# Patient Record
Sex: MALE | Race: BLACK OR AFRICAN AMERICAN | NOT HISPANIC OR LATINO | Employment: OTHER | URBAN - METROPOLITAN AREA
[De-identification: names, ages, dates, MRNs, and addresses within clinical notes are randomized per-mention and may not be internally consistent; named-entity substitution may affect disease eponyms.]

---

## 2023-01-01 ENCOUNTER — APPOINTMENT (OUTPATIENT)
Dept: GENERAL RADIOLOGY | Facility: CLINIC | Age: 88
DRG: 682 | End: 2023-01-01
Payer: COMMERCIAL

## 2023-01-01 ENCOUNTER — APPOINTMENT (OUTPATIENT)
Dept: SPEECH THERAPY | Facility: CLINIC | Age: 88
DRG: 871 | End: 2023-01-01
Payer: COMMERCIAL

## 2023-01-01 ENCOUNTER — VIRTUAL VISIT (OUTPATIENT)
Dept: INTERPRETER SERVICES | Facility: CLINIC | Age: 88
End: 2023-01-01

## 2023-01-01 ENCOUNTER — APPOINTMENT (OUTPATIENT)
Dept: GENERAL RADIOLOGY | Facility: CLINIC | Age: 88
DRG: 071 | End: 2023-01-01
Payer: COMMERCIAL

## 2023-01-01 ENCOUNTER — APPOINTMENT (OUTPATIENT)
Dept: GENERAL RADIOLOGY | Facility: CLINIC | Age: 88
DRG: 071 | End: 2023-01-01
Attending: HOSPITALIST
Payer: COMMERCIAL

## 2023-01-01 ENCOUNTER — APPOINTMENT (OUTPATIENT)
Dept: CT IMAGING | Facility: CLINIC | Age: 88
DRG: 871 | End: 2023-01-01
Attending: INTERNAL MEDICINE
Payer: COMMERCIAL

## 2023-01-01 ENCOUNTER — APPOINTMENT (OUTPATIENT)
Dept: INTERPRETER SERVICES | Facility: CLINIC | Age: 88
End: 2023-01-01
Payer: COMMERCIAL

## 2023-01-01 ENCOUNTER — APPOINTMENT (OUTPATIENT)
Dept: GENERAL RADIOLOGY | Facility: CLINIC | Age: 88
DRG: 871 | End: 2023-01-01
Attending: INTERNAL MEDICINE
Payer: COMMERCIAL

## 2023-01-01 ENCOUNTER — APPOINTMENT (OUTPATIENT)
Dept: CARDIOLOGY | Facility: CLINIC | Age: 88
DRG: 682 | End: 2023-01-01
Payer: COMMERCIAL

## 2023-01-01 ENCOUNTER — PATIENT OUTREACH (OUTPATIENT)
Dept: CARE COORDINATION | Facility: CLINIC | Age: 88
End: 2023-01-01
Payer: COMMERCIAL

## 2023-01-01 ENCOUNTER — APPOINTMENT (OUTPATIENT)
Dept: GENERAL RADIOLOGY | Facility: CLINIC | Age: 88
DRG: 871 | End: 2023-01-01
Attending: EMERGENCY MEDICINE
Payer: COMMERCIAL

## 2023-01-01 ENCOUNTER — HOSPITAL ENCOUNTER (INPATIENT)
Facility: CLINIC | Age: 88
LOS: 2 days | DRG: 682 | End: 2023-08-22
Attending: EMERGENCY MEDICINE | Admitting: INTERNAL MEDICINE
Payer: COMMERCIAL

## 2023-01-01 ENCOUNTER — APPOINTMENT (OUTPATIENT)
Dept: SPEECH THERAPY | Facility: CLINIC | Age: 88
DRG: 871 | End: 2023-01-01
Attending: HOSPITALIST
Payer: COMMERCIAL

## 2023-01-01 ENCOUNTER — APPOINTMENT (OUTPATIENT)
Dept: OCCUPATIONAL THERAPY | Facility: CLINIC | Age: 88
DRG: 871 | End: 2023-01-01
Attending: INTERNAL MEDICINE
Payer: COMMERCIAL

## 2023-01-01 ENCOUNTER — APPOINTMENT (OUTPATIENT)
Dept: CT IMAGING | Facility: CLINIC | Age: 88
DRG: 871 | End: 2023-01-01
Attending: EMERGENCY MEDICINE
Payer: COMMERCIAL

## 2023-01-01 ENCOUNTER — DOCUMENTATION ONLY (OUTPATIENT)
Dept: OTHER | Facility: CLINIC | Age: 88
End: 2023-01-01
Payer: COMMERCIAL

## 2023-01-01 ENCOUNTER — APPOINTMENT (OUTPATIENT)
Dept: GENERAL RADIOLOGY | Facility: CLINIC | Age: 88
DRG: 871 | End: 2023-01-01
Attending: HOSPITALIST
Payer: COMMERCIAL

## 2023-01-01 ENCOUNTER — APPOINTMENT (OUTPATIENT)
Dept: CT IMAGING | Facility: CLINIC | Age: 88
DRG: 871 | End: 2023-01-01
Attending: PHYSICIAN ASSISTANT
Payer: COMMERCIAL

## 2023-01-01 ENCOUNTER — HOSPITAL ENCOUNTER (INPATIENT)
Facility: CLINIC | Age: 88
LOS: 19 days | Discharge: NURSING, REHAB OR LONG-TERM CARE | DRG: 871 | End: 2023-07-17
Attending: EMERGENCY MEDICINE | Admitting: INTERNAL MEDICINE
Payer: COMMERCIAL

## 2023-01-01 ENCOUNTER — APPOINTMENT (OUTPATIENT)
Dept: GENERAL RADIOLOGY | Facility: CLINIC | Age: 88
DRG: 871 | End: 2023-01-01
Attending: PODIATRIST
Payer: COMMERCIAL

## 2023-01-01 ENCOUNTER — APPOINTMENT (OUTPATIENT)
Dept: CT IMAGING | Facility: CLINIC | Age: 88
DRG: 071 | End: 2023-01-01
Payer: COMMERCIAL

## 2023-01-01 ENCOUNTER — HOSPITAL ENCOUNTER (INPATIENT)
Facility: CLINIC | Age: 88
LOS: 2 days | Discharge: NURSING, REHAB OR LONG-TERM CARE | DRG: 641 | End: 2023-08-16
Attending: EMERGENCY MEDICINE | Admitting: INTERNAL MEDICINE
Payer: COMMERCIAL

## 2023-01-01 ENCOUNTER — MEDICAL CORRESPONDENCE (OUTPATIENT)
Dept: HEALTH INFORMATION MANAGEMENT | Facility: CLINIC | Age: 88
End: 2023-01-01
Payer: COMMERCIAL

## 2023-01-01 ENCOUNTER — APPOINTMENT (OUTPATIENT)
Dept: CARDIOLOGY | Facility: CLINIC | Age: 88
DRG: 871 | End: 2023-01-01
Attending: INTERNAL MEDICINE
Payer: COMMERCIAL

## 2023-01-01 ENCOUNTER — HOSPITAL ENCOUNTER (INPATIENT)
Facility: CLINIC | Age: 88
LOS: 4 days | Discharge: NURSING, REHAB OR LONG-TERM CARE | DRG: 071 | End: 2023-07-31
Admitting: HOSPITALIST
Payer: COMMERCIAL

## 2023-01-01 ENCOUNTER — APPOINTMENT (OUTPATIENT)
Dept: OCCUPATIONAL THERAPY | Facility: CLINIC | Age: 88
DRG: 871 | End: 2023-01-01
Payer: COMMERCIAL

## 2023-01-01 ENCOUNTER — APPOINTMENT (OUTPATIENT)
Dept: ULTRASOUND IMAGING | Facility: CLINIC | Age: 88
DRG: 682 | End: 2023-01-01
Attending: EMERGENCY MEDICINE
Payer: COMMERCIAL

## 2023-01-01 ENCOUNTER — APPOINTMENT (OUTPATIENT)
Dept: ULTRASOUND IMAGING | Facility: CLINIC | Age: 88
DRG: 071 | End: 2023-01-01
Attending: HOSPITALIST
Payer: COMMERCIAL

## 2023-01-01 ENCOUNTER — MEDICAL CORRESPONDENCE (OUTPATIENT)
Dept: HEALTH INFORMATION MANAGEMENT | Facility: CLINIC | Age: 88
End: 2023-01-01

## 2023-01-01 ENCOUNTER — APPOINTMENT (OUTPATIENT)
Dept: MRI IMAGING | Facility: CLINIC | Age: 88
DRG: 871 | End: 2023-01-01
Attending: NURSE PRACTITIONER
Payer: COMMERCIAL

## 2023-01-01 VITALS
HEART RATE: 104 BPM | SYSTOLIC BLOOD PRESSURE: 114 MMHG | DIASTOLIC BLOOD PRESSURE: 64 MMHG | TEMPERATURE: 98.7 F | RESPIRATION RATE: 16 BRPM | OXYGEN SATURATION: 98 %

## 2023-01-01 VITALS
WEIGHT: 220.68 LBS | BODY MASS INDEX: 34.64 KG/M2 | DIASTOLIC BLOOD PRESSURE: 62 MMHG | SYSTOLIC BLOOD PRESSURE: 116 MMHG | HEIGHT: 67 IN | OXYGEN SATURATION: 98 % | HEART RATE: 71 BPM | RESPIRATION RATE: 18 BRPM | TEMPERATURE: 97.8 F

## 2023-01-01 VITALS
WEIGHT: 188.71 LBS | RESPIRATION RATE: 18 BRPM | HEART RATE: 99 BPM | SYSTOLIC BLOOD PRESSURE: 72 MMHG | BODY MASS INDEX: 29.62 KG/M2 | DIASTOLIC BLOOD PRESSURE: 49 MMHG | OXYGEN SATURATION: 91 % | TEMPERATURE: 97.2 F

## 2023-01-01 VITALS
TEMPERATURE: 97.7 F | SYSTOLIC BLOOD PRESSURE: 106 MMHG | OXYGEN SATURATION: 100 % | RESPIRATION RATE: 18 BRPM | HEART RATE: 64 BPM | DIASTOLIC BLOOD PRESSURE: 55 MMHG | BODY MASS INDEX: 30.07 KG/M2 | WEIGHT: 191.58 LBS

## 2023-01-01 DIAGNOSIS — L97.919 ULCERS OF BOTH LOWER EXTREMITIES, UNSPECIFIED ULCER STAGE (H): ICD-10-CM

## 2023-01-01 DIAGNOSIS — R40.4 TRANSIENT ALTERATION OF AWARENESS: ICD-10-CM

## 2023-01-01 DIAGNOSIS — A41.9 SEPSIS WITH ACUTE RENAL FAILURE WITHOUT SEPTIC SHOCK, DUE TO UNSPECIFIED ORGANISM, UNSPECIFIED ACUTE RENAL FAILURE TYPE (H): ICD-10-CM

## 2023-01-01 DIAGNOSIS — A41.9 ACUTE SEPSIS (H): ICD-10-CM

## 2023-01-01 DIAGNOSIS — R65.20 SEPSIS WITH ACUTE RENAL FAILURE WITHOUT SEPTIC SHOCK, DUE TO UNSPECIFIED ORGANISM, UNSPECIFIED ACUTE RENAL FAILURE TYPE (H): ICD-10-CM

## 2023-01-01 DIAGNOSIS — R78.81 GRAM-POSITIVE BACTEREMIA: ICD-10-CM

## 2023-01-01 DIAGNOSIS — S81.812A LACERATION OF LEFT LOWER EXTREMITY, INITIAL ENCOUNTER: ICD-10-CM

## 2023-01-01 DIAGNOSIS — G93.41 METABOLIC ENCEPHALOPATHY: ICD-10-CM

## 2023-01-01 DIAGNOSIS — I50.22 CHRONIC SYSTOLIC CONGESTIVE HEART FAILURE (H): Primary | ICD-10-CM
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
## 2023-01-01 DIAGNOSIS — N17.9 SEPSIS WITH ACUTE RENAL FAILURE WITHOUT SEPTIC SHOCK, DUE TO UNSPECIFIED ORGANISM, UNSPECIFIED ACUTE RENAL FAILURE TYPE (H): ICD-10-CM

## 2023-01-01 DIAGNOSIS — R54 OLD AGE: Chronic | ICD-10-CM

## 2023-01-01 DIAGNOSIS — B37.49 CANDIDA UTI: ICD-10-CM

## 2023-01-01 DIAGNOSIS — I63.10 CEREBROVASCULAR ACCIDENT (CVA) DUE TO EMBOLISM OF PRECEREBRAL ARTERY (H): ICD-10-CM

## 2023-01-01 DIAGNOSIS — E16.2 HYPOGLYCEMIA: ICD-10-CM

## 2023-01-01 DIAGNOSIS — N17.9 ACUTE KIDNEY INJURY (H): ICD-10-CM

## 2023-01-01 DIAGNOSIS — R53.1 GENERALIZED WEAKNESS: ICD-10-CM

## 2023-01-01 DIAGNOSIS — I63.10 CEREBROVASCULAR ACCIDENT (CVA) DUE TO EMBOLISM OF PRECEREBRAL ARTERY (H): Primary | ICD-10-CM

## 2023-01-01 DIAGNOSIS — N30.01 ACUTE CYSTITIS WITH HEMATURIA: ICD-10-CM

## 2023-01-01 DIAGNOSIS — L97.929 ULCERS OF BOTH LOWER EXTREMITIES, UNSPECIFIED ULCER STAGE (H): ICD-10-CM

## 2023-01-01 DIAGNOSIS — N39.0 ACUTE UTI: ICD-10-CM

## 2023-01-01 DIAGNOSIS — I10 BENIGN ESSENTIAL HYPERTENSION: ICD-10-CM

## 2023-01-01 LAB
ABO/RH(D): NORMAL
ABO/RH(D): NORMAL
ALBUMIN SERPL BCG-MCNC: 1.6 G/DL (ref 3.5–5.2)
ALBUMIN SERPL BCG-MCNC: 1.7 G/DL (ref 3.5–5.2)
ALBUMIN SERPL BCG-MCNC: 2 G/DL (ref 3.5–5.2)
ALBUMIN SERPL BCG-MCNC: 2.2 G/DL (ref 3.5–5.2)
ALBUMIN SERPL BCG-MCNC: 2.4 G/DL (ref 3.5–5.2)
ALBUMIN UR-MCNC: 10 MG/DL
ALBUMIN UR-MCNC: 20 MG/DL
ALBUMIN UR-MCNC: 30 MG/DL
ALBUMIN UR-MCNC: NEGATIVE MG/DL
ALBUMIN UR-MCNC: NEGATIVE MG/DL
ALP SERPL-CCNC: 115 U/L (ref 40–129)
ALP SERPL-CCNC: 120 U/L (ref 40–129)
ALP SERPL-CCNC: 149 U/L (ref 40–129)
ALP SERPL-CCNC: 153 U/L (ref 40–129)
ALP SERPL-CCNC: 154 U/L (ref 40–129)
ALT SERPL W P-5'-P-CCNC: 11 U/L (ref 0–70)
ALT SERPL W P-5'-P-CCNC: 22 U/L (ref 0–70)
ALT SERPL W P-5'-P-CCNC: 36 U/L (ref 0–70)
ALT SERPL W P-5'-P-CCNC: 44 U/L (ref 0–70)
ALT SERPL W P-5'-P-CCNC: 7 U/L (ref 0–70)
ANION GAP SERPL CALCULATED.3IONS-SCNC: 10 MMOL/L (ref 7–15)
ANION GAP SERPL CALCULATED.3IONS-SCNC: 11 MMOL/L (ref 7–15)
ANION GAP SERPL CALCULATED.3IONS-SCNC: 13 MMOL/L (ref 7–15)
ANION GAP SERPL CALCULATED.3IONS-SCNC: 14 MMOL/L (ref 7–15)
ANION GAP SERPL CALCULATED.3IONS-SCNC: 17 MMOL/L (ref 7–15)
ANION GAP SERPL CALCULATED.3IONS-SCNC: 5 MMOL/L (ref 7–15)
ANION GAP SERPL CALCULATED.3IONS-SCNC: 6 MMOL/L (ref 7–15)
ANION GAP SERPL CALCULATED.3IONS-SCNC: 7 MMOL/L (ref 7–15)
ANION GAP SERPL CALCULATED.3IONS-SCNC: 8 MMOL/L (ref 7–15)
ANION GAP SERPL CALCULATED.3IONS-SCNC: 9 MMOL/L (ref 7–15)
ANTIBODY SCREEN: NEGATIVE
ANTIBODY SCREEN: NEGATIVE
APPEARANCE UR: ABNORMAL
APPEARANCE UR: CLEAR
APPEARANCE UR: CLEAR
APTT PPP: 110 SECONDS (ref 22–38)
AST SERPL W P-5'-P-CCNC: 113 U/L (ref 0–45)
AST SERPL W P-5'-P-CCNC: 21 U/L (ref 0–45)
AST SERPL W P-5'-P-CCNC: 36 U/L (ref 0–45)
AST SERPL W P-5'-P-CCNC: 61 U/L (ref 0–45)
AST SERPL W P-5'-P-CCNC: 86 U/L (ref 0–45)
ATRIAL RATE - MUSE: NORMAL BPM
ATRIAL RATE - MUSE: NORMAL BPM
BACTERIA #/AREA URNS HPF: ABNORMAL /HPF
BACTERIA BLD CULT: ABNORMAL
BACTERIA BLD CULT: NO GROWTH
BACTERIA UR CULT: ABNORMAL
BACTERIA UR CULT: NO GROWTH
BASOPHILS # BLD AUTO: 0 10E3/UL (ref 0–0.2)
BASOPHILS # BLD AUTO: 0.1 10E3/UL (ref 0–0.2)
BASOPHILS NFR BLD AUTO: 0 %
BASOPHILS NFR BLD AUTO: 1 %
BILIRUB DIRECT SERPL-MCNC: <0.2 MG/DL (ref 0–0.3)
BILIRUB SERPL-MCNC: 0.2 MG/DL
BILIRUB SERPL-MCNC: 0.3 MG/DL
BILIRUB SERPL-MCNC: 0.4 MG/DL
BILIRUB SERPL-MCNC: 0.4 MG/DL
BILIRUB SERPL-MCNC: 0.5 MG/DL
BILIRUB UR QL STRIP: NEGATIVE
BLD PROD TYP BPU: NORMAL
BLOOD COMPONENT TYPE: NORMAL
BUN SERPL-MCNC: 10.8 MG/DL (ref 8–23)
BUN SERPL-MCNC: 11 MG/DL (ref 8–23)
BUN SERPL-MCNC: 12.2 MG/DL (ref 8–23)
BUN SERPL-MCNC: 12.3 MG/DL (ref 8–23)
BUN SERPL-MCNC: 12.4 MG/DL (ref 8–23)
BUN SERPL-MCNC: 12.5 MG/DL (ref 8–23)
BUN SERPL-MCNC: 12.8 MG/DL (ref 8–23)
BUN SERPL-MCNC: 12.9 MG/DL (ref 8–23)
BUN SERPL-MCNC: 13.6 MG/DL (ref 8–23)
BUN SERPL-MCNC: 15 MG/DL (ref 8–23)
BUN SERPL-MCNC: 15.3 MG/DL (ref 8–23)
BUN SERPL-MCNC: 15.9 MG/DL (ref 8–23)
BUN SERPL-MCNC: 18 MG/DL (ref 8–23)
BUN SERPL-MCNC: 19 MG/DL (ref 8–23)
BUN SERPL-MCNC: 19.4 MG/DL (ref 8–23)
BUN SERPL-MCNC: 20 MG/DL (ref 8–23)
BUN SERPL-MCNC: 22.4 MG/DL (ref 8–23)
BUN SERPL-MCNC: 26.4 MG/DL (ref 8–23)
BUN SERPL-MCNC: 28.1 MG/DL (ref 8–23)
BUN SERPL-MCNC: 30.5 MG/DL (ref 8–23)
BUN SERPL-MCNC: 4.6 MG/DL (ref 8–23)
BUN SERPL-MCNC: 49.2 MG/DL (ref 8–23)
BUN SERPL-MCNC: 5.2 MG/DL (ref 8–23)
BUN SERPL-MCNC: 5.4 MG/DL (ref 8–23)
BUN SERPL-MCNC: 5.9 MG/DL (ref 8–23)
BUN SERPL-MCNC: 6.5 MG/DL (ref 8–23)
BUN SERPL-MCNC: 60.9 MG/DL (ref 8–23)
BUN SERPL-MCNC: 68.3 MG/DL (ref 8–23)
BUN SERPL-MCNC: 7.1 MG/DL (ref 8–23)
BUN SERPL-MCNC: 7.2 MG/DL (ref 8–23)
CA-I BLD-MCNC: 4.6 MG/DL (ref 4.4–5.2)
CALCIUM SERPL-MCNC: 10 MG/DL (ref 8.2–9.6)
CALCIUM SERPL-MCNC: 8.4 MG/DL (ref 8.2–9.6)
CALCIUM SERPL-MCNC: 8.5 MG/DL (ref 8.2–9.6)
CALCIUM SERPL-MCNC: 8.6 MG/DL (ref 8.2–9.6)
CALCIUM SERPL-MCNC: 8.6 MG/DL (ref 8.2–9.6)
CALCIUM SERPL-MCNC: 8.7 MG/DL (ref 8.2–9.6)
CALCIUM SERPL-MCNC: 8.8 MG/DL (ref 8.2–9.6)
CALCIUM SERPL-MCNC: 8.9 MG/DL (ref 8.2–9.6)
CALCIUM SERPL-MCNC: 9 MG/DL (ref 8.2–9.6)
CALCIUM SERPL-MCNC: 9.2 MG/DL (ref 8.2–9.6)
CALCIUM SERPL-MCNC: 9.3 MG/DL (ref 8.2–9.6)
CALCIUM SERPL-MCNC: 9.3 MG/DL (ref 8.2–9.6)
CALCIUM SERPL-MCNC: 9.4 MG/DL (ref 8.2–9.6)
CALCIUM SERPL-MCNC: 9.4 MG/DL (ref 8.2–9.6)
CALCIUM SERPL-MCNC: 9.5 MG/DL (ref 8.2–9.6)
CHLORIDE SERPL-SCNC: 100 MMOL/L (ref 98–107)
CHLORIDE SERPL-SCNC: 104 MMOL/L (ref 98–107)
CHLORIDE SERPL-SCNC: 106 MMOL/L (ref 98–107)
CHLORIDE SERPL-SCNC: 106 MMOL/L (ref 98–107)
CHLORIDE SERPL-SCNC: 108 MMOL/L (ref 98–107)
CHLORIDE SERPL-SCNC: 109 MMOL/L (ref 98–107)
CHLORIDE SERPL-SCNC: 110 MMOL/L (ref 98–107)
CHLORIDE SERPL-SCNC: 110 MMOL/L (ref 98–107)
CHLORIDE SERPL-SCNC: 111 MMOL/L (ref 98–107)
CHLORIDE SERPL-SCNC: 111 MMOL/L (ref 98–107)
CHLORIDE SERPL-SCNC: 112 MMOL/L (ref 98–107)
CHLORIDE SERPL-SCNC: 113 MMOL/L (ref 98–107)
CHLORIDE SERPL-SCNC: 114 MMOL/L (ref 98–107)
CHLORIDE SERPL-SCNC: 114 MMOL/L (ref 98–107)
CHLORIDE SERPL-SCNC: 115 MMOL/L (ref 98–107)
CHLORIDE SERPL-SCNC: 116 MMOL/L (ref 98–107)
CHLORIDE SERPL-SCNC: 120 MMOL/L (ref 98–107)
CHLORIDE SERPL-SCNC: 122 MMOL/L (ref 98–107)
CHLORIDE SERPL-SCNC: 123 MMOL/L (ref 98–107)
CHLORIDE SERPL-SCNC: 123 MMOL/L (ref 98–107)
CHLORIDE SERPL-SCNC: 128 MMOL/L (ref 98–107)
CHLORIDE SERPL-SCNC: 131 MMOL/L (ref 98–107)
CHOLEST SERPL-MCNC: 105 MG/DL
CODING SYSTEM: NORMAL
COLOR UR AUTO: ABNORMAL
COLOR UR AUTO: YELLOW
CREAT SERPL-MCNC: 0.59 MG/DL (ref 0.67–1.17)
CREAT SERPL-MCNC: 0.61 MG/DL (ref 0.67–1.17)
CREAT SERPL-MCNC: 0.61 MG/DL (ref 0.67–1.17)
CREAT SERPL-MCNC: 0.62 MG/DL (ref 0.67–1.17)
CREAT SERPL-MCNC: 0.63 MG/DL (ref 0.67–1.17)
CREAT SERPL-MCNC: 0.64 MG/DL (ref 0.67–1.17)
CREAT SERPL-MCNC: 0.64 MG/DL (ref 0.67–1.17)
CREAT SERPL-MCNC: 0.65 MG/DL (ref 0.67–1.17)
CREAT SERPL-MCNC: 0.68 MG/DL (ref 0.67–1.17)
CREAT SERPL-MCNC: 0.68 MG/DL (ref 0.67–1.17)
CREAT SERPL-MCNC: 0.71 MG/DL (ref 0.67–1.17)
CREAT SERPL-MCNC: 0.72 MG/DL (ref 0.67–1.17)
CREAT SERPL-MCNC: 0.73 MG/DL (ref 0.67–1.17)
CREAT SERPL-MCNC: 0.82 MG/DL (ref 0.67–1.17)
CREAT SERPL-MCNC: 0.84 MG/DL (ref 0.67–1.17)
CREAT SERPL-MCNC: 0.88 MG/DL (ref 0.67–1.17)
CREAT SERPL-MCNC: 0.88 MG/DL (ref 0.67–1.17)
CREAT SERPL-MCNC: 0.97 MG/DL (ref 0.67–1.17)
CREAT SERPL-MCNC: 1.03 MG/DL (ref 0.67–1.17)
CREAT SERPL-MCNC: 1.03 MG/DL (ref 0.67–1.17)
CREAT SERPL-MCNC: 1.05 MG/DL (ref 0.67–1.17)
CREAT SERPL-MCNC: 1.17 MG/DL (ref 0.67–1.17)
CREAT SERPL-MCNC: 1.41 MG/DL (ref 0.67–1.17)
CREAT SERPL-MCNC: 1.49 MG/DL (ref 0.67–1.17)
CREAT SERPL-MCNC: 1.63 MG/DL (ref 0.67–1.17)
CREAT SERPL-MCNC: 1.78 MG/DL (ref 0.67–1.17)
CREAT SERPL-MCNC: 2.29 MG/DL (ref 0.67–1.17)
CREAT SERPL-MCNC: 2.6 MG/DL (ref 0.67–1.17)
CREAT UR-MCNC: 46.1 MG/DL
CROSSMATCH: NORMAL
CROSSMATCH: NORMAL
DEPRECATED HCO3 PLAS-SCNC: 15 MMOL/L (ref 22–29)
DEPRECATED HCO3 PLAS-SCNC: 16 MMOL/L (ref 22–29)
DEPRECATED HCO3 PLAS-SCNC: 17 MMOL/L (ref 22–29)
DEPRECATED HCO3 PLAS-SCNC: 18 MMOL/L (ref 22–29)
DEPRECATED HCO3 PLAS-SCNC: 19 MMOL/L (ref 22–29)
DEPRECATED HCO3 PLAS-SCNC: 21 MMOL/L (ref 22–29)
DEPRECATED HCO3 PLAS-SCNC: 22 MMOL/L (ref 22–29)
DEPRECATED HCO3 PLAS-SCNC: 22 MMOL/L (ref 22–29)
DEPRECATED HCO3 PLAS-SCNC: 23 MMOL/L (ref 22–29)
DEPRECATED HCO3 PLAS-SCNC: 23 MMOL/L (ref 22–29)
DEPRECATED HCO3 PLAS-SCNC: 24 MMOL/L (ref 22–29)
DEPRECATED HCO3 PLAS-SCNC: 25 MMOL/L (ref 22–29)
DEPRECATED HCO3 PLAS-SCNC: 26 MMOL/L (ref 22–29)
DEPRECATED HCO3 PLAS-SCNC: 27 MMOL/L (ref 22–29)
DEPRECATED HCO3 PLAS-SCNC: 27 MMOL/L (ref 22–29)
DEPRECATED HCO3 PLAS-SCNC: 28 MMOL/L (ref 22–29)
DIASTOLIC BLOOD PRESSURE - MUSE: NORMAL MMHG
DIASTOLIC BLOOD PRESSURE - MUSE: NORMAL MMHG
ENTEROCOCCUS FAECALIS: NOT DETECTED
ENTEROCOCCUS FAECIUM: NOT DETECTED
EOSINOPHIL # BLD AUTO: 0 10E3/UL (ref 0–0.7)
EOSINOPHIL # BLD AUTO: 0 10E3/UL (ref 0–0.7)
EOSINOPHIL # BLD AUTO: 0.1 10E3/UL (ref 0–0.7)
EOSINOPHIL NFR BLD AUTO: 0 %
EOSINOPHIL NFR BLD AUTO: 0 %
EOSINOPHIL NFR BLD AUTO: 1 %
EOSINOPHIL NFR BLD AUTO: 1 %
EOSINOPHIL NFR BLD AUTO: 2 %
ERYTHROCYTE [DISTWIDTH] IN BLOOD BY AUTOMATED COUNT: 16.1 % (ref 10–15)
ERYTHROCYTE [DISTWIDTH] IN BLOOD BY AUTOMATED COUNT: 16.4 % (ref 10–15)
ERYTHROCYTE [DISTWIDTH] IN BLOOD BY AUTOMATED COUNT: 16.8 % (ref 10–15)
ERYTHROCYTE [DISTWIDTH] IN BLOOD BY AUTOMATED COUNT: 17 % (ref 10–15)
ERYTHROCYTE [DISTWIDTH] IN BLOOD BY AUTOMATED COUNT: 17.2 % (ref 10–15)
ERYTHROCYTE [DISTWIDTH] IN BLOOD BY AUTOMATED COUNT: 17.3 % (ref 10–15)
ERYTHROCYTE [DISTWIDTH] IN BLOOD BY AUTOMATED COUNT: 17.5 % (ref 10–15)
ERYTHROCYTE [DISTWIDTH] IN BLOOD BY AUTOMATED COUNT: 17.6 % (ref 10–15)
ERYTHROCYTE [DISTWIDTH] IN BLOOD BY AUTOMATED COUNT: 17.9 % (ref 10–15)
ERYTHROCYTE [DISTWIDTH] IN BLOOD BY AUTOMATED COUNT: 18.6 % (ref 10–15)
ERYTHROCYTE [DISTWIDTH] IN BLOOD BY AUTOMATED COUNT: 18.7 % (ref 10–15)
ERYTHROCYTE [DISTWIDTH] IN BLOOD BY AUTOMATED COUNT: 18.8 % (ref 10–15)
ERYTHROCYTE [DISTWIDTH] IN BLOOD BY AUTOMATED COUNT: 18.9 % (ref 10–15)
ERYTHROCYTE [DISTWIDTH] IN BLOOD BY AUTOMATED COUNT: 19 % (ref 10–15)
FRACT EXCRET NA UR+SERPL-RTO: 2.4 %
GFR SERPL CREATININE-BSD FRML MDRD: 21 ML/MIN/1.73M2
GFR SERPL CREATININE-BSD FRML MDRD: 25 ML/MIN/1.73M2
GFR SERPL CREATININE-BSD FRML MDRD: 34 ML/MIN/1.73M2
GFR SERPL CREATININE-BSD FRML MDRD: 38 ML/MIN/1.73M2
GFR SERPL CREATININE-BSD FRML MDRD: 42 ML/MIN/1.73M2
GFR SERPL CREATININE-BSD FRML MDRD: 45 ML/MIN/1.73M2
GFR SERPL CREATININE-BSD FRML MDRD: 56 ML/MIN/1.73M2
GFR SERPL CREATININE-BSD FRML MDRD: 64 ML/MIN/1.73M2
GFR SERPL CREATININE-BSD FRML MDRD: 65 ML/MIN/1.73M2
GFR SERPL CREATININE-BSD FRML MDRD: 65 ML/MIN/1.73M2
GFR SERPL CREATININE-BSD FRML MDRD: 70 ML/MIN/1.73M2
GFR SERPL CREATININE-BSD FRML MDRD: 77 ML/MIN/1.73M2
GFR SERPL CREATININE-BSD FRML MDRD: 77 ML/MIN/1.73M2
GFR SERPL CREATININE-BSD FRML MDRD: 78 ML/MIN/1.73M2
GFR SERPL CREATININE-BSD FRML MDRD: 79 ML/MIN/1.73M2
GFR SERPL CREATININE-BSD FRML MDRD: 82 ML/MIN/1.73M2
GFR SERPL CREATININE-BSD FRML MDRD: 83 ML/MIN/1.73M2
GFR SERPL CREATININE-BSD FRML MDRD: 83 ML/MIN/1.73M2
GFR SERPL CREATININE-BSD FRML MDRD: 85 ML/MIN/1.73M2
GFR SERPL CREATININE-BSD FRML MDRD: 86 ML/MIN/1.73M2
GFR SERPL CREATININE-BSD FRML MDRD: 87 ML/MIN/1.73M2
GLUCOSE BLDC GLUCOMTR-MCNC: 100 MG/DL (ref 70–99)
GLUCOSE BLDC GLUCOMTR-MCNC: 100 MG/DL (ref 70–99)
GLUCOSE BLDC GLUCOMTR-MCNC: 101 MG/DL (ref 70–99)
GLUCOSE BLDC GLUCOMTR-MCNC: 102 MG/DL (ref 70–99)
GLUCOSE BLDC GLUCOMTR-MCNC: 103 MG/DL (ref 70–99)
GLUCOSE BLDC GLUCOMTR-MCNC: 103 MG/DL (ref 70–99)
GLUCOSE BLDC GLUCOMTR-MCNC: 104 MG/DL (ref 70–99)
GLUCOSE BLDC GLUCOMTR-MCNC: 106 MG/DL (ref 70–99)
GLUCOSE BLDC GLUCOMTR-MCNC: 106 MG/DL (ref 70–99)
GLUCOSE BLDC GLUCOMTR-MCNC: 107 MG/DL (ref 70–99)
GLUCOSE BLDC GLUCOMTR-MCNC: 109 MG/DL (ref 70–99)
GLUCOSE BLDC GLUCOMTR-MCNC: 109 MG/DL (ref 70–99)
GLUCOSE BLDC GLUCOMTR-MCNC: 110 MG/DL (ref 70–99)
GLUCOSE BLDC GLUCOMTR-MCNC: 111 MG/DL (ref 70–99)
GLUCOSE BLDC GLUCOMTR-MCNC: 113 MG/DL (ref 70–99)
GLUCOSE BLDC GLUCOMTR-MCNC: 113 MG/DL (ref 70–99)
GLUCOSE BLDC GLUCOMTR-MCNC: 114 MG/DL (ref 70–99)
GLUCOSE BLDC GLUCOMTR-MCNC: 116 MG/DL (ref 70–99)
GLUCOSE BLDC GLUCOMTR-MCNC: 116 MG/DL (ref 70–99)
GLUCOSE BLDC GLUCOMTR-MCNC: 117 MG/DL (ref 70–99)
GLUCOSE BLDC GLUCOMTR-MCNC: 118 MG/DL (ref 70–99)
GLUCOSE BLDC GLUCOMTR-MCNC: 119 MG/DL (ref 70–99)
GLUCOSE BLDC GLUCOMTR-MCNC: 119 MG/DL (ref 70–99)
GLUCOSE BLDC GLUCOMTR-MCNC: 120 MG/DL (ref 70–99)
GLUCOSE BLDC GLUCOMTR-MCNC: 120 MG/DL (ref 70–99)
GLUCOSE BLDC GLUCOMTR-MCNC: 122 MG/DL (ref 70–99)
GLUCOSE BLDC GLUCOMTR-MCNC: 125 MG/DL (ref 70–99)
GLUCOSE BLDC GLUCOMTR-MCNC: 127 MG/DL (ref 70–99)
GLUCOSE BLDC GLUCOMTR-MCNC: 129 MG/DL (ref 70–99)
GLUCOSE BLDC GLUCOMTR-MCNC: 130 MG/DL (ref 70–99)
GLUCOSE BLDC GLUCOMTR-MCNC: 130 MG/DL (ref 70–99)
GLUCOSE BLDC GLUCOMTR-MCNC: 132 MG/DL (ref 70–99)
GLUCOSE BLDC GLUCOMTR-MCNC: 136 MG/DL (ref 70–99)
GLUCOSE BLDC GLUCOMTR-MCNC: 140 MG/DL (ref 70–99)
GLUCOSE BLDC GLUCOMTR-MCNC: 142 MG/DL (ref 70–99)
GLUCOSE BLDC GLUCOMTR-MCNC: 145 MG/DL (ref 70–99)
GLUCOSE BLDC GLUCOMTR-MCNC: 173 MG/DL (ref 70–99)
GLUCOSE BLDC GLUCOMTR-MCNC: 53 MG/DL (ref 70–99)
GLUCOSE BLDC GLUCOMTR-MCNC: 54 MG/DL (ref 70–99)
GLUCOSE BLDC GLUCOMTR-MCNC: 56 MG/DL (ref 70–99)
GLUCOSE BLDC GLUCOMTR-MCNC: 61 MG/DL (ref 70–99)
GLUCOSE BLDC GLUCOMTR-MCNC: 61 MG/DL (ref 70–99)
GLUCOSE BLDC GLUCOMTR-MCNC: 62 MG/DL (ref 70–99)
GLUCOSE BLDC GLUCOMTR-MCNC: 63 MG/DL (ref 70–99)
GLUCOSE BLDC GLUCOMTR-MCNC: 64 MG/DL (ref 70–99)
GLUCOSE BLDC GLUCOMTR-MCNC: 64 MG/DL (ref 70–99)
GLUCOSE BLDC GLUCOMTR-MCNC: 65 MG/DL (ref 70–99)
GLUCOSE BLDC GLUCOMTR-MCNC: 66 MG/DL (ref 70–99)
GLUCOSE BLDC GLUCOMTR-MCNC: 67 MG/DL (ref 70–99)
GLUCOSE BLDC GLUCOMTR-MCNC: 68 MG/DL (ref 70–99)
GLUCOSE BLDC GLUCOMTR-MCNC: 68 MG/DL (ref 70–99)
GLUCOSE BLDC GLUCOMTR-MCNC: 69 MG/DL (ref 70–99)
GLUCOSE BLDC GLUCOMTR-MCNC: 71 MG/DL (ref 70–99)
GLUCOSE BLDC GLUCOMTR-MCNC: 72 MG/DL (ref 70–99)
GLUCOSE BLDC GLUCOMTR-MCNC: 73 MG/DL (ref 70–99)
GLUCOSE BLDC GLUCOMTR-MCNC: 74 MG/DL (ref 70–99)
GLUCOSE BLDC GLUCOMTR-MCNC: 75 MG/DL (ref 70–99)
GLUCOSE BLDC GLUCOMTR-MCNC: 76 MG/DL (ref 70–99)
GLUCOSE BLDC GLUCOMTR-MCNC: 77 MG/DL (ref 70–99)
GLUCOSE BLDC GLUCOMTR-MCNC: 78 MG/DL (ref 70–99)
GLUCOSE BLDC GLUCOMTR-MCNC: 79 MG/DL (ref 70–99)
GLUCOSE BLDC GLUCOMTR-MCNC: 80 MG/DL (ref 70–99)
GLUCOSE BLDC GLUCOMTR-MCNC: 81 MG/DL (ref 70–99)
GLUCOSE BLDC GLUCOMTR-MCNC: 83 MG/DL (ref 70–99)
GLUCOSE BLDC GLUCOMTR-MCNC: 84 MG/DL (ref 70–99)
GLUCOSE BLDC GLUCOMTR-MCNC: 85 MG/DL (ref 70–99)
GLUCOSE BLDC GLUCOMTR-MCNC: 86 MG/DL (ref 70–99)
GLUCOSE BLDC GLUCOMTR-MCNC: 87 MG/DL (ref 70–99)
GLUCOSE BLDC GLUCOMTR-MCNC: 88 MG/DL (ref 70–99)
GLUCOSE BLDC GLUCOMTR-MCNC: 88 MG/DL (ref 70–99)
GLUCOSE BLDC GLUCOMTR-MCNC: 89 MG/DL (ref 70–99)
GLUCOSE BLDC GLUCOMTR-MCNC: 89 MG/DL (ref 70–99)
GLUCOSE BLDC GLUCOMTR-MCNC: 90 MG/DL (ref 70–99)
GLUCOSE BLDC GLUCOMTR-MCNC: 91 MG/DL (ref 70–99)
GLUCOSE BLDC GLUCOMTR-MCNC: 91 MG/DL (ref 70–99)
GLUCOSE BLDC GLUCOMTR-MCNC: 92 MG/DL (ref 70–99)
GLUCOSE BLDC GLUCOMTR-MCNC: 92 MG/DL (ref 70–99)
GLUCOSE BLDC GLUCOMTR-MCNC: 93 MG/DL (ref 70–99)
GLUCOSE BLDC GLUCOMTR-MCNC: 94 MG/DL (ref 70–99)
GLUCOSE BLDC GLUCOMTR-MCNC: 95 MG/DL (ref 70–99)
GLUCOSE BLDC GLUCOMTR-MCNC: 96 MG/DL (ref 70–99)
GLUCOSE BLDC GLUCOMTR-MCNC: 97 MG/DL (ref 70–99)
GLUCOSE BLDC GLUCOMTR-MCNC: 97 MG/DL (ref 70–99)
GLUCOSE BLDC GLUCOMTR-MCNC: 98 MG/DL (ref 70–99)
GLUCOSE BLDC GLUCOMTR-MCNC: 99 MG/DL (ref 70–99)
GLUCOSE SERPL-MCNC: 100 MG/DL (ref 70–99)
GLUCOSE SERPL-MCNC: 103 MG/DL (ref 70–99)
GLUCOSE SERPL-MCNC: 103 MG/DL (ref 70–99)
GLUCOSE SERPL-MCNC: 109 MG/DL (ref 70–99)
GLUCOSE SERPL-MCNC: 111 MG/DL (ref 70–99)
GLUCOSE SERPL-MCNC: 120 MG/DL (ref 70–99)
GLUCOSE SERPL-MCNC: 121 MG/DL (ref 70–99)
GLUCOSE SERPL-MCNC: 127 MG/DL (ref 70–99)
GLUCOSE SERPL-MCNC: 130 MG/DL (ref 70–99)
GLUCOSE SERPL-MCNC: 137 MG/DL (ref 70–99)
GLUCOSE SERPL-MCNC: 139 MG/DL (ref 70–99)
GLUCOSE SERPL-MCNC: 144 MG/DL (ref 70–99)
GLUCOSE SERPL-MCNC: 208 MG/DL (ref 70–99)
GLUCOSE SERPL-MCNC: 45 MG/DL (ref 70–99)
GLUCOSE SERPL-MCNC: 58 MG/DL (ref 70–99)
GLUCOSE SERPL-MCNC: 63 MG/DL (ref 70–99)
GLUCOSE SERPL-MCNC: 71 MG/DL (ref 70–99)
GLUCOSE SERPL-MCNC: 73 MG/DL (ref 70–99)
GLUCOSE SERPL-MCNC: 78 MG/DL (ref 70–99)
GLUCOSE SERPL-MCNC: 83 MG/DL (ref 70–99)
GLUCOSE SERPL-MCNC: 84 MG/DL (ref 70–99)
GLUCOSE SERPL-MCNC: 84 MG/DL (ref 70–99)
GLUCOSE SERPL-MCNC: 85 MG/DL (ref 70–99)
GLUCOSE SERPL-MCNC: 86 MG/DL (ref 70–99)
GLUCOSE SERPL-MCNC: 92 MG/DL (ref 70–99)
GLUCOSE SERPL-MCNC: 93 MG/DL (ref 70–99)
GLUCOSE SERPL-MCNC: 94 MG/DL (ref 70–99)
GLUCOSE SERPL-MCNC: 98 MG/DL (ref 70–99)
GLUCOSE UR STRIP-MCNC: NEGATIVE MG/DL
HBA1C MFR BLD: 5.8 %
HCO3 BLDV-SCNC: 19 MMOL/L (ref 21–28)
HCO3 BLDV-SCNC: 29 MMOL/L (ref 21–28)
HCT VFR BLD AUTO: 27.5 % (ref 40–53)
HCT VFR BLD AUTO: 28.6 % (ref 40–53)
HCT VFR BLD AUTO: 29.7 % (ref 40–53)
HCT VFR BLD AUTO: 29.8 % (ref 40–53)
HCT VFR BLD AUTO: 30.8 % (ref 40–53)
HCT VFR BLD AUTO: 32.3 % (ref 40–53)
HCT VFR BLD AUTO: 35.3 % (ref 40–53)
HCT VFR BLD AUTO: 35.8 % (ref 40–53)
HCT VFR BLD AUTO: 36.2 % (ref 40–53)
HCT VFR BLD AUTO: 36.8 % (ref 40–53)
HCT VFR BLD AUTO: 37.1 % (ref 40–53)
HCT VFR BLD AUTO: 37.4 % (ref 40–53)
HCT VFR BLD AUTO: 37.7 % (ref 40–53)
HCT VFR BLD AUTO: 38.8 % (ref 40–53)
HCT VFR BLD AUTO: 39.1 % (ref 40–53)
HCT VFR BLD AUTO: 43.2 % (ref 40–53)
HDLC SERPL-MCNC: 36 MG/DL
HGB BLD-MCNC: 10.1 G/DL (ref 13.3–17.7)
HGB BLD-MCNC: 10.6 G/DL (ref 13.3–17.7)
HGB BLD-MCNC: 10.8 G/DL (ref 13.3–17.7)
HGB BLD-MCNC: 11 G/DL (ref 13.3–17.7)
HGB BLD-MCNC: 11.2 G/DL (ref 13.3–17.7)
HGB BLD-MCNC: 11.2 G/DL (ref 13.3–17.7)
HGB BLD-MCNC: 11.5 G/DL (ref 13.3–17.7)
HGB BLD-MCNC: 11.6 G/DL (ref 13.3–17.7)
HGB BLD-MCNC: 11.7 G/DL (ref 13.3–17.7)
HGB BLD-MCNC: 11.7 G/DL (ref 13.3–17.7)
HGB BLD-MCNC: 12.1 G/DL (ref 13.3–17.7)
HGB BLD-MCNC: 13.4 G/DL (ref 13.3–17.7)
HGB BLD-MCNC: 8.6 G/DL (ref 13.3–17.7)
HGB BLD-MCNC: 8.7 G/DL (ref 13.3–17.7)
HGB BLD-MCNC: 9.1 G/DL (ref 13.3–17.7)
HGB BLD-MCNC: 9.3 G/DL (ref 13.3–17.7)
HGB BLD-MCNC: 9.5 G/DL (ref 13.3–17.7)
HGB BLD-MCNC: 9.6 G/DL (ref 13.3–17.7)
HGB BLD-MCNC: 9.8 G/DL (ref 13.3–17.7)
HGB BLD-MCNC: 9.8 G/DL (ref 13.3–17.7)
HGB BLD-MCNC: 9.9 G/DL (ref 13.3–17.7)
HGB UR QL STRIP: ABNORMAL
HOLD SPECIMEN: NORMAL
HOLD SPECIMEN: NORMAL
HYALINE CASTS: 2 /LPF
HYALINE CASTS: 5 /LPF
IMM GRANULOCYTES # BLD: 0.1 10E3/UL
IMM GRANULOCYTES # BLD: 0.2 10E3/UL
IMM GRANULOCYTES # BLD: 0.4 10E3/UL
IMM GRANULOCYTES NFR BLD: 1 %
IMM GRANULOCYTES NFR BLD: 2 %
IMM GRANULOCYTES NFR BLD: 4 %
INR PPP: 1 (ref 0.85–1.15)
INR PPP: 1.08 (ref 0.85–1.15)
INTERPRETATION ECG - MUSE: NORMAL
INTERPRETATION ECG - MUSE: NORMAL
ISSUE DATE AND TIME: NORMAL
KETONES UR STRIP-MCNC: NEGATIVE MG/DL
LACTATE BLD-SCNC: 1.3 MMOL/L
LACTATE BLD-SCNC: 1.9 MMOL/L
LACTATE SERPL-SCNC: 1.7 MMOL/L (ref 0.7–2)
LACTATE SERPL-SCNC: 1.8 MMOL/L (ref 0.7–2)
LACTATE SERPL-SCNC: 2 MMOL/L (ref 0.7–2)
LACTATE SERPL-SCNC: 2 MMOL/L (ref 0.7–2)
LDLC SERPL CALC-MCNC: 50 MG/DL
LEUKOCYTE ESTERASE UR QL STRIP: ABNORMAL
LISTERIA SPECIES (DETECTED/NOT DETECTED): NOT DETECTED
LVEF ECHO: NORMAL
LVEF ECHO: NORMAL
LYMPHOCYTES # BLD AUTO: 1.1 10E3/UL (ref 0.8–5.3)
LYMPHOCYTES # BLD AUTO: 1.3 10E3/UL (ref 0.8–5.3)
LYMPHOCYTES # BLD AUTO: 2.1 10E3/UL (ref 0.8–5.3)
LYMPHOCYTES # BLD AUTO: 2.5 10E3/UL (ref 0.8–5.3)
LYMPHOCYTES # BLD AUTO: 3.4 10E3/UL (ref 0.8–5.3)
LYMPHOCYTES NFR BLD AUTO: 17 %
LYMPHOCYTES NFR BLD AUTO: 17 %
LYMPHOCYTES NFR BLD AUTO: 21 %
LYMPHOCYTES NFR BLD AUTO: 27 %
LYMPHOCYTES NFR BLD AUTO: 34 %
MAGNESIUM SERPL-MCNC: 1.5 MG/DL (ref 1.7–2.3)
MAGNESIUM SERPL-MCNC: 1.5 MG/DL (ref 1.7–2.3)
MAGNESIUM SERPL-MCNC: 1.6 MG/DL (ref 1.7–2.3)
MAGNESIUM SERPL-MCNC: 1.6 MG/DL (ref 1.7–2.3)
MAGNESIUM SERPL-MCNC: 1.8 MG/DL (ref 1.7–2.3)
MAGNESIUM SERPL-MCNC: 1.9 MG/DL (ref 1.7–2.3)
MAGNESIUM SERPL-MCNC: 2 MG/DL (ref 1.7–2.3)
MAGNESIUM SERPL-MCNC: 2 MG/DL (ref 1.7–2.3)
MAGNESIUM SERPL-MCNC: 2.2 MG/DL (ref 1.7–2.3)
MAGNESIUM SERPL-MCNC: 2.2 MG/DL (ref 1.7–2.3)
MCH RBC QN AUTO: 30.6 PG (ref 26.5–33)
MCH RBC QN AUTO: 30.9 PG (ref 26.5–33)
MCH RBC QN AUTO: 31 PG (ref 26.5–33)
MCH RBC QN AUTO: 31.2 PG (ref 26.5–33)
MCH RBC QN AUTO: 31.3 PG (ref 26.5–33)
MCH RBC QN AUTO: 31.5 PG (ref 26.5–33)
MCH RBC QN AUTO: 31.5 PG (ref 26.5–33)
MCH RBC QN AUTO: 31.6 PG (ref 26.5–33)
MCH RBC QN AUTO: 31.6 PG (ref 26.5–33)
MCH RBC QN AUTO: 31.8 PG (ref 26.5–33)
MCH RBC QN AUTO: 32 PG (ref 26.5–33)
MCH RBC QN AUTO: 32.1 PG (ref 26.5–33)
MCHC RBC AUTO-ENTMCNC: 29.6 G/DL (ref 31.5–36.5)
MCHC RBC AUTO-ENTMCNC: 30.2 G/DL (ref 31.5–36.5)
MCHC RBC AUTO-ENTMCNC: 30.4 G/DL (ref 31.5–36.5)
MCHC RBC AUTO-ENTMCNC: 30.6 G/DL (ref 31.5–36.5)
MCHC RBC AUTO-ENTMCNC: 30.7 G/DL (ref 31.5–36.5)
MCHC RBC AUTO-ENTMCNC: 30.8 G/DL (ref 31.5–36.5)
MCHC RBC AUTO-ENTMCNC: 30.9 G/DL (ref 31.5–36.5)
MCHC RBC AUTO-ENTMCNC: 30.9 G/DL (ref 31.5–36.5)
MCHC RBC AUTO-ENTMCNC: 31 G/DL (ref 31.5–36.5)
MCHC RBC AUTO-ENTMCNC: 31 G/DL (ref 31.5–36.5)
MCHC RBC AUTO-ENTMCNC: 31.2 G/DL (ref 31.5–36.5)
MCHC RBC AUTO-ENTMCNC: 31.3 G/DL (ref 31.5–36.5)
MCHC RBC AUTO-ENTMCNC: 31.8 G/DL (ref 31.5–36.5)
MCV RBC AUTO: 100 FL (ref 78–100)
MCV RBC AUTO: 101 FL (ref 78–100)
MCV RBC AUTO: 102 FL (ref 78–100)
MCV RBC AUTO: 103 FL (ref 78–100)
MCV RBC AUTO: 104 FL (ref 78–100)
MCV RBC AUTO: 99 FL (ref 78–100)
MCV RBC AUTO: 99 FL (ref 78–100)
MONOCYTES # BLD AUTO: 0.3 10E3/UL (ref 0–1.3)
MONOCYTES # BLD AUTO: 0.6 10E3/UL (ref 0–1.3)
MONOCYTES # BLD AUTO: 0.6 10E3/UL (ref 0–1.3)
MONOCYTES # BLD AUTO: 0.7 10E3/UL (ref 0–1.3)
MONOCYTES # BLD AUTO: 0.8 10E3/UL (ref 0–1.3)
MONOCYTES NFR BLD AUTO: 5 %
MONOCYTES NFR BLD AUTO: 5 %
MONOCYTES NFR BLD AUTO: 8 %
MONOCYTES NFR BLD AUTO: 8 %
MONOCYTES NFR BLD AUTO: 9 %
MRSA DNA SPEC QL NAA+PROBE: NEGATIVE
MUCOUS THREADS #/AREA URNS LPF: PRESENT /LPF
MUCOUS THREADS #/AREA URNS LPF: PRESENT /LPF
NEUTROPHILS # BLD AUTO: 4.7 10E3/UL (ref 1.6–8.3)
NEUTROPHILS # BLD AUTO: 5 10E3/UL (ref 1.6–8.3)
NEUTROPHILS # BLD AUTO: 5.4 10E3/UL (ref 1.6–8.3)
NEUTROPHILS # BLD AUTO: 5.4 10E3/UL (ref 1.6–8.3)
NEUTROPHILS # BLD AUTO: 8 10E3/UL (ref 1.6–8.3)
NEUTROPHILS NFR BLD AUTO: 55 %
NEUTROPHILS NFR BLD AUTO: 62 %
NEUTROPHILS NFR BLD AUTO: 69 %
NEUTROPHILS NFR BLD AUTO: 74 %
NEUTROPHILS NFR BLD AUTO: 75 %
NITRATE UR QL: NEGATIVE
NONHDLC SERPL-MCNC: 69 MG/DL
NRBC # BLD AUTO: 0 10E3/UL
NRBC # BLD AUTO: 0.1 10E3/UL
NRBC BLD AUTO-RTO: 0 /100
NRBC BLD AUTO-RTO: 1 /100
NT-PROBNP SERPL-MCNC: 4864 PG/ML (ref 0–1800)
P AXIS - MUSE: NORMAL DEGREES
P AXIS - MUSE: NORMAL DEGREES
PCO2 BLDV: 42 MM HG (ref 40–50)
PCO2 BLDV: 55 MM HG (ref 40–50)
PH BLDV: 7.26 [PH] (ref 7.32–7.43)
PH BLDV: 7.33 [PH] (ref 7.32–7.43)
PH UR STRIP: 5 [PH] (ref 5–7)
PH UR STRIP: 5.5 [PH] (ref 5–7)
PHOSPHATE SERPL-MCNC: 1.6 MG/DL (ref 2.5–4.5)
PHOSPHATE SERPL-MCNC: 1.7 MG/DL (ref 2.5–4.5)
PHOSPHATE SERPL-MCNC: 1.7 MG/DL (ref 2.5–4.5)
PHOSPHATE SERPL-MCNC: 1.8 MG/DL (ref 2.5–4.5)
PHOSPHATE SERPL-MCNC: 2 MG/DL (ref 2.5–4.5)
PHOSPHATE SERPL-MCNC: 2 MG/DL (ref 2.5–4.5)
PHOSPHATE SERPL-MCNC: 2.1 MG/DL (ref 2.5–4.5)
PHOSPHATE SERPL-MCNC: 2.2 MG/DL (ref 2.5–4.5)
PHOSPHATE SERPL-MCNC: 2.3 MG/DL (ref 2.5–4.5)
PHOSPHATE SERPL-MCNC: 2.3 MG/DL (ref 2.5–4.5)
PHOSPHATE SERPL-MCNC: 2.5 MG/DL (ref 2.5–4.5)
PHOSPHATE SERPL-MCNC: 2.5 MG/DL (ref 2.5–4.5)
PHOSPHATE SERPL-MCNC: 2.9 MG/DL (ref 2.5–4.5)
PHOSPHATE SERPL-MCNC: 3.1 MG/DL (ref 2.5–4.5)
PHOSPHATE SERPL-MCNC: 3.1 MG/DL (ref 2.5–4.5)
PHOSPHATE SERPL-MCNC: 3.7 MG/DL (ref 2.5–4.5)
PHOSPHATE SERPL-MCNC: 3.8 MG/DL (ref 2.5–4.5)
PLATELET # BLD AUTO: 108 10E3/UL (ref 150–450)
PLATELET # BLD AUTO: 111 10E3/UL (ref 150–450)
PLATELET # BLD AUTO: 114 10E3/UL (ref 150–450)
PLATELET # BLD AUTO: 119 10E3/UL (ref 150–450)
PLATELET # BLD AUTO: 119 10E3/UL (ref 150–450)
PLATELET # BLD AUTO: 121 10E3/UL (ref 150–450)
PLATELET # BLD AUTO: 122 10E3/UL (ref 150–450)
PLATELET # BLD AUTO: 124 10E3/UL (ref 150–450)
PLATELET # BLD AUTO: 124 10E3/UL (ref 150–450)
PLATELET # BLD AUTO: 125 10E3/UL (ref 150–450)
PLATELET # BLD AUTO: 126 10E3/UL (ref 150–450)
PLATELET # BLD AUTO: 127 10E3/UL (ref 150–450)
PLATELET # BLD AUTO: 135 10E3/UL (ref 150–450)
PLATELET # BLD AUTO: 143 10E3/UL (ref 150–450)
PLATELET # BLD AUTO: 144 10E3/UL (ref 150–450)
PLATELET # BLD AUTO: 151 10E3/UL (ref 150–450)
PLATELET # BLD AUTO: 180 10E3/UL (ref 150–450)
PLATELET # BLD AUTO: 182 10E3/UL (ref 150–450)
PLATELET # BLD AUTO: 193 10E3/UL (ref 150–450)
PLATELET # BLD AUTO: 201 10E3/UL (ref 150–450)
PLATELET # BLD AUTO: 87 10E3/UL (ref 150–450)
PLATELET # BLD AUTO: 98 10E3/UL (ref 150–450)
PO2 BLDV: 16 MM HG (ref 25–47)
PO2 BLDV: 18 MM HG (ref 25–47)
POTASSIUM SERPL-SCNC: 3.1 MMOL/L (ref 3.4–5.3)
POTASSIUM SERPL-SCNC: 3.2 MMOL/L (ref 3.4–5.3)
POTASSIUM SERPL-SCNC: 3.2 MMOL/L (ref 3.4–5.3)
POTASSIUM SERPL-SCNC: 3.3 MMOL/L (ref 3.4–5.3)
POTASSIUM SERPL-SCNC: 3.3 MMOL/L (ref 3.4–5.3)
POTASSIUM SERPL-SCNC: 3.4 MMOL/L (ref 3.4–5.3)
POTASSIUM SERPL-SCNC: 3.6 MMOL/L (ref 3.4–5.3)
POTASSIUM SERPL-SCNC: 3.7 MMOL/L (ref 3.4–5.3)
POTASSIUM SERPL-SCNC: 3.7 MMOL/L (ref 3.4–5.3)
POTASSIUM SERPL-SCNC: 3.8 MMOL/L (ref 3.4–5.3)
POTASSIUM SERPL-SCNC: 3.8 MMOL/L (ref 3.4–5.3)
POTASSIUM SERPL-SCNC: 3.9 MMOL/L (ref 3.4–5.3)
POTASSIUM SERPL-SCNC: 4 MMOL/L (ref 3.4–5.3)
POTASSIUM SERPL-SCNC: 4.1 MMOL/L (ref 3.4–5.3)
POTASSIUM SERPL-SCNC: 4.2 MMOL/L (ref 3.4–5.3)
POTASSIUM SERPL-SCNC: 4.4 MMOL/L (ref 3.4–5.3)
POTASSIUM SERPL-SCNC: 4.5 MMOL/L (ref 3.4–5.3)
POTASSIUM SERPL-SCNC: 4.7 MMOL/L (ref 3.4–5.3)
POTASSIUM SERPL-SCNC: 4.7 MMOL/L (ref 3.4–5.3)
POTASSIUM SERPL-SCNC: 4.8 MMOL/L (ref 3.4–5.3)
POTASSIUM SERPL-SCNC: 4.8 MMOL/L (ref 3.4–5.3)
POTASSIUM SERPL-SCNC: 4.9 MMOL/L (ref 3.4–5.3)
POTASSIUM SERPL-SCNC: 5.1 MMOL/L (ref 3.4–5.3)
POTASSIUM SERPL-SCNC: 5.6 MMOL/L (ref 3.4–5.3)
POTASSIUM SERPL-SCNC: 6.7 MMOL/L (ref 3.4–5.3)
PR INTERVAL - MUSE: NORMAL MS
PR INTERVAL - MUSE: NORMAL MS
PROCALCITONIN SERPL IA-MCNC: 0.09 NG/ML
PROCALCITONIN SERPL IA-MCNC: 0.15 NG/ML
PROCALCITONIN SERPL IA-MCNC: 0.26 NG/ML
PROT SERPL-MCNC: 4.7 G/DL (ref 6.4–8.3)
PROT SERPL-MCNC: 4.8 G/DL (ref 6.4–8.3)
PROT SERPL-MCNC: 5.7 G/DL (ref 6.4–8.3)
PROT SERPL-MCNC: 5.7 G/DL (ref 6.4–8.3)
PROT SERPL-MCNC: 6.8 G/DL (ref 6.4–8.3)
QRS DURATION - MUSE: 106 MS
QRS DURATION - MUSE: 98 MS
QT - MUSE: 336 MS
QT - MUSE: 384 MS
QTC - MUSE: 420 MS
QTC - MUSE: 446 MS
R AXIS - MUSE: -62 DEGREES
R AXIS - MUSE: -70 DEGREES
RBC # BLD AUTO: 2.75 10E6/UL (ref 4.4–5.9)
RBC # BLD AUTO: 2.89 10E6/UL (ref 4.4–5.9)
RBC # BLD AUTO: 2.95 10E6/UL (ref 4.4–5.9)
RBC # BLD AUTO: 3 10E6/UL (ref 4.4–5.9)
RBC # BLD AUTO: 3.01 10E6/UL (ref 4.4–5.9)
RBC # BLD AUTO: 3.18 10E6/UL (ref 4.4–5.9)
RBC # BLD AUTO: 3.46 10E6/UL (ref 4.4–5.9)
RBC # BLD AUTO: 3.46 10E6/UL (ref 4.4–5.9)
RBC # BLD AUTO: 3.58 10E6/UL (ref 4.4–5.9)
RBC # BLD AUTO: 3.59 10E6/UL (ref 4.4–5.9)
RBC # BLD AUTO: 3.65 10E6/UL (ref 4.4–5.9)
RBC # BLD AUTO: 3.67 10E6/UL (ref 4.4–5.9)
RBC # BLD AUTO: 3.68 10E6/UL (ref 4.4–5.9)
RBC # BLD AUTO: 3.79 10E6/UL (ref 4.4–5.9)
RBC # BLD AUTO: 3.88 10E6/UL (ref 4.4–5.9)
RBC # BLD AUTO: 4.19 10E6/UL (ref 4.4–5.9)
RBC URINE: 21 /HPF
RBC URINE: 30 /HPF
RBC URINE: 5 /HPF
RBC URINE: 5 /HPF
RBC URINE: 50 /HPF
SA TARGET DNA: POSITIVE
SAO2 % BLDV: 20 % (ref 94–100)
SAO2 % BLDV: 21 % (ref 94–100)
SODIUM SERPL-SCNC: 136 MMOL/L (ref 136–145)
SODIUM SERPL-SCNC: 136 MMOL/L (ref 136–145)
SODIUM SERPL-SCNC: 138 MMOL/L (ref 136–145)
SODIUM SERPL-SCNC: 139 MMOL/L (ref 136–145)
SODIUM SERPL-SCNC: 140 MMOL/L (ref 136–145)
SODIUM SERPL-SCNC: 140 MMOL/L (ref 136–145)
SODIUM SERPL-SCNC: 141 MMOL/L (ref 136–145)
SODIUM SERPL-SCNC: 142 MMOL/L (ref 136–145)
SODIUM SERPL-SCNC: 143 MMOL/L (ref 136–145)
SODIUM SERPL-SCNC: 145 MMOL/L (ref 136–145)
SODIUM SERPL-SCNC: 146 MMOL/L (ref 136–145)
SODIUM SERPL-SCNC: 147 MMOL/L (ref 136–145)
SODIUM SERPL-SCNC: 148 MMOL/L (ref 136–145)
SODIUM SERPL-SCNC: 150 MMOL/L (ref 136–145)
SODIUM SERPL-SCNC: 150 MMOL/L (ref 136–145)
SODIUM SERPL-SCNC: 151 MMOL/L (ref 136–145)
SODIUM SERPL-SCNC: 153 MMOL/L (ref 136–145)
SODIUM SERPL-SCNC: 153 MMOL/L (ref 136–145)
SODIUM UR-SCNC: 69 MMOL/L
SP GR UR STRIP: 1.01 (ref 1–1.03)
SP GR UR STRIP: 1.02 (ref 1–1.03)
SP GR UR STRIP: 1.03 (ref 1–1.03)
SPECIMEN EXPIRATION DATE: NORMAL
SPECIMEN EXPIRATION DATE: NORMAL
STAPHYLOCOCCUS AUREUS: NOT DETECTED
STAPHYLOCOCCUS EPIDERMIDIS: DETECTED
STAPHYLOCOCCUS EPIDERMIDIS: NOT DETECTED
STAPHYLOCOCCUS EPIDERMIDIS: NOT DETECTED
STAPHYLOCOCCUS LUGDUNENSIS: NOT DETECTED
STAPHYLOCOCCUS SPECIES: NOT DETECTED
STAPHYLOCOCCUS SPECIES: NOT DETECTED
STREPTOCOCCUS AGALACTIAE: NOT DETECTED
STREPTOCOCCUS ANGINOSUS GROUP: NOT DETECTED
STREPTOCOCCUS PNEUMONIAE: NOT DETECTED
STREPTOCOCCUS PYOGENES: NOT DETECTED
STREPTOCOCCUS SPECIES: NOT DETECTED
SYSTOLIC BLOOD PRESSURE - MUSE: NORMAL MMHG
SYSTOLIC BLOOD PRESSURE - MUSE: NORMAL MMHG
T AXIS - MUSE: 241 DEGREES
T AXIS - MUSE: 96 DEGREES
TRIGL SERPL-MCNC: 94 MG/DL
TROPONIN T SERPL HS-MCNC: 92 NG/L
TROPONIN T SERPL HS-MCNC: 93 NG/L
UFH PPP CHRO-ACNC: 0.17 IU/ML
UFH PPP CHRO-ACNC: 0.23 IU/ML
UFH PPP CHRO-ACNC: 0.26 IU/ML
UFH PPP CHRO-ACNC: 0.29 IU/ML
UFH PPP CHRO-ACNC: 0.3 IU/ML
UFH PPP CHRO-ACNC: 0.31 IU/ML
UFH PPP CHRO-ACNC: 0.33 IU/ML
UFH PPP CHRO-ACNC: 0.34 IU/ML
UFH PPP CHRO-ACNC: 0.41 IU/ML
UFH PPP CHRO-ACNC: 0.45 IU/ML
UFH PPP CHRO-ACNC: 0.52 IU/ML
UFH PPP CHRO-ACNC: 0.67 IU/ML
UFH PPP CHRO-ACNC: <0.1 IU/ML
UNIT ABO/RH: NORMAL
UNIT NUMBER: NORMAL
UNIT STATUS: NORMAL
UNIT TYPE ISBT: 6200
UNIT TYPE ISBT: 6200
UNIT TYPE ISBT: 9500
UNIT TYPE ISBT: 9500
UROBILINOGEN UR STRIP-MCNC: NORMAL MG/DL
VENTRICULAR RATE- MUSE: 106 BPM
VENTRICULAR RATE- MUSE: 72 BPM
WBC # BLD AUTO: 10.6 10E3/UL (ref 4–11)
WBC # BLD AUTO: 11.5 10E3/UL (ref 4–11)
WBC # BLD AUTO: 5.3 10E3/UL (ref 4–11)
WBC # BLD AUTO: 6.2 10E3/UL (ref 4–11)
WBC # BLD AUTO: 6.3 10E3/UL (ref 4–11)
WBC # BLD AUTO: 6.6 10E3/UL (ref 4–11)
WBC # BLD AUTO: 7.1 10E3/UL (ref 4–11)
WBC # BLD AUTO: 7.3 10E3/UL (ref 4–11)
WBC # BLD AUTO: 7.5 10E3/UL (ref 4–11)
WBC # BLD AUTO: 7.6 10E3/UL (ref 4–11)
WBC # BLD AUTO: 8 10E3/UL (ref 4–11)
WBC # BLD AUTO: 8 10E3/UL (ref 4–11)
WBC # BLD AUTO: 8.2 10E3/UL (ref 4–11)
WBC # BLD AUTO: 8.5 10E3/UL (ref 4–11)
WBC # BLD AUTO: 9.1 10E3/UL (ref 4–11)
WBC # BLD AUTO: 9.9 10E3/UL (ref 4–11)
WBC CLUMPS #/AREA URNS HPF: PRESENT /HPF
WBC URINE: 12 /HPF
WBC URINE: 46 /HPF
WBC URINE: 95 /HPF
WBC URINE: >182 /HPF
WBC URINE: >182 /HPF
YEAST #/AREA URNS HPF: ABNORMAL /HPF
YEAST #/AREA URNS HPF: ABNORMAL /HPF

## 2023-01-01 PROCEDURE — 84132 ASSAY OF SERUM POTASSIUM: CPT | Performed by: INTERNAL MEDICINE

## 2023-01-01 PROCEDURE — 87077 CULTURE AEROBIC IDENTIFY: CPT | Performed by: EMERGENCY MEDICINE

## 2023-01-01 PROCEDURE — 250N000013 HC RX MED GY IP 250 OP 250 PS 637: Performed by: HOSPITALIST

## 2023-01-01 PROCEDURE — 250N000013 HC RX MED GY IP 250 OP 250 PS 637: Performed by: INTERNAL MEDICINE

## 2023-01-01 PROCEDURE — 36415 COLL VENOUS BLD VENIPUNCTURE: CPT | Performed by: INTERNAL MEDICINE

## 2023-01-01 PROCEDURE — 93308 TTE F-UP OR LMTD: CPT | Mod: 26 | Performed by: INTERNAL MEDICINE

## 2023-01-01 PROCEDURE — 85018 HEMOGLOBIN: CPT | Performed by: NURSE PRACTITIONER

## 2023-01-01 PROCEDURE — 250N000011 HC RX IP 250 OP 636: Mod: JZ | Performed by: HOSPITALIST

## 2023-01-01 PROCEDURE — 250N000011 HC RX IP 250 OP 636: Mod: JZ | Performed by: INTERNAL MEDICINE

## 2023-01-01 PROCEDURE — 99207 PR NO BILLABLE SERVICE THIS VISIT: CPT | Performed by: INTERNAL MEDICINE

## 2023-01-01 PROCEDURE — 97530 THERAPEUTIC ACTIVITIES: CPT | Mod: GO

## 2023-01-01 PROCEDURE — 85520 HEPARIN ASSAY: CPT | Performed by: INTERNAL MEDICINE

## 2023-01-01 PROCEDURE — 258N000001 HC RX 258: Performed by: INTERNAL MEDICINE

## 2023-01-01 PROCEDURE — 120N000001 HC R&B MED SURG/OB

## 2023-01-01 PROCEDURE — 82310 ASSAY OF CALCIUM: CPT | Performed by: INTERNAL MEDICINE

## 2023-01-01 PROCEDURE — 0HQLXZZ REPAIR LEFT LOWER LEG SKIN, EXTERNAL APPROACH: ICD-10-PCS | Performed by: EMERGENCY MEDICINE

## 2023-01-01 PROCEDURE — 73650 X-RAY EXAM OF HEEL: CPT | Mod: RT

## 2023-01-01 PROCEDURE — 84100 ASSAY OF PHOSPHORUS: CPT | Performed by: INTERNAL MEDICINE

## 2023-01-01 PROCEDURE — 85049 AUTOMATED PLATELET COUNT: CPT | Performed by: NURSE PRACTITIONER

## 2023-01-01 PROCEDURE — G0463 HOSPITAL OUTPT CLINIC VISIT: HCPCS

## 2023-01-01 PROCEDURE — 74176 CT ABD & PELVIS W/O CONTRAST: CPT

## 2023-01-01 PROCEDURE — 250N000011 HC RX IP 250 OP 636: Performed by: INTERNAL MEDICINE

## 2023-01-01 PROCEDURE — 93971 EXTREMITY STUDY: CPT | Mod: RT

## 2023-01-01 PROCEDURE — 84145 PROCALCITONIN (PCT): CPT | Performed by: EMERGENCY MEDICINE

## 2023-01-01 PROCEDURE — 73030 X-RAY EXAM OF SHOULDER: CPT | Mod: LT

## 2023-01-01 PROCEDURE — 99291 CRITICAL CARE FIRST HOUR: CPT | Performed by: INTERNAL MEDICINE

## 2023-01-01 PROCEDURE — 99418 PROLNG IP/OBS E/M EA 15 MIN: CPT | Mod: FS | Performed by: PSYCHIATRY & NEUROLOGY

## 2023-01-01 PROCEDURE — 250N000011 HC RX IP 250 OP 636: Mod: JZ | Performed by: NURSE PRACTITIONER

## 2023-01-01 PROCEDURE — 70496 CT ANGIOGRAPHY HEAD: CPT

## 2023-01-01 PROCEDURE — 82962 GLUCOSE BLOOD TEST: CPT

## 2023-01-01 PROCEDURE — 85027 COMPLETE CBC AUTOMATED: CPT | Performed by: INTERNAL MEDICINE

## 2023-01-01 PROCEDURE — 258N000003 HC RX IP 258 OP 636: Performed by: INTERNAL MEDICINE

## 2023-01-01 PROCEDURE — 85520 HEPARIN ASSAY: CPT | Performed by: HOSPITALIST

## 2023-01-01 PROCEDURE — 99232 SBSQ HOSP IP/OBS MODERATE 35: CPT | Performed by: INTERNAL MEDICINE

## 2023-01-01 PROCEDURE — 200N000001 HC R&B ICU

## 2023-01-01 PROCEDURE — 250N000009 HC RX 250: Performed by: INTERNAL MEDICINE

## 2023-01-01 PROCEDURE — 73060 X-RAY EXAM OF HUMERUS: CPT | Mod: RT

## 2023-01-01 PROCEDURE — 36415 COLL VENOUS BLD VENIPUNCTURE: CPT | Performed by: NURSE PRACTITIONER

## 2023-01-01 PROCEDURE — 36415 COLL VENOUS BLD VENIPUNCTURE: CPT | Performed by: EMERGENCY MEDICINE

## 2023-01-01 PROCEDURE — 86901 BLOOD TYPING SEROLOGIC RH(D): CPT | Performed by: NURSE PRACTITIONER

## 2023-01-01 PROCEDURE — 96376 TX/PRO/DX INJ SAME DRUG ADON: CPT

## 2023-01-01 PROCEDURE — 92526 ORAL FUNCTION THERAPY: CPT | Mod: GN | Performed by: SPEECH-LANGUAGE PATHOLOGIST

## 2023-01-01 PROCEDURE — 250N000013 HC RX MED GY IP 250 OP 250 PS 637

## 2023-01-01 PROCEDURE — 120N000013 HC R&B IMCU

## 2023-01-01 PROCEDURE — 99285 EMERGENCY DEPT VISIT HI MDM: CPT | Mod: 25

## 2023-01-01 PROCEDURE — 84100 ASSAY OF PHOSPHORUS: CPT | Performed by: HOSPITALIST

## 2023-01-01 PROCEDURE — 71045 X-RAY EXAM CHEST 1 VIEW: CPT

## 2023-01-01 PROCEDURE — 84484 ASSAY OF TROPONIN QUANT: CPT

## 2023-01-01 PROCEDURE — 97530 THERAPEUTIC ACTIVITIES: CPT | Mod: GO | Performed by: OCCUPATIONAL THERAPIST

## 2023-01-01 PROCEDURE — 250N000012 HC RX MED GY IP 250 OP 636 PS 637: Performed by: EMERGENCY MEDICINE

## 2023-01-01 PROCEDURE — 96361 HYDRATE IV INFUSION ADD-ON: CPT

## 2023-01-01 PROCEDURE — 85027 COMPLETE CBC AUTOMATED: CPT | Performed by: HOSPITALIST

## 2023-01-01 PROCEDURE — 83735 ASSAY OF MAGNESIUM: CPT | Performed by: INTERNAL MEDICINE

## 2023-01-01 PROCEDURE — 83605 ASSAY OF LACTIC ACID: CPT

## 2023-01-01 PROCEDURE — 83605 ASSAY OF LACTIC ACID: CPT | Performed by: NURSE PRACTITIONER

## 2023-01-01 PROCEDURE — 93306 TTE W/DOPPLER COMPLETE: CPT | Mod: 26 | Performed by: INTERNAL MEDICINE

## 2023-01-01 PROCEDURE — G0378 HOSPITAL OBSERVATION PER HR: HCPCS

## 2023-01-01 PROCEDURE — 96365 THER/PROPH/DIAG IV INF INIT: CPT

## 2023-01-01 PROCEDURE — 70450 CT HEAD/BRAIN W/O DYE: CPT

## 2023-01-01 PROCEDURE — 87077 CULTURE AEROBIC IDENTIFY: CPT | Performed by: INTERNAL MEDICINE

## 2023-01-01 PROCEDURE — 80048 BASIC METABOLIC PNL TOTAL CA: CPT | Performed by: INTERNAL MEDICINE

## 2023-01-01 PROCEDURE — C9113 INJ PANTOPRAZOLE SODIUM, VIA: HCPCS | Mod: JZ | Performed by: NURSE PRACTITIONER

## 2023-01-01 PROCEDURE — 99233 SBSQ HOSP IP/OBS HIGH 50: CPT | Performed by: INTERNAL MEDICINE

## 2023-01-01 PROCEDURE — 93005 ELECTROCARDIOGRAM TRACING: CPT

## 2023-01-01 PROCEDURE — 83735 ASSAY OF MAGNESIUM: CPT | Performed by: HOSPITALIST

## 2023-01-01 PROCEDURE — 99418 PROLNG IP/OBS E/M EA 15 MIN: CPT | Performed by: INTERNAL MEDICINE

## 2023-01-01 PROCEDURE — 92610 EVALUATE SWALLOWING FUNCTION: CPT | Mod: GN | Performed by: SPEECH-LANGUAGE PATHOLOGIST

## 2023-01-01 PROCEDURE — 92526 ORAL FUNCTION THERAPY: CPT | Mod: GN

## 2023-01-01 PROCEDURE — 82803 BLOOD GASES ANY COMBINATION: CPT

## 2023-01-01 PROCEDURE — 99291 CRITICAL CARE FIRST HOUR: CPT | Mod: FS | Performed by: PSYCHIATRY & NEUROLOGY

## 2023-01-01 PROCEDURE — 87086 URINE CULTURE/COLONY COUNT: CPT

## 2023-01-01 PROCEDURE — 93306 TTE W/DOPPLER COMPLETE: CPT

## 2023-01-01 PROCEDURE — 258N000001 HC RX 258: Performed by: EMERGENCY MEDICINE

## 2023-01-01 PROCEDURE — 84300 ASSAY OF URINE SODIUM: CPT | Performed by: INTERNAL MEDICINE

## 2023-01-01 PROCEDURE — 999N000198 HC STATISTIC WOC PT EDUCATION, 16-30 MIN

## 2023-01-01 PROCEDURE — 93325 DOPPLER ECHO COLOR FLOW MAPG: CPT | Mod: 26 | Performed by: INTERNAL MEDICINE

## 2023-01-01 PROCEDURE — 99292 CRITICAL CARE ADDL 30 MIN: CPT | Mod: FS | Performed by: PSYCHIATRY & NEUROLOGY

## 2023-01-01 PROCEDURE — 99207 PR NO BILLABLE SERVICE THIS VISIT: CPT | Performed by: PHYSICIAN ASSISTANT

## 2023-01-01 PROCEDURE — 99222 1ST HOSP IP/OBS MODERATE 55: CPT | Performed by: PODIATRIST

## 2023-01-01 PROCEDURE — 82330 ASSAY OF CALCIUM: CPT | Performed by: INTERNAL MEDICINE

## 2023-01-01 PROCEDURE — 97165 OT EVAL LOW COMPLEX 30 MIN: CPT | Mod: GO | Performed by: OCCUPATIONAL THERAPIST

## 2023-01-01 PROCEDURE — 99223 1ST HOSP IP/OBS HIGH 75: CPT | Performed by: INTERNAL MEDICINE

## 2023-01-01 PROCEDURE — 73610 X-RAY EXAM OF ANKLE: CPT | Mod: RT

## 2023-01-01 PROCEDURE — 96360 HYDRATION IV INFUSION INIT: CPT | Mod: 59

## 2023-01-01 PROCEDURE — 86923 COMPATIBILITY TEST ELECTRIC: CPT | Performed by: NURSE PRACTITIONER

## 2023-01-01 PROCEDURE — 86850 RBC ANTIBODY SCREEN: CPT | Performed by: NURSE PRACTITIONER

## 2023-01-01 PROCEDURE — 250N000011 HC RX IP 250 OP 636: Mod: JZ

## 2023-01-01 PROCEDURE — 71275 CT ANGIOGRAPHY CHEST: CPT

## 2023-01-01 PROCEDURE — 99222 1ST HOSP IP/OBS MODERATE 55: CPT

## 2023-01-01 PROCEDURE — 85610 PROTHROMBIN TIME: CPT | Performed by: EMERGENCY MEDICINE

## 2023-01-01 PROCEDURE — 272N000433 HC KIT CATH IV 18 OR 20G CM, POWERGLIDE W MAX BARRIER

## 2023-01-01 PROCEDURE — 99222 1ST HOSP IP/OBS MODERATE 55: CPT | Mod: AI | Performed by: INTERNAL MEDICINE

## 2023-01-01 PROCEDURE — 87106 FUNGI IDENTIFICATION YEAST: CPT | Performed by: EMERGENCY MEDICINE

## 2023-01-01 PROCEDURE — 36415 COLL VENOUS BLD VENIPUNCTURE: CPT | Performed by: HOSPITALIST

## 2023-01-01 PROCEDURE — 93321 DOPPLER ECHO F-UP/LMTD STD: CPT | Mod: 26 | Performed by: INTERNAL MEDICINE

## 2023-01-01 PROCEDURE — 99291 CRITICAL CARE FIRST HOUR: CPT | Mod: 25

## 2023-01-01 PROCEDURE — 87149 DNA/RNA DIRECT PROBE: CPT | Performed by: EMERGENCY MEDICINE

## 2023-01-01 PROCEDURE — 36415 COLL VENOUS BLD VENIPUNCTURE: CPT

## 2023-01-01 PROCEDURE — 87040 BLOOD CULTURE FOR BACTERIA: CPT | Performed by: EMERGENCY MEDICINE

## 2023-01-01 PROCEDURE — 83605 ASSAY OF LACTIC ACID: CPT | Performed by: EMERGENCY MEDICINE

## 2023-01-01 PROCEDURE — 85014 HEMATOCRIT: CPT | Performed by: EMERGENCY MEDICINE

## 2023-01-01 PROCEDURE — 99292 CRITICAL CARE ADDL 30 MIN: CPT

## 2023-01-01 PROCEDURE — 255N000002 HC RX 255 OP 636: Performed by: INTERNAL MEDICINE

## 2023-01-01 PROCEDURE — 99231 SBSQ HOSP IP/OBS SF/LOW 25: CPT | Performed by: INTERNAL MEDICINE

## 2023-01-01 PROCEDURE — 85520 HEPARIN ASSAY: CPT | Performed by: NURSE PRACTITIONER

## 2023-01-01 PROCEDURE — 87641 MR-STAPH DNA AMP PROBE: CPT | Performed by: INTERNAL MEDICINE

## 2023-01-01 PROCEDURE — 80053 COMPREHEN METABOLIC PANEL: CPT | Performed by: EMERGENCY MEDICINE

## 2023-01-01 PROCEDURE — 80053 COMPREHEN METABOLIC PANEL: CPT

## 2023-01-01 PROCEDURE — 87086 URINE CULTURE/COLONY COUNT: CPT | Performed by: EMERGENCY MEDICINE

## 2023-01-01 PROCEDURE — 99233 SBSQ HOSP IP/OBS HIGH 50: CPT | Performed by: HOSPITALIST

## 2023-01-01 PROCEDURE — 84145 PROCALCITONIN (PCT): CPT | Performed by: HOSPITALIST

## 2023-01-01 PROCEDURE — 85520 HEPARIN ASSAY: CPT

## 2023-01-01 PROCEDURE — 81001 URINALYSIS AUTO W/SCOPE: CPT | Performed by: EMERGENCY MEDICINE

## 2023-01-01 PROCEDURE — 99498 ADVNCD CARE PLAN ADDL 30 MIN: CPT | Mod: 25 | Performed by: FAMILY MEDICINE

## 2023-01-01 PROCEDURE — 70553 MRI BRAIN STEM W/O & W/DYE: CPT

## 2023-01-01 PROCEDURE — 86850 RBC ANTIBODY SCREEN: CPT | Performed by: EMERGENCY MEDICINE

## 2023-01-01 PROCEDURE — 93325 DOPPLER ECHO COLOR FLOW MAPG: CPT

## 2023-01-01 PROCEDURE — 99291 CRITICAL CARE FIRST HOUR: CPT | Performed by: NURSE PRACTITIONER

## 2023-01-01 PROCEDURE — 250N000013 HC RX MED GY IP 250 OP 250 PS 637: Performed by: EMERGENCY MEDICINE

## 2023-01-01 PROCEDURE — 99232 SBSQ HOSP IP/OBS MODERATE 35: CPT | Performed by: HOSPITALIST

## 2023-01-01 PROCEDURE — 99291 CRITICAL CARE FIRST HOUR: CPT

## 2023-01-01 PROCEDURE — 96375 TX/PRO/DX INJ NEW DRUG ADDON: CPT

## 2023-01-01 PROCEDURE — 73501 X-RAY EXAM HIP UNI 1 VIEW: CPT | Mod: RT

## 2023-01-01 PROCEDURE — 999N000065 XR ABDOMEN PORT 1 VIEW

## 2023-01-01 PROCEDURE — 90715 TDAP VACCINE 7 YRS/> IM: CPT | Performed by: EMERGENCY MEDICINE

## 2023-01-01 PROCEDURE — 99239 HOSP IP/OBS DSCHRG MGMT >30: CPT | Performed by: INTERNAL MEDICINE

## 2023-01-01 PROCEDURE — 258N000003 HC RX IP 258 OP 636: Performed by: EMERGENCY MEDICINE

## 2023-01-01 PROCEDURE — P9016 RBC LEUKOCYTES REDUCED: HCPCS

## 2023-01-01 PROCEDURE — 81001 URINALYSIS AUTO W/SCOPE: CPT

## 2023-01-01 PROCEDURE — 85025 COMPLETE CBC W/AUTO DIFF WBC: CPT

## 2023-01-01 PROCEDURE — P9045 ALBUMIN (HUMAN), 5%, 250 ML: HCPCS | Mod: JZ

## 2023-01-01 PROCEDURE — 99292 CRITICAL CARE ADDL 30 MIN: CPT | Performed by: INTERNAL MEDICINE

## 2023-01-01 PROCEDURE — A9585 GADOBUTROL INJECTION: HCPCS | Performed by: INTERNAL MEDICINE

## 2023-01-01 PROCEDURE — 99221 1ST HOSP IP/OBS SF/LOW 40: CPT | Mod: 25 | Performed by: FAMILY MEDICINE

## 2023-01-01 PROCEDURE — 258N000003 HC RX IP 258 OP 636

## 2023-01-01 PROCEDURE — 86901 BLOOD TYPING SEROLOGIC RH(D): CPT | Performed by: EMERGENCY MEDICINE

## 2023-01-01 PROCEDURE — 99239 HOSP IP/OBS DSCHRG MGMT >30: CPT | Performed by: HOSPITALIST

## 2023-01-01 PROCEDURE — 80048 BASIC METABOLIC PNL TOTAL CA: CPT

## 2023-01-01 PROCEDURE — 250N000009 HC RX 250: Performed by: HOSPITALIST

## 2023-01-01 PROCEDURE — 80048 BASIC METABOLIC PNL TOTAL CA: CPT | Performed by: HOSPITALIST

## 2023-01-01 PROCEDURE — 81001 URINALYSIS AUTO W/SCOPE: CPT | Performed by: INTERNAL MEDICINE

## 2023-01-01 PROCEDURE — 999N000199 HC STATISTIC WOC PT EDUCATION, 31-45 MIN

## 2023-01-01 PROCEDURE — 85027 COMPLETE CBC AUTOMATED: CPT

## 2023-01-01 PROCEDURE — 99223 1ST HOSP IP/OBS HIGH 75: CPT | Performed by: HOSPITALIST

## 2023-01-01 PROCEDURE — 85025 COMPLETE CBC W/AUTO DIFF WBC: CPT | Performed by: EMERGENCY MEDICINE

## 2023-01-01 PROCEDURE — 12002 RPR S/N/AX/GEN/TRNK2.6-7.5CM: CPT

## 2023-01-01 PROCEDURE — 250N000011 HC RX IP 250 OP 636: Mod: JZ | Performed by: EMERGENCY MEDICINE

## 2023-01-01 PROCEDURE — 96365 THER/PROPH/DIAG IV INF INIT: CPT | Mod: 59

## 2023-01-01 PROCEDURE — 250N000009 HC RX 250

## 2023-01-01 PROCEDURE — 90471 IMMUNIZATION ADMIN: CPT | Performed by: EMERGENCY MEDICINE

## 2023-01-01 PROCEDURE — G0463 HOSPITAL OUTPT CLINIC VISIT: HCPCS | Mod: 25

## 2023-01-01 PROCEDURE — 36569 INSJ PICC 5 YR+ W/O IMAGING: CPT

## 2023-01-01 PROCEDURE — 99233 SBSQ HOSP IP/OBS HIGH 50: CPT | Mod: FS | Performed by: PSYCHIATRY & NEUROLOGY

## 2023-01-01 PROCEDURE — 73522 X-RAY EXAM HIPS BI 3-4 VIEWS: CPT

## 2023-01-01 PROCEDURE — 80061 LIPID PANEL: CPT | Performed by: NURSE PRACTITIONER

## 2023-01-01 PROCEDURE — 83036 HEMOGLOBIN GLYCOSYLATED A1C: CPT | Performed by: INTERNAL MEDICINE

## 2023-01-01 PROCEDURE — 85018 HEMOGLOBIN: CPT

## 2023-01-01 PROCEDURE — 87040 BLOOD CULTURE FOR BACTERIA: CPT

## 2023-01-01 PROCEDURE — 87040 BLOOD CULTURE FOR BACTERIA: CPT | Performed by: INTERNAL MEDICINE

## 2023-01-01 PROCEDURE — 250N000011 HC RX IP 250 OP 636: Performed by: EMERGENCY MEDICINE

## 2023-01-01 PROCEDURE — 258N000003 HC RX IP 258 OP 636: Performed by: HOSPITALIST

## 2023-01-01 PROCEDURE — P9016 RBC LEUKOCYTES REDUCED: HCPCS | Performed by: NURSE PRACTITIONER

## 2023-01-01 PROCEDURE — C8924 2D TTE W OR W/O FOL W/CON,FU: HCPCS

## 2023-01-01 PROCEDURE — 250N000009 HC RX 250: Performed by: EMERGENCY MEDICINE

## 2023-01-01 PROCEDURE — 85014 HEMATOCRIT: CPT | Performed by: INTERNAL MEDICINE

## 2023-01-01 PROCEDURE — 85730 THROMBOPLASTIN TIME PARTIAL: CPT | Performed by: NURSE PRACTITIONER

## 2023-01-01 PROCEDURE — 82248 BILIRUBIN DIRECT: CPT | Performed by: NURSE PRACTITIONER

## 2023-01-01 PROCEDURE — 72080 X-RAY EXAM THORACOLMB 2/> VW: CPT

## 2023-01-01 PROCEDURE — 70498 CT ANGIOGRAPHY NECK: CPT

## 2023-01-01 PROCEDURE — 71101 X-RAY EXAM UNILAT RIBS/CHEST: CPT | Mod: LT

## 2023-01-01 PROCEDURE — 999N000040 HC STATISTIC CONSULT NO CHARGE VASC ACCESS

## 2023-01-01 PROCEDURE — 93010 ELECTROCARDIOGRAM REPORT: CPT | Performed by: INTERNAL MEDICINE

## 2023-01-01 PROCEDURE — 82374 ASSAY BLOOD CARBON DIOXIDE: CPT | Performed by: INTERNAL MEDICINE

## 2023-01-01 PROCEDURE — 99418 PROLNG IP/OBS E/M EA 15 MIN: CPT | Performed by: HOSPITALIST

## 2023-01-01 PROCEDURE — 99497 ADVNCD CARE PLAN 30 MIN: CPT | Mod: 25 | Performed by: FAMILY MEDICINE

## 2023-01-01 PROCEDURE — 99232 SBSQ HOSP IP/OBS MODERATE 35: CPT | Performed by: PODIATRIST

## 2023-01-01 PROCEDURE — 83880 ASSAY OF NATRIURETIC PEPTIDE: CPT

## 2023-01-01 PROCEDURE — 93970 EXTREMITY STUDY: CPT

## 2023-01-01 PROCEDURE — 84132 ASSAY OF SERUM POTASSIUM: CPT | Performed by: EMERGENCY MEDICINE

## 2023-01-01 PROCEDURE — 258N000001 HC RX 258: Performed by: HOSPITALIST

## 2023-01-01 PROCEDURE — 85610 PROTHROMBIN TIME: CPT | Performed by: NURSE PRACTITIONER

## 2023-01-01 PROCEDURE — 272N000452 HC KIT SHRLOCK 5FR POWER PICC TRIPLE LUMEN

## 2023-01-01 PROCEDURE — 99231 SBSQ HOSP IP/OBS SF/LOW 25: CPT | Performed by: HOSPITALIST

## 2023-01-01 PROCEDURE — 84295 ASSAY OF SERUM SODIUM: CPT | Performed by: INTERNAL MEDICINE

## 2023-01-01 RX ORDER — POLYETHYLENE GLYCOL 3350 17 G/17G
17 POWDER, FOR SOLUTION ORAL DAILY
Qty: 510 G | Refills: 1 | Status: SHIPPED | OUTPATIENT
Start: 2023-01-01

## 2023-01-01 RX ORDER — NOREPINEPHRINE BITARTRATE 0.02 MG/ML
.01-.6 INJECTION, SOLUTION INTRAVENOUS CONTINUOUS
Status: DISCONTINUED | OUTPATIENT
Start: 2023-01-01 | End: 2023-01-01

## 2023-01-01 RX ORDER — ONDANSETRON 2 MG/ML
4 INJECTION INTRAMUSCULAR; INTRAVENOUS EVERY 6 HOURS PRN
Status: DISCONTINUED | OUTPATIENT
Start: 2023-01-01 | End: 2023-01-01 | Stop reason: HOSPADM

## 2023-01-01 RX ORDER — PROTAMINE SULFATE 10 MG/ML
4 INJECTION, SOLUTION INTRAVENOUS ONCE
Status: COMPLETED | OUTPATIENT
Start: 2023-01-01 | End: 2023-01-01

## 2023-01-01 RX ORDER — LORAZEPAM 2 MG/ML
1 INJECTION INTRAMUSCULAR EVERY 4 HOURS PRN
Status: DISCONTINUED | OUTPATIENT
Start: 2023-01-01 | End: 2023-01-01

## 2023-01-01 RX ORDER — NALOXONE HYDROCHLORIDE 0.4 MG/ML
0.2 INJECTION, SOLUTION INTRAMUSCULAR; INTRAVENOUS; SUBCUTANEOUS
Status: DISCONTINUED | OUTPATIENT
Start: 2023-01-01 | End: 2023-01-01 | Stop reason: HOSPADM

## 2023-01-01 RX ORDER — DEXTROSE MONOHYDRATE 100 MG/ML
INJECTION, SOLUTION INTRAVENOUS CONTINUOUS PRN
Status: DISCONTINUED | OUTPATIENT
Start: 2023-01-01 | End: 2023-01-01 | Stop reason: HOSPADM

## 2023-01-01 RX ORDER — AMOXICILLIN 250 MG
1 CAPSULE ORAL 2 TIMES DAILY
Status: DISCONTINUED | OUTPATIENT
Start: 2023-01-01 | End: 2023-01-01

## 2023-01-01 RX ORDER — LORAZEPAM 2 MG/ML
0.5 INJECTION INTRAMUSCULAR
Status: DISCONTINUED | OUTPATIENT
Start: 2023-01-01 | End: 2023-01-01

## 2023-01-01 RX ORDER — NITROGLYCERIN 0.4 MG/1
0.4 TABLET SUBLINGUAL EVERY 5 MIN PRN
Status: DISCONTINUED | OUTPATIENT
Start: 2023-01-01 | End: 2023-01-01 | Stop reason: HOSPADM

## 2023-01-01 RX ORDER — CEFEPIME HYDROCHLORIDE 1 G/1
1 INJECTION, POWDER, FOR SOLUTION INTRAMUSCULAR; INTRAVENOUS ONCE
Status: COMPLETED | OUTPATIENT
Start: 2023-01-01 | End: 2023-01-01

## 2023-01-01 RX ORDER — OLANZAPINE 5 MG/1
5 TABLET, ORALLY DISINTEGRATING ORAL 2 TIMES DAILY
Status: DISCONTINUED | OUTPATIENT
Start: 2023-01-01 | End: 2023-01-01 | Stop reason: HOSPADM

## 2023-01-01 RX ORDER — ALBUTEROL SULFATE 90 UG/1
2 AEROSOL, METERED RESPIRATORY (INHALATION) EVERY 4 HOURS PRN
Status: DISCONTINUED | OUTPATIENT
Start: 2023-01-01 | End: 2023-01-01 | Stop reason: HOSPADM

## 2023-01-01 RX ORDER — FUROSEMIDE 20 MG
20 TABLET ORAL EVERY MORNING
Status: ON HOLD | COMMUNITY
End: 2023-01-01

## 2023-01-01 RX ORDER — ENOXAPARIN SODIUM 100 MG/ML
30 INJECTION SUBCUTANEOUS EVERY 24 HOURS
Status: DISCONTINUED | OUTPATIENT
Start: 2023-01-01 | End: 2023-01-01

## 2023-01-01 RX ORDER — DOCUSATE SODIUM 100 MG/1
100 CAPSULE, LIQUID FILLED ORAL 2 TIMES DAILY
Status: DISCONTINUED | OUTPATIENT
Start: 2023-01-01 | End: 2023-01-01 | Stop reason: HOSPADM

## 2023-01-01 RX ORDER — GADOBUTROL 604.72 MG/ML
8 INJECTION INTRAVENOUS ONCE
Status: COMPLETED | OUTPATIENT
Start: 2023-01-01 | End: 2023-01-01

## 2023-01-01 RX ORDER — ONDANSETRON 4 MG/1
4 TABLET, ORALLY DISINTEGRATING ORAL EVERY 6 HOURS PRN
Status: DISCONTINUED | OUTPATIENT
Start: 2023-01-01 | End: 2023-01-01 | Stop reason: HOSPADM

## 2023-01-01 RX ORDER — ACETAMINOPHEN 500 MG
1000 TABLET ORAL EVERY MORNING
Status: DISCONTINUED | OUTPATIENT
Start: 2023-01-01 | End: 2023-01-01 | Stop reason: HOSPADM

## 2023-01-01 RX ORDER — AMOXICILLIN 250 MG
2 CAPSULE ORAL 2 TIMES DAILY PRN
Status: DISCONTINUED | OUTPATIENT
Start: 2023-01-01 | End: 2023-01-01 | Stop reason: HOSPADM

## 2023-01-01 RX ORDER — LIDOCAINE 40 MG/G
CREAM TOPICAL
Status: DISCONTINUED | OUTPATIENT
Start: 2023-01-01 | End: 2023-01-01 | Stop reason: HOSPADM

## 2023-01-01 RX ORDER — FOLIC ACID 5 MG/ML
1 INJECTION, SOLUTION INTRAMUSCULAR; INTRAVENOUS; SUBCUTANEOUS DAILY
Status: DISCONTINUED | OUTPATIENT
Start: 2023-01-01 | End: 2023-01-01

## 2023-01-01 RX ORDER — METOPROLOL SUCCINATE 25 MG/1
25 TABLET, EXTENDED RELEASE ORAL DAILY
Status: DISCONTINUED | OUTPATIENT
Start: 2023-01-01 | End: 2023-01-01

## 2023-01-01 RX ORDER — TAMSULOSIN HYDROCHLORIDE 0.4 MG/1
0.4 CAPSULE ORAL EVERY EVENING
Status: DISCONTINUED | OUTPATIENT
Start: 2023-01-01 | End: 2023-01-01 | Stop reason: HOSPADM

## 2023-01-01 RX ORDER — DEXTROSE MONOHYDRATE 25 G/50ML
25-50 INJECTION, SOLUTION INTRAVENOUS
Status: DISCONTINUED | OUTPATIENT
Start: 2023-01-01 | End: 2023-01-01

## 2023-01-01 RX ORDER — CARBOXYMETHYLCELLULOSE SODIUM 10 MG/ML
1 GEL OPHTHALMIC 4 TIMES DAILY
COMMUNITY

## 2023-01-01 RX ORDER — CARBOXYMETHYLCELLULOSE SODIUM 10 MG/ML
1 GEL OPHTHALMIC 4 TIMES DAILY
Status: DISCONTINUED | OUTPATIENT
Start: 2023-01-01 | End: 2023-01-01 | Stop reason: HOSPADM

## 2023-01-01 RX ORDER — DOCUSATE SODIUM 100 MG/1
100 CAPSULE, LIQUID FILLED ORAL 2 TIMES DAILY
Status: DISCONTINUED | OUTPATIENT
Start: 2023-01-01 | End: 2023-01-01

## 2023-01-01 RX ORDER — PANTOPRAZOLE SODIUM 40 MG/1
40 TABLET, DELAYED RELEASE ORAL
Status: DISCONTINUED | OUTPATIENT
Start: 2023-01-01 | End: 2023-01-01 | Stop reason: HOSPADM

## 2023-01-01 RX ORDER — NICOTINE POLACRILEX 4 MG
15-30 LOZENGE BUCCAL
Status: DISCONTINUED | OUTPATIENT
Start: 2023-01-01 | End: 2023-01-01

## 2023-01-01 RX ORDER — ASPIRIN 81 MG/1
81 TABLET ORAL DAILY
Status: DISCONTINUED | OUTPATIENT
Start: 2023-01-01 | End: 2023-01-01 | Stop reason: HOSPADM

## 2023-01-01 RX ORDER — DUTASTERIDE 0.5 MG/1
0.5 CAPSULE, LIQUID FILLED ORAL DAILY
Qty: 30 CAPSULE | Refills: 0 | Status: SHIPPED | OUTPATIENT
Start: 2023-01-01

## 2023-01-01 RX ORDER — ASPIRIN 81 MG/1
81 TABLET ORAL DAILY
Status: DISCONTINUED | OUTPATIENT
Start: 2023-01-01 | End: 2023-01-01

## 2023-01-01 RX ORDER — AMOXICILLIN 250 MG
1 CAPSULE ORAL 2 TIMES DAILY
COMMUNITY

## 2023-01-01 RX ORDER — TAMSULOSIN HYDROCHLORIDE 0.4 MG/1
0.4 CAPSULE ORAL EVERY MORNING
Status: DISCONTINUED | OUTPATIENT
Start: 2023-01-01 | End: 2023-01-01 | Stop reason: HOSPADM

## 2023-01-01 RX ORDER — LIDOCAINE 40 MG/G
CREAM TOPICAL
Status: DISCONTINUED | OUTPATIENT
Start: 2023-01-01 | End: 2023-01-01

## 2023-01-01 RX ORDER — ACETAMINOPHEN 650 MG/1
650 SUPPOSITORY RECTAL EVERY 6 HOURS PRN
Status: DISCONTINUED | OUTPATIENT
Start: 2023-01-01 | End: 2023-01-01 | Stop reason: HOSPADM

## 2023-01-01 RX ORDER — PIPERACILLIN SODIUM, TAZOBACTAM SODIUM 4; .5 G/20ML; G/20ML
4.5 INJECTION, POWDER, LYOPHILIZED, FOR SOLUTION INTRAVENOUS EVERY 6 HOURS
Status: DISCONTINUED | OUTPATIENT
Start: 2023-01-01 | End: 2023-01-01

## 2023-01-01 RX ORDER — HYDROMORPHONE HYDROCHLORIDE 1 MG/ML
0.3 INJECTION, SOLUTION INTRAMUSCULAR; INTRAVENOUS; SUBCUTANEOUS
Status: DISCONTINUED | OUTPATIENT
Start: 2023-01-01 | End: 2023-01-01 | Stop reason: HOSPADM

## 2023-01-01 RX ORDER — PIPERACILLIN SODIUM, TAZOBACTAM SODIUM 2; .25 G/10ML; G/10ML
2.25 INJECTION, POWDER, LYOPHILIZED, FOR SOLUTION INTRAVENOUS EVERY 6 HOURS
Status: DISCONTINUED | OUTPATIENT
Start: 2023-01-01 | End: 2023-01-01

## 2023-01-01 RX ORDER — PIPERACILLIN SODIUM, TAZOBACTAM SODIUM 3; .375 G/15ML; G/15ML
3.38 INJECTION, POWDER, LYOPHILIZED, FOR SOLUTION INTRAVENOUS EVERY 6 HOURS
Status: DISCONTINUED | OUTPATIENT
Start: 2023-01-01 | End: 2023-01-01

## 2023-01-01 RX ORDER — ACETAMINOPHEN 500 MG
1000 TABLET ORAL EVERY MORNING
Status: DISCONTINUED | OUTPATIENT
Start: 2023-01-01 | End: 2023-01-01

## 2023-01-01 RX ORDER — HALOPERIDOL 5 MG/ML
2 INJECTION INTRAMUSCULAR ONCE
Status: COMPLETED | OUTPATIENT
Start: 2023-01-01 | End: 2023-01-01

## 2023-01-01 RX ORDER — FUROSEMIDE 20 MG
20 TABLET ORAL EVERY MORNING
DISCHARGE
Start: 2023-01-01

## 2023-01-01 RX ORDER — OLANZAPINE 5 MG/1
5 TABLET, ORALLY DISINTEGRATING ORAL
Status: DISCONTINUED | OUTPATIENT
Start: 2023-01-01 | End: 2023-01-01

## 2023-01-01 RX ORDER — NICOTINE POLACRILEX 4 MG
15-30 LOZENGE BUCCAL
Status: DISCONTINUED | OUTPATIENT
Start: 2023-01-01 | End: 2023-01-01 | Stop reason: HOSPADM

## 2023-01-01 RX ORDER — MAGNESIUM SULFATE HEPTAHYDRATE 40 MG/ML
4 INJECTION, SOLUTION INTRAVENOUS ONCE
Status: COMPLETED | OUTPATIENT
Start: 2023-01-01 | End: 2023-01-01

## 2023-01-01 RX ORDER — DEXTROSE MONOHYDRATE 25 G/50ML
25-50 INJECTION, SOLUTION INTRAVENOUS
Status: DISCONTINUED | OUTPATIENT
Start: 2023-01-01 | End: 2023-01-01 | Stop reason: HOSPADM

## 2023-01-01 RX ORDER — OXYCODONE HYDROCHLORIDE 5 MG/1
5 TABLET ORAL EVERY 4 HOURS PRN
Status: DISCONTINUED | OUTPATIENT
Start: 2023-01-01 | End: 2023-01-01 | Stop reason: HOSPADM

## 2023-01-01 RX ORDER — SODIUM CHLORIDE 9 MG/ML
INJECTION, SOLUTION INTRAVENOUS CONTINUOUS
Status: DISCONTINUED | OUTPATIENT
Start: 2023-01-01 | End: 2023-01-01

## 2023-01-01 RX ORDER — OXYCODONE HYDROCHLORIDE 5 MG/1
2.5 TABLET ORAL EVERY 6 HOURS PRN
Qty: 5 TABLET | Refills: 0 | Status: SHIPPED | OUTPATIENT
Start: 2023-01-01

## 2023-01-01 RX ORDER — FOLIC ACID 1 MG/1
1 TABLET ORAL EVERY MORNING
COMMUNITY

## 2023-01-01 RX ORDER — NALOXONE HYDROCHLORIDE 0.4 MG/ML
0.4 INJECTION, SOLUTION INTRAMUSCULAR; INTRAVENOUS; SUBCUTANEOUS
Status: DISCONTINUED | OUTPATIENT
Start: 2023-01-01 | End: 2023-01-01 | Stop reason: HOSPADM

## 2023-01-01 RX ORDER — FOLIC ACID 1 MG/1
1 TABLET ORAL DAILY
Status: DISCONTINUED | OUTPATIENT
Start: 2023-01-01 | End: 2023-01-01 | Stop reason: HOSPADM

## 2023-01-01 RX ORDER — ACETAMINOPHEN 500 MG
1000 TABLET ORAL EVERY MORNING
Status: ON HOLD | COMMUNITY
End: 2023-01-01

## 2023-01-01 RX ORDER — AMOXICILLIN 250 MG
1 CAPSULE ORAL 2 TIMES DAILY
Status: DISCONTINUED | OUTPATIENT
Start: 2023-01-01 | End: 2023-01-01 | Stop reason: HOSPADM

## 2023-01-01 RX ORDER — OLANZAPINE 5 MG/1
5 TABLET, ORALLY DISINTEGRATING ORAL ONCE
Status: COMPLETED | OUTPATIENT
Start: 2023-01-01 | End: 2023-01-01

## 2023-01-01 RX ORDER — ENOXAPARIN SODIUM 100 MG/ML
40 INJECTION SUBCUTANEOUS EVERY 24 HOURS
Status: DISCONTINUED | OUTPATIENT
Start: 2023-01-01 | End: 2023-01-01

## 2023-01-01 RX ORDER — AMOXICILLIN 250 MG
1 CAPSULE ORAL 2 TIMES DAILY PRN
Status: DISCONTINUED | OUTPATIENT
Start: 2023-01-01 | End: 2023-01-01 | Stop reason: HOSPADM

## 2023-01-01 RX ORDER — VANCOMYCIN HYDROCHLORIDE 1 G/200ML
1000 INJECTION, SOLUTION INTRAVENOUS EVERY 24 HOURS
Status: DISCONTINUED | OUTPATIENT
Start: 2023-01-01 | End: 2023-01-01

## 2023-01-01 RX ORDER — OLANZAPINE 10 MG/2ML
2.5 INJECTION, POWDER, FOR SOLUTION INTRAMUSCULAR EVERY 6 HOURS PRN
Status: DISCONTINUED | OUTPATIENT
Start: 2023-01-01 | End: 2023-01-01 | Stop reason: HOSPADM

## 2023-01-01 RX ORDER — OLANZAPINE 5 MG/1
5 TABLET, ORALLY DISINTEGRATING ORAL
Qty: 30 TABLET | Refills: 0 | Status: SHIPPED | OUTPATIENT
Start: 2023-01-01

## 2023-01-01 RX ORDER — POTASSIUM CHLORIDE 750 MG/1
20 TABLET, EXTENDED RELEASE ORAL EVERY MORNING
DISCHARGE
Start: 2023-01-01

## 2023-01-01 RX ORDER — DEXTROSE MONOHYDRATE 25 G/50ML
25 INJECTION, SOLUTION INTRAVENOUS ONCE
Status: COMPLETED | OUTPATIENT
Start: 2023-01-01 | End: 2023-01-01

## 2023-01-01 RX ORDER — POLYETHYLENE GLYCOL 3350 17 G/17G
17 POWDER, FOR SOLUTION ORAL DAILY
Status: DISCONTINUED | OUTPATIENT
Start: 2023-01-01 | End: 2023-01-01 | Stop reason: HOSPADM

## 2023-01-01 RX ORDER — LORAZEPAM 2 MG/ML
0.5 INJECTION INTRAMUSCULAR EVERY 4 HOURS PRN
Status: DISCONTINUED | OUTPATIENT
Start: 2023-01-01 | End: 2023-01-01

## 2023-01-01 RX ORDER — OLANZAPINE 10 MG/2ML
5 INJECTION, POWDER, FOR SOLUTION INTRAMUSCULAR ONCE
Status: COMPLETED | OUTPATIENT
Start: 2023-01-01 | End: 2023-01-01

## 2023-01-01 RX ORDER — DUTASTERIDE 0.5 MG/1
0.5 CAPSULE, LIQUID FILLED ORAL DAILY
Status: DISCONTINUED | OUTPATIENT
Start: 2023-01-01 | End: 2023-01-01 | Stop reason: HOSPADM

## 2023-01-01 RX ORDER — HEPARIN SODIUM 10000 [USP'U]/100ML
0-5000 INJECTION, SOLUTION INTRAVENOUS CONTINUOUS
Status: DISCONTINUED | OUTPATIENT
Start: 2023-01-01 | End: 2023-01-01

## 2023-01-01 RX ORDER — LIDOCAINE HYDROCHLORIDE 20 MG/ML
10 JELLY TOPICAL ONCE
Status: COMPLETED | OUTPATIENT
Start: 2023-01-01 | End: 2023-01-01

## 2023-01-01 RX ORDER — CALCIUM GLUCONATE 20 MG/ML
1 INJECTION, SOLUTION INTRAVENOUS ONCE
Status: COMPLETED | OUTPATIENT
Start: 2023-01-01 | End: 2023-01-01

## 2023-01-01 RX ORDER — LORAZEPAM 2 MG/ML
0.5 INJECTION INTRAMUSCULAR
Status: COMPLETED | OUTPATIENT
Start: 2023-01-01 | End: 2023-01-01

## 2023-01-01 RX ORDER — ACETAMINOPHEN 500 MG
1000 TABLET ORAL EVERY 8 HOURS PRN
COMMUNITY

## 2023-01-01 RX ORDER — POTASSIUM CHLORIDE 750 MG/1
20 TABLET, EXTENDED RELEASE ORAL EVERY MORNING
Status: ON HOLD | COMMUNITY
End: 2023-01-01

## 2023-01-01 RX ORDER — AMOXICILLIN 250 MG
2 CAPSULE ORAL 2 TIMES DAILY
Status: DISCONTINUED | OUTPATIENT
Start: 2023-01-01 | End: 2023-01-01 | Stop reason: HOSPADM

## 2023-01-01 RX ORDER — OLANZAPINE 5 MG/1
5 TABLET, ORALLY DISINTEGRATING ORAL
Status: DISCONTINUED | OUTPATIENT
Start: 2023-01-01 | End: 2023-01-01 | Stop reason: HOSPADM

## 2023-01-01 RX ORDER — TAMSULOSIN HYDROCHLORIDE 0.4 MG/1
0.4 CAPSULE ORAL DAILY
Status: DISCONTINUED | OUTPATIENT
Start: 2023-01-01 | End: 2023-01-01

## 2023-01-01 RX ORDER — OLANZAPINE 5 MG/1
5 TABLET, ORALLY DISINTEGRATING ORAL 4 TIMES DAILY PRN
Status: DISCONTINUED | OUTPATIENT
Start: 2023-01-01 | End: 2023-01-01 | Stop reason: HOSPADM

## 2023-01-01 RX ORDER — AMOXICILLIN AND CLAVULANATE POTASSIUM 400; 57 MG/5ML; MG/5ML
875 POWDER, FOR SUSPENSION ORAL 2 TIMES DAILY
Status: DISCONTINUED | OUTPATIENT
Start: 2023-01-01 | End: 2023-01-01

## 2023-01-01 RX ORDER — POLYETHYLENE GLYCOL 3350 17 G/17G
17 POWDER, FOR SOLUTION ORAL DAILY PRN
Status: DISCONTINUED | OUTPATIENT
Start: 2023-01-01 | End: 2023-01-01 | Stop reason: HOSPADM

## 2023-01-01 RX ORDER — METOPROLOL SUCCINATE 25 MG/1
25 TABLET, EXTENDED RELEASE ORAL DAILY
Status: DISCONTINUED | OUTPATIENT
Start: 2023-01-01 | End: 2023-01-01 | Stop reason: HOSPADM

## 2023-01-01 RX ORDER — TAMSULOSIN HYDROCHLORIDE 0.4 MG/1
0.4 CAPSULE ORAL DAILY
Status: DISCONTINUED | OUTPATIENT
Start: 2023-01-01 | End: 2023-01-01 | Stop reason: HOSPADM

## 2023-01-01 RX ORDER — FUROSEMIDE 20 MG
20 TABLET ORAL DAILY
Status: DISCONTINUED | OUTPATIENT
Start: 2023-01-01 | End: 2023-01-01 | Stop reason: HOSPADM

## 2023-01-01 RX ORDER — TOLNAFTATE 1 G/100G
POWDER TOPICAL 2 TIMES DAILY
Status: DISCONTINUED | OUTPATIENT
Start: 2023-01-01 | End: 2023-01-01

## 2023-01-01 RX ORDER — LISINOPRIL 2.5 MG/1
2.5 TABLET ORAL EVERY MORNING
Status: DISCONTINUED | OUTPATIENT
Start: 2023-01-01 | End: 2023-01-01

## 2023-01-01 RX ORDER — CEFTRIAXONE 2 G/1
2 INJECTION, POWDER, FOR SOLUTION INTRAMUSCULAR; INTRAVENOUS EVERY 24 HOURS
Status: DISCONTINUED | OUTPATIENT
Start: 2023-01-01 | End: 2023-01-01

## 2023-01-01 RX ORDER — IOPAMIDOL 755 MG/ML
80 INJECTION, SOLUTION INTRAVASCULAR ONCE
Status: COMPLETED | OUTPATIENT
Start: 2023-01-01 | End: 2023-01-01

## 2023-01-01 RX ORDER — FLUCONAZOLE 2 MG/ML
100 INJECTION INTRAVENOUS EVERY 24 HOURS
Status: DISCONTINUED | OUTPATIENT
Start: 2023-01-01 | End: 2023-01-01

## 2023-01-01 RX ORDER — LISINOPRIL 2.5 MG/1
2.5 TABLET ORAL EVERY MORNING
Status: ON HOLD | COMMUNITY
End: 2023-01-01

## 2023-01-01 RX ORDER — OXYCODONE HYDROCHLORIDE 5 MG/1
2.5 TABLET ORAL EVERY 6 HOURS PRN
Qty: 10 TABLET | Refills: 0 | Status: ON HOLD | OUTPATIENT
Start: 2023-01-01 | End: 2023-01-01

## 2023-01-01 RX ORDER — LORAZEPAM 2 MG/ML
1 INJECTION INTRAMUSCULAR EVERY 4 HOURS PRN
Status: DISCONTINUED | OUTPATIENT
Start: 2023-01-01 | End: 2023-01-01 | Stop reason: HOSPADM

## 2023-01-01 RX ORDER — TAMSULOSIN HYDROCHLORIDE 0.4 MG/1
0.4 CAPSULE ORAL EVERY MORNING
COMMUNITY

## 2023-01-01 RX ORDER — MORPHINE SULFATE 20 MG/ML
10 SOLUTION ORAL EVERY 4 HOURS PRN
Status: DISCONTINUED | OUTPATIENT
Start: 2023-01-01 | End: 2023-01-01 | Stop reason: HOSPADM

## 2023-01-01 RX ORDER — FLUCONAZOLE 2 MG/ML
200 INJECTION, SOLUTION INTRAVENOUS EVERY 24 HOURS
Status: DISCONTINUED | OUTPATIENT
Start: 2023-01-01 | End: 2023-01-01

## 2023-01-01 RX ORDER — ASPIRIN 81 MG/1
81 TABLET, CHEWABLE ORAL EVERY MORNING
Status: DISCONTINUED | OUTPATIENT
Start: 2023-01-01 | End: 2023-01-01 | Stop reason: HOSPADM

## 2023-01-01 RX ORDER — ACETAMINOPHEN 325 MG/1
650 TABLET ORAL EVERY 6 HOURS PRN
Status: DISCONTINUED | OUTPATIENT
Start: 2023-01-01 | End: 2023-01-01 | Stop reason: HOSPADM

## 2023-01-01 RX ORDER — HYDROMORPHONE HCL IN WATER/PF 6 MG/30 ML
0.2 PATIENT CONTROLLED ANALGESIA SYRINGE INTRAVENOUS
Status: DISCONTINUED | OUTPATIENT
Start: 2023-01-01 | End: 2023-01-01

## 2023-01-01 RX ORDER — THIAMINE HYDROCHLORIDE 100 MG/ML
50 INJECTION, SOLUTION INTRAMUSCULAR; INTRAVENOUS DAILY
Status: DISCONTINUED | OUTPATIENT
Start: 2023-01-01 | End: 2023-01-01

## 2023-01-01 RX ORDER — ACETAMINOPHEN 325 MG/1
975 TABLET ORAL EVERY 8 HOURS
Status: DISCONTINUED | OUTPATIENT
Start: 2023-01-01 | End: 2023-01-01 | Stop reason: HOSPADM

## 2023-01-01 RX ORDER — OXYCODONE HYDROCHLORIDE 5 MG/1
5 TABLET ORAL ONCE
Status: COMPLETED | OUTPATIENT
Start: 2023-01-01 | End: 2023-01-01

## 2023-01-01 RX ORDER — DOCUSATE SODIUM 100 MG/1
100 CAPSULE, LIQUID FILLED ORAL 2 TIMES DAILY
COMMUNITY

## 2023-01-01 RX ORDER — PIPERACILLIN SODIUM, TAZOBACTAM SODIUM 4; .5 G/20ML; G/20ML
4.5 INJECTION, POWDER, LYOPHILIZED, FOR SOLUTION INTRAVENOUS ONCE
Status: COMPLETED | OUTPATIENT
Start: 2023-01-01 | End: 2023-01-01

## 2023-01-01 RX ORDER — ACETAMINOPHEN 325 MG/1
650 TABLET ORAL ONCE
Status: COMPLETED | OUTPATIENT
Start: 2023-01-01 | End: 2023-01-01

## 2023-01-01 RX ORDER — METOPROLOL SUCCINATE 25 MG/1
25 TABLET, EXTENDED RELEASE ORAL DAILY
Qty: 12.5 TABLET | Refills: 1 | Status: SHIPPED | OUTPATIENT
Start: 2023-01-01

## 2023-01-01 RX ORDER — FLUCONAZOLE 40 MG/ML
100 POWDER, FOR SUSPENSION ORAL DAILY
Status: COMPLETED | OUTPATIENT
Start: 2023-01-01 | End: 2023-01-01

## 2023-01-01 RX ORDER — ACETAMINOPHEN 500 MG
1000 TABLET ORAL EVERY 8 HOURS PRN
Status: DISCONTINUED | OUTPATIENT
Start: 2023-01-01 | End: 2023-01-01 | Stop reason: HOSPADM

## 2023-01-01 RX ORDER — POTASSIUM CHLORIDE 1.5 G/1.58G
40 POWDER, FOR SOLUTION ORAL ONCE
Status: COMPLETED | OUTPATIENT
Start: 2023-01-01 | End: 2023-01-01

## 2023-01-01 RX ORDER — CEFTRIAXONE 1 G/1
1 INJECTION, POWDER, FOR SOLUTION INTRAMUSCULAR; INTRAVENOUS ONCE
Status: DISCONTINUED | OUTPATIENT
Start: 2023-01-01 | End: 2023-01-01

## 2023-01-01 RX ORDER — ASPIRIN 81 MG/1
81 TABLET, CHEWABLE ORAL EVERY MORNING
COMMUNITY

## 2023-01-01 RX ORDER — LANOLIN ALCOHOL/MO/W.PET/CERES
3 CREAM (GRAM) TOPICAL
Status: DISCONTINUED | OUTPATIENT
Start: 2023-01-01 | End: 2023-01-01 | Stop reason: HOSPADM

## 2023-01-01 RX ORDER — FLUCONAZOLE 2 MG/ML
400 INJECTION, SOLUTION INTRAVENOUS EVERY 24 HOURS
Status: COMPLETED | OUTPATIENT
Start: 2023-01-01 | End: 2023-01-01

## 2023-01-01 RX ORDER — CARBOXYMETHYLCELLULOSE SODIUM 5 MG/ML
1 SOLUTION/ DROPS OPHTHALMIC 4 TIMES DAILY
Status: DISCONTINUED | OUTPATIENT
Start: 2023-01-01 | End: 2023-01-01 | Stop reason: HOSPADM

## 2023-01-01 RX ORDER — OXYCODONE HYDROCHLORIDE 5 MG/1
2.5 TABLET ORAL EVERY 4 HOURS PRN
Qty: 30 TABLET | Refills: 0 | Status: ON HOLD | OUTPATIENT
Start: 2023-01-01 | End: 2023-01-01

## 2023-01-01 RX ORDER — PANTOPRAZOLE SODIUM 40 MG/1
40 TABLET, DELAYED RELEASE ORAL
Qty: 60 TABLET | Refills: 0 | Status: SHIPPED | OUTPATIENT
Start: 2023-01-01

## 2023-01-01 RX ORDER — POTASSIUM CHLORIDE 1500 MG/1
40 TABLET, EXTENDED RELEASE ORAL ONCE
Status: COMPLETED | OUTPATIENT
Start: 2023-01-01 | End: 2023-01-01

## 2023-01-01 RX ORDER — ALBUTEROL SULFATE 90 UG/1
2 AEROSOL, METERED RESPIRATORY (INHALATION) EVERY 4 HOURS PRN
COMMUNITY

## 2023-01-01 RX ORDER — FLUCONAZOLE 2 MG/ML
300 INJECTION INTRAVENOUS EVERY 24 HOURS
Status: DISCONTINUED | OUTPATIENT
Start: 2023-01-01 | End: 2023-01-01

## 2023-01-01 RX ORDER — ASPIRIN 81 MG/1
81 TABLET, CHEWABLE ORAL DAILY
Status: DISCONTINUED | OUTPATIENT
Start: 2023-01-01 | End: 2023-01-01

## 2023-01-01 RX ORDER — DEXTROSE MONOHYDRATE 25 G/50ML
50 INJECTION, SOLUTION INTRAVENOUS ONCE
Status: COMPLETED | OUTPATIENT
Start: 2023-01-01 | End: 2023-01-01

## 2023-01-01 RX ORDER — OXYCODONE HYDROCHLORIDE 5 MG/1
2.5 TABLET ORAL DAILY
Status: ON HOLD | COMMUNITY
End: 2023-01-01

## 2023-01-01 RX ORDER — OLANZAPINE 5 MG/1
5 TABLET, ORALLY DISINTEGRATING ORAL AT BEDTIME
Status: DISCONTINUED | OUTPATIENT
Start: 2023-01-01 | End: 2023-01-01 | Stop reason: HOSPADM

## 2023-01-01 RX ORDER — LISINOPRIL 2.5 MG/1
2.5 TABLET ORAL DAILY
Status: DISCONTINUED | OUTPATIENT
Start: 2023-01-01 | End: 2023-01-01 | Stop reason: HOSPADM

## 2023-01-01 RX ORDER — BISACODYL 10 MG
10 SUPPOSITORY, RECTAL RECTAL DAILY PRN
Status: DISCONTINUED | OUTPATIENT
Start: 2023-01-01 | End: 2023-01-01 | Stop reason: HOSPADM

## 2023-01-01 RX ORDER — HYDROMORPHONE HCL IN WATER/PF 6 MG/30 ML
0.4 PATIENT CONTROLLED ANALGESIA SYRINGE INTRAVENOUS
Status: DISCONTINUED | OUTPATIENT
Start: 2023-01-01 | End: 2023-01-01

## 2023-01-01 RX ORDER — OLANZAPINE 5 MG/1
5 TABLET, ORALLY DISINTEGRATING ORAL 2 TIMES DAILY PRN
Status: DISCONTINUED | OUTPATIENT
Start: 2023-01-01 | End: 2023-01-01

## 2023-01-01 RX ORDER — LISINOPRIL 2.5 MG/1
2.5 TABLET ORAL EVERY MORNING
DISCHARGE
Start: 2023-01-01

## 2023-01-01 RX ORDER — IOPAMIDOL 755 MG/ML
125 INJECTION, SOLUTION INTRAVASCULAR ONCE
Status: COMPLETED | OUTPATIENT
Start: 2023-01-01 | End: 2023-01-01

## 2023-01-01 RX ORDER — ATROPINE SULFATE 10 MG/ML
2 SOLUTION/ DROPS OPHTHALMIC
Status: DISCONTINUED | OUTPATIENT
Start: 2023-01-01 | End: 2023-01-01 | Stop reason: HOSPADM

## 2023-01-01 RX ORDER — CEFTRIAXONE 2 G/1
2 INJECTION, POWDER, FOR SOLUTION INTRAMUSCULAR; INTRAVENOUS ONCE
Status: COMPLETED | OUTPATIENT
Start: 2023-01-01 | End: 2023-01-01

## 2023-01-01 RX ORDER — LOPERAMIDE HCL 2 MG
2 CAPSULE ORAL 4 TIMES DAILY PRN
Status: DISCONTINUED | OUTPATIENT
Start: 2023-01-01 | End: 2023-01-01 | Stop reason: HOSPADM

## 2023-01-01 RX ORDER — FOLIC ACID 1 MG/1
1 TABLET ORAL DAILY
Status: DISCONTINUED | OUTPATIENT
Start: 2023-01-01 | End: 2023-01-01 | Stop reason: ALTCHOICE

## 2023-01-01 RX ORDER — FOLIC ACID 1 MG/1
1 TABLET ORAL EVERY MORNING
Status: DISCONTINUED | OUTPATIENT
Start: 2023-01-01 | End: 2023-01-01 | Stop reason: HOSPADM

## 2023-01-01 RX ORDER — HYDROMORPHONE HCL IN WATER/PF 6 MG/30 ML
0.2 PATIENT CONTROLLED ANALGESIA SYRINGE INTRAVENOUS
Status: DISCONTINUED | OUTPATIENT
Start: 2023-01-01 | End: 2023-01-01 | Stop reason: DRUGHIGH

## 2023-01-01 RX ORDER — LORAZEPAM 2 MG/ML
2 INJECTION INTRAMUSCULAR EVERY 4 HOURS PRN
Status: DISCONTINUED | OUTPATIENT
Start: 2023-01-01 | End: 2023-01-01

## 2023-01-01 RX ADMIN — VASOPRESSIN: 20 INJECTION, SOLUTION INTRAVENOUS at 16:13

## 2023-01-01 RX ADMIN — PANTOPRAZOLE SODIUM 40 MG: 40 INJECTION, POWDER, FOR SOLUTION INTRAVENOUS at 08:38

## 2023-01-01 RX ADMIN — HYDROMORPHONE HYDROCHLORIDE 0.4 MG: 0.2 INJECTION, SOLUTION INTRAMUSCULAR; INTRAVENOUS; SUBCUTANEOUS at 08:39

## 2023-01-01 RX ADMIN — CARBOXYMETHYLCELLULOSE SODIUM 1 DROP: 10 GEL OPHTHALMIC at 21:02

## 2023-01-01 RX ADMIN — DEXTROSE MONOHYDRATE 25 G: 25 INJECTION, SOLUTION INTRAVENOUS at 14:15

## 2023-01-01 RX ADMIN — GADOBUTROL 8 ML: 604.72 INJECTION INTRAVENOUS at 23:43

## 2023-01-01 RX ADMIN — DICLOFENAC SODIUM 2 G: 10 GEL TOPICAL at 09:36

## 2023-01-01 RX ADMIN — HYALURONIDASE (HUMAN RECOMBINANT) 150 UNITS: 150 INJECTION, SOLUTION SUBCUTANEOUS at 05:00

## 2023-01-01 RX ADMIN — ACETAMINOPHEN 650 MG: 325 TABLET, FILM COATED ORAL at 18:31

## 2023-01-01 RX ADMIN — CARBOXYMETHYLCELLULOSE SODIUM 1 DROP: 10 GEL OPHTHALMIC at 08:40

## 2023-01-01 RX ADMIN — SENNOSIDES AND DOCUSATE SODIUM 1 TABLET: 8.6; 5 TABLET ORAL at 10:34

## 2023-01-01 RX ADMIN — DICLOFENAC SODIUM 2 G: 10 GEL TOPICAL at 10:32

## 2023-01-01 RX ADMIN — PIPERACILLIN AND TAZOBACTAM 4.5 G: 4; .5 INJECTION, POWDER, FOR SOLUTION INTRAVENOUS at 03:50

## 2023-01-01 RX ADMIN — PANTOPRAZOLE SODIUM 40 MG: 40 INJECTION, POWDER, FOR SOLUTION INTRAVENOUS at 10:13

## 2023-01-01 RX ADMIN — AMOXICILLIN AND CLAVULANATE POTASSIUM 875 MG: 400; 57 POWDER, FOR SUSPENSION ORAL at 09:06

## 2023-01-01 RX ADMIN — METOPROLOL SUCCINATE 25 MG: 25 TABLET, EXTENDED RELEASE ORAL at 09:58

## 2023-01-01 RX ADMIN — PANTOPRAZOLE SODIUM 40 MG: 40 TABLET, DELAYED RELEASE ORAL at 07:37

## 2023-01-01 RX ADMIN — HYDROMORPHONE HYDROCHLORIDE 0.4 MG: 0.2 INJECTION, SOLUTION INTRAMUSCULAR; INTRAVENOUS; SUBCUTANEOUS at 09:29

## 2023-01-01 RX ADMIN — HUMAN ALBUMIN MICROSPHERES AND PERFLUTREN 9 ML: 10; .22 INJECTION, SOLUTION INTRAVENOUS at 14:48

## 2023-01-01 RX ADMIN — ACETAMINOPHEN 650 MG: 325 TABLET, FILM COATED ORAL at 22:46

## 2023-01-01 RX ADMIN — HYDROMORPHONE HYDROCHLORIDE 0.4 MG: 0.2 INJECTION, SOLUTION INTRAMUSCULAR; INTRAVENOUS; SUBCUTANEOUS at 22:37

## 2023-01-01 RX ADMIN — PANTOPRAZOLE SODIUM 40 MG: 40 INJECTION, POWDER, FOR SOLUTION INTRAVENOUS at 08:41

## 2023-01-01 RX ADMIN — DEXTROSE AND SODIUM CHLORIDE: 5; 900 INJECTION, SOLUTION INTRAVENOUS at 20:43

## 2023-01-01 RX ADMIN — POTASSIUM & SODIUM PHOSPHATES POWDER PACK 280-160-250 MG 2 PACKET: 280-160-250 PACK at 17:41

## 2023-01-01 RX ADMIN — DUTASTERIDE 0.5 MG: 0.5 CAPSULE, LIQUID FILLED ORAL at 08:42

## 2023-01-01 RX ADMIN — LORAZEPAM 1 MG: 2 INJECTION INTRAMUSCULAR; INTRAVENOUS at 21:55

## 2023-01-01 RX ADMIN — DEXTROSE MONOHYDRATE 25 ML: 25 INJECTION, SOLUTION INTRAVENOUS at 14:11

## 2023-01-01 RX ADMIN — DUTASTERIDE 0.5 MG: 0.5 CAPSULE ORAL at 08:41

## 2023-01-01 RX ADMIN — CARBOXYMETHYLCELLULOSE SODIUM 1 DROP: 10 GEL OPHTHALMIC at 09:01

## 2023-01-01 RX ADMIN — DUTASTERIDE 0.5 MG: 0.5 CAPSULE ORAL at 10:17

## 2023-01-01 RX ADMIN — DEXTROSE MONOHYDRATE 50 ML: 25 INJECTION, SOLUTION INTRAVENOUS at 14:43

## 2023-01-01 RX ADMIN — CARBOXYMETHYLCELLULOSE SODIUM 1 DROP: 5 SOLUTION/ DROPS OPHTHALMIC at 19:53

## 2023-01-01 RX ADMIN — CARBOXYMETHYLCELLULOSE SODIUM 1 DROP: 10 GEL OPHTHALMIC at 12:57

## 2023-01-01 RX ADMIN — CARBOXYMETHYLCELLULOSE SODIUM 1 DROP: 10 GEL OPHTHALMIC at 21:40

## 2023-01-01 RX ADMIN — PANTOPRAZOLE SODIUM 40 MG: 40 TABLET, DELAYED RELEASE ORAL at 08:42

## 2023-01-01 RX ADMIN — OLANZAPINE 5 MG: 5 TABLET, ORALLY DISINTEGRATING ORAL at 21:28

## 2023-01-01 RX ADMIN — METOPROLOL SUCCINATE 25 MG: 25 TABLET, EXTENDED RELEASE ORAL at 08:56

## 2023-01-01 RX ADMIN — CARBOXYMETHYLCELLULOSE SODIUM 1 DROP: 10 GEL OPHTHALMIC at 10:32

## 2023-01-01 RX ADMIN — DEXTROSE MONOHYDRATE 25 ML: 25 INJECTION, SOLUTION INTRAVENOUS at 01:24

## 2023-01-01 RX ADMIN — DEXTROSE MONOHYDRATE 50 ML: 25 INJECTION, SOLUTION INTRAVENOUS at 21:35

## 2023-01-01 RX ADMIN — CEFTRIAXONE SODIUM 2 G: 2 INJECTION, POWDER, FOR SOLUTION INTRAMUSCULAR; INTRAVENOUS at 20:50

## 2023-01-01 RX ADMIN — SENNOSIDES AND DOCUSATE SODIUM 1 TABLET: 50; 8.6 TABLET ORAL at 08:57

## 2023-01-01 RX ADMIN — PIPERACILLIN AND TAZOBACTAM 3.38 G: 3; .375 INJECTION, POWDER, FOR SOLUTION INTRAVENOUS at 05:22

## 2023-01-01 RX ADMIN — FLUCONAZOLE 300 MG: 400 INJECTION, SOLUTION INTRAVENOUS at 16:40

## 2023-01-01 RX ADMIN — MICONAZOLE NITRATE: 2 POWDER TOPICAL at 21:39

## 2023-01-01 RX ADMIN — DUTASTERIDE 0.5 MG: 0.5 CAPSULE ORAL at 08:38

## 2023-01-01 RX ADMIN — CARBOXYMETHYLCELLULOSE SODIUM 1 DROP: 5 SOLUTION/ DROPS OPHTHALMIC at 08:57

## 2023-01-01 RX ADMIN — PIPERACILLIN AND TAZOBACTAM 2.25 G: 2; .25 INJECTION, POWDER, FOR SOLUTION INTRAVENOUS at 06:20

## 2023-01-01 RX ADMIN — PIPERACILLIN AND TAZOBACTAM 3.38 G: 3; .375 INJECTION, POWDER, FOR SOLUTION INTRAVENOUS at 06:36

## 2023-01-01 RX ADMIN — CARBOXYMETHYLCELLULOSE SODIUM 1 DROP: 10 GEL OPHTHALMIC at 21:12

## 2023-01-01 RX ADMIN — AMOXICILLIN AND CLAVULANATE POTASSIUM 875 MG: 400; 57 POWDER, FOR SUSPENSION ORAL at 13:21

## 2023-01-01 RX ADMIN — TAMSULOSIN HYDROCHLORIDE 0.4 MG: 0.4 CAPSULE ORAL at 08:57

## 2023-01-01 RX ADMIN — PANTOPRAZOLE SODIUM 40 MG: 40 TABLET, DELAYED RELEASE ORAL at 16:20

## 2023-01-01 RX ADMIN — DEXTROSE MONOHYDRATE 25 ML: 25 INJECTION, SOLUTION INTRAVENOUS at 08:10

## 2023-01-01 RX ADMIN — DUTASTERIDE 0.5 MG: 0.5 CAPSULE ORAL at 09:06

## 2023-01-01 RX ADMIN — ONDANSETRON 4 MG: 4 TABLET, ORALLY DISINTEGRATING ORAL at 09:52

## 2023-01-01 RX ADMIN — DOCUSATE SODIUM 100 MG: 100 CAPSULE, LIQUID FILLED ORAL at 08:57

## 2023-01-01 RX ADMIN — ATROPINE SULFATE 2 DROP: 10 SOLUTION/ DROPS OPHTHALMIC at 14:55

## 2023-01-01 RX ADMIN — SENNOSIDES AND DOCUSATE SODIUM 1 TABLET: 50; 8.6 TABLET ORAL at 08:52

## 2023-01-01 RX ADMIN — DOCUSATE SODIUM LIQUID 100 MG: 100 LIQUID ORAL at 20:27

## 2023-01-01 RX ADMIN — DUTASTERIDE 0.5 MG: 0.5 CAPSULE ORAL at 09:17

## 2023-01-01 RX ADMIN — VANCOMYCIN HYDROCHLORIDE 1500 MG: 10 INJECTION, POWDER, LYOPHILIZED, FOR SOLUTION INTRAVENOUS at 22:20

## 2023-01-01 RX ADMIN — LORAZEPAM 0.5 MG: 2 INJECTION INTRAMUSCULAR; INTRAVENOUS at 15:01

## 2023-01-01 RX ADMIN — FUROSEMIDE 20 MG: 20 TABLET ORAL at 08:55

## 2023-01-01 RX ADMIN — ACETAMINOPHEN 1000 MG: 500 TABLET ORAL at 10:17

## 2023-01-01 RX ADMIN — DUTASTERIDE 0.5 MG: 0.5 CAPSULE, LIQUID FILLED ORAL at 08:59

## 2023-01-01 RX ADMIN — IOPAMIDOL 125 ML: 755 INJECTION, SOLUTION INTRAVENOUS at 12:05

## 2023-01-01 RX ADMIN — DOCUSATE SODIUM 100 MG: 100 CAPSULE, LIQUID FILLED ORAL at 20:58

## 2023-01-01 RX ADMIN — LORAZEPAM 1 MG: 2 INJECTION INTRAMUSCULAR; INTRAVENOUS at 10:13

## 2023-01-01 RX ADMIN — PIPERACILLIN AND TAZOBACTAM 3.38 G: 3; .375 INJECTION, POWDER, FOR SOLUTION INTRAVENOUS at 20:43

## 2023-01-01 RX ADMIN — CARBOXYMETHYLCELLULOSE SODIUM 1 DROP: 10 GEL OPHTHALMIC at 12:26

## 2023-01-01 RX ADMIN — SODIUM CHLORIDE 1000 ML: 9 INJECTION, SOLUTION INTRAVENOUS at 09:14

## 2023-01-01 RX ADMIN — DICLOFENAC SODIUM 2 G: 10 GEL TOPICAL at 09:04

## 2023-01-01 RX ADMIN — SENNOSIDES AND DOCUSATE SODIUM 1 TABLET: 8.6; 5 TABLET ORAL at 20:28

## 2023-01-01 RX ADMIN — PIPERACILLIN AND TAZOBACTAM 2.25 G: 2; .25 INJECTION, POWDER, FOR SOLUTION INTRAVENOUS at 19:52

## 2023-01-01 RX ADMIN — DICLOFENAC SODIUM 2 G: 10 GEL TOPICAL at 08:41

## 2023-01-01 RX ADMIN — OLANZAPINE 5 MG: 5 TABLET, ORALLY DISINTEGRATING ORAL at 10:08

## 2023-01-01 RX ADMIN — CARBOXYMETHYLCELLULOSE SODIUM 1 DROP: 10 GEL OPHTHALMIC at 16:19

## 2023-01-01 RX ADMIN — TAMSULOSIN HYDROCHLORIDE 0.4 MG: 0.4 CAPSULE ORAL at 20:40

## 2023-01-01 RX ADMIN — CARBOXYMETHYLCELLULOSE SODIUM 1 DROP: 10 GEL OPHTHALMIC at 09:04

## 2023-01-01 RX ADMIN — LISINOPRIL 2.5 MG: 2.5 TABLET ORAL at 08:19

## 2023-01-01 RX ADMIN — CARBOXYMETHYLCELLULOSE SODIUM 1 DROP: 10 GEL OPHTHALMIC at 21:29

## 2023-01-01 RX ADMIN — FOLIC ACID 1 MG: 1 TABLET ORAL at 09:17

## 2023-01-01 RX ADMIN — Medication 50 MG: at 08:56

## 2023-01-01 RX ADMIN — DICLOFENAC SODIUM 2 G: 10 GEL TOPICAL at 21:39

## 2023-01-01 RX ADMIN — HYDROMORPHONE HYDROCHLORIDE 0.4 MG: 0.2 INJECTION, SOLUTION INTRAMUSCULAR; INTRAVENOUS; SUBCUTANEOUS at 06:30

## 2023-01-01 RX ADMIN — DEXTROSE AND SODIUM CHLORIDE: 5; 900 INJECTION, SOLUTION INTRAVENOUS at 04:54

## 2023-01-01 RX ADMIN — Medication 50 MG: at 08:59

## 2023-01-01 RX ADMIN — PANTOPRAZOLE SODIUM 40 MG: 40 TABLET, DELAYED RELEASE ORAL at 06:27

## 2023-01-01 RX ADMIN — ACETAMINOPHEN 1000 MG: 500 TABLET ORAL at 08:46

## 2023-01-01 RX ADMIN — PIPERACILLIN AND TAZOBACTAM 4.5 G: 4; .5 INJECTION, POWDER, FOR SOLUTION INTRAVENOUS at 02:47

## 2023-01-01 RX ADMIN — DOCUSATE SODIUM LIQUID 100 MG: 100 LIQUID ORAL at 09:17

## 2023-01-01 RX ADMIN — DEXTROSE MONOHYDRATE 25 ML: 25 INJECTION, SOLUTION INTRAVENOUS at 00:28

## 2023-01-01 RX ADMIN — FOLIC ACID 1 MG: 1 TABLET ORAL at 12:21

## 2023-01-01 RX ADMIN — ASPIRIN 81 MG: 81 TABLET, COATED ORAL at 09:58

## 2023-01-01 RX ADMIN — POLYETHYLENE GLYCOL 3350 17 G: 17 POWDER, FOR SOLUTION ORAL at 09:05

## 2023-01-01 RX ADMIN — AMOXICILLIN AND CLAVULANATE POTASSIUM 875 MG: 400; 57 POWDER, FOR SUSPENSION ORAL at 00:03

## 2023-01-01 RX ADMIN — PANTOPRAZOLE SODIUM 40 MG: 40 TABLET, DELAYED RELEASE ORAL at 16:09

## 2023-01-01 RX ADMIN — CARBOXYMETHYLCELLULOSE SODIUM 1 DROP: 10 GEL OPHTHALMIC at 08:26

## 2023-01-01 RX ADMIN — CARBOXYMETHYLCELLULOSE SODIUM 1 DROP: 10 GEL OPHTHALMIC at 12:21

## 2023-01-01 RX ADMIN — PANTOPRAZOLE SODIUM 40 MG: 40 TABLET, DELAYED RELEASE ORAL at 16:11

## 2023-01-01 RX ADMIN — CARBOXYMETHYLCELLULOSE SODIUM 1 DROP: 10 GEL OPHTHALMIC at 21:22

## 2023-01-01 RX ADMIN — DOCUSATE SODIUM LIQUID 100 MG: 100 LIQUID ORAL at 09:31

## 2023-01-01 RX ADMIN — POTASSIUM CHLORIDE 40 MEQ: 1.5 POWDER, FOR SOLUTION ORAL at 09:57

## 2023-01-01 RX ADMIN — PIPERACILLIN AND TAZOBACTAM 2.25 G: 2; .25 INJECTION, POWDER, FOR SOLUTION INTRAVENOUS at 12:35

## 2023-01-01 RX ADMIN — DOCUSATE SODIUM LIQUID 100 MG: 100 LIQUID ORAL at 20:46

## 2023-01-01 RX ADMIN — LORAZEPAM 1 MG: 2 INJECTION INTRAMUSCULAR; INTRAVENOUS at 06:23

## 2023-01-01 RX ADMIN — HYDROMORPHONE HYDROCHLORIDE 0.4 MG: 0.2 INJECTION, SOLUTION INTRAMUSCULAR; INTRAVENOUS; SUBCUTANEOUS at 18:32

## 2023-01-01 RX ADMIN — POTASSIUM CHLORIDE 40 MEQ: 1.5 POWDER, FOR SOLUTION ORAL at 20:39

## 2023-01-01 RX ADMIN — ACETAMINOPHEN 650 MG: 325 TABLET, FILM COATED ORAL at 18:20

## 2023-01-01 RX ADMIN — ASPIRIN 81 MG: 81 TABLET, COATED ORAL at 08:56

## 2023-01-01 RX ADMIN — ACETAMINOPHEN 1000 MG: 500 TABLET ORAL at 08:39

## 2023-01-01 RX ADMIN — DEXTROSE AND SODIUM CHLORIDE: 5; 450 INJECTION, SOLUTION INTRAVENOUS at 16:32

## 2023-01-01 RX ADMIN — DICLOFENAC SODIUM 2 G: 10 GEL TOPICAL at 22:04

## 2023-01-01 RX ADMIN — FOLIC ACID 1 MG: 5 INJECTION, SOLUTION INTRAMUSCULAR; INTRAVENOUS; SUBCUTANEOUS at 09:03

## 2023-01-01 RX ADMIN — FLUCONAZOLE 100 MG: 40 POWDER, FOR SUSPENSION ORAL at 08:41

## 2023-01-01 RX ADMIN — Medication 50 MG: at 09:31

## 2023-01-01 RX ADMIN — CEFTRIAXONE SODIUM 2 G: 2 INJECTION, POWDER, FOR SOLUTION INTRAMUSCULAR; INTRAVENOUS at 21:39

## 2023-01-01 RX ADMIN — TAMSULOSIN HYDROCHLORIDE 0.4 MG: 0.4 CAPSULE ORAL at 09:01

## 2023-01-01 RX ADMIN — FLUCONAZOLE 300 MG: 400 INJECTION, SOLUTION INTRAVENOUS at 16:46

## 2023-01-01 RX ADMIN — LORAZEPAM 0.5 MG: 2 INJECTION INTRAMUSCULAR; INTRAVENOUS at 16:15

## 2023-01-01 RX ADMIN — CARBOXYMETHYLCELLULOSE SODIUM 1 DROP: 10 GEL OPHTHALMIC at 21:00

## 2023-01-01 RX ADMIN — PANTOPRAZOLE SODIUM 40 MG: 40 TABLET, DELAYED RELEASE ORAL at 06:43

## 2023-01-01 RX ADMIN — SODIUM CHLORIDE: 9 INJECTION, SOLUTION INTRAVENOUS at 16:31

## 2023-01-01 RX ADMIN — SODIUM CHLORIDE 500 ML: 9 INJECTION, SOLUTION INTRAVENOUS at 04:59

## 2023-01-01 RX ADMIN — PANTOPRAZOLE SODIUM 40 MG: 40 TABLET, DELAYED RELEASE ORAL at 16:18

## 2023-01-01 RX ADMIN — DOCUSATE SODIUM LIQUID 100 MG: 100 LIQUID ORAL at 08:46

## 2023-01-01 RX ADMIN — OLANZAPINE 5 MG: 5 TABLET, ORALLY DISINTEGRATING ORAL at 22:35

## 2023-01-01 RX ADMIN — PANTOPRAZOLE SODIUM 40 MG: 40 TABLET, DELAYED RELEASE ORAL at 16:22

## 2023-01-01 RX ADMIN — THIAMINE HYDROCHLORIDE 50 MG: 100 INJECTION, SOLUTION INTRAMUSCULAR; INTRAVENOUS at 09:35

## 2023-01-01 RX ADMIN — ASPIRIN 81 MG: 81 TABLET, COATED ORAL at 08:52

## 2023-01-01 RX ADMIN — CARBOXYMETHYLCELLULOSE SODIUM 1 DROP: 10 GEL OPHTHALMIC at 22:03

## 2023-01-01 RX ADMIN — POTASSIUM & SODIUM PHOSPHATES POWDER PACK 280-160-250 MG 1 PACKET: 280-160-250 PACK at 21:02

## 2023-01-01 RX ADMIN — ACETAMINOPHEN 1000 MG: 500 TABLET ORAL at 06:45

## 2023-01-01 RX ADMIN — FUROSEMIDE 20 MG: 20 TABLET ORAL at 09:17

## 2023-01-01 RX ADMIN — ACETAMINOPHEN 1000 MG: 500 TABLET ORAL at 08:41

## 2023-01-01 RX ADMIN — OXYCODONE HYDROCHLORIDE 5 MG: 5 TABLET ORAL at 17:54

## 2023-01-01 RX ADMIN — CARBOXYMETHYLCELLULOSE SODIUM 1 DROP: 5 SOLUTION/ DROPS OPHTHALMIC at 16:58

## 2023-01-01 RX ADMIN — PIPERACILLIN AND TAZOBACTAM 3.38 G: 3; .375 INJECTION, POWDER, FOR SOLUTION INTRAVENOUS at 20:14

## 2023-01-01 RX ADMIN — PANTOPRAZOLE SODIUM 40 MG: 40 TABLET, DELAYED RELEASE ORAL at 18:29

## 2023-01-01 RX ADMIN — OXYCODONE HYDROCHLORIDE 5 MG: 5 TABLET ORAL at 12:18

## 2023-01-01 RX ADMIN — CLOSTRIDIUM TETANI TOXOID ANTIGEN (FORMALDEHYDE INACTIVATED), CORYNEBACTERIUM DIPHTHERIAE TOXOID ANTIGEN (FORMALDEHYDE INACTIVATED), BORDETELLA PERTUSSIS TOXOID ANTIGEN (GLUTARALDEHYDE INACTIVATED), BORDETELLA PERTUSSIS FILAMENTOUS HEMAGGLUTININ ANTIGEN (FORMALDEHYDE INACTIVATED), BORDETELLA PERTUSSIS PERTACTIN ANTIGEN, AND BORDETELLA PERTUSSIS FIMBRIAE 2/3 ANTIGEN 0.5 ML: 5; 2; 2.5; 5; 3; 5 INJECTION, SUSPENSION INTRAMUSCULAR at 17:42

## 2023-01-01 RX ADMIN — FOLIC ACID 1 MG: 5 INJECTION, SOLUTION INTRAMUSCULAR; INTRAVENOUS; SUBCUTANEOUS at 09:36

## 2023-01-01 RX ADMIN — SENNOSIDES AND DOCUSATE SODIUM 1 TABLET: 8.6; 5 TABLET ORAL at 08:55

## 2023-01-01 RX ADMIN — POTASSIUM & SODIUM PHOSPHATES POWDER PACK 280-160-250 MG 2 PACKET: 280-160-250 PACK at 10:34

## 2023-01-01 RX ADMIN — FLUCONAZOLE 400 MG: 400 INJECTION, SOLUTION INTRAVENOUS at 17:35

## 2023-01-01 RX ADMIN — SODIUM BICARBONATE 50 MEQ: 84 INJECTION INTRAVENOUS at 14:19

## 2023-01-01 RX ADMIN — DICLOFENAC SODIUM 2 G: 10 GEL TOPICAL at 09:02

## 2023-01-01 RX ADMIN — SODIUM CHLORIDE 80 ML: 9 INJECTION, SOLUTION INTRAVENOUS at 11:58

## 2023-01-01 RX ADMIN — HEPARIN SODIUM 750 UNITS/HR: 10000 INJECTION, SOLUTION INTRAVENOUS at 12:27

## 2023-01-01 RX ADMIN — CARBOXYMETHYLCELLULOSE SODIUM 1 DROP: 10 GEL OPHTHALMIC at 10:23

## 2023-01-01 RX ADMIN — VANCOMYCIN HYDROCHLORIDE 1750 MG: 10 INJECTION, POWDER, LYOPHILIZED, FOR SOLUTION INTRAVENOUS at 11:34

## 2023-01-01 RX ADMIN — DUTASTERIDE 0.5 MG: 0.5 CAPSULE, LIQUID FILLED ORAL at 08:52

## 2023-01-01 RX ADMIN — CARBOXYMETHYLCELLULOSE SODIUM 1 DROP: 10 GEL OPHTHALMIC at 17:07

## 2023-01-01 RX ADMIN — THIAMINE HYDROCHLORIDE 50 MG: 100 INJECTION, SOLUTION INTRAMUSCULAR; INTRAVENOUS at 09:06

## 2023-01-01 RX ADMIN — CARBOXYMETHYLCELLULOSE SODIUM 1 DROP: 10 GEL OPHTHALMIC at 16:22

## 2023-01-01 RX ADMIN — DEXTROSE MONOHYDRATE 25 ML: 25 INJECTION, SOLUTION INTRAVENOUS at 20:37

## 2023-01-01 RX ADMIN — FUROSEMIDE 20 MG: 20 TABLET ORAL at 14:08

## 2023-01-01 RX ADMIN — OLANZAPINE 5 MG: 5 TABLET, ORALLY DISINTEGRATING ORAL at 09:00

## 2023-01-01 RX ADMIN — CARBOXYMETHYLCELLULOSE SODIUM 1 DROP: 10 GEL OPHTHALMIC at 23:12

## 2023-01-01 RX ADMIN — HEPARIN SODIUM 100 UNITS/HR: 10000 INJECTION, SOLUTION INTRAVENOUS at 04:53

## 2023-01-01 RX ADMIN — POTASSIUM & SODIUM PHOSPHATES POWDER PACK 280-160-250 MG 1 PACKET: 280-160-250 PACK at 06:43

## 2023-01-01 RX ADMIN — POTASSIUM & SODIUM PHOSPHATES POWDER PACK 280-160-250 MG 1 PACKET: 280-160-250 PACK at 10:20

## 2023-01-01 RX ADMIN — POLYETHYLENE GLYCOL 3350 17 G: 17 POWDER, FOR SOLUTION ORAL at 09:02

## 2023-01-01 RX ADMIN — POLYETHYLENE GLYCOL 3350 17 G: 17 POWDER, FOR SOLUTION ORAL at 08:43

## 2023-01-01 RX ADMIN — ACETAMINOPHEN 1000 MG: 500 TABLET ORAL at 07:54

## 2023-01-01 RX ADMIN — POTASSIUM & SODIUM PHOSPHATES POWDER PACK 280-160-250 MG 1 PACKET: 280-160-250 PACK at 17:19

## 2023-01-01 RX ADMIN — DUTASTERIDE 0.5 MG: 0.5 CAPSULE ORAL at 10:12

## 2023-01-01 RX ADMIN — AMOXICILLIN AND CLAVULANATE POTASSIUM 875 MG: 400; 57 POWDER, FOR SUSPENSION ORAL at 21:38

## 2023-01-01 RX ADMIN — FLUCONAZOLE 200 MG: 2 INJECTION, SOLUTION INTRAVENOUS at 16:19

## 2023-01-01 RX ADMIN — SENNOSIDES AND DOCUSATE SODIUM 1 TABLET: 50; 8.6 TABLET ORAL at 19:53

## 2023-01-01 RX ADMIN — DUTASTERIDE 0.5 MG: 0.5 CAPSULE ORAL at 09:04

## 2023-01-01 RX ADMIN — SODIUM CHLORIDE 2000 ML: 9 INJECTION, SOLUTION INTRAVENOUS at 03:37

## 2023-01-01 RX ADMIN — CARBOXYMETHYLCELLULOSE SODIUM 1 DROP: 10 GEL OPHTHALMIC at 18:43

## 2023-01-01 RX ADMIN — PIPERACILLIN AND TAZOBACTAM 3.38 G: 3; .375 INJECTION, POWDER, FOR SOLUTION INTRAVENOUS at 23:20

## 2023-01-01 RX ADMIN — SENNOSIDES AND DOCUSATE SODIUM 1 TABLET: 8.6; 5 TABLET ORAL at 21:12

## 2023-01-01 RX ADMIN — CARBOXYMETHYLCELLULOSE SODIUM 1 DROP: 5 SOLUTION/ DROPS OPHTHALMIC at 20:57

## 2023-01-01 RX ADMIN — METOPROLOL SUCCINATE 25 MG: 25 TABLET, EXTENDED RELEASE ORAL at 09:01

## 2023-01-01 RX ADMIN — FOLIC ACID 1 MG: 5 INJECTION, SOLUTION INTRAMUSCULAR; INTRAVENOUS; SUBCUTANEOUS at 08:37

## 2023-01-01 RX ADMIN — DICLOFENAC SODIUM 2 G: 10 GEL TOPICAL at 08:53

## 2023-01-01 RX ADMIN — POTASSIUM CHLORIDE 40 MEQ: 1500 TABLET, EXTENDED RELEASE ORAL at 10:58

## 2023-01-01 RX ADMIN — FLUCONAZOLE 100 MG: 40 POWDER, FOR SUSPENSION ORAL at 09:07

## 2023-01-01 RX ADMIN — PANTOPRAZOLE SODIUM 40 MG: 40 TABLET, DELAYED RELEASE ORAL at 08:55

## 2023-01-01 RX ADMIN — LORAZEPAM 1 MG: 2 INJECTION INTRAMUSCULAR; INTRAVENOUS at 17:08

## 2023-01-01 RX ADMIN — ACETAMINOPHEN 650 MG: 325 TABLET, FILM COATED ORAL at 13:54

## 2023-01-01 RX ADMIN — SENNOSIDES AND DOCUSATE SODIUM 1 TABLET: 8.6; 5 TABLET ORAL at 20:27

## 2023-01-01 RX ADMIN — HEPARIN SODIUM 950 UNITS/HR: 10000 INJECTION, SOLUTION INTRAVENOUS at 16:45

## 2023-01-01 RX ADMIN — ASPIRIN 81 MG: 81 TABLET, COATED ORAL at 08:19

## 2023-01-01 RX ADMIN — PANTOPRAZOLE SODIUM 40 MG: 40 TABLET, DELAYED RELEASE ORAL at 17:03

## 2023-01-01 RX ADMIN — CARBOXYMETHYLCELLULOSE SODIUM 1 DROP: 10 GEL OPHTHALMIC at 14:14

## 2023-01-01 RX ADMIN — CARBOXYMETHYLCELLULOSE SODIUM 1 DROP: 10 GEL OPHTHALMIC at 09:17

## 2023-01-01 RX ADMIN — OXYCODONE HYDROCHLORIDE 5 MG: 5 TABLET ORAL at 00:24

## 2023-01-01 RX ADMIN — DUTASTERIDE 0.5 MG: 0.5 CAPSULE ORAL at 10:34

## 2023-01-01 RX ADMIN — DOCUSATE SODIUM LIQUID 100 MG: 100 LIQUID ORAL at 08:21

## 2023-01-01 RX ADMIN — PANTOPRAZOLE SODIUM 40 MG: 40 TABLET, DELAYED RELEASE ORAL at 16:58

## 2023-01-01 RX ADMIN — PIPERACILLIN AND TAZOBACTAM 3.38 G: 3; .375 INJECTION, POWDER, FOR SOLUTION INTRAVENOUS at 13:54

## 2023-01-01 RX ADMIN — DOCUSATE SODIUM 100 MG: 100 CAPSULE, LIQUID FILLED ORAL at 19:52

## 2023-01-01 RX ADMIN — DEXTROSE AND SODIUM CHLORIDE: 5; 900 INJECTION, SOLUTION INTRAVENOUS at 22:28

## 2023-01-01 RX ADMIN — FLUCONAZOLE 100 MG: 40 POWDER, FOR SUSPENSION ORAL at 09:02

## 2023-01-01 RX ADMIN — SODIUM CHLORIDE 8.2 UNITS: 9 INJECTION, SOLUTION INTRAVENOUS at 14:18

## 2023-01-01 RX ADMIN — METOPROLOL SUCCINATE 25 MG: 25 TABLET, EXTENDED RELEASE ORAL at 09:17

## 2023-01-01 RX ADMIN — FOLIC ACID 1 MG: 5 INJECTION, SOLUTION INTRAMUSCULAR; INTRAVENOUS; SUBCUTANEOUS at 17:10

## 2023-01-01 RX ADMIN — SENNOSIDES AND DOCUSATE SODIUM 1 TABLET: 8.6; 5 TABLET ORAL at 08:18

## 2023-01-01 RX ADMIN — PANTOPRAZOLE SODIUM 40 MG: 40 TABLET, DELAYED RELEASE ORAL at 08:37

## 2023-01-01 RX ADMIN — PIPERACILLIN AND TAZOBACTAM 3.38 G: 3; .375 INJECTION, POWDER, FOR SOLUTION INTRAVENOUS at 10:41

## 2023-01-01 RX ADMIN — POLYETHYLENE GLYCOL 3350 17 G: 17 POWDER, FOR SOLUTION ORAL at 08:53

## 2023-01-01 RX ADMIN — TAMSULOSIN HYDROCHLORIDE 0.4 MG: 0.4 CAPSULE ORAL at 08:49

## 2023-01-01 RX ADMIN — FOLIC ACID 1 MG: 1 TABLET ORAL at 09:58

## 2023-01-01 RX ADMIN — SENNOSIDES AND DOCUSATE SODIUM 1 TABLET: 8.6; 5 TABLET ORAL at 21:38

## 2023-01-01 RX ADMIN — MAGNESIUM SULFATE HEPTAHYDRATE 4 G: 40 INJECTION, SOLUTION INTRAVENOUS at 14:18

## 2023-01-01 RX ADMIN — OXYCODONE HYDROCHLORIDE 5 MG: 5 TABLET ORAL at 08:59

## 2023-01-01 RX ADMIN — SENNOSIDES AND DOCUSATE SODIUM 1 TABLET: 8.6; 5 TABLET ORAL at 20:40

## 2023-01-01 RX ADMIN — ACETAMINOPHEN 1000 MG: 500 TABLET ORAL at 09:04

## 2023-01-01 RX ADMIN — AMOXICILLIN AND CLAVULANATE POTASSIUM 875 MG: 400; 57 POWDER, FOR SUSPENSION ORAL at 14:19

## 2023-01-01 RX ADMIN — SENNOSIDES AND DOCUSATE SODIUM 1 TABLET: 8.6; 5 TABLET ORAL at 09:24

## 2023-01-01 RX ADMIN — AMOXICILLIN AND CLAVULANATE POTASSIUM 875 MG: 400; 57 POWDER, FOR SUSPENSION ORAL at 10:20

## 2023-01-01 RX ADMIN — OXYCODONE HYDROCHLORIDE 5 MG: 5 TABLET ORAL at 06:29

## 2023-01-01 RX ADMIN — PANTOPRAZOLE SODIUM 40 MG: 40 INJECTION, POWDER, FOR SOLUTION INTRAVENOUS at 21:29

## 2023-01-01 RX ADMIN — POTASSIUM & SODIUM PHOSPHATES POWDER PACK 280-160-250 MG 1 PACKET: 280-160-250 PACK at 12:16

## 2023-01-01 RX ADMIN — LIDOCAINE HYDROCHLORIDE 10 ML: 20 JELLY TOPICAL at 19:50

## 2023-01-01 RX ADMIN — DICLOFENAC SODIUM 2 G: 10 GEL TOPICAL at 21:13

## 2023-01-01 RX ADMIN — FUROSEMIDE 20 MG: 20 TABLET ORAL at 09:58

## 2023-01-01 RX ADMIN — SODIUM CHLORIDE, POTASSIUM CHLORIDE, SODIUM LACTATE AND CALCIUM CHLORIDE 250 ML: 600; 310; 30; 20 INJECTION, SOLUTION INTRAVENOUS at 04:03

## 2023-01-01 RX ADMIN — DOCUSATE SODIUM LIQUID 100 MG: 100 LIQUID ORAL at 21:12

## 2023-01-01 RX ADMIN — PANTOPRAZOLE SODIUM 40 MG: 40 TABLET, DELAYED RELEASE ORAL at 08:40

## 2023-01-01 RX ADMIN — ASPIRIN 81 MG: 81 TABLET, COATED ORAL at 09:17

## 2023-01-01 RX ADMIN — CARBOXYMETHYLCELLULOSE SODIUM 1 DROP: 10 GEL OPHTHALMIC at 20:50

## 2023-01-01 RX ADMIN — HYDROMORPHONE HYDROCHLORIDE 0.4 MG: 0.2 INJECTION, SOLUTION INTRAMUSCULAR; INTRAVENOUS; SUBCUTANEOUS at 06:00

## 2023-01-01 RX ADMIN — CARBOXYMETHYLCELLULOSE SODIUM 1 DROP: 10 GEL OPHTHALMIC at 21:44

## 2023-01-01 RX ADMIN — SENNOSIDES AND DOCUSATE SODIUM 1 TABLET: 50; 8.6 TABLET ORAL at 20:50

## 2023-01-01 RX ADMIN — SENNOSIDES AND DOCUSATE SODIUM 1 TABLET: 50; 8.6 TABLET ORAL at 08:37

## 2023-01-01 RX ADMIN — OLANZAPINE 5 MG: 5 TABLET, ORALLY DISINTEGRATING ORAL at 19:52

## 2023-01-01 RX ADMIN — CARBOXYMETHYLCELLULOSE SODIUM 1 DROP: 5 SOLUTION/ DROPS OPHTHALMIC at 09:04

## 2023-01-01 RX ADMIN — METOPROLOL SUCCINATE 25 MG: 25 TABLET, EXTENDED RELEASE ORAL at 08:55

## 2023-01-01 RX ADMIN — ACETAMINOPHEN 1000 MG: 500 TABLET ORAL at 08:55

## 2023-01-01 RX ADMIN — CARBOXYMETHYLCELLULOSE SODIUM 1 DROP: 5 SOLUTION/ DROPS OPHTHALMIC at 16:11

## 2023-01-01 RX ADMIN — DEXTROSE MONOHYDRATE 25 ML: 25 INJECTION, SOLUTION INTRAVENOUS at 17:45

## 2023-01-01 RX ADMIN — OLANZAPINE 5 MG: 5 TABLET, ORALLY DISINTEGRATING ORAL at 23:48

## 2023-01-01 RX ADMIN — SENNOSIDES AND DOCUSATE SODIUM 1 TABLET: 8.6; 5 TABLET ORAL at 09:58

## 2023-01-01 RX ADMIN — CARBOXYMETHYLCELLULOSE SODIUM 1 DROP: 10 GEL OPHTHALMIC at 08:38

## 2023-01-01 RX ADMIN — ACETAMINOPHEN 650 MG: 325 TABLET, FILM COATED ORAL at 17:54

## 2023-01-01 RX ADMIN — DOCUSATE SODIUM 100 MG: 100 CAPSULE, LIQUID FILLED ORAL at 20:50

## 2023-01-01 RX ADMIN — CARBOXYMETHYLCELLULOSE SODIUM 1 DROP: 10 GEL OPHTHALMIC at 17:09

## 2023-01-01 RX ADMIN — OXYCODONE HYDROCHLORIDE 5 MG: 5 TABLET ORAL at 07:37

## 2023-01-01 RX ADMIN — SENNOSIDES AND DOCUSATE SODIUM 1 TABLET: 50; 8.6 TABLET ORAL at 09:00

## 2023-01-01 RX ADMIN — ACETAMINOPHEN 650 MG: 325 TABLET, FILM COATED ORAL at 22:47

## 2023-01-01 RX ADMIN — CARBOXYMETHYLCELLULOSE SODIUM 1 DROP: 10 GEL OPHTHALMIC at 14:08

## 2023-01-01 RX ADMIN — AMOXICILLIN AND CLAVULANATE POTASSIUM 875 MG: 400; 57 POWDER, FOR SUSPENSION ORAL at 11:26

## 2023-01-01 RX ADMIN — CARBOXYMETHYLCELLULOSE SODIUM 1 DROP: 10 GEL OPHTHALMIC at 18:13

## 2023-01-01 RX ADMIN — DICLOFENAC SODIUM 2 G: 10 GEL TOPICAL at 08:43

## 2023-01-01 RX ADMIN — CARBOXYMETHYLCELLULOSE SODIUM 1 DROP: 10 GEL OPHTHALMIC at 13:38

## 2023-01-01 RX ADMIN — CEFEPIME HYDROCHLORIDE 1 G: 1 INJECTION, POWDER, FOR SOLUTION INTRAMUSCULAR; INTRAVENOUS at 12:43

## 2023-01-01 RX ADMIN — PIPERACILLIN AND TAZOBACTAM 3.38 G: 3; .375 INJECTION, POWDER, FOR SOLUTION INTRAVENOUS at 01:17

## 2023-01-01 RX ADMIN — CARBOXYMETHYLCELLULOSE SODIUM 1 DROP: 10 GEL OPHTHALMIC at 14:07

## 2023-01-01 RX ADMIN — DUTASTERIDE 0.5 MG: 0.5 CAPSULE, LIQUID FILLED ORAL at 09:02

## 2023-01-01 RX ADMIN — AMOXICILLIN AND CLAVULANATE POTASSIUM 875 MG: 400; 57 POWDER, FOR SUSPENSION ORAL at 11:15

## 2023-01-01 RX ADMIN — CARBOXYMETHYLCELLULOSE SODIUM 1 DROP: 10 GEL OPHTHALMIC at 08:51

## 2023-01-01 RX ADMIN — HALOPERIDOL LACTATE 2 MG: 5 INJECTION, SOLUTION INTRAMUSCULAR at 00:23

## 2023-01-01 RX ADMIN — HYDROMORPHONE HYDROCHLORIDE 0.4 MG: 0.2 INJECTION, SOLUTION INTRAMUSCULAR; INTRAVENOUS; SUBCUTANEOUS at 20:27

## 2023-01-01 RX ADMIN — PANTOPRAZOLE SODIUM 40 MG: 40 TABLET, DELAYED RELEASE ORAL at 06:31

## 2023-01-01 RX ADMIN — SODIUM CHLORIDE 100 ML: 9 INJECTION, SOLUTION INTRAVENOUS at 12:06

## 2023-01-01 RX ADMIN — PANTOPRAZOLE SODIUM 40 MG: 40 TABLET, DELAYED RELEASE ORAL at 09:58

## 2023-01-01 RX ADMIN — LISINOPRIL 2.5 MG: 2.5 TABLET ORAL at 09:06

## 2023-01-01 RX ADMIN — POTASSIUM & SODIUM PHOSPHATES POWDER PACK 280-160-250 MG 1 PACKET: 280-160-250 PACK at 13:37

## 2023-01-01 RX ADMIN — DOCUSATE SODIUM 100 MG: 100 CAPSULE, LIQUID FILLED ORAL at 09:04

## 2023-01-01 RX ADMIN — DOCUSATE SODIUM LIQUID 100 MG: 100 LIQUID ORAL at 10:13

## 2023-01-01 RX ADMIN — ALBUMIN HUMAN 25 G: 0.05 INJECTION, SOLUTION INTRAVENOUS at 04:50

## 2023-01-01 RX ADMIN — CARBOXYMETHYLCELLULOSE SODIUM 1 DROP: 5 SOLUTION/ DROPS OPHTHALMIC at 08:36

## 2023-01-01 RX ADMIN — LISINOPRIL 2.5 MG: 2.5 TABLET ORAL at 09:31

## 2023-01-01 RX ADMIN — PANTOPRAZOLE SODIUM 40 MG: 40 TABLET, DELAYED RELEASE ORAL at 10:34

## 2023-01-01 RX ADMIN — ACETAMINOPHEN 1000 MG: 500 TABLET, FILM COATED ORAL at 10:13

## 2023-01-01 RX ADMIN — OXYCODONE HYDROCHLORIDE 2.5 MG: 5 TABLET ORAL at 02:45

## 2023-01-01 RX ADMIN — AMOXICILLIN AND CLAVULANATE POTASSIUM 875 MG: 400; 57 POWDER, FOR SUSPENSION ORAL at 23:56

## 2023-01-01 RX ADMIN — CARBOXYMETHYLCELLULOSE SODIUM 1 DROP: 10 GEL OPHTHALMIC at 08:56

## 2023-01-01 RX ADMIN — DICLOFENAC SODIUM 2 G: 10 GEL TOPICAL at 21:31

## 2023-01-01 RX ADMIN — MICONAZOLE NITRATE: 2 POWDER TOPICAL at 09:08

## 2023-01-01 RX ADMIN — CARBOXYMETHYLCELLULOSE SODIUM 1 DROP: 10 GEL OPHTHALMIC at 23:00

## 2023-01-01 RX ADMIN — SENNOSIDES AND DOCUSATE SODIUM 1 TABLET: 8.6; 5 TABLET ORAL at 10:12

## 2023-01-01 RX ADMIN — ACETAMINOPHEN 1000 MG: 500 TABLET ORAL at 08:18

## 2023-01-01 RX ADMIN — IOPAMIDOL 80 ML: 755 INJECTION, SOLUTION INTRAVENOUS at 11:58

## 2023-01-01 RX ADMIN — METOPROLOL SUCCINATE 25 MG: 25 TABLET, EXTENDED RELEASE ORAL at 08:59

## 2023-01-01 RX ADMIN — FUROSEMIDE 20 MG: 20 TABLET ORAL at 09:31

## 2023-01-01 RX ADMIN — DOCUSATE SODIUM 100 MG: 100 CAPSULE, LIQUID FILLED ORAL at 08:42

## 2023-01-01 RX ADMIN — PANTOPRAZOLE SODIUM 40 MG: 40 TABLET, DELAYED RELEASE ORAL at 08:52

## 2023-01-01 RX ADMIN — FLUCONAZOLE 100 MG: 40 POWDER, FOR SUSPENSION ORAL at 10:20

## 2023-01-01 RX ADMIN — ASPIRIN 81 MG: 81 TABLET, COATED ORAL at 08:55

## 2023-01-01 RX ADMIN — ENOXAPARIN SODIUM 30 MG: 30 INJECTION SUBCUTANEOUS at 21:56

## 2023-01-01 RX ADMIN — CARBOXYMETHYLCELLULOSE SODIUM 1 DROP: 10 GEL OPHTHALMIC at 17:24

## 2023-01-01 RX ADMIN — ACETAMINOPHEN 1000 MG: 500 TABLET ORAL at 10:31

## 2023-01-01 RX ADMIN — SENNOSIDES AND DOCUSATE SODIUM 1 TABLET: 8.6; 5 TABLET ORAL at 08:38

## 2023-01-01 RX ADMIN — HYDROMORPHONE HYDROCHLORIDE 0.2 MG: 0.2 INJECTION, SOLUTION INTRAMUSCULAR; INTRAVENOUS; SUBCUTANEOUS at 17:28

## 2023-01-01 RX ADMIN — PANTOPRAZOLE SODIUM 40 MG: 40 TABLET, DELAYED RELEASE ORAL at 17:33

## 2023-01-01 RX ADMIN — THIAMINE HYDROCHLORIDE 50 MG: 100 INJECTION, SOLUTION INTRAMUSCULAR; INTRAVENOUS at 08:40

## 2023-01-01 RX ADMIN — CARBOXYMETHYLCELLULOSE SODIUM 1 DROP: 10 GEL OPHTHALMIC at 21:38

## 2023-01-01 RX ADMIN — ASPIRIN 81 MG: 81 TABLET, COATED ORAL at 08:42

## 2023-01-01 RX ADMIN — DOCUSATE SODIUM LIQUID 100 MG: 100 LIQUID ORAL at 21:02

## 2023-01-01 RX ADMIN — PANTOPRAZOLE SODIUM 40 MG: 40 INJECTION, POWDER, FOR SOLUTION INTRAVENOUS at 20:39

## 2023-01-01 RX ADMIN — OXYCODONE HYDROCHLORIDE 2.5 MG: 5 TABLET ORAL at 17:40

## 2023-01-01 RX ADMIN — CARBOXYMETHYLCELLULOSE SODIUM 1 DROP: 10 GEL OPHTHALMIC at 12:28

## 2023-01-01 RX ADMIN — CARBOXYMETHYLCELLULOSE SODIUM 1 DROP: 10 GEL OPHTHALMIC at 13:45

## 2023-01-01 RX ADMIN — POTASSIUM & SODIUM PHOSPHATES POWDER PACK 280-160-250 MG 2 PACKET: 280-160-250 PACK at 14:09

## 2023-01-01 RX ADMIN — CARBOXYMETHYLCELLULOSE SODIUM 1 DROP: 10 GEL OPHTHALMIC at 09:57

## 2023-01-01 RX ADMIN — OXYCODONE HYDROCHLORIDE 5 MG: 5 TABLET ORAL at 10:10

## 2023-01-01 RX ADMIN — DUTASTERIDE 0.5 MG: 0.5 CAPSULE, LIQUID FILLED ORAL at 08:36

## 2023-01-01 RX ADMIN — PANTOPRAZOLE SODIUM 40 MG: 40 TABLET, DELAYED RELEASE ORAL at 16:25

## 2023-01-01 RX ADMIN — AMOXICILLIN AND CLAVULANATE POTASSIUM 875 MG: 400; 57 POWDER, FOR SUSPENSION ORAL at 13:04

## 2023-01-01 RX ADMIN — OLANZAPINE 5 MG: 5 TABLET, ORALLY DISINTEGRATING ORAL at 16:11

## 2023-01-01 RX ADMIN — Medication 3 MG: at 22:46

## 2023-01-01 RX ADMIN — MICONAZOLE NITRATE: 2 POWDER TOPICAL at 10:34

## 2023-01-01 RX ADMIN — CARBOXYMETHYLCELLULOSE SODIUM 1 DROP: 10 GEL OPHTHALMIC at 17:03

## 2023-01-01 RX ADMIN — CARBOXYMETHYLCELLULOSE SODIUM 1 DROP: 10 GEL OPHTHALMIC at 08:46

## 2023-01-01 RX ADMIN — DOCUSATE SODIUM 100 MG: 100 CAPSULE, LIQUID FILLED ORAL at 20:28

## 2023-01-01 RX ADMIN — OXYCODONE HYDROCHLORIDE 2.5 MG: 5 TABLET ORAL at 09:08

## 2023-01-01 RX ADMIN — SENNOSIDES AND DOCUSATE SODIUM 1 TABLET: 50; 8.6 TABLET ORAL at 09:01

## 2023-01-01 RX ADMIN — FLUCONAZOLE 100 MG: 40 POWDER, FOR SUSPENSION ORAL at 10:30

## 2023-01-01 RX ADMIN — CARBOXYMETHYLCELLULOSE SODIUM 1 DROP: 10 GEL OPHTHALMIC at 10:16

## 2023-01-01 RX ADMIN — DICLOFENAC SODIUM 2 G: 10 GEL TOPICAL at 09:06

## 2023-01-01 RX ADMIN — OXYCODONE HYDROCHLORIDE 2.5 MG: 5 TABLET ORAL at 00:16

## 2023-01-01 RX ADMIN — DEXTROSE AND SODIUM CHLORIDE: 5; 900 INJECTION, SOLUTION INTRAVENOUS at 11:35

## 2023-01-01 RX ADMIN — DEXTROSE MONOHYDRATE 50 ML: 25 INJECTION, SOLUTION INTRAVENOUS at 13:13

## 2023-01-01 RX ADMIN — POLYETHYLENE GLYCOL 3350 17 G: 17 POWDER, FOR SOLUTION ORAL at 09:00

## 2023-01-01 RX ADMIN — DICLOFENAC SODIUM 2 G: 10 GEL TOPICAL at 22:28

## 2023-01-01 RX ADMIN — DEXTROSE AND SODIUM CHLORIDE: 5; 450 INJECTION, SOLUTION INTRAVENOUS at 17:02

## 2023-01-01 RX ADMIN — GLUCAGON HYDROCHLORIDE 1 MG: KIT at 02:29

## 2023-01-01 RX ADMIN — PANTOPRAZOLE SODIUM 40 MG: 40 TABLET, DELAYED RELEASE ORAL at 06:34

## 2023-01-01 RX ADMIN — POTASSIUM & SODIUM PHOSPHATES POWDER PACK 280-160-250 MG 2 PACKET: 280-160-250 PACK at 14:31

## 2023-01-01 RX ADMIN — SENNOSIDES AND DOCUSATE SODIUM 1 TABLET: 8.6; 5 TABLET ORAL at 07:54

## 2023-01-01 RX ADMIN — DOCUSATE SODIUM LIQUID 100 MG: 100 LIQUID ORAL at 21:43

## 2023-01-01 RX ADMIN — ACETAMINOPHEN 650 MG: 325 TABLET, FILM COATED ORAL at 21:25

## 2023-01-01 RX ADMIN — FOLIC ACID 1 MG: 5 INJECTION, SOLUTION INTRAMUSCULAR; INTRAVENOUS; SUBCUTANEOUS at 19:37

## 2023-01-01 RX ADMIN — TAMSULOSIN HYDROCHLORIDE 0.4 MG: 0.4 CAPSULE ORAL at 21:42

## 2023-01-01 RX ADMIN — DEXTROSE AND SODIUM CHLORIDE: 5; 450 INJECTION, SOLUTION INTRAVENOUS at 01:49

## 2023-01-01 RX ADMIN — FLUCONAZOLE 100 MG: 40 POWDER, FOR SUSPENSION ORAL at 10:19

## 2023-01-01 RX ADMIN — FOLIC ACID 1 MG: 5 INJECTION, SOLUTION INTRAMUSCULAR; INTRAVENOUS; SUBCUTANEOUS at 10:20

## 2023-01-01 RX ADMIN — SENNOSIDES AND DOCUSATE SODIUM 1 TABLET: 50; 8.6 TABLET ORAL at 08:42

## 2023-01-01 RX ADMIN — ACETAMINOPHEN 650 MG: 325 TABLET, FILM COATED ORAL at 06:00

## 2023-01-01 RX ADMIN — DEXTROSE 15 G: 15 GEL ORAL at 08:32

## 2023-01-01 RX ADMIN — THIAMINE HYDROCHLORIDE 50 MG: 100 INJECTION, SOLUTION INTRAMUSCULAR; INTRAVENOUS at 10:15

## 2023-01-01 RX ADMIN — DICLOFENAC SODIUM 2 G: 10 GEL TOPICAL at 11:55

## 2023-01-01 RX ADMIN — PANTOPRAZOLE SODIUM 40 MG: 40 TABLET, DELAYED RELEASE ORAL at 09:01

## 2023-01-01 RX ADMIN — FUROSEMIDE 20 MG: 20 TABLET ORAL at 08:16

## 2023-01-01 RX ADMIN — FOLIC ACID 1 MG: 5 INJECTION, SOLUTION INTRAMUSCULAR; INTRAVENOUS; SUBCUTANEOUS at 08:40

## 2023-01-01 RX ADMIN — PIPERACILLIN AND TAZOBACTAM 2.25 G: 2; .25 INJECTION, POWDER, FOR SOLUTION INTRAVENOUS at 23:54

## 2023-01-01 RX ADMIN — CARBOXYMETHYLCELLULOSE SODIUM 1 DROP: 10 GEL OPHTHALMIC at 09:06

## 2023-01-01 RX ADMIN — PANTOPRAZOLE SODIUM 40 MG: 40 TABLET, DELAYED RELEASE ORAL at 17:09

## 2023-01-01 RX ADMIN — METOPROLOL SUCCINATE 25 MG: 25 TABLET, EXTENDED RELEASE ORAL at 08:52

## 2023-01-01 RX ADMIN — SODIUM PHOSPHATE, MONOBASIC, MONOHYDRATE AND SODIUM PHOSPHATE, DIBASIC, ANHYDROUS 9 MMOL: 142; 276 INJECTION, SOLUTION INTRAVENOUS at 20:52

## 2023-01-01 RX ADMIN — LISINOPRIL 2.5 MG: 2.5 TABLET ORAL at 09:04

## 2023-01-01 RX ADMIN — DICLOFENAC SODIUM 2 G: 10 GEL TOPICAL at 10:29

## 2023-01-01 RX ADMIN — ACETAMINOPHEN 1000 MG: 500 TABLET ORAL at 10:12

## 2023-01-01 RX ADMIN — PIPERACILLIN AND TAZOBACTAM 4.5 G: 4; .5 INJECTION, POWDER, FOR SOLUTION INTRAVENOUS at 08:46

## 2023-01-01 RX ADMIN — PANTOPRAZOLE SODIUM 40 MG: 40 TABLET, DELAYED RELEASE ORAL at 15:37

## 2023-01-01 RX ADMIN — HYDROMORPHONE HYDROCHLORIDE 0.4 MG: 0.2 INJECTION, SOLUTION INTRAMUSCULAR; INTRAVENOUS; SUBCUTANEOUS at 01:19

## 2023-01-01 RX ADMIN — SODIUM CHLORIDE 1000 ML: 9 INJECTION, SOLUTION INTRAVENOUS at 11:37

## 2023-01-01 RX ADMIN — SODIUM CHLORIDE: 9 INJECTION, SOLUTION INTRAVENOUS at 10:14

## 2023-01-01 RX ADMIN — DEXTROSE AND SODIUM CHLORIDE: 5; 900 INJECTION, SOLUTION INTRAVENOUS at 22:54

## 2023-01-01 RX ADMIN — POTASSIUM & SODIUM PHOSPHATES POWDER PACK 280-160-250 MG 2 PACKET: 280-160-250 PACK at 10:51

## 2023-01-01 RX ADMIN — SODIUM CHLORIDE 1000 ML: 9 INJECTION, SOLUTION INTRAVENOUS at 12:53

## 2023-01-01 RX ADMIN — DICLOFENAC SODIUM 2 G: 10 GEL TOPICAL at 10:33

## 2023-01-01 RX ADMIN — DEXTROSE MONOHYDRATE 25 ML: 25 INJECTION, SOLUTION INTRAVENOUS at 09:07

## 2023-01-01 RX ADMIN — CARBOXYMETHYLCELLULOSE SODIUM 1 DROP: 10 GEL OPHTHALMIC at 08:52

## 2023-01-01 RX ADMIN — TAMSULOSIN HYDROCHLORIDE 0.4 MG: 0.4 CAPSULE ORAL at 08:35

## 2023-01-01 RX ADMIN — HYDROMORPHONE HYDROCHLORIDE 0.4 MG: 0.2 INJECTION, SOLUTION INTRAMUSCULAR; INTRAVENOUS; SUBCUTANEOUS at 15:33

## 2023-01-01 RX ADMIN — HYDROMORPHONE HYDROCHLORIDE 0.4 MG: 0.2 INJECTION, SOLUTION INTRAMUSCULAR; INTRAVENOUS; SUBCUTANEOUS at 13:41

## 2023-01-01 RX ADMIN — TAMSULOSIN HYDROCHLORIDE 0.4 MG: 0.4 CAPSULE ORAL at 08:52

## 2023-01-01 RX ADMIN — HEPARIN SODIUM 600 UNITS/HR: 10000 INJECTION, SOLUTION INTRAVENOUS at 18:52

## 2023-01-01 RX ADMIN — CARBOXYMETHYLCELLULOSE SODIUM 1 DROP: 10 GEL OPHTHALMIC at 21:47

## 2023-01-01 RX ADMIN — TAMSULOSIN HYDROCHLORIDE 0.4 MG: 0.4 CAPSULE ORAL at 08:59

## 2023-01-01 RX ADMIN — DICLOFENAC SODIUM 2 G: 10 GEL TOPICAL at 20:55

## 2023-01-01 RX ADMIN — METOPROLOL SUCCINATE 25 MG: 25 TABLET, EXTENDED RELEASE ORAL at 09:31

## 2023-01-01 RX ADMIN — Medication 50 MG: at 17:33

## 2023-01-01 RX ADMIN — DOCUSATE SODIUM LIQUID 100 MG: 100 LIQUID ORAL at 08:56

## 2023-01-01 RX ADMIN — MAGNESIUM SULFATE HEPTAHYDRATE 1 G: 500 INJECTION, SOLUTION INTRAMUSCULAR; INTRAVENOUS at 02:41

## 2023-01-01 RX ADMIN — FOLIC ACID 1 MG: 5 INJECTION, SOLUTION INTRAMUSCULAR; INTRAVENOUS; SUBCUTANEOUS at 08:41

## 2023-01-01 RX ADMIN — FOLIC ACID 1 MG: 1 TABLET ORAL at 09:31

## 2023-01-01 RX ADMIN — THIAMINE HYDROCHLORIDE 50 MG: 100 INJECTION, SOLUTION INTRAMUSCULAR; INTRAVENOUS at 08:38

## 2023-01-01 RX ADMIN — VANCOMYCIN HYDROCHLORIDE 1000 MG: 1 INJECTION, SOLUTION INTRAVENOUS at 22:26

## 2023-01-01 RX ADMIN — DOCUSATE SODIUM LIQUID 100 MG: 100 LIQUID ORAL at 20:28

## 2023-01-01 RX ADMIN — LISINOPRIL 2.5 MG: 2.5 TABLET ORAL at 09:17

## 2023-01-01 RX ADMIN — DEXTROSE MONOHYDRATE 25 ML: 25 INJECTION, SOLUTION INTRAVENOUS at 10:08

## 2023-01-01 RX ADMIN — LISINOPRIL 2.5 MG: 2.5 TABLET ORAL at 10:32

## 2023-01-01 RX ADMIN — ASPIRIN 81 MG: 81 TABLET, COATED ORAL at 09:01

## 2023-01-01 RX ADMIN — DOCUSATE SODIUM 100 MG: 100 CAPSULE, LIQUID FILLED ORAL at 08:52

## 2023-01-01 RX ADMIN — ASPIRIN 81 MG 81 MG: 81 TABLET ORAL at 12:35

## 2023-01-01 RX ADMIN — SENNOSIDES AND DOCUSATE SODIUM 1 TABLET: 8.6; 5 TABLET ORAL at 08:40

## 2023-01-01 RX ADMIN — PIPERACILLIN AND TAZOBACTAM 3.38 G: 3; .375 INJECTION, POWDER, FOR SOLUTION INTRAVENOUS at 13:20

## 2023-01-01 RX ADMIN — SODIUM CHLORIDE: 9 INJECTION, SOLUTION INTRAVENOUS at 02:29

## 2023-01-01 RX ADMIN — CARBOXYMETHYLCELLULOSE SODIUM 1 DROP: 5 SOLUTION/ DROPS OPHTHALMIC at 16:17

## 2023-01-01 RX ADMIN — DUTASTERIDE 0.5 MG: 0.5 CAPSULE ORAL at 09:31

## 2023-01-01 RX ADMIN — CARBOXYMETHYLCELLULOSE SODIUM 1 DROP: 10 GEL OPHTHALMIC at 09:31

## 2023-01-01 RX ADMIN — DEXTROSE MONOHYDRATE 25 ML: 25 INJECTION, SOLUTION INTRAVENOUS at 16:26

## 2023-01-01 RX ADMIN — DOCUSATE SODIUM 100 MG: 100 CAPSULE, LIQUID FILLED ORAL at 08:37

## 2023-01-01 RX ADMIN — ASPIRIN 81 MG: 81 TABLET, COATED ORAL at 08:37

## 2023-01-01 RX ADMIN — DUTASTERIDE 0.5 MG: 0.5 CAPSULE ORAL at 08:46

## 2023-01-01 RX ADMIN — CARBOXYMETHYLCELLULOSE SODIUM 1 DROP: 10 GEL OPHTHALMIC at 17:19

## 2023-01-01 RX ADMIN — CARBOXYMETHYLCELLULOSE SODIUM 1 DROP: 10 GEL OPHTHALMIC at 08:21

## 2023-01-01 RX ADMIN — AMOXICILLIN AND CLAVULANATE POTASSIUM 875 MG: 400; 57 POWDER, FOR SUSPENSION ORAL at 23:12

## 2023-01-01 RX ADMIN — FOLIC ACID 1 MG: 5 INJECTION, SOLUTION INTRAMUSCULAR; INTRAVENOUS; SUBCUTANEOUS at 09:23

## 2023-01-01 RX ADMIN — HYDROMORPHONE HYDROCHLORIDE 0.2 MG: 0.2 INJECTION, SOLUTION INTRAMUSCULAR; INTRAVENOUS; SUBCUTANEOUS at 21:26

## 2023-01-01 RX ADMIN — ACETAMINOPHEN 1000 MG: 500 TABLET ORAL at 10:34

## 2023-01-01 RX ADMIN — PIPERACILLIN AND TAZOBACTAM 4.5 G: 4; .5 INJECTION, POWDER, FOR SOLUTION INTRAVENOUS at 15:25

## 2023-01-01 RX ADMIN — CARBOXYMETHYLCELLULOSE SODIUM 1 DROP: 10 GEL OPHTHALMIC at 17:33

## 2023-01-01 RX ADMIN — DEXTROSE AND SODIUM CHLORIDE: 5; 450 INJECTION, SOLUTION INTRAVENOUS at 15:41

## 2023-01-01 RX ADMIN — CARBOXYMETHYLCELLULOSE SODIUM 1 DROP: 10 GEL OPHTHALMIC at 21:14

## 2023-01-01 RX ADMIN — MICONAZOLE NITRATE: 2 POWDER TOPICAL at 21:17

## 2023-01-01 RX ADMIN — THIAMINE HYDROCHLORIDE 50 MG: 100 INJECTION, SOLUTION INTRAMUSCULAR; INTRAVENOUS at 10:19

## 2023-01-01 RX ADMIN — PIPERACILLIN AND TAZOBACTAM 2.25 G: 2; .25 INJECTION, POWDER, FOR SOLUTION INTRAVENOUS at 00:12

## 2023-01-01 RX ADMIN — Medication 50 MG: at 09:58

## 2023-01-01 RX ADMIN — CARBOXYMETHYLCELLULOSE SODIUM 1 DROP: 10 GEL OPHTHALMIC at 13:21

## 2023-01-01 RX ADMIN — LISINOPRIL 2.5 MG: 2.5 TABLET ORAL at 08:56

## 2023-01-01 RX ADMIN — HYDROMORPHONE HYDROCHLORIDE 0.4 MG: 0.2 INJECTION, SOLUTION INTRAMUSCULAR; INTRAVENOUS; SUBCUTANEOUS at 02:03

## 2023-01-01 RX ADMIN — ACETAMINOPHEN 1000 MG: 500 TABLET ORAL at 07:37

## 2023-01-01 RX ADMIN — DOCUSATE SODIUM LIQUID 100 MG: 100 LIQUID ORAL at 08:42

## 2023-01-01 RX ADMIN — ACETAMINOPHEN 1000 MG: 500 TABLET ORAL at 08:40

## 2023-01-01 RX ADMIN — FOLIC ACID 1 MG: 1 TABLET ORAL at 08:56

## 2023-01-01 RX ADMIN — POTASSIUM PHOSPHATE, MONOBASIC AND POTASSIUM PHOSPHATE, DIBASIC 9 MMOL: 224; 236 INJECTION, SOLUTION, CONCENTRATE INTRAVENOUS at 09:43

## 2023-01-01 RX ADMIN — DICLOFENAC SODIUM 2 G: 10 GEL TOPICAL at 09:43

## 2023-01-01 RX ADMIN — CARBOXYMETHYLCELLULOSE SODIUM 1 DROP: 10 GEL OPHTHALMIC at 11:35

## 2023-01-01 RX ADMIN — DUTASTERIDE 0.5 MG: 0.5 CAPSULE ORAL at 09:36

## 2023-01-01 RX ADMIN — CARBOXYMETHYLCELLULOSE SODIUM 1 DROP: 10 GEL OPHTHALMIC at 13:19

## 2023-01-01 RX ADMIN — FUROSEMIDE 20 MG: 20 TABLET ORAL at 10:34

## 2023-01-01 RX ADMIN — DUTASTERIDE 0.5 MG: 0.5 CAPSULE ORAL at 10:32

## 2023-01-01 RX ADMIN — PIPERACILLIN AND TAZOBACTAM 4.5 G: 4; .5 INJECTION, POWDER, FOR SOLUTION INTRAVENOUS at 15:52

## 2023-01-01 RX ADMIN — OXYCODONE HYDROCHLORIDE 2.5 MG: 5 TABLET ORAL at 18:20

## 2023-01-01 RX ADMIN — Medication 50 MG: at 08:37

## 2023-01-01 RX ADMIN — FOLIC ACID 1 MG: 5 INJECTION, SOLUTION INTRAMUSCULAR; INTRAVENOUS; SUBCUTANEOUS at 10:32

## 2023-01-01 RX ADMIN — SENNOSIDES AND DOCUSATE SODIUM 1 TABLET: 8.6; 5 TABLET ORAL at 20:29

## 2023-01-01 RX ADMIN — METOPROLOL SUCCINATE 25 MG: 25 TABLET, EXTENDED RELEASE ORAL at 08:42

## 2023-01-01 RX ADMIN — ENOXAPARIN SODIUM 30 MG: 30 INJECTION SUBCUTANEOUS at 23:54

## 2023-01-01 RX ADMIN — OXYCODONE HYDROCHLORIDE 2.5 MG: 5 TABLET ORAL at 11:07

## 2023-01-01 RX ADMIN — SODIUM CHLORIDE: 9 INJECTION, SOLUTION INTRAVENOUS at 12:39

## 2023-01-01 RX ADMIN — PIPERACILLIN AND TAZOBACTAM 4.5 G: 4; .5 INJECTION, POWDER, FOR SOLUTION INTRAVENOUS at 15:24

## 2023-01-01 RX ADMIN — THIAMINE HYDROCHLORIDE 50 MG: 100 INJECTION, SOLUTION INTRAMUSCULAR; INTRAVENOUS at 09:23

## 2023-01-01 RX ADMIN — ASPIRIN 81 MG: 81 TABLET, COATED ORAL at 10:14

## 2023-01-01 RX ADMIN — POTASSIUM & SODIUM PHOSPHATES POWDER PACK 280-160-250 MG 1 PACKET: 280-160-250 PACK at 05:05

## 2023-01-01 RX ADMIN — ACETAMINOPHEN 650 MG: 325 TABLET, FILM COATED ORAL at 16:24

## 2023-01-01 RX ADMIN — DOCUSATE SODIUM LIQUID 100 MG: 100 LIQUID ORAL at 08:40

## 2023-01-01 RX ADMIN — OLANZAPINE 5 MG: 5 TABLET, ORALLY DISINTEGRATING ORAL at 16:23

## 2023-01-01 RX ADMIN — PIPERACILLIN AND TAZOBACTAM 4.5 G: 4; .5 INJECTION, POWDER, FOR SOLUTION INTRAVENOUS at 20:57

## 2023-01-01 RX ADMIN — THIAMINE HYDROCHLORIDE 50 MG: 100 INJECTION, SOLUTION INTRAMUSCULAR; INTRAVENOUS at 08:41

## 2023-01-01 RX ADMIN — VASOPRESSIN: 20 INJECTION, SOLUTION INTRAVENOUS at 21:30

## 2023-01-01 RX ADMIN — ENOXAPARIN SODIUM 40 MG: 40 INJECTION SUBCUTANEOUS at 20:17

## 2023-01-01 RX ADMIN — ACETAMINOPHEN 1000 MG: 500 TABLET ORAL at 09:58

## 2023-01-01 RX ADMIN — PIPERACILLIN AND TAZOBACTAM 3.38 G: 3; .375 INJECTION, POWDER, FOR SOLUTION INTRAVENOUS at 16:09

## 2023-01-01 RX ADMIN — OLANZAPINE 5 MG: 5 TABLET, ORALLY DISINTEGRATING ORAL at 11:58

## 2023-01-01 RX ADMIN — MICONAZOLE NITRATE: 2 POWDER TOPICAL at 09:46

## 2023-01-01 RX ADMIN — DEXTROSE AND SODIUM CHLORIDE: 5; 450 INJECTION, SOLUTION INTRAVENOUS at 09:27

## 2023-01-01 RX ADMIN — Medication 50 MG: at 09:17

## 2023-01-01 RX ADMIN — LISINOPRIL 2.5 MG: 2.5 TABLET ORAL at 10:17

## 2023-01-01 RX ADMIN — PIPERACILLIN AND TAZOBACTAM 4.5 G: 4; .5 INJECTION, POWDER, FOR SOLUTION INTRAVENOUS at 09:31

## 2023-01-01 RX ADMIN — POLYETHYLENE GLYCOL 3350 17 G: 17 POWDER, FOR SOLUTION ORAL at 08:41

## 2023-01-01 RX ADMIN — TAMSULOSIN HYDROCHLORIDE 0.4 MG: 0.4 CAPSULE ORAL at 21:47

## 2023-01-01 RX ADMIN — PIPERACILLIN AND TAZOBACTAM 3.38 G: 3; .375 INJECTION, POWDER, FOR SOLUTION INTRAVENOUS at 01:42

## 2023-01-01 RX ADMIN — CARBOXYMETHYLCELLULOSE SODIUM 1 DROP: 10 GEL OPHTHALMIC at 12:16

## 2023-01-01 RX ADMIN — FOLIC ACID 1 MG: 5 INJECTION, SOLUTION INTRAMUSCULAR; INTRAVENOUS; SUBCUTANEOUS at 09:46

## 2023-01-01 RX ADMIN — TAMSULOSIN HYDROCHLORIDE 0.4 MG: 0.4 CAPSULE ORAL at 20:28

## 2023-01-01 RX ADMIN — OXYCODONE HYDROCHLORIDE 5 MG: 5 TABLET ORAL at 09:32

## 2023-01-01 RX ADMIN — OLANZAPINE 5 MG: 10 INJECTION, POWDER, FOR SOLUTION INTRAMUSCULAR at 16:43

## 2023-01-01 RX ADMIN — Medication 50 MG: at 08:16

## 2023-01-01 RX ADMIN — SODIUM PHOSPHATE, MONOBASIC, MONOHYDRATE AND SODIUM PHOSPHATE, DIBASIC, ANHYDROUS 15 MMOL: 142; 276 INJECTION, SOLUTION INTRAVENOUS at 12:21

## 2023-01-01 RX ADMIN — DUTASTERIDE 0.5 MG: 0.5 CAPSULE ORAL at 08:19

## 2023-01-01 RX ADMIN — ACETAMINOPHEN 650 MG: 325 TABLET, FILM COATED ORAL at 21:14

## 2023-01-01 RX ADMIN — ENOXAPARIN SODIUM 30 MG: 30 INJECTION SUBCUTANEOUS at 20:43

## 2023-01-01 RX ADMIN — DEXTROSE AND SODIUM CHLORIDE: 5; 450 INJECTION, SOLUTION INTRAVENOUS at 13:18

## 2023-01-01 RX ADMIN — FOLIC ACID 1 MG: 5 INJECTION, SOLUTION INTRAMUSCULAR; INTRAVENOUS; SUBCUTANEOUS at 10:19

## 2023-01-01 RX ADMIN — DUTASTERIDE 0.5 MG: 0.5 CAPSULE ORAL at 08:40

## 2023-01-01 RX ADMIN — PIPERACILLIN AND TAZOBACTAM 4.5 G: 4; .5 INJECTION, POWDER, FOR SOLUTION INTRAVENOUS at 22:04

## 2023-01-01 RX ADMIN — CARBOXYMETHYLCELLULOSE SODIUM 1 DROP: 5 SOLUTION/ DROPS OPHTHALMIC at 20:26

## 2023-01-01 RX ADMIN — OLANZAPINE 5 MG: 5 TABLET, ORALLY DISINTEGRATING ORAL at 08:57

## 2023-01-01 RX ADMIN — CARBOXYMETHYLCELLULOSE SODIUM 1 DROP: 10 GEL OPHTHALMIC at 08:42

## 2023-01-01 RX ADMIN — CARBOXYMETHYLCELLULOSE SODIUM 1 DROP: 10 GEL OPHTHALMIC at 16:25

## 2023-01-01 RX ADMIN — DUTASTERIDE 0.5 MG: 0.5 CAPSULE ORAL at 08:55

## 2023-01-01 RX ADMIN — THIAMINE HYDROCHLORIDE 50 MG: 100 INJECTION, SOLUTION INTRAMUSCULAR; INTRAVENOUS at 17:51

## 2023-01-01 RX ADMIN — DEXTROSE MONOHYDRATE 25 ML: 25 INJECTION, SOLUTION INTRAVENOUS at 01:47

## 2023-01-01 RX ADMIN — HYDROMORPHONE HYDROCHLORIDE 0.4 MG: 0.2 INJECTION, SOLUTION INTRAMUSCULAR; INTRAVENOUS; SUBCUTANEOUS at 20:38

## 2023-01-01 RX ADMIN — AMOXICILLIN AND CLAVULANATE POTASSIUM 875 MG: 400; 57 POWDER, FOR SUSPENSION ORAL at 21:14

## 2023-01-01 RX ADMIN — FLUCONAZOLE 200 MG: 2 INJECTION, SOLUTION INTRAVENOUS at 15:57

## 2023-01-01 RX ADMIN — CALCIUM GLUCONATE 1 G: 20 INJECTION, SOLUTION INTRAVENOUS at 14:13

## 2023-01-01 RX ADMIN — THIAMINE HYDROCHLORIDE 50 MG: 100 INJECTION, SOLUTION INTRAMUSCULAR; INTRAVENOUS at 09:04

## 2023-01-01 RX ADMIN — FOLIC ACID 1 MG: 5 INJECTION, SOLUTION INTRAMUSCULAR; INTRAVENOUS; SUBCUTANEOUS at 09:07

## 2023-01-01 RX ADMIN — POTASSIUM & SODIUM PHOSPHATES POWDER PACK 280-160-250 MG 2 PACKET: 280-160-250 PACK at 18:43

## 2023-01-01 RX ADMIN — PANTOPRAZOLE SODIUM 40 MG: 40 INJECTION, POWDER, FOR SOLUTION INTRAVENOUS at 18:32

## 2023-01-01 RX ADMIN — CARBOXYMETHYLCELLULOSE SODIUM 1 DROP: 10 GEL OPHTHALMIC at 09:37

## 2023-01-01 RX ADMIN — ACETAMINOPHEN 1000 MG: 500 TABLET ORAL at 09:06

## 2023-01-01 RX ADMIN — DEXTROSE MONOHYDRATE 300 ML: 100 INJECTION, SOLUTION INTRAVENOUS at 14:23

## 2023-01-01 RX ADMIN — DEXTROSE 15 G: 15 GEL ORAL at 13:08

## 2023-01-01 RX ADMIN — MORPHINE SULFATE 10 MG: 100 SOLUTION ORAL at 11:53

## 2023-01-01 RX ADMIN — DICLOFENAC SODIUM 2 G: 10 GEL TOPICAL at 20:51

## 2023-01-01 RX ADMIN — CARBOXYMETHYLCELLULOSE SODIUM 1 DROP: 10 GEL OPHTHALMIC at 18:20

## 2023-01-01 RX ADMIN — PROTAMINE SULFATE 4 MG: 10 INJECTION, SOLUTION INTRAVENOUS at 18:48

## 2023-01-01 RX ADMIN — PIPERACILLIN AND TAZOBACTAM 4.5 G: 4; .5 INJECTION, POWDER, FOR SOLUTION INTRAVENOUS at 08:53

## 2023-01-01 RX ADMIN — OXYCODONE HYDROCHLORIDE 2.5 MG: 5 TABLET ORAL at 18:15

## 2023-01-01 RX ADMIN — AMOXICILLIN AND CLAVULANATE POTASSIUM 875 MG: 400; 57 POWDER, FOR SUSPENSION ORAL at 21:02

## 2023-01-01 RX ADMIN — LIDOCAINE HYDROCHLORIDE ANHYDROUS 1 ML: 10 INJECTION, SOLUTION INFILTRATION at 17:10

## 2023-01-01 RX ADMIN — HYDROMORPHONE HYDROCHLORIDE 0.2 MG: 0.2 INJECTION, SOLUTION INTRAMUSCULAR; INTRAVENOUS; SUBCUTANEOUS at 14:07

## 2023-01-01 RX ADMIN — SODIUM CHLORIDE 500 ML: 9 INJECTION, SOLUTION INTRAVENOUS at 14:03

## 2023-01-01 RX ADMIN — ENOXAPARIN SODIUM 30 MG: 30 INJECTION SUBCUTANEOUS at 22:20

## 2023-01-01 RX ADMIN — ASPIRIN 81 MG: 81 TABLET, COATED ORAL at 09:00

## 2023-01-01 RX ADMIN — PANTOPRAZOLE SODIUM 40 MG: 40 TABLET, DELAYED RELEASE ORAL at 16:23

## 2023-01-01 RX ADMIN — CARBOXYMETHYLCELLULOSE SODIUM 1 DROP: 10 GEL OPHTHALMIC at 13:04

## 2023-01-01 RX ADMIN — THIAMINE HYDROCHLORIDE 50 MG: 100 INJECTION, SOLUTION INTRAMUSCULAR; INTRAVENOUS at 08:57

## 2023-01-01 RX ADMIN — ASPIRIN 81 MG: 81 TABLET, COATED ORAL at 09:31

## 2023-01-01 RX ADMIN — FOLIC ACID 1 MG: 1 TABLET ORAL at 08:32

## 2023-01-01 RX ADMIN — PIPERACILLIN AND TAZOBACTAM 4.5 G: 4; .5 INJECTION, POWDER, FOR SOLUTION INTRAVENOUS at 12:59

## 2023-01-01 RX ADMIN — DUTASTERIDE 0.5 MG: 0.5 CAPSULE, LIQUID FILLED ORAL at 08:57

## 2023-01-01 RX ADMIN — CARBOXYMETHYLCELLULOSE SODIUM 1 DROP: 10 GEL OPHTHALMIC at 21:35

## 2023-01-01 RX ADMIN — LORAZEPAM 1 MG: 2 INJECTION INTRAMUSCULAR; INTRAVENOUS at 03:44

## 2023-01-01 RX ADMIN — THIAMINE HYDROCHLORIDE 50 MG: 100 INJECTION, SOLUTION INTRAMUSCULAR; INTRAVENOUS at 08:37

## 2023-01-01 RX ADMIN — PIPERACILLIN AND TAZOBACTAM 3.38 G: 3; .375 INJECTION, POWDER, FOR SOLUTION INTRAVENOUS at 10:20

## 2023-01-01 RX ADMIN — AMOXICILLIN AND CLAVULANATE POTASSIUM 875 MG: 400; 57 POWDER, FOR SUSPENSION ORAL at 09:03

## 2023-01-01 RX ADMIN — METOPROLOL SUCCINATE 25 MG: 25 TABLET, EXTENDED RELEASE ORAL at 08:19

## 2023-01-01 RX ADMIN — DEXTROSE 15 G: 15 GEL ORAL at 15:34

## 2023-01-01 RX ADMIN — PIPERACILLIN AND TAZOBACTAM 4.5 G: 4; .5 INJECTION, POWDER, FOR SOLUTION INTRAVENOUS at 04:59

## 2023-01-01 RX ADMIN — SODIUM CHLORIDE 1000 ML: 9 INJECTION, SOLUTION INTRAVENOUS at 03:52

## 2023-01-01 RX ADMIN — DOCUSATE SODIUM 100 MG: 100 CAPSULE, LIQUID FILLED ORAL at 09:01

## 2023-01-01 RX ADMIN — THIAMINE HYDROCHLORIDE 50 MG: 100 INJECTION, SOLUTION INTRAMUSCULAR; INTRAVENOUS at 10:32

## 2023-01-01 RX ADMIN — FOLIC ACID 1 MG: 1 TABLET ORAL at 09:00

## 2023-01-01 RX ADMIN — POTASSIUM & SODIUM PHOSPHATES POWDER PACK 280-160-250 MG 1 PACKET: 280-160-250 PACK at 14:07

## 2023-01-01 RX ADMIN — PIPERACILLIN AND TAZOBACTAM 2.25 G: 2; .25 INJECTION, POWDER, FOR SOLUTION INTRAVENOUS at 08:20

## 2023-01-01 RX ADMIN — DEXTROSE AND SODIUM CHLORIDE: 5; 450 INJECTION, SOLUTION INTRAVENOUS at 21:55

## 2023-01-01 RX ADMIN — FLUCONAZOLE 100 MG: 40 POWDER, FOR SUSPENSION ORAL at 14:18

## 2023-01-01 RX ADMIN — ACETAMINOPHEN 650 MG: 325 TABLET, FILM COATED ORAL at 02:35

## 2023-01-01 ASSESSMENT — ACTIVITIES OF DAILY LIVING (ADL)
ADLS_ACUITY_SCORE: 39
ADLS_ACUITY_SCORE: 63
DEPENDENT_IADLS:: CLEANING;COOKING;LAUNDRY;SHOPPING;MEAL PREPARATION;MEDICATION MANAGEMENT;MONEY MANAGEMENT;TRANSPORTATION;INCONTINENCE
ADLS_ACUITY_SCORE: 58
ADLS_ACUITY_SCORE: 51
ADLS_ACUITY_SCORE: 53
ADLS_ACUITY_SCORE: 49
ADLS_ACUITY_SCORE: 51
ADLS_ACUITY_SCORE: 63
ADLS_ACUITY_SCORE: 58
ADLS_ACUITY_SCORE: 47
ADLS_ACUITY_SCORE: 35
ADLS_ACUITY_SCORE: 51
ADLS_ACUITY_SCORE: 55
DIFFICULTY_EATING/SWALLOWING: YES
ADLS_ACUITY_SCORE: 35
ADLS_ACUITY_SCORE: 47
ADLS_ACUITY_SCORE: 53
ADLS_ACUITY_SCORE: 55
ADLS_ACUITY_SCORE: 53
ADLS_ACUITY_SCORE: 47
ADLS_ACUITY_SCORE: 47
ADLS_ACUITY_SCORE: 43
ADLS_ACUITY_SCORE: 58
ADLS_ACUITY_SCORE: 47
ADLS_ACUITY_SCORE: 57
ADLS_ACUITY_SCORE: 51
ADLS_ACUITY_SCORE: 47
ADLS_ACUITY_SCORE: 53
ADLS_ACUITY_SCORE: 47
ADLS_ACUITY_SCORE: 58
ADLS_ACUITY_SCORE: 43
ADLS_ACUITY_SCORE: 53
ADLS_ACUITY_SCORE: 63
ADLS_ACUITY_SCORE: 58
ADLS_ACUITY_SCORE: 47
ADLS_ACUITY_SCORE: 55
ADLS_ACUITY_SCORE: 51
ADLS_ACUITY_SCORE: 58
ADLS_ACUITY_SCORE: 58
ADLS_ACUITY_SCORE: 47
ADLS_ACUITY_SCORE: 63
ADLS_ACUITY_SCORE: 49
ADLS_ACUITY_SCORE: 51
ADLS_ACUITY_SCORE: 63
ADLS_ACUITY_SCORE: 51
ADLS_ACUITY_SCORE: 47
ADLS_ACUITY_SCORE: 53
ADLS_ACUITY_SCORE: 35
ADLS_ACUITY_SCORE: 51
ADLS_ACUITY_SCORE: 53
ADLS_ACUITY_SCORE: 43
ADLS_ACUITY_SCORE: 58
TOILETING_ASSISTANCE: TOILETING DIFFICULTY, DEPENDENT
ADLS_ACUITY_SCORE: 53
ADLS_ACUITY_SCORE: 58
ADLS_ACUITY_SCORE: 58
ADLS_ACUITY_SCORE: 61
ADLS_ACUITY_SCORE: 53
ADLS_ACUITY_SCORE: 51
ADLS_ACUITY_SCORE: 43
ADLS_ACUITY_SCORE: 35
ADLS_ACUITY_SCORE: 51
ADLS_ACUITY_SCORE: 51
ADLS_ACUITY_SCORE: 47
ADLS_ACUITY_SCORE: 43
WEAR_GLASSES_OR_BLIND: NO
DEPENDENT_IADLS:: CLEANING;COOKING;LAUNDRY;SHOPPING;MEAL PREPARATION;MEDICATION MANAGEMENT;MONEY MANAGEMENT;TRANSPORTATION;INCONTINENCE
ADLS_ACUITY_SCORE: 63
ADLS_ACUITY_SCORE: 43
ADLS_ACUITY_SCORE: 58
ADLS_ACUITY_SCORE: 57
ADLS_ACUITY_SCORE: 63
ADLS_ACUITY_SCORE: 57
ADLS_ACUITY_SCORE: 57
ADLS_ACUITY_SCORE: 51
ADLS_ACUITY_SCORE: 35
ADLS_ACUITY_SCORE: 53
ADLS_ACUITY_SCORE: 63
ADLS_ACUITY_SCORE: 49
ADLS_ACUITY_SCORE: 53
ADLS_ACUITY_SCORE: 53
ADLS_ACUITY_SCORE: 56
ADLS_ACUITY_SCORE: 53
ADLS_ACUITY_SCORE: 53
ADLS_ACUITY_SCORE: 47
ADLS_ACUITY_SCORE: 55
ADLS_ACUITY_SCORE: 53
ADLS_ACUITY_SCORE: 47
ADLS_ACUITY_SCORE: 37
ADLS_ACUITY_SCORE: 63
ADLS_ACUITY_SCORE: 57
ADLS_ACUITY_SCORE: 53
WALKING_OR_CLIMBING_STAIRS_DIFFICULTY: YES
ADLS_ACUITY_SCORE: 43
ADLS_ACUITY_SCORE: 47
ADLS_ACUITY_SCORE: 53
ADLS_ACUITY_SCORE: 51
ADLS_ACUITY_SCORE: 58
ADLS_ACUITY_SCORE: 51
TOILETING_ISSUES: YES
ADLS_ACUITY_SCORE: 43
ADLS_ACUITY_SCORE: 49
ADLS_ACUITY_SCORE: 61
ADLS_ACUITY_SCORE: 58
ADLS_ACUITY_SCORE: 53
ADLS_ACUITY_SCORE: 53
ADLS_ACUITY_SCORE: 49
ADLS_ACUITY_SCORE: 57
ADLS_ACUITY_SCORE: 43
ADLS_ACUITY_SCORE: 55
ADLS_ACUITY_SCORE: 61
ADLS_ACUITY_SCORE: 47
ADLS_ACUITY_SCORE: 54
ADLS_ACUITY_SCORE: 55
ADLS_ACUITY_SCORE: 53
ADLS_ACUITY_SCORE: 43
ADLS_ACUITY_SCORE: 35
ADLS_ACUITY_SCORE: 58
ADLS_ACUITY_SCORE: 57
ADLS_ACUITY_SCORE: 58
ADLS_ACUITY_SCORE: 45
ADLS_ACUITY_SCORE: 53
ADLS_ACUITY_SCORE: 51
ADLS_ACUITY_SCORE: 47
ADLS_ACUITY_SCORE: 63
ADLS_ACUITY_SCORE: 51
ADLS_ACUITY_SCORE: 61
ADLS_ACUITY_SCORE: 51
ADLS_ACUITY_SCORE: 47
ADLS_ACUITY_SCORE: 53
ADLS_ACUITY_SCORE: 53
ADLS_ACUITY_SCORE: 47
ADLS_ACUITY_SCORE: 47
ADLS_ACUITY_SCORE: 53
ADLS_ACUITY_SCORE: 43
ADLS_ACUITY_SCORE: 58
ADLS_ACUITY_SCORE: 43
ADLS_ACUITY_SCORE: 51
ADLS_ACUITY_SCORE: 49
ADLS_ACUITY_SCORE: 51
ADLS_ACUITY_SCORE: 51
ADLS_ACUITY_SCORE: 35
ADLS_ACUITY_SCORE: 63
ADLS_ACUITY_SCORE: 63
DRESSING/BATHING_DIFFICULTY: YES
ADLS_ACUITY_SCORE: 60
ADLS_ACUITY_SCORE: 51
ADLS_ACUITY_SCORE: 58
ADLS_ACUITY_SCORE: 53
ADLS_ACUITY_SCORE: 45
ADLS_ACUITY_SCORE: 35
ADLS_ACUITY_SCORE: 35
ADLS_ACUITY_SCORE: 61
ADLS_ACUITY_SCORE: 35
ADLS_ACUITY_SCORE: 43
ADLS_ACUITY_SCORE: 58
ADLS_ACUITY_SCORE: 47
ADLS_ACUITY_SCORE: 51
ADLS_ACUITY_SCORE: 63
ADLS_ACUITY_SCORE: 53
ADLS_ACUITY_SCORE: 55
ADLS_ACUITY_SCORE: 55
CONCENTRATING,_REMEMBERING_OR_MAKING_DECISIONS_DIFFICULTY: YES
ADLS_ACUITY_SCORE: 47
ADLS_ACUITY_SCORE: 58
ADLS_ACUITY_SCORE: 51
ADLS_ACUITY_SCORE: 51
ADLS_ACUITY_SCORE: 53
ADLS_ACUITY_SCORE: 53
ADLS_ACUITY_SCORE: 51
ADLS_ACUITY_SCORE: 55
ADLS_ACUITY_SCORE: 53
ADLS_ACUITY_SCORE: 47
ADLS_ACUITY_SCORE: 53
ADLS_ACUITY_SCORE: 55
ADLS_ACUITY_SCORE: 49
ADLS_ACUITY_SCORE: 35
ADLS_ACUITY_SCORE: 45
ADLS_ACUITY_SCORE: 47
ADLS_ACUITY_SCORE: 57
ADLS_ACUITY_SCORE: 58
ADLS_ACUITY_SCORE: 49
ADLS_ACUITY_SCORE: 60
ADLS_ACUITY_SCORE: 58
ADLS_ACUITY_SCORE: 53
ADLS_ACUITY_SCORE: 35
ADLS_ACUITY_SCORE: 43
ADLS_ACUITY_SCORE: 53
ADLS_ACUITY_SCORE: 47
ADLS_ACUITY_SCORE: 43
ADLS_ACUITY_SCORE: 57
ADLS_ACUITY_SCORE: 35
ADLS_ACUITY_SCORE: 63
ADLS_ACUITY_SCORE: 63
ADLS_ACUITY_SCORE: 53
ADLS_ACUITY_SCORE: 56
ADLS_ACUITY_SCORE: 57
ADLS_ACUITY_SCORE: 53
ADLS_ACUITY_SCORE: 56
ADLS_ACUITY_SCORE: 53
ADLS_ACUITY_SCORE: 58
ADLS_ACUITY_SCORE: 55
ADLS_ACUITY_SCORE: 58
ADLS_ACUITY_SCORE: 56
ADLS_ACUITY_SCORE: 57
ADLS_ACUITY_SCORE: 56
ADLS_ACUITY_SCORE: 53
ADLS_ACUITY_SCORE: 53
ADLS_ACUITY_SCORE: 63
ADLS_ACUITY_SCORE: 45
ADLS_ACUITY_SCORE: 57
ADLS_ACUITY_SCORE: 47
ADLS_ACUITY_SCORE: 47
ADLS_ACUITY_SCORE: 53
ADLS_ACUITY_SCORE: 43
ADLS_ACUITY_SCORE: 57
ADLS_ACUITY_SCORE: 51
ADLS_ACUITY_SCORE: 58
ADLS_ACUITY_SCORE: 53
ADLS_ACUITY_SCORE: 53
ADLS_ACUITY_SCORE: 49
ADLS_ACUITY_SCORE: 43
ADLS_ACUITY_SCORE: 35
ADLS_ACUITY_SCORE: 58
ADLS_ACUITY_SCORE: 51
ADLS_ACUITY_SCORE: 58
ADLS_ACUITY_SCORE: 63
ADLS_ACUITY_SCORE: 37
ADLS_ACUITY_SCORE: 55
ADLS_ACUITY_SCORE: 57
ADLS_ACUITY_SCORE: 51
ADLS_ACUITY_SCORE: 58
ADLS_ACUITY_SCORE: 43
ADLS_ACUITY_SCORE: 53
DEPENDENT_IADLS:: CLEANING;COOKING;LAUNDRY;SHOPPING;MEAL PREPARATION;MEDICATION MANAGEMENT;MONEY MANAGEMENT;TRANSPORTATION;INCONTINENCE
ADLS_ACUITY_SCORE: 49
ADLS_ACUITY_SCORE: 57
ADLS_ACUITY_SCORE: 55
ADLS_ACUITY_SCORE: 57
ADLS_ACUITY_SCORE: 57
ADLS_ACUITY_SCORE: 51
ADLS_ACUITY_SCORE: 47
ADLS_ACUITY_SCORE: 63
ADLS_ACUITY_SCORE: 53
ADLS_ACUITY_SCORE: 53
ADLS_ACUITY_SCORE: 58
ADLS_ACUITY_SCORE: 57
ADLS_ACUITY_SCORE: 35
ADLS_ACUITY_SCORE: 53
ADLS_ACUITY_SCORE: 56
ADLS_ACUITY_SCORE: 61
ADLS_ACUITY_SCORE: 57
ADLS_ACUITY_SCORE: 55
ADLS_ACUITY_SCORE: 47
ADLS_ACUITY_SCORE: 63
ADLS_ACUITY_SCORE: 53
ADLS_ACUITY_SCORE: 57
ADLS_ACUITY_SCORE: 35
ADLS_ACUITY_SCORE: 35
ADLS_ACUITY_SCORE: 58
ADLS_ACUITY_SCORE: 43
ADLS_ACUITY_SCORE: 57
ADLS_ACUITY_SCORE: 53
ADLS_ACUITY_SCORE: 63
ADLS_ACUITY_SCORE: 55
ADLS_ACUITY_SCORE: 53
ADLS_ACUITY_SCORE: 51
ADLS_ACUITY_SCORE: 53
ADLS_ACUITY_SCORE: 58
ADLS_ACUITY_SCORE: 55
ADLS_ACUITY_SCORE: 43
ADLS_ACUITY_SCORE: 51
ADLS_ACUITY_SCORE: 57
ADLS_ACUITY_SCORE: 53
ADLS_ACUITY_SCORE: 53
ADLS_ACUITY_SCORE: 55
ADLS_ACUITY_SCORE: 63
ADLS_ACUITY_SCORE: 57
ADLS_ACUITY_SCORE: 53
ADLS_ACUITY_SCORE: 53
ADLS_ACUITY_SCORE: 35
ADLS_ACUITY_SCORE: 47
ADLS_ACUITY_SCORE: 58
ADLS_ACUITY_SCORE: 49
ADLS_ACUITY_SCORE: 63
DOING_ERRANDS_INDEPENDENTLY_DIFFICULTY: YES
ADLS_ACUITY_SCORE: 51
ADLS_ACUITY_SCORE: 53
ADLS_ACUITY_SCORE: 47
ADLS_ACUITY_SCORE: 51
ADLS_ACUITY_SCORE: 58
ADLS_ACUITY_SCORE: 43
ADLS_ACUITY_SCORE: 47
ADLS_ACUITY_SCORE: 58
ADLS_ACUITY_SCORE: 47
ADLS_ACUITY_SCORE: 47
ADLS_ACUITY_SCORE: 54
ADLS_ACUITY_SCORE: 35
ADLS_ACUITY_SCORE: 43
ADLS_ACUITY_SCORE: 53
DEPENDENT_IADLS:: CLEANING;COOKING;LAUNDRY;SHOPPING;MEAL PREPARATION;MEDICATION MANAGEMENT;MONEY MANAGEMENT;TRANSPORTATION;INCONTINENCE
ADLS_ACUITY_SCORE: 35
ADLS_ACUITY_SCORE: 53
ADLS_ACUITY_SCORE: 61
ADLS_ACUITY_SCORE: 47
ADLS_ACUITY_SCORE: 55
ADLS_ACUITY_SCORE: 35
ADLS_ACUITY_SCORE: 47
ADLS_ACUITY_SCORE: 53
ADLS_ACUITY_SCORE: 51
ADLS_ACUITY_SCORE: 53
ADLS_ACUITY_SCORE: 53
ADLS_ACUITY_SCORE: 51
ADLS_ACUITY_SCORE: 53
ADLS_ACUITY_SCORE: 49
ADLS_ACUITY_SCORE: 47
ADLS_ACUITY_SCORE: 58
ADLS_ACUITY_SCORE: 53
ADLS_ACUITY_SCORE: 53
ADLS_ACUITY_SCORE: 35
ADLS_ACUITY_SCORE: 51
ADLS_ACUITY_SCORE: 63
ADLS_ACUITY_SCORE: 51
ADLS_ACUITY_SCORE: 57
ADLS_ACUITY_SCORE: 53
ADLS_ACUITY_SCORE: 58
ADLS_ACUITY_SCORE: 58
ADLS_ACUITY_SCORE: 35
ADLS_ACUITY_SCORE: 63
ADLS_ACUITY_SCORE: 53
ADLS_ACUITY_SCORE: 55
ADLS_ACUITY_SCORE: 47
ADLS_ACUITY_SCORE: 53
ADLS_ACUITY_SCORE: 58
ADLS_ACUITY_SCORE: 35
ADLS_ACUITY_SCORE: 58
ADLS_ACUITY_SCORE: 51
ADLS_ACUITY_SCORE: 58
FALL_HISTORY_WITHIN_LAST_SIX_MONTHS: NO
ADLS_ACUITY_SCORE: 47
ADLS_ACUITY_SCORE: 55
ADLS_ACUITY_SCORE: 53
ADLS_ACUITY_SCORE: 57
ADLS_ACUITY_SCORE: 58
ADLS_ACUITY_SCORE: 47
ADLS_ACUITY_SCORE: 51

## 2023-06-28 PROBLEM — R65.20 SEPSIS WITH ACUTE RENAL FAILURE WITHOUT SEPTIC SHOCK, DUE TO UNSPECIFIED ORGANISM, UNSPECIFIED ACUTE RENAL FAILURE TYPE (H): Status: ACTIVE | Noted: 2023-01-01

## 2023-06-28 PROBLEM — A41.9 SEPSIS WITH ACUTE RENAL FAILURE WITHOUT SEPTIC SHOCK, DUE TO UNSPECIFIED ORGANISM, UNSPECIFIED ACUTE RENAL FAILURE TYPE (H): Status: ACTIVE | Noted: 2023-01-01

## 2023-06-28 PROBLEM — E16.2 HYPOGLYCEMIA: Status: ACTIVE | Noted: 2023-01-01

## 2023-06-28 PROBLEM — R40.4 TRANSIENT ALTERATION OF AWARENESS: Status: ACTIVE | Noted: 2023-01-01

## 2023-06-28 PROBLEM — N17.9 SEPSIS WITH ACUTE RENAL FAILURE WITHOUT SEPTIC SHOCK, DUE TO UNSPECIFIED ORGANISM, UNSPECIFIED ACUTE RENAL FAILURE TYPE (H): Status: ACTIVE | Noted: 2023-01-01

## 2023-06-28 NOTE — H&P
[facility]    History and Physical - Hospitalist Service       Date of Admission:  6/28/2023    Assessment & Plan      Joselin Sanchez is a 89 year old male admitted on 6/28/2023. He has a past medical history of hypertension, diabetes mellitus type 2, chronic atrial fibrillation, not on anticoagulation, chronic systolic congestive heart failure, last EF 40 to 45% in July 2020, dyslipidemia, diabetic foot ulcer, lymphedema, dementia, BPH who was sent from his facility for evaluation of altered mental status..     # Severe sepsis due to UTI  - Presented with altered mental status  - Was in A-fib with RVR upon arrival in ER, blood pressure also on lower side, lowest BP 85/62  - White blood cells on admission 11.5 lactic acid 1.3; Pro-Ryan 0.26  - Chest x-ray with no infiltrates  - Nails Catheter was placed in ER, draining cloudy, milky-appearance urine; UA was not able to be performed by the lab as of purulent urine  - CT abdomen/ pelvis- Sequela of chronic bladder outlet obstruction, likely secondary to prostatomegaly  - Unclear if he had urinary retention as Nails catheter was placed for CT imaging  - He was given a bolus of normal saline 1000 cc and 1 dose of Zosyn  - Blood pressure improved  - He is admitted to ICU  - Continue with IV Zosyn at this time  - Follow-up blood cultures and urine cultures  - Monitor fever curve and white blood cells trend    # Altered mental status  # Underlying dementia  - Possible multifactorial , most likely due to severe sepsis in the setting of underlying dementia; low blood sugars may also contributing  - CT of the head-negative for acute pathology  - Reported that he was agitated after he went to ICU  - Zyprexa 5 mg IM x1 ordered  - We will order Zyprexa 5 mg p.o. at bedtime as needed  -Treat UTI as above  - Gentle redirectioning, avoid sedating or pain medications as able  - Will need PT/OT evaluation when appropriate  - /care coordinator  consult; reported the patient being a vulnerable adult; he was admitted to [facility] the end of March/23 and discharged to [facility]    # KIMO  # Hyperkalemia  - Creatinine on admission 2.6; Potassium on admission 6.7  - Recommended CKD stage III in Care Everywhere but recent BMP on June/9//23 showed a potassium of 4.1, creatinine 0.86  - This is likely related to severe sepsis, decreased intravascular volume, hypotension  - Noted that prior to admission he was on Lasix, lisinopril, potassium supplementation  - CT of the abdomen and pelvis- Sequela of chronic bladder outlet obstruction, likely secondary to prostatomegaly  - EKG with A-fib with RVR, no evidence of peaked T waves  - Unclear if he had urinary retention as Nails catheter was placed for CT imaging  - He was given a bolus of normal saline; calcium gluconate, insulin with dextrose, ampule of bicarb  - Repeat potassium down to 5.6  - Monitor on telemetry  - Continue with IV fluids normal saline at 100 cc/h for now; monitor for fluid overload given history of systolic heart failure  - Avoid nephrotoxic drugs  - Hold prior to admission lisinopril, Lasix, potassium supplementation  - Repeat BMP at 6 PM and in the morning  - Check FeNa and urine sodium  - Consider nephrology consult if creatinine does not improve    # Diabetes mellitus type 2  # Hypoglycemia  - PTA on metformin 500 mg p.o. daily  - Reportedly had low blood sugars upon EMS arrival of 50; D10 was given by EMS  - Blood sugars improved to 110  - Hemoglobin A1c is low 5.8  - Hold prior to admission metformin and likely not need to resume after discharge  - Accu-Cheks before meals and at bedtime  Insulin sliding scale    # A-fib with RVR   -History of chronic A-fib, not on anticoagulation at home  - Was in A-fib with RVR upon arrival  - Received a bolus of normal saline 1000 cc and another 500 cc  - Treat UTI as above  - Continue mild IV hydration as anticipate that his  heart rate will improve by treating his infection  -Telemetry    # Systolic congestive heart failure  # Hypertension  - A prior echocardiogram in July/2020 showed EF of 40 to 45%, LV hypokinesis  - PTA on lisinopril 2.5 mg p.o. daily, Lasix 20 mg p.o. daily, along with potassium chloride 20 mg p.o. daily.  - Presented now with evidence of KIMO I and hyperkalemia  - He will need to have his KIMO and hyperkalemia corrected, holding his prior to admission lisinopril, Lasix and KCl for now  - Received a total of 1500 cc bolus normal saline  - Continue with IV fluids normal saline at 100 cc/h  - Echocardiogram has been ordered  - Monitor fluid status; he does not seem to have evidence of fluid overload at this time;    # Peripheral arterial disease  # Diabetic foot ulcer (right heel)  # History of lymphedema  # History of polymicrobial bacterial infection of the legs  - He had an admission to [facility] in [place] from 3/27 through 4/4/2023 for polymicrobial bacterial infection of his legs.  He was taken to the OR by surgery for debridement; he was treated with IV Zosyn for 14 days  - He is wounds does not seem to be infected at this time  - Wound care consult requested    # BPH  - Resumed prior to admission Flomax       Diet:  Mod carb diet  DVT Prophylaxis: Enoxaparin (Lovenox) SQ and Pneumatic Compression Devices  Nails Catheter: PRESENT, indication:    Lines: None     Cardiac Monitoring: None  Code Status:  Full code    Clinically Significant Risk Factors Present on Admission        # Hyperkalemia: Highest K = 6.7 mmol/L in last 2 days, will monitor as appropriate    # Hypercalcemia: corrected calcium is >10.1, will monitor as appropriate    # Hypoalbuminemia: Lowest albumin = 2.4 g/dL at 6/28/2023 12:50 PM, will monitor as appropriate   # Drug Induced Platelet Defect: home medication list includes an antiplatelet medication   # Hypertension: Home medication list includes antihypertensive(s)      #  "Overweight: Estimated body mass index is 28.25 kg/m  as calculated from the following:    Height as of this encounter: 1.7 m (5' 6.93\").    Weight as of this encounter: 81.6 kg (180 lb).            Disposition Plan      Expected Discharge Date: 06/30/2023                  Allyssa Paz MD  Hospitalist Service  [facility]  Securely message with [employer] (more info)  Text page via [employer] Paging/Directory     ______________________________________________________________________    Chief Complaint   Altered mental status    Unable to obtain a history from the patient due to language barrier and mental status; no family members were present at the time of my examination in ER I did discuss with ER attending, Dr Meléndez and I reviewed his medical records in care everywhere;    History of Present Illness   Joselin Sanchez is a 89 year old male admitted on 6/28/2023. He has a past medical history of hypertension, diabetes mellitus type 2, chronic atrial fibrillation, not on anticoagulation, chronic systolic congestive heart failure, last EF 40 to 45% in July 2020, dyslipidemia, diabetic foot ulcer, lymphedema, dementia, BPH who was sent from his facility for evaluation of altered mental status..  The patient has history of dementia, unclear what his baseline mental status is but apparently he is conversational.  He lives in [facility].  Apparently his niece tried to call him yesterday and he did not answer the phone which was not normal.  Today she went to check on him and found him to be altered; EMS was called; as per report his blood sugar were 50, with oxygen saturation in the 70s..  He was given D10 by EMS and she was brought to ER for further evaluation..    In ER he was seen by Dr. Meléndez.  The history is limited due to altered mental status.  He is blood pressure on arrival was 95/51.  He was in A-fib with RVR.  Temperature in ER 96.3.  He is blood pressure did drop to 85/62 but " improved to 104/71 with a bolus of normal saline..  Oxygen saturation 91 to 98% on 10 L via oxy mask.    Last Comprehensive Metabolic Panel:  Lab Results   Component Value Date     06/28/2023    POTASSIUM 5.6 (H) 06/28/2023    CHLORIDE 104 06/28/2023    CO2 15 (L) 06/28/2023    ANIONGAP 17 (H) 06/28/2023    GLC 84 06/28/2023    BUN 68.3 (H) 06/28/2023    CR 2.60 (H) 06/28/2023    GFRESTIMATED 21 (L) 06/28/2023    BERHANE 10.0 (H) 06/28/2023     Liver Function Studies - Recent Labs   Lab Test 06/28/23  1250   PROTTOTAL 6.8   ALBUMIN 2.4*   BILITOTAL 0.4   ALKPHOS 115   AST 21   ALT 7     CBC RESULTS: Recent Labs   Lab Test 06/28/23  1250   WBC 11.5*   RBC 4.19*   HGB 13.4   HCT 43.2   *   MCH 32.0   MCHC 31.0*   RDW 17.5*        procal-0.26    HbA1c 5.8    Chest x-ray-no infiltrates; CT head no acute intracranial pathology.    CT abd/pelvis w/o contrast:  1.  Sequela of chronic bladder outlet obstruction, likely secondary to  prostatomegaly, Nails catheter now in place.  2.  No nephroureterolithiasis or hydronephrosis.  3.  Possible subtle/early changes of chronic liver disease. No  ascites.    EKG: A-fib with RVR, nonspecific ST-T wave abnormalities    A Nails catheter was placed in ER; cloudy, milky urine was draining; UA was ordered but unable to be done due to extremely cloudy/purulent urine; blood cultures and urine cultures pending    He was given 1 dose of Zosyn in ER for suspected severe sepsis/urosepsis  Also for his hyperkalemia he was given -calcium gluconate 1 g IV,, insulin with dextrose, sodium bicarb 50 mEq.  Repeat potassium improved to 5.6.    At the time of my examination he was lethargic, but able to wake up; he was moaning and seem to keep asking about where his relative is.     Past Medical History    Past Medical History:   Diagnosis Date     Anxiety      Chronic heart failure, unspecified heart failure type (H)      Chronic pain      CKD (chronic kidney disease) stage 3, GFR  30-59 ml/min (H)      Dementia (H)      Diabetes (H)      Dysphagia      Dysuria      Hypertension      Lymphedema      Mixed hyperlipidemia      Mood disturbance      SHEILA (obstructive sleep apnea)      Peripheral vascular disease (H)      Pressure ulcer of heel        Past Surgical History   No past surgical history on file.    Prior to Admission Medications   Prior to Admission Medications   Prescriptions Last Dose Informant Patient Reported? Taking?   Carboxymethylcellulose Sodium (REFRESH LIQUIGEL) 1 % GEL  Nursing Home Yes Yes   Sig: Apply 1 drop to eye 4 times daily   acetaminophen (TYLENOL) 500 MG tablet  Nursing Home Yes Yes   Sig: Take 1,000 mg by mouth every 8 hours as needed for pain   acetaminophen (TYLENOL) 500 MG tablet  Nursing Home Yes Yes   Sig: Take 1,000 mg by mouth every morning   albuterol (PROAIR HFA/PROVENTIL HFA/VENTOLIN HFA) 108 (90 Base) MCG/ACT inhaler  [facility] Yes Yes   Sig: Inhale 2 puffs into the lungs every 4 hours as needed for shortness of breath or wheezing   aspirin 81 MG EC tablet  Nursing Home Yes Yes   Sig: Take 81 mg by mouth daily   diclofenac (VOLTAREN) 1 % topical gel  Nursing Home Yes Yes   Sig: Apply topically 2 times daily   docusate sodium (COLACE) 100 MG capsule  Nursing Home Yes Yes   Sig: Take 100 mg by mouth 2 times daily   folic acid (FOLVITE) 1 MG tablet  Nursing Home Yes Yes   Sig: Take 1 mg by mouth daily   furosemide (LASIX) 20 MG tablet  Nursing Home Yes Yes   Sig: Take 20 mg by mouth daily   lisinopril (ZESTRIL) 2.5 MG tablet  Nursing Home Yes Yes   Sig: Take 2.5 mg by mouth daily   metFORMIN (GLUCOPHAGE) 500 MG tablet  Nursing Home Yes Yes   Sig: Take 500 mg by mouth daily   oxyCODONE (ROXICODONE) 5 MG tablet  Nursing Home Yes Yes   Sig: Take 2.5 mg by mouth daily   potassium chloride ER (K-TAB/KLOR-CON) 10 MEQ CR tablet  Nursing Home Yes Yes   Sig: Take 20 mEq by mouth daily   senna-docusate (SENOKOT-S/PERICOLACE) 8.6-50 MG tablet  Nursing Home Yes Yes    Sig: Take 1 tablet by mouth 2 times daily   tamsulosin (FLOMAX) 0.4 MG capsule  Nursing Home Yes Yes   Sig: Take 0.4 mg by mouth daily      Facility-Administered Medications: None        Review of Systems    The 10 point Review of Systems is negative other than noted in the HPI or here.     Social History   I have reviewed this patient's social history and updated it with pertinent information if needed.       Allergies   Allergies   Allergen Reactions     Gabapentin         Physical Exam   Vital Signs: Temp: (!) 96.5  F (35.8  C) Temp src: Rectal BP: 107/48 Pulse: 93   Resp: 12 SpO2: 98 % O2 Device: Oxymask Oxygen Delivery: 10 LPM  Weight: 180 lbs 0 oz    General: Lethargic, able to wake up, seems to be in moderate distress secondary to pain  HEENT: Head is normocephalic, atraumatic oromucosa is dry  CV: Irregularly irregular, tachycardia, no murmurs, rubs  GI-abdomen is soft, mildly tender to palpation?,  No rebound, guarding  : Nails catheter in place draining very cloudy urine  Extremities: Right heel ulcer; extensive wound over his left shin covered with gauze; there is some skin laceration of right lateral aspect of his shin; also some skin excoriation underneath his breasts.   Neurologic: Lethargic, able to wake up, not following commands, moves all extremities  Psych: Difficult to assess    Medical Decision Making       60 MINUTES SPENT BY ME on the date of service doing chart review, history, exam, documentation & further activities per the note.  MANAGEMENT DISCUSSED with the following over the past 24 hours: ER attending, RN   NOTE(S)/MEDICAL RECORDS REVIEWED over the past 24 hours: prior hospitalization notes  Tests ORDERED & REVIEWED in the past 24 hours:  - See lab/imaging results included in the data section of the note  - BMP  - CBC  - Lactic Acid  - Procalcitonin      Data     I have personally reviewed the following data over the past 24 hrs:    11.5 (H)  \   13.4   / 193     136 104 68.3 (H) /   84   5.6 (H) 15 (L) 2.60 (H) \       ALT: 7 AST: 21 AP: 115 TBILI: 0.4   ALB: 2.4 (L) TOT PROTEIN: 6.8 LIPASE: N/A       TSH: N/A T4: N/A A1C: 5.8 (H)       Procal: 0.26 (H) CRP: N/A Lactic Acid: 1.3         Imaging results reviewed over the past 24 hrs:   Recent Results (from the past 24 hour(s))   XR Chest Port 1 View    Narrative    XR CHEST PORT 1 VIEW   6/28/2023 1:19 PM     HISTORY: Hypoxemia    COMPARISON: None.      Impression    IMPRESSION: No acute cardiopulmonary disease.    RAJEEV ERWIN MD         SYSTEM ID:  [id number]   CT Head w/o Contrast    Narrative    CT SCAN OF THE HEAD WITHOUT CONTRAST   6/28/2023 1:36 PM     HISTORY: Altered mental status.    TECHNIQUE:  Axial images of the head and coronal reformations without  IV contrast material. Radiation dose for this scan was reduced using  automated exposure control, adjustment of the mA and/or kV according  to patient size, or iterative reconstruction technique.    COMPARISON: None.    FINDINGS:  No evidence of hemorrhage, mass, or hydrocephalus. Volume loss and  patchy white matter hypoattenuation which likely represents chronic  small vessel ischemic change. No acute osseous abnormality.      Impression    IMPRESSION:   No acute intracranial abnormality.    ALETHEA AREVALO MD         SYSTEM ID:  [id number]   CT Abdomen Pelvis w/o Contrast    Narrative    CT ABDOMEN PELVIS W/O CONTRAST 6/28/2023 3:07 PM    CLINICAL HISTORY: purulent urine, eval for kidney stone  TECHNIQUE: CT scan of the abdomen and pelvis was performed without IV  contrast. Multiplanar reformats were obtained. Dose reduction  techniques were used.  CONTRAST: None.    COMPARISON: None.    FINDINGS:   LOWER CHEST: Unremarkable.    HEPATOBILIARY: Subtle no focal lesion seen on this noncontrast CT. No  evidence of biliary obstruction. Nodular contour of the liver with  some widening of the fissures.    PANCREAS: Normal.    SPLEEN: Normal.    ADRENAL GLANDS: Normal.    KIDNEYS/BLADDER:  No nephroureterolithiasis or hydronephrosis. Urinary  bladder is decompressed with Nails catheter. Multiple bladder  diverticuli noted.    BOWEL: Diverticulosis in the colon. No acute inflammatory change. No  obstruction. Normal appendix.    LYMPH NODES: Normal.    VASCULATURE: Moderate calcified atherosclerosis. Ectasia of the  infrarenal abdominal aorta without jorge aneurysmal dilation.    PELVIC ORGANS: Prostatomegaly.    OTHER: No significant ascites.    MUSCULOSKELETAL: No acute bony abnormality.      Impression    IMPRESSION:   1.  Sequela of chronic bladder outlet obstruction, likely secondary to  prostatomegaly, Nails catheter now in place.  2.  No nephroureterolithiasis or hydronephrosis.  3.  Possible subtle/early changes of chronic liver disease. No  ascites.    RAJEEV ERWIN MD         SYSTEM ID:  [id number]

## 2023-06-28 NOTE — ED TRIAGE NOTES
Pt presents from [address] [place] [address] for altered level of consciousness. Niece reports she was trying to call patient yesterday and he did not answer which is not typical. Today, she went to visit pt and noticed he was in and out of consciousness. Pt was hypoxic for EMS, blood pressure in the 70s, and glucose 50. Pt placed on o2 by EMS, 250 ml NS given and d10 given by EMS. Pt is [place] speaking.   Pt moaning, unable to follow commands and is altered. Pt has extensive wounds to L armpit, under breasts, and varying stages of healing to both legs.     Triage Assessment     Row Name 06/28/23 [id number]       Triage Assessment (Adult)    Airway WDL WDL       Respiratory WDL    Respiratory WDL X  hypoxic, low 02       Skin Circulation/Temperature WDL    Skin Circulation/Temperature WDL X  open sores present to L armpit, groin and legs       Cardiac WDL    Cardiac WDL X  hypotensive, tachycardic       Peripheral/Neurovascular WDL    Peripheral Neurovascular WDL WDL       Cognitive/Neuro/Behavioral WDL    Cognitive/Neuro/Behavioral WDL WDL

## 2023-06-28 NOTE — ED NOTES
Pt presented altered- moaning and yelling out with cares. Pt has wounds under L armpit, skin breakdown and redness under bilateral breasts, groin irritated and pt in extremely soiled briefs. Pt has decubitus ulcers to buttocks and open sores, pt has varying degrees of wounds and peeling skin to bilateral legs and R heel ulcer. Pt does not appear to be cared for at facility. Niece is expressing that she does not want him to return to care facility. Antunez catheter was also placed and purulent urine returned from antunez.

## 2023-06-28 NOTE — ED NOTES
Lab called and report they are unable to run the UA due to the thickness and purulency of urine. UA sent out for analysis

## 2023-06-28 NOTE — ED PROVIDER NOTES
History     Chief Complaint:  Altered Mental Status, Hypoglycemia, and Hypotension        The history is provided by the nursing home and a relative. The history is limited by the condition of the patient.      Joselin Sanchez is a 89 year old male who presents with altered mental status from care facility. Niece reports that she was trying to call him the day before and he did not answer which was not normal. Today she went to check on him and found altered mental status with the patient unable to respond to her questions. EMS was called and the patient was noted to have a oxygen saturation in the 70s with a questionable waveform and had a measured blood glucose of 50. He was given D 10 by EMS.     Independent Historian:    No    Review of External Notes:  Nursing home documentation sent with the patient reviewed.  Significant for history of CKD 3, systolic heart failure, DM 2, HTN, HLD, dementia, dysuria listed with an onset date of 5/22/2023. No antibiotics listed on the medication list.     Medications:    Furosemide  Lisinopril  Metformin   Oxycodone  Potassium Chloride Marjan  Senna-S  Tamsulosin    Past Medical History:    CKD  Hypertension   Lymphedema   Anemia   Diabetes   MDD  Congestive Heart Failure   Dementia   Dysphagia  Hyperlipidemia  Muscle weakness  Varicose Veins     Physical Exam     Patient Vitals for the past 24 hrs:   BP Temp Temp src Pulse Resp SpO2 Height Weight   06/28/23 1545 104/71 -- -- 96 11 98 % -- --   06/28/23 1530 104/71 -- -- 93 (!) 9 -- -- --   06/28/23 1528 104/71 -- -- 88 11 97 % -- --   06/28/23 1520 -- -- -- 105 14 99 % -- --   06/28/23 1515 115/78 -- -- 98 -- -- -- --   06/28/23 1448 -- -- -- 93 12 -- -- --   06/28/23 1445 107/48 -- -- 112 13 -- -- --   06/28/23 1431 94/66 -- -- 101 17 98 % -- --   06/28/23 1416 (!) 86/70 -- -- 97 15 -- -- --   06/28/23 1401 (!) 86/47 -- -- 87 20 -- -- --   06/28/23 1345 (!) 85/62 -- -- 95 13 -- -- --   06/28/23 1342 96/59 -- -- 112 16 -- 1.7 m  "(5' 6.93\") --   06/28/23 1314 -- -- -- 91 10 97 % -- --   06/28/23 1309 (!) 117/104 -- -- 82 20 -- -- --   06/28/23 1304 -- -- -- 86 16 -- -- --   06/28/23 1259 114/87 -- -- 99 16 -- -- --   06/28/23 1254 -- -- -- 106 12 -- -- --   06/28/23 1243 -- -- -- 112 15 98 % -- --   06/28/23 1241 -- -- -- (!) 147 24 91 % -- --   06/28/23 1239 (!) 130/98 -- -- 117 15 100 % -- --   06/28/23 1237 -- (!) 96.5  F (35.8  C) Rectal -- -- -- -- --   06/28/23 1236 -- -- -- 111 30 -- -- --   06/28/23 1230 (!) 125/106 -- -- 116 30 -- -- --   06/28/23 1226 94/74 -- -- 105 16 -- -- 81.6 kg (180 lb)   06/28/23 1216 95/51 -- -- 110 -- -- -- --        Physical Exam  General: Laying on the ED bed, moderate distress 2/2 pain  HEENT: Normocephalic, atraumatic  Cardiac: Radial pulses 1+, irregular tachycardia  Pulm: No respiratory distress, no clinical dyspnea  GI: Abdomen soft, nontender, no rigidity or guarding  : Cloudy appearing urine  Skin: Warm and dry, diffuse skin sloughing/excoriation including the left axilla, left submammary fold, bilateral anterior shins.  There is a bleeding wound over the right anterior shin.  There are chronic wounds dressed with petroleum gauze over the left knee and left shin.  There is a heel ulcer on the right.  Neuro: Moves all extremities  Psych: Normal mood and affect     Emergency Department Course   ECG  ECG taken at 1223, ECG read at 1340  Atrial fibrillation with rapid ventricular response    Rate 106 bpm. RI interval * ms. QRS duration 106 ms. QT/QTc 336/446 ms. P-R-T axes * -70 96.    Imaging:  CT Abdomen Pelvis w/o Contrast   Final Result   IMPRESSION:    1.  Sequela of chronic bladder outlet obstruction, likely secondary to   prostatomegaly, Nails catheter now in place.   2.  No nephroureterolithiasis or hydronephrosis.   3.  Possible subtle/early changes of chronic liver disease. No   ascites.      RAJEEV ERWIN MD            SYSTEM ID:  [id number]      CT Head w/o Contrast   Final Result "   IMPRESSION:    No acute intracranial abnormality.      ALETHEA AREVALO MD            SYSTEM ID:  [id number]      XR Chest Port 1 View   Final Result   IMPRESSION: No acute cardiopulmonary disease.      RAJEEV ERWIN MD            SYSTEM ID:  [id number]      Echocardiogram Complete    (Results Pending)     Report per radiology    Laboratory:  Labs Ordered and Resulted from Time of ED Arrival to Time of ED Departure   ISTAT GASES LACTATE VENOUS POCT - Abnormal       Result Value    Lactic Acid POCT 1.3      Bicarbonate Venous POCT 19 (*)     O2 Sat, Venous POCT 21 (*)     pCO2 Venous POCT 42      pH Venous POCT 7.26 (*)     pO2 Venous POCT 18 (*)    COMPREHENSIVE METABOLIC PANEL - Abnormal    Sodium 136      Potassium 6.7 (*)     Chloride 104      Carbon Dioxide (CO2) 15 (*)     Anion Gap 17 (*)     Urea Nitrogen 68.3 (*)     Creatinine 2.60 (*)     Calcium 10.0 (*)     Glucose 208 (*)     Alkaline Phosphatase 115      AST 21      ALT 7      Protein Total 6.8      Albumin 2.4 (*)     Bilirubin Total 0.4      GFR Estimate 21 (*)    PROCALCITONIN - Abnormal    Procalcitonin 0.26 (*)    CBC WITH PLATELETS AND DIFFERENTIAL - Abnormal    WBC Count 11.5 (*)     RBC Count 4.19 (*)     Hemoglobin 13.4      Hematocrit 43.2       (*)     MCH 32.0      MCHC 31.0 (*)     RDW 17.5 (*)     Platelet Count 193      % Neutrophils 69      % Lymphocytes 21      % Monocytes 5      % Eosinophils 0      % Basophils 1      % Immature Granulocytes 4      NRBCs per 100 WBC 0      Absolute Neutrophils 8.0      Absolute Lymphocytes 2.5      Absolute Monocytes 0.6      Absolute Eosinophils 0.0      Absolute Basophils 0.1      Absolute Immature Granulocytes 0.4      Absolute NRBCs 0.0     POTASSIUM - Abnormal    Potassium 5.6 (*)    GLUCOSE BY METER - Abnormal    GLUCOSE BY METER POCT 173 (*)    GLUCOSE BY METER - Abnormal    GLUCOSE BY METER POCT 110 (*)    GLUCOSE BY METER - Normal    GLUCOSE BY METER POCT 91     GLUCOSE BY METER -  Normal    GLUCOSE BY METER POCT 81     GLUCOSE BY METER - Normal    GLUCOSE BY METER POCT 94     POTASSIUM   GLUCOSE MONITOR NURSING POCT   HEMOGLOBIN A1C   GLUCOSE MONITOR NURSING POCT   GLUCOSE MONITOR NURSING POCT   BLOOD CULTURE   BLOOD CULTURE   URINE CULTURE      Emergency Department Course & Assessments:    Interventions:  Medications   dextrose 10% BOLUS (300 mLs Intravenous $New Bag 6/28/23 3499)   acetaminophen (TYLENOL) tablet 1,000 mg (has no administration in time range)   albuterol (PROVENTIL HFA/VENTOLIN HFA) inhaler (has no administration in time range)   aspirin EC tablet 81 mg (has no administration in time range)   folic acid (FOLVITE) tablet 1 mg (has no administration in time range)   tamsulosin (FLOMAX) capsule 0.4 mg (has no administration in time range)   senna-docusate (SENOKOT-S/PERICOLACE) 8.6-50 MG per tablet 1 tablet (has no administration in time range)   docusate sodium (COLACE) capsule 100 mg (has no administration in time range)   lidocaine 1 % 0.1-1 mL (has no administration in time range)   lidocaine (LMX4) cream (has no administration in time range)   sodium chloride (PF) 0.9% PF flush 3 mL (has no administration in time range)   sodium chloride (PF) 0.9% PF flush 3 mL (has no administration in time range)   piperacillin-tazobactam (ZOSYN) 2.25 g vial to attach to  ml bag (has no administration in time range)   sodium chloride 0.9% infusion (has no administration in time range)   acetaminophen (TYLENOL) tablet 650 mg (has no administration in time range)     Or   acetaminophen (TYLENOL) Suppository 650 mg (has no administration in time range)   polyethylene glycol (MIRALAX) Packet 17 g (has no administration in time range)   ondansetron (ZOFRAN ODT) ODT tab 4 mg (has no administration in time range)     Or   ondansetron (ZOFRAN) injection 4 mg (has no administration in time range)   glucose gel 15-30 g (has no administration in time range)     Or   dextrose 50 % injection  25-50 mL (has no administration in time range)     Or   glucagon injection 1 mg (has no administration in time range)   insulin aspart (NovoLOG) injection (RAPID ACTING) (has no administration in time range)   insulin aspart (NovoLOG) injection (RAPID ACTING) (has no administration in time range)   enoxaparin ANTICOAGULANT (LOVENOX) injection 40 mg (has no administration in time range)   0.9% sodium chloride BOLUS (0 mLs Intravenous Stopped 23 1322)   piperacillin-tazobactam (ZOSYN) 4.5 g vial to attach to  mL bag (0 g Intravenous Stopped 23 1322)   0.9% sodium chloride BOLUS (0 mLs Intravenous Stopped 23 1435)   calcium gluconate 1 g in 50 mL sodium chloride intermittent infusion (premix) (1 g Intravenous $Given 23 1413)   dextrose 50 % injection 25 g (25 g Intravenous $Given 23 1415)   insulin regular 1 unit/mL injection 8.2 Units (8.2 Units Intravenous $Given 23 1418)   sodium bicarbonate 8.4 % injection 50 mEq (50 mEq Intravenous $Given 23 1419)        Assessments:  1235 I obtained history and examined the patient as noted above.    Independent Interpretation (X-rays, CTs, rhythm strip):  See ED course below     Consultations/Discussion of Management or Tests:  1345 I spoke with Dr. Paz of the hospitalist team regarding the patient, who accepted the patient for admission.     ED Course as of 23 1607      1332 XR Chest Port 1 View  Chest x-ray on my review is clear without lobar infiltrate, pneumothorax, large pleural effusion, or significant edema.      Social Determinants of Health affecting care:  Social Connections/Isolation     Disposition:  The patient was admitted to the hospital under the care of Dr. Paz.     Impression & Plan    CMS Diagnoses: None    Medical Decision Makin-year-old male presents with altered mental status as above.  He is tachycardic on my evaluation, slightly hypothermic.  Lactate is thankfully negative, but  overall picture seems consistent with sepsis, urinary source given the purulent appearance of his urine.  The lab here was unable to run his urinalysis due to the amount of purulence.  Fluids were promptly ordered as well as a dose of Zosyn.  Lab work here shows acute renal failure with hyperkalemia.  A narrow complex EKG shows atrial fibrillation with RVR and no peaked T waves.  Chest x-ray shows no acute findings.  The patient's map remained above 65, although he did have some soft blood pressures which so far have been fluid responsive.  Using fluids judiciously given the reported history of systolic heart failure.  Echocardiogram has been ordered to further fill out the cardiac picture.  Potassium shifting medications were ordered as well, with specific vigilance with regard to the patient's blood sugar after insulin administration given the reported hypoglycemic episode prior to arrival in conjunction with his sepsis.  Plan is for admission to the ICU on the hospitalist service.     Critical Care time:  was 60 minutes for this patient excluding procedures.    Diagnosis:    ICD-10-CM    1. Sepsis with acute renal failure without septic shock, due to unspecified organism, unspecified acute renal failure type (H)  A41.9     R65.20     N17.9       2. Hypoglycemia  E16.2       3. Transient alteration of awareness  R40.4          Scribe Disclosure:  I, Dragan Núñez, am serving as a scribe at 1:12 PM on 6/28/2023 to document services personally performed by Pola Meléndez MD based on my observations and the provider's statements to me.    I, Kely Staples, am serving as a scribe at 1:18 PM on 6/28/2023 to document services personally performed by Pola Meléndez MD based on my observations and the provider's statements to me.    6/28/2023   Pola Meléndez MD King, Colin, MD  06/28/23 1607       Pola Meléndez MD  06/28/23 1624       Pola Meléndez MD  06/28/23 1624

## 2023-06-28 NOTE — PHARMACY-ADMISSION MEDICATION HISTORY
Pharmacist Admission Medication History    Admission medication history is complete. The information provided in this note is only as accurate as the sources available at the time of the update.    Medication reconciliation/reorder completed by provider prior to medication history? No    Information Source(s): Facility ([facility] medication list/MAR and CareEverywhere/SureScripts via N/A    Pertinent Information:   1. Patient from [facility] and med rec completed using medication list. Per facility RN, they attempted to administer meds this morning at 0800  but patient spit them out. Patient has not gotten any of his medications today.    Changes made to PTA medication list:    Added: all meds added to list    Deleted: None    Changed: None      Allergies reviewed with patient and updates made in EHR: NKDA listed on facility list    Medication History Completed By: Roberta Bauer RPH 6/28/2023 2:35 PM    Prior to Admission medications    Medication Sig Last Dose Taking? Auth Provider Long Term End Date   acetaminophen (TYLENOL) 500 MG tablet Take 1,000 mg by mouth every 8 hours as needed for pain  Yes Unknown, Entered By History     acetaminophen (TYLENOL) 500 MG tablet Take 1,000 mg by mouth every morning  Yes Unknown, Entered By History     albuterol (PROAIR HFA/PROVENTIL HFA/VENTOLIN HFA) 108 (90 Base) MCG/ACT inhaler Inhale 2 puffs into the lungs every 4 hours as needed for shortness of breath or wheezing  Yes Unknown, Entered By History Yes    aspirin 81 MG EC tablet Take 81 mg by mouth daily  Yes Unknown, Entered By History     Carboxymethylcellulose Sodium (REFRESH LIQUIGEL) 1 % GEL Apply 1 drop to eye 4 times daily  Yes Unknown, Entered By History     diclofenac (VOLTAREN) 1 % topical gel Apply topically 2 times daily  Yes Unknown, Entered By History     docusate sodium (COLACE) 100 MG capsule Take 100 mg by mouth 2 times daily  Yes Unknown, Entered By History     folic acid (FOLVITE) 1 MG tablet Take 1  mg by mouth daily  Yes Unknown, Entered By History     furosemide (LASIX) 20 MG tablet Take 20 mg by mouth daily  Yes Unknown, Entered By History Yes    lisinopril (ZESTRIL) 2.5 MG tablet Take 2.5 mg by mouth daily  Yes Unknown, Entered By History Yes    metFORMIN (GLUCOPHAGE) 500 MG tablet Take 500 mg by mouth daily  Yes Unknown, Entered By History Yes    oxyCODONE (ROXICODONE) 5 MG tablet Take 2.5 mg by mouth daily  Yes Unknown, Entered By History     potassium chloride ER (K-TAB/KLOR-CON) 10 MEQ CR tablet Take 20 mEq by mouth daily  Yes Unknown, Entered By History     senna-docusate (SENOKOT-S/PERICOLACE) 8.6-50 MG tablet Take 1 tablet by mouth 2 times daily  Yes Unknown, Entered By History     tamsulosin (FLOMAX) 0.4 MG capsule Take 0.4 mg by mouth daily  Yes Unknown, Entered By History

## 2023-06-29 NOTE — PROCEDURES
[facility]    Single Lumen Midline Placement    Date/Time: 6/29/2023 5:26 PM    Performed by: Nicolette Ugalde RN  Authorized by: Allyssa Paz MD  Indications: vascular access      UNIVERSAL PROTOCOL   Site Marked: Yes  Prior Images Obtained and Reviewed:  Yes  Required items: Required blood products, implants, devices and special equipment available    Patient identity confirmed:  Verbally with patient, arm band, provided demographic data and hospital-assigned identification number  NA - No sedation, light sedation, or local anesthesia  Confirmation Checklist:  Patient's identity using two indicators, relevant allergies, procedure was appropriate and matched the consent or emergent situation and correct equipment/implants were available  Time out: Immediately prior to the procedure a time out was called    Universal Protocol: the Joint Commission Universal Protocol was followed    Preparation: Patient was prepped and draped in usual sterile fashion       ANESTHESIA    Anesthesia: Local infiltration  Local Anesthetic:  Lidocaine 1% without epinephrine  Anesthetic Total (mL):  1      SEDATION    Patient Sedated: No        Preparation: skin prepped with 2% chlorhexidine  Skin prep agent: skin prep agent completely dried prior to procedure  Sterile barriers: maximum sterile barriers were used: cap, mask, sterile gown, sterile gloves, and large sterile sheet  Hand hygiene: hand hygiene performed prior to central venous catheter insertion  Type of line used: Midline  Catheter type: single lumen  Lumen type: non-valved  Catheter size: 4 Fr  Brand: Bard  Lot number: [id number]  Placement method: MST and ultrasound  Number of attempts: 1  Difficulty threading catheter: yes  Successful placement: yes (Successful placement of midline, see note below)  Orientation: right    Location: basilic vein  Tip Location: distal to axillary vein  Arm circumference: adults 10 cm  Extremity  circumference: 30  Visible catheter length: 0  Internal length: 15 cm  Total catheter length: 15  Dressing and securement: adhesive securement device, chlorhexidine disc applied, gloves changed prior to final dressing, sterile dressing applied and transparent dressing  Post procedure assessment: blood return through all ports  PROCEDURE Describe Procedure: Initial attempt in right basilic for PICC placement, unable to thread PICC past shoulder.  Introducer maintained, stylet replaced, midline kit obtained.  Midline catheter cut to 15 cm, threads easily into position of 0 cm external, good blood return, flushes well with 10cc of saline.  ICU MD aware of complication and conversion.

## 2023-06-29 NOTE — PROGRESS NOTES
[facility]    Medicine Progress Note - Hospitalist Service    Date of Admission:  6/28/2023    Assessment & Plan   Joselin Sanchez is a 89 year old male admitted on 6/28/2023. He has a past medical history of hypertension, diabetes mellitus type 2, chronic atrial fibrillation, not on anticoagulation, chronic systolic congestive heart failure, last EF 40 to 45% in July 2020, dyslipidemia, diabetic foot ulcer, lymphedema, dementia, BPH who was sent from his facility for evaluation of altered mental status..     # Severe sepsis due to UTI  # Septic shock   - Presented with altered mental status  - Was in A-fib with RVR upon arrival in ER, blood pressure also on lower side, lowest BP 85/62  - White blood cells on admission 11.5 lactic acid 1.3; Pro-Ryan 0.26  - Chest x-ray with no infiltrates  - Nails Catheter was placed in ER, draining cloudy, milky-appearance urine; UA was not able to be performed by the lab as of purulent urine  - CT abdomen/ pelvis- Sequela of chronic bladder outlet obstruction, likely secondary to prostatomegaly  - Unclear if he had urinary retention as Nails catheter was placed for CT imaging  - in ER - he was given a bolus of normal saline 1000 ml and LR 500cc and 1 dose of Zosyn  - Admitted to ICU; continued on IV Zosyn  - Blood pressure improved initially but was intermittently hypotensive overnight  - received a total of 1500cc NS, BP temporary improved, hypotensive again in am 66/40; another bolud NS 1000cc ordered  - BP slightly better 85/54 and mentation better  - he is Full Code as per his records;  - Echo- EF 40-45%. There is moderate global hypokinesia of the left ventricle; he has no history of systolic heart failure with similar EF  - Blood cultures from 6/28, 1 of 2 bottles positive for gram-positive bacilli, resembling diphtheroids; suspected contamination  - Repeat blood culture  - Urine culture positive for 50,000-100,000 colony of yeast  - Given his a severe  sepsis; we will start him on fluconazole 400 mg IV daily for now  - Continue with IV Zosyn at this time  - Discussed with he is a niece. Lucho regarding goals of care; she would like him to have active treatment including IV antibiotics IV fluids and pressors if needed  - PICC line ordered  - Intensivist consult for possible need of pressors  - Follow-up blood cultures and urine cultures  - Monitor fever curve and white blood cells trend     # Acute/ early subacute ischemic infarct  - he has a history of A-fib, not on anticoagulation prior to admission  -Presented with altered mental status, onitially thought to be related to severe sepsis  - CT head on admission negative for acute pathology  - 6/28- noted to have left-sided weakness  - Code stroke called  - CT head- findings concerning for acute/early subacute ischemic infarct  involving the left occipital lobe; No acute intracranial hemorrhage, extra axial fluid collection, or mass effect; Brain atrophy and presumed chronic ischemic changes   - CTA head and neck   1. No definite high-grade stenosis or large vessel occlusion involving the major intracranial arteries.  2. Mild nonflow-limiting atherosclerosis of the bilateral carotid siphons.  3. No intracranial aneurysm or high flow vascular malformation.  4. Mild bilateral carotid bifurcation atherosclerosis, left more than right, with less than 50% stenosis by NASCET criteria.  5. Patent cervical vertebral arteries without flow-limiting stenosis.  6. 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 CTA of the chest could be considered for further evaluation, as indicated.    - Stroke neuro consult appreciated  - He is already on aspirin 81 mg p.o. daily  - MRI of the brain ordered  - Echo  - Consider CTA of the chest tomorrow for further evaluation of thrombosed saccular pseudoaneurysm,  if creatinine is stable  - PT/OT  - SLP-recommended n.p.o; he may need NG tube for alternate way of nutrition    # Altered mental status, multifactorial  # Underlying dementia  - most likely due to severe sepsis in the setting of underlying dementia; low blood sugars may also contributing; also found to have an acute stroke-see above  -Treat sepsis as above  - Stroke work-up as above  - Gentle redirectioning, avoid sedating or pain medications as able  - Will need PT/OT evaluation when appropriate  - /care coordinator consult; reported the patient being a vulnerable adult; he was admitted to [facility] the end of March/23 and discharged to [facility]     # KIMO, improving  # Hyperkalemia, resolved  - Creatinine on admission 2.6; Potassium on admission 6.7  - Recommended CKD stage III in Care Everywhere but recent BMP on June/9//23 showed a potassium of 4.1, creatinine 0.86  - This is likely related to severe sepsis, decreased intravascular volume, hypotension  - Noted that prior to admission he was on Lasix, lisinopril, potassium supplementation  - CT of the abdomen and pelvis- Sequela of chronic bladder outlet obstruction, likely secondary to prostatomegaly  - EKG with A-fib with RVR, no evidence of peaked T waves  - Unclear if he had urinary retention as Nails catheter was placed for CT imaging  - FeNa 2.4, urine cr 46.1  - He was given a bolus of normal saline; calcium gluconate, insulin with dextrose, ampule of bicarb  - Creatinine improved to 2.29-- > 1.78  - Repeat potassium down to 5.6--5.1  - Telemetry  - Continue with IV fluids normal saline at 100 cc/h for now; monitor for fluid overload given history of systolic heart failure  - Avoid nephrotoxic drugs  - Hold prior to admission lisinopril, Lasix, potassium supplementation  - Repeat BMP in a.m     # Diabetes mellitus type 2  # Hypoglycemia  - PTA on metformin 500 mg p.o. daily  - Reportedly had low blood sugars upon  EMS arrival of 50; D10 was given by EMS  - Blood sugars improved to 110 but then down to 75 last night  - Started on D5 normal saline at 50 cc/h last night, now discontinued given the new stroke  - Blood sugars 80--93  - Hemoglobin A1c is low 5.8  - Hold prior to admission metformin and likely not need to resume it after discharge  - Accu-Cheks every 4 hours as he is n.p.o.   - Hypoglycemia protocol    # A-fib with RVR   - History of chronic A-fib, not on anticoagulation at home  - Was in A-fib with RVR upon arrival  - Received multiple boluses of normal saline since arrival due to hypotension and severe sepsis  - Treat UTI as above  - Currently on normal saline at 100 cc/h  -Telemetry-heart rate in 90- 100s     # Systolic congestive heart failure  # Hypertension  - A prior echocardiogram in July/2020 showed EF of 40 to 45%, LV hypokinesis  - PTA on lisinopril 2.5 mg p.o. daily, Lasix 20 mg p.o. daily, along with potassium chloride 20 mg p.o. daily.  - Presented now with evidence of KIMO I and hyperkalemia  - He will need to have his KIMO and hyperkalemia corrected, holding his prior to admission lisinopril, Lasix and KCl for now  - Received multiple boluses NS   - Echo- The visual ejection fraction is 40-45%.There is moderate global hypokinesia of the left ventricle- seems similar to prior Echo  - Monitor fluid status; he does not seem to have evidence of fluid overload at this time.     # Peripheral arterial disease  # Diabetic foot ulcer (right heel)  # History of lymphedema  # History of polymicrobial bacterial infection of the legs  - He had an admission to [facility] in [place] from 3/27 through 4/4/2023 for polymicrobial bacterial infection of his legs.  He was taken to the OR by surgery for debridement; he was treated with IV Zosyn for 14 days  - He is wounds does not seem to be infected at this time  - Wound care consult requested  - Podiatry consult    # BPH  - Resumed prior to admission  "Flomax    # Goals of care  - as per his papers- he is full code; he is critically ill at this time; I did discuss with that his  niece Lucho over the phone; I informed her about his current condition including severe sepsis associated with the hypotension for which she is getting multiple boluses of IV fluids; I expressed my concern that that his blood pressure might not stabilize despite aggressive IV hydration;  I also discussed with her that given his advanced age and underlying dementia, very aggressive measures might be futile; I did try to discuss possible transitioning to comfort care but she did not seem to be interested at this time.  She said that she wants us to help him in any way to get better.  She acknowledges that \"he neglected himself for a long time\" before he went to [facility].            Diet: NPO for Medical/Clinical Reasons Except for: No Exceptions    DVT Prophylaxis: Enoxaparin (Lovenox) SQ  Nails Catheter: PRESENT, indication: Strict 1-2 Hour I&O  Lines: None     Cardiac Monitoring: ACTIVE order. Indication: Electrolyte Imbalance (24 hours)- Magnesium <1.3 mg/ml; Potassium < =2.8 or > 5.5 mg/ml  Code Status: Full Code      Clinically Significant Risk Factors Present on Admission        # Hyperkalemia: Highest K = 6.7 mmol/L in last 2 days, will monitor as appropriate    # Hypercalcemia: corrected calcium is >10.1, will monitor as appropriate    # Hypoalbuminemia: Lowest albumin = 2.4 g/dL at 6/28/2023 12:50 PM, will monitor as appropriate   # Drug Induced Platelet Defect: home medication list includes an antiplatelet medication   # Hypertension: Home medication list includes antihypertensive(s)   # Acute Respiratory Failure: Documented O2 saturation < 91%.  Continue supplemental oxygen as needed     # Overweight: Estimated body mass index is 26.33 kg/m  as calculated from the following:    Height as of this encounter: 1.7 m (5' 6.93\").    Weight as of this encounter: 76.1 kg (167 " lb 12.3 oz).            Disposition Plan     Expected Discharge Date: 06/30/2023                  Allyssa Paz MD  Hospitalist Service  [facility]  Securely message with [employer] (more info)  Text page via [employer] Paging/Directory   ______________________________________________________________________    Interval History   Hypotensive overnight, received a total of 1500ccNS bolus; hypotensive again in am; 2 more boluses normal saline 1000 ml x 2 ordered   -Discussed with intensivist; plan for a PICC line and possible pressors  -Notified by RN that he seems to have a new onset left-sided weakness; code stroke was called; CT of the head- acute/early subacute ischemic infarct  involving the left occipital lobe;    Physical Exam   Vital Signs: Temp: 98  F (36.7  C) Temp src: Axillary BP: (!) 84/42 Pulse: 97   Resp: 15 SpO2: 99 % O2 Device: None (Room air) Oxygen Delivery: 10 LPM  Weight: 167 lbs 12.32 oz        Medical Decision Making       Time Spent on this Encounter   Ali was seen and evaluated by me on 06/29/23.  He was in critical condition as the result of severe sepsis, hypotension, acute stroke.    His condition is now Serious.      The acute issues managed by me today include  Hypotension, altered mental status, acute stroke.  Supportive interventions provided and/or ordered by me include iv fluids, iv Fluconazole, PICC line      Total Critical Care time spent by me, excluding procedures, was 80 minutes.    Allyssa Paz MD          Data     I have personally reviewed the following data over the past 24 hrs:    10.6  \   12.1 (L)   / 144 (L)     143 115 (H) 49.2 (H) /  90   5.1 15 (L) 1.78 (H) \       TSH: N/A T4: N/A A1C: N/A       Imaging results reviewed over the past 24 hrs:   Recent Results (from the past 24 hour(s))   Echocardiogram Complete   Result Value    LVEF  40-45%    Narrative    [id number]  [id number]  [id number]  [id number]^HUY^ALLYSSA^JANA He [facility]  [facility]  Echocardiography Laboratory  [address] [place] [address]     Name: QI PACHECO  MRN: [id number]  : 1924  Study Date: 2023 09:54 AM  Age: 89 yrs  Gender: Male  Patient Location: [facility]  Reason For Study: Tachycardia  Ordering Physician: AIDEN SON  Referring Physician: Alistair Ross MD  Performed By: Leora Bess     BSA: 1.9 m2  Height: 66 in  Weight: 167 lb  HR: 117  BP: 86/51 mmHg  ______________________________________________________________________________  Procedure  Complete Portable Echo Adult.  ______________________________________________________________________________  Interpretation Summary     The left ventricle is normal in size.  The visual ejection fraction is 40-45%.  There is moderate global hypokinesia of the left ventricle.  No significant valvular heart disease.  The rhythm was rapid atrial fibrillation.  ______________________________________________________________________________  Left Ventricle  The left ventricle is normal in size. There is normal left ventricular wall  thickness. The visual ejection fraction is 40-45%. There is moderate global  hypokinesia of the left ventricle.     Right Ventricle  The right ventricle is normal in size and function.     Atria  The left atrium is moderately dilated. The right atrium is mild to moderately  dilated. There is no color Doppler evidence of an atrial shunt.     Mitral Valve  The mitral valve leaflets are mildly thickened. There is trace mitral  regurgitation.     Tricuspid Valve  There is mild (1+) tricuspid regurgitation. The right ventricular systolic  pressure is approximated at 34.4 mmHg plus the right atrial pressure.     Aortic Valve  There is trivial trileaflet aortic sclerosis. No aortic regurgitation is  present.     Pulmonic Valve  There is trace pulmonic valvular regurgitation.     Vessels  Normal size aorta. The aortic root is normal size.     Pericardium  There is no  pericardial effusion.     Rhythm  The rhythm was rapid atrial fibrillation.  ______________________________________________________________________________  MMode/2D Measurements & Calculations  Ao root diam: 3.4 cm     asc Aorta Diam: 2.8 cm  LVOT diam: 2.1 cm  LVOT area: 3.5 cm2  LA Volume (BP): 74.1 ml  LA Volume Index (BP): 40.1 ml/m2  TAPSE: 1.6 cm     Doppler Measurements & Calculations  MV E max bashir: 85.6 cm/sec  MV dec slope: 470.0 cm/sec2  MV dec time: 0.18 sec  Ao V2 max: 127.0 cm/sec  Ao max P.0 mmHg  Ao V2 mean: 91.7 cm/sec  Ao mean P.0 mmHg  Ao V2 VTI: 18.9 cm  SHILO(I,D): 2.8 cm2  SHILO(V,D): 2.7 cm2     LV V1 max P.0 mmHg  LV V1 max: 99.4 cm/sec  LV V1 VTI: 15.4 cm  SV(LVOT): 53.3 ml  SI(LVOT): 28.8 ml/m2  PA acc time: 0.11 sec  TR max bashir: 305.0 cm/sec  TR max P.4 mmHg  AV Bashir Ratio (DI): 0.78  SHILO Index (cm2/m2): 1.5  E/E' av.4  Lateral E/e': 6.2  Medial E/e': 8.6  RV S Bashir: 11.2 cm/sec     ______________________________________________________________________________  Report approved by: Aury Spivey 2023 02:16 PM         CT Head w/o Contrast    Narrative    CT SCAN OF THE HEAD WITHOUT CONTRAST   2023 12:11 PM     HISTORY: Code stroke.    TECHNIQUE: Axial images of the head and coronal reformations without  IV contrast material. Radiation dose for this scan was reduced using  automated exposure control, adjustment of the mA and/or kV according  to patient size, or iterative reconstruction technique.    COMPARISON: 2023    FINDINGS: No acute intracranial hemorrhage, extra axial fluid  collection, or mass effect. The ventricles are proportionate to the  sulci. Unchanged small focus of probable chronic gliosis in the left  subinsular region, possibly small chronic infarct or chronic ischemic  change. There is a new small area of cortical/subcortical hypodensity  and loss of gray-white differentiation (series 3 image 12, series 7  image 62) concerning for  acute/early subacute ischemic infarct.  Elsewhere, mild to moderate patchy nonspecific hypoattenuation in the  cerebral white matter, likely due to chronic small vessel ischemic  disease. Moderate generalized brain parenchymal volume loss.    Bilateral lens implants. The visualized paranasal sinuses are clear.  The mastoid air cells are clear. The calvarium appears intact.      Impression    IMPRESSION:  1. Findings concerning for acute/early subacute ischemic infarct  involving the left occipital lobe.  2. No acute intracranial hemorrhage, extra axial fluid collection, or  mass effect.  3. Brain atrophy and presumed chronic ischemic changes, as described.    Findings were discussed with Dr. Hernandez by phone by myself at  approximately 12:20 PM on 6/29/2023.   CTA Head Neck w Contrast    Narrative    CT ANGIOGRAM OF THE HEAD AND NECK WITH CONTRAST  June 29, 2023 12:12  PM     HISTORY: Code stroke.     TECHNIQUE: CT angiography with an injection of 75mL [id number]     IV  with scans through the head and neck. Images were transferred to a  separate 3-D workstation where multiplanar reformations and 3-D images  were created. Estimates of carotid stenoses are made relative to the  distal internal carotid artery diameters except as noted. Radiation  dose for this scan was reduced using automated exposure control,  adjustment of the mA and/or kV according to patient size, or iterative  reconstruction technique.     COMPARISON: CT head same day.     CT HEAD FINDINGS: No contrast enhancing lesions. Cerebral blood flow  is grossly normal.     CT ANGIOGRAM HEAD FINDINGS: There is mild nonflow-limiting  atherosclerosis of the bilateral carotid siphons. The major proximal  branches of the anterior cerebral and middle cerebral arteries appear  patent. The bilateral vertebral arteries are patent. The basilar  artery is patent. The proximal branches of the posterior cerebral  arteries appear to be patent. No intracranial aneurysm or  high flow  vascular malformation identified. Expected enhancement of the major  dural venous sinuses.    CT ANGIOGRAM NECK FINDINGS: Mixed atherosclerotic disease of the  aortic arch. There is a dome shaped lesion along the lateral margin of  the distal aortic arch on the left (series 5 image 1) measuring  approximately 18 mm x 29 mm in the axial plane. 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 Common origin of the brachycephalic  and left common carotid arteries, a normal variant. No significant  stenosis at the origins of the great vessels.     Right carotid artery: The right common and internal carotid arteries  are patent. Mild atherosclerotic disease at the carotid bifurcation  and proximal internal carotid artery without significant stenosis by  NASCET criteria.     Left carotid artery: The left common and internal carotid arteries are  patent. Mild atherosclerotic disease at the carotid bifurcation and  proximal internal carotid artery with less than 50% stenosis by NASCET  criteria.     Vertebral arteries: Vertebral arteries are patent without evidence of  dissection. No significant stenosis.     Other findings: Emphysematous changes of the upper lung zones  bilaterally. Multiple foci of air are noted in the right   space region, presumably due to some intravenous air. A few foci of  air are noted in the right internal jugular vein, likely due to recent  contrast injection. There is a calcified lesion measuring up to 14 mm  in the left submandibular space that appears to replace the normal  left submandibular tissue, which may represent a large  calculus/sialolith. Multilevel degenerative changes in the cervical  spine are noted.      Impression    IMPRESSION:  1. No definite high-grade stenosis or large vessel occlusion involving  the major intracranial arteries.  2. Mild nonflow-limiting atherosclerosis of the  bilateral carotid  siphons.  3. No intracranial aneurysm or high flow vascular malformation.  4. Mild bilateral carotid bifurcation atherosclerosis, left more than  right, with less than 50% stenosis by NASCET criteria.  5. Patent cervical vertebral arteries without flow-limiting stenosis.  6. 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 CTA of the chest could  be considered for further evaluation, as indicated.  7. Emphysema.

## 2023-06-29 NOTE — PROGRESS NOTES
MICU Progress Note    Joselin Sanchez MRN# [id number]   Age: 89 year old YOB: 1924     Date of Admission: 6/28/2023  Date of Service: 06/29/2023   ==================================================  24 HOUR EVENTS:  Admitted yesterday  Transient hypotension      Changes for Today:  Attempted PICC placement, now with a midline  Norepinephrine ordered, but has actually needed it    ==================================================    ASSESSMENT AND PLAN:  89-year-old male with history of hypertension, DM 2, chronic A-fib not on anticoagulation, heart failure with reduced ejection fraction, diabetic foot ulcer, lymphedema, dementia, and BPH who was transferred to [facility] on 6/28 for altered mental status.    Neuro/psych:   -Sedation: None    ## Dementia with acute altered mental status  ## Agitated delirium  Likely secondary to sepsis superimposed on dementia.  -Sepsis treatment  -As needed Zyprexa as first-line for agitation, then lorazepam if necessary    ## Subacute ischemic infarct in the left occipital lobe  Patient found to have left-sided weakness on the morning of 6/29 so CT was ordered.  -Neuro critical care consult      Pulmonary:   No acute concerns, saturating well on room air      Cardiac:  ## Sepsis  ## Atrial fibrillation  Hypotension on admission refractory to more than 5 L of of NS.  Echo 6/29 with EF 40 to 45%, moderate global hypokinesia, and rapid A-fib  -Norepinephrine  -Consider starting amiodarone  -Holding Flomax, lisinopril      Renal:   ## KIMO  Unknown baseline creatinine, elevated to 2.6 on admission, now trending down.  BUN also elevated and slowly improving.  -Continue to trend  -Avoid nephrotoxic agents  -Discontinue maintenance fluids after current bag is completed.  If he needs additional fluids would likely benefit from LR over NS given his current electrolytes    ## Hyperkalemia-resolved    ## BPH with chronic retention  -Holding PTA Flomax in the setting of  hypotension      Infectious Disease:   ## Septic shock  ## Possible bacteremia  ## Possible UTI  WBC 11.5 on admission, now trending down.  Procalcitonin elevated 0.26.  Afebrile on admission, though trend is increasing.  Procalcitonin slightly elevated at 0.26.  Urine culture only positive for yeast, no UA.  Blood positive for diphtheroids in 1 of 4 bottles, though likely skin contamination.  Chest x-ray clear.  Admission CT abdomen with sequelae of chronic bladder outlet obstruction.  -Check UA  -Continue empiric Zosyn  -Fluconazole per hospitalist team  -Await further culture results      GI/:   -Nutrition: N.p.o. for now      Endocrine:   ## Prediabetes  ## Hypoglycemia  Admission A1c 5.8.  Glucose reportedly in the 50s by EMS.  Currently on D5 NS  -Holding PTA metformin  -Trend glucose      Heme:   ## Anemia, normochromic, macrocytic.  Possible vitamin B or folate deficiency.  -Vitamin B and folate supplementation      Skin/MSK:   ## Peripheral artery disease  ## Diabetic foot ulcer of the right heel  ## History of lymphedema  ## History of polymicrobial bacterial infection of the legs  Previous admission in [place] 3/27 through 4/4 for polymicrobial bacterial infection of the leg requiring surgical debridement and treatment with Zosyn for 14 days.  -WOC consult  -Podiatry consult      Prophylaxis:    -GI: None currently   -DVT: Lovenox      CODE: Full code per his paperwork.  Additionally, the hospitalist discussed this with his family prior to initiation of critical care and they confirm desire for full code.        Attestation:      CCT 45 min excluding procedures        This document was generated with the assistance of voice recognition software. Unintentional transcription errors may occur. Please contact the author for any clarification.    Amador Salgado M.D.  Pulmonary & Critical Care  Pager: Click Here to page    ==================================================    PHYSICAL EXAM  Temp:  [96.2   F (35.7  C)-98.1  F (36.7  C)] 98  F (36.7  C)  Pulse:  [] 121  Resp:  [8-26] 19  BP: ()/(29-78) 106/71  SpO2:  [78 %-100 %] 99 %    Resp: 19      General: Somnolent  Resp: CTAB, no crackles or wheezes  Cardiac: Irregularly irregular  Abdomen: Soft,  +BS.  No HSM or masses, no rebound or guarding.  Skin: Warm and dry, no jaundice or rash        Date 06/29/23 0700 - 06/30/23 0659   Shift 0734-8169 [id number] 24 Hour Total   INTAKE   I.V. 503   503   IV Piggyback 1000   1000   Shift Total(mL/kg) 1503(19.75)   1503(19.75)   OUTPUT   Urine 185   185   Shift Total(mL/kg) 185(2.43)   185(2.43)   Weight (kg) 76.1 76.1 76.1 76.1       =====================================================  LABORATORY DATA    Labs reviewed by me personally, significant abnormalities detailed in assessment and plan.      ROUTINE ICU LABS (Last four results)  CMP  Recent Labs   Lab 06/29/23  1522 06/29/23  1325 06/29/23  0909 06/29/23  0732 06/29/23  0545 06/28/23  2159 06/28/23  1841 06/28/23  1519 06/28/23  1445 06/28/23  1258 06/28/23  1250   NA  --   --   --   --  143  --  141  --   --   --  136   POTASSIUM  --   --   --   --  5.1  --  5.6*  5.6*  --  5.6*  --  6.7*   CHLORIDE  --   --   --   --  115*  --  109*  --   --   --  104   CO2  --   --   --   --  15*  --  18*  --   --   --  15*   ANIONGAP  --   --   --   --  13  --  14  --   --   --  17*   GLC 90 80 92 91 93   < > 63*   < >  --    < > 208*   BUN  --   --   --   --  49.2*  --  60.9*  --   --   --  68.3*   CR  --   --   --   --  1.78*  --  2.29*  --   --   --  2.60*   GFRESTIMATED  --   --   --   --  34*  --  25*  --   --   --  21*   BERHANE  --   --   --   --  8.8  --  9.5  --   --   --  10.0*   PROTTOTAL  --   --   --   --   --   --   --   --   --   --  6.8   ALBUMIN  --   --   --   --   --   --   --   --   --   --  2.4*   BILITOTAL  --   --   --   --   --   --   --   --   --   --  0.4   ALKPHOS  --   --   --   --   --   --   --   --   --   --  115   AST  --   --    --   --   --   --   --   --   --   --  21   ALT  --   --   --   --   --   --   --   --   --   --  7    < > = values in this interval not displayed.     CBC  Recent Labs   Lab 06/29/23  0545 06/28/23  1250   WBC 10.6 11.5*   RBC 3.88* 4.19*   HGB 12.1* 13.4   HCT 39.1* 43.2   * 103*   MCH 31.2 32.0   MCHC 30.9* 31.0*   RDW 17.3* 17.5*   * 193     INRNo lab results found in last 7 days.  Arterial Blood GasNo lab results found in last 7 days.  Venous Blood Gas   Recent Labs   Lab 06/28/23  1223   PHV 7.26*   PCO2V 42   PO2V 18*   HCO3V 19*         No results for input(s): CULT in the last 168 hours.      Recent Results (from the past 48 hour(s))   XR Chest Port 1 View    Narrative    XR CHEST PORT 1 VIEW   6/28/2023 1:19 PM     HISTORY: Hypoxemia    COMPARISON: None.      Impression    IMPRESSION: No acute cardiopulmonary disease.    RAJEEV ERWIN MD         SYSTEM ID:  [id number]   CT Head w/o Contrast    Narrative    CT SCAN OF THE HEAD WITHOUT CONTRAST   6/28/2023 1:36 PM     HISTORY: Altered mental status.    TECHNIQUE:  Axial images of the head and coronal reformations without  IV contrast material. Radiation dose for this scan was reduced using  automated exposure control, adjustment of the mA and/or kV according  to patient size, or iterative reconstruction technique.    COMPARISON: None.    FINDINGS:  No evidence of hemorrhage, mass, or hydrocephalus. Volume loss and  patchy white matter hypoattenuation which likely represents chronic  small vessel ischemic change. No acute osseous abnormality.      Impression    IMPRESSION:   No acute intracranial abnormality.    ALETHEA AREVALO MD         SYSTEM ID:  [id number]   CT Abdomen Pelvis w/o Contrast    Narrative    CT ABDOMEN PELVIS W/O CONTRAST 6/28/2023 3:07 PM    CLINICAL HISTORY: purulent urine, eval for kidney stone  TECHNIQUE: CT scan of the abdomen and pelvis was performed without IV  contrast. Multiplanar reformats were obtained. Dose  reduction  techniques were used.  CONTRAST: None.    COMPARISON: None.    FINDINGS:   LOWER CHEST: Unremarkable.    HEPATOBILIARY: Subtle no focal lesion seen on this noncontrast CT. No  evidence of biliary obstruction. Nodular contour of the liver with  some widening of the fissures.    PANCREAS: Normal.    SPLEEN: Normal.    ADRENAL GLANDS: Normal.    KIDNEYS/BLADDER: No nephroureterolithiasis or hydronephrosis. Urinary  bladder is decompressed with Nails catheter. Multiple bladder  diverticuli noted.    BOWEL: Diverticulosis in the colon. No acute inflammatory change. No  obstruction. Normal appendix.    LYMPH NODES: Normal.    VASCULATURE: Moderate calcified atherosclerosis. Ectasia of the  infrarenal abdominal aorta without jorge aneurysmal dilation.    PELVIC ORGANS: Prostatomegaly.    OTHER: No significant ascites.    MUSCULOSKELETAL: No acute bony abnormality.      Impression    IMPRESSION:   1.  Sequela of chronic bladder outlet obstruction, likely secondary to  prostatomegaly, Nails catheter now in place.  2.  No nephroureterolithiasis or hydronephrosis.  3.  Possible subtle/early changes of chronic liver disease. No  ascites.    RAJEEV ERWIN MD         SYSTEM ID:  [id number]   Echocardiogram Complete   Result Value    LVEF  40-45%    Narrative    [id number]  [id number]  [id number]  [id number]^AIDEN^JANA     [facility]  Echocardiography Laboratory  [address] [place] [address]     Name: QI PACHECO  MRN: [id number]  : 1924  Study Date: 2023 09:54 AM  Age: 89 yrs  Gender: Male  Patient Location: [facility]  Reason For Study: Tachycardia  Ordering Physician: AIDEN SON  Referring Physician: Alistair Ross MD  Performed By: Leora Bess     BSA: 1.9 m2  Height: 66 in  Weight: 167 lb  HR: 117  BP: 86/51 mmHg  ______________________________________________________________________________  Procedure  Complete Portable Echo  Adult.  ______________________________________________________________________________  Interpretation Summary     The left ventricle is normal in size.  The visual ejection fraction is 40-45%.  There is moderate global hypokinesia of the left ventricle.  No significant valvular heart disease.  The rhythm was rapid atrial fibrillation.  ______________________________________________________________________________  Left Ventricle  The left ventricle is normal in size. There is normal left ventricular wall  thickness. The visual ejection fraction is 40-45%. There is moderate global  hypokinesia of the left ventricle.     Right Ventricle  The right ventricle is normal in size and function.     Atria  The left atrium is moderately dilated. The right atrium is mild to moderately  dilated. There is no color Doppler evidence of an atrial shunt.     Mitral Valve  The mitral valve leaflets are mildly thickened. There is trace mitral  regurgitation.     Tricuspid Valve  There is mild (1+) tricuspid regurgitation. The right ventricular systolic  pressure is approximated at 34.4 mmHg plus the right atrial pressure.     Aortic Valve  There is trivial trileaflet aortic sclerosis. No aortic regurgitation is  present.     Pulmonic Valve  There is trace pulmonic valvular regurgitation.     Vessels  Normal size aorta. The aortic root is normal size.     Pericardium  There is no pericardial effusion.     Rhythm  The rhythm was rapid atrial fibrillation.  ______________________________________________________________________________  MMode/2D Measurements & Calculations  Ao root diam: 3.4 cm     asc Aorta Diam: 2.8 cm  LVOT diam: 2.1 cm  LVOT area: 3.5 cm2  LA Volume (BP): 74.1 ml  LA Volume Index (BP): 40.1 ml/m2  TAPSE: 1.6 cm     Doppler Measurements & Calculations  MV E max madalyn: 85.6 cm/sec  MV dec slope: 470.0 cm/sec2  MV dec time: 0.18 sec  Ao V2 max: 127.0 cm/sec  Ao max P.0 mmHg  Ao V2 mean: 91.7 cm/sec  Ao mean P.0  mmHg  Ao V2 VTI: 18.9 cm  SHILO(I,D): 2.8 cm2  SHILO(V,D): 2.7 cm2     LV V1 max P.0 mmHg  LV V1 max: 99.4 cm/sec  LV V1 VTI: 15.4 cm  SV(LVOT): 53.3 ml  SI(LVOT): 28.8 ml/m2  PA acc time: 0.11 sec  TR max bashir: 305.0 cm/sec  TR max P.4 mmHg  AV Bashir Ratio (DI): 0.78  SHILO Index (cm2/m2): 1.5  E/E' av.4  Lateral E/e': 6.2  Medial E/e': 8.6  RV S Bashir: 11.2 cm/sec     ______________________________________________________________________________  Report approved by: Aury Spivey 2023 02:16 PM         CT Head w/o Contrast    Narrative    CT SCAN OF THE HEAD WITHOUT CONTRAST   2023 12:11 PM     HISTORY: Code stroke.    TECHNIQUE: Axial images of the head and coronal reformations without  IV contrast material. Radiation dose for this scan was reduced using  automated exposure control, adjustment of the mA and/or kV according  to patient size, or iterative reconstruction technique.    COMPARISON: 2023    FINDINGS: No acute intracranial hemorrhage, extra axial fluid  collection, or mass effect. The ventricles are proportionate to the  sulci. Unchanged small focus of probable chronic gliosis in the left  subinsular region, possibly small chronic infarct or chronic ischemic  change. There is a new small area of cortical/subcortical hypodensity  and loss of gray-white differentiation (series 3 image 12, series 7  image 62) concerning for acute/early subacute ischemic infarct.  Elsewhere, mild to moderate patchy nonspecific hypoattenuation in the  cerebral white matter, likely due to chronic small vessel ischemic  disease. Moderate generalized brain parenchymal volume loss.    Bilateral lens implants. The visualized paranasal sinuses are clear.  The mastoid air cells are clear. The calvarium appears intact.      Impression    IMPRESSION:  1. Findings concerning for acute/early subacute ischemic infarct  involving the left occipital lobe.  2. No acute intracranial hemorrhage, extra axial fluid  collection, or  mass effect.  3. Brain atrophy and presumed chronic ischemic changes, as described.    Findings were discussed with Dr. Hernandez by phone by myself at  approximately 12:20 PM on 6/29/2023.   CTA Head Neck w Contrast    Narrative    CT ANGIOGRAM OF THE HEAD AND NECK WITH CONTRAST  June 29, 2023 12:12  PM     HISTORY: Code stroke.     TECHNIQUE: CT angiography with an injection of 75mL Isovue-370     IV  with scans through the head and neck. Images were transferred to a  separate 3-D workstation where multiplanar reformations and 3-D images  were created. Estimates of carotid stenoses are made relative to the  distal internal carotid artery diameters except as noted. Radiation  dose for this scan was reduced using automated exposure control,  adjustment of the mA and/or kV according to patient size, or iterative  reconstruction technique.     COMPARISON: CT head same day.     CT HEAD FINDINGS: No contrast enhancing lesions. Cerebral blood flow  is grossly normal.     CT ANGIOGRAM HEAD FINDINGS: There is mild nonflow-limiting  atherosclerosis of the bilateral carotid siphons. The major proximal  branches of the anterior cerebral and middle cerebral arteries appear  patent. The bilateral vertebral arteries are patent. The basilar  artery is patent. The proximal branches of the posterior cerebral  arteries appear to be patent. No intracranial aneurysm or high flow  vascular malformation identified. Expected enhancement of the major  dural venous sinuses.    CT ANGIOGRAM NECK FINDINGS: Mixed atherosclerotic disease of the  aortic arch. There is a dome shaped lesion along the lateral margin of  the distal aortic arch on the left (series 5 image 1) measuring  approximately 18 mm x 29 mm in the axial plane. 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 Common origin of the brachycephalic  and left common carotid arteries, a normal  variant. No significant  stenosis at the origins of the great vessels.     Right carotid artery: The right common and internal carotid arteries  are patent. Mild atherosclerotic disease at the carotid bifurcation  and proximal internal carotid artery without significant stenosis by  NASCET criteria.     Left carotid artery: The left common and internal carotid arteries are  patent. Mild atherosclerotic disease at the carotid bifurcation and  proximal internal carotid artery with less than 50% stenosis by NASCET  criteria.     Vertebral arteries: Vertebral arteries are patent without evidence of  dissection. No significant stenosis.     Other findings: Emphysematous changes of the upper lung zones  bilaterally. Multiple foci of air are noted in the right   space region, presumably due to some intravenous air. A few foci of  air are noted in the right internal jugular vein, likely due to recent  contrast injection. There is a calcified lesion measuring up to 14 mm  in the left submandibular space that appears to replace the normal  left submandibular tissue, which may represent a large  calculus/sialolith. Multilevel degenerative changes in the cervical  spine are noted.      Impression    IMPRESSION:  1. No definite high-grade stenosis or large vessel occlusion involving  the major intracranial arteries.  2. Mild nonflow-limiting atherosclerosis of the bilateral carotid  siphons.  3. No intracranial aneurysm or high flow vascular malformation.  4. Mild bilateral carotid bifurcation atherosclerosis, left more than  right, with less than 50% stenosis by NASCET criteria.  5. Patent cervical vertebral arteries without flow-limiting stenosis.  6. 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 CTA of the chest could  be considered for further evaluation, as indicated.  7. Emphysema.            ==================================================

## 2023-06-29 NOTE — PROVIDER NOTIFICATION
Brief update:    Patient agitated, spitting at nurses; piror Zyprexa dosing ineffective for agitation    2 mg IV Haldol ordered    Note hypoglycemia on arrival with blood glucose in the 60s range.  Hypoglycemia protocol is in place and patient should have every 15 minute blood glucose checks until greater than 100.  Note that he has been started on D5 at last shift.  Discussed with nursing.    Valentino Marcus MD  12:10 AM    Developing hypotension; give 1L NS bolus now.   If not improving, may need to consider pressors and will need ICU consult.    Valentino Marcus MD  3:44 AM

## 2023-06-29 NOTE — PROGRESS NOTES
SLP Bedside Swallow Evaluation  06/29/23 5674   Appointment Info   Signing Clinician's Name / Credentials (SLP) Olena Cruz MA [facility] SLP   General Information   Onset of Illness/Injury or Date of Surgery 06/28/23   Referring Physician Dr. Romero   Patient/Family Therapy Goal Statement (SLP) To be determined   Pertinent History of Current Problem Per notes: Joselin Sanchez is a 89 year old male admitted on 6/28/2023. He has a past medical history of hypertension, diabetes mellitus type 2, chronic atrial fibrillation, not on anticoagulation, chronic systolic congestive heart failure, last EF 40 to 45% in July 2020, dyslipidemia, diabetic foot ulcer, lymphedema, dementia, BPH who was sent from his facility for evaluation of altered mental status..     Dx:  # Severe sepsis due to UTI # Altered mental status  # Underlying dementia  # KIMO  # Hyperkalemia   General Observations Pt's niece was present to assist with interpretation and provide some background information.  Pt's niece reports pt has had a slight left lean and some coughing with solids and liquids over the past few months.  She has seen pt have a decline in overall function as well.  She states pt's status today is below level that she observed about a week ago.  Pt's speech is not intelligible for most responses in [place], and pt's lean to the left is significantly worse per family report.   Type of Evaluation   Type of Evaluation Swallow Evaluation   Oral Motor   Oral Musculature   (Decreased labial and lingual coordination, ? oral apraxia, decreased labial control on L)   Dentition (Oral Motor)   Dentition (Oral Motor) adequate dentition   Cough/Swallow/Gag Reflex (Oral Motor)   Comment, Cough/Swallow/Gag Reflex (Oral Motor) Able to cough on command with mod-max cues   Vocal Quality/Secretion Management (Oral Motor)   Comment, Vocal Quality/Secretion Management (Oral Motor) Hoarse voicing with decreased intensity   General Swallowing Observations   Past  History of Dysphagia See comments above   Current Diet/Method of Nutritional Intake (General Swallowing Observations, NIS) NPO   Swallowing Evaluation Clinical swallow evaluation   Clinical Swallow Evaluation   Feeding Assistance dependent   Clinical Swallow Evaluation Textures Trialed thin liquids;pureed   Clinical Swallow Eval: Thin Liquid Texture Trial   Mode of Presentation, Thin Liquids spoon;cup   Volume of Liquid or Food Presented tsps x 4, sips by cup x 3   Oral Phase of Swallow residue in oral cavity;effortful AP movement;delayed AP movement;premature pharyngeal entry  (poor oral awareness and control to close mouth on spoon and cup, mod-max cues given, drooling on left)   Pharyngeal Phase of Swallow reduction in laryngeal movement  (mod-severe swallow delay to absent swallow; decreased swallow awareness, drooling on left)   Diagnostic Statement no overt aspiration signs, however, poor swallow awareness and initiation of swallows - suspect silent aspiration   Clinical Swallow Evaluation: Puree Solid Texture Trial   Mode of Presentation, Puree spoon   Volume of Puree Presented tsps x 3   Oral Phase, Puree delayed AP movement;effortful AP movement;residue in oral cavity  (poor oral awareness and control to close mouth on spoon and cup, mod-max cues given)   Pharyngeal Phase, Puree reduction in laryngeal movement  (mod-severe swallow delay to absent swallow; decreased swallow awareness)   Diagnostic Statement no overt aspiration signs, however, poor swallow awareness and initiation of swallows - suspect pharyngeal residue   Swallowing Recommendations   Diet Consistency Recommendations NPO  (1-2 tsps of applesauce with 1:1 RN supervision if able to verify swallows, no aspiration signs are noted, and respiratory status is stable)   Clinical Impression   Criteria for Skilled Therapeutic Interventions Met (SLP Eval) Yes, treatment indicated   SLP Diagnosis Moderate-severe oral-pharyngeal dysphagia   Risks &  Benefits of therapy have been explained evaluation/treatment results reviewed;care plan/treatment goals reviewed;current/potential barriers reviewed;risks/benefits reviewed;participants voiced agreement with care plan;participants included;patient  (Niece)   Clinical Impression Comments Pt presents with moderate-severe oral-pharyngeal dysphagia at bedside.  Deficits/risk factors include hx of dementia with ongoing decline in status and increased coughing with po intake, new dysarthria, significant lean to the left with poor head control, increased confusion, poor oral awareness and control to close mouth on spoon and cup, poor AP movement and bolus control, drooling on the left, and mod-severe swallow delay to absent swallow response despite mod-max cues.  Laryngeal elevation was poor when swallows were verified.  Pt is at high risk for silent aspiration and pharyngeal residue at this time.  Recommend NPO status except for 1-2 tsps of applesauce with 1:1 RN supervision if able to verify swallows, no aspiration signs are noted, and respiratory status is stable.  Plan to continue SLP swallow Tx to assess safety for po intake pending overall POC goals.   SLP Total Evaluation Time   Eval: oral/pharyngeal swallow function, clinical swallow Minutes (83372) 15   Interventions   Interventions Quick Adds Swallowing Dysfunction   Swallowing Dysfunction &/or Oral Function for Feeding   Treatment of Swallowing Dysfunction &/or Oral Function for Feeding Minutes (90741) 10   Symptoms Noted During/After Treatment None   Treatment Detail/Skilled Intervention Swallow: Education provided to nursing and family regarding current deficits, question of new neuro findings with deficits greater on L, recommendation for NPO except min applesauce with RN if able to verify swallows.  Family verbalized understanding and agreement.  RN notified of ? new neuro deficits and CT planned to check status.  Mod-max cues were provided for pt to close    SLP Discharge Planning   SLP Plan SLP - re-attempt po, check POC   SLP Discharge Recommendation Transitional Care Facility   SLP Rationale for DC Rec Communication and swallow function are below baseline; supervision with nutrition after discharge if goals remain restorative   SLP Brief overview of current status  Mod-severe dysphagia; high silent aspiration and pharyngeal residue risk; Rec: NPO status except for 1-2 tsps of applesauce with 1:1 RN supervision if able to verify swallows, no aspiration signs are noted, and respiratory status is stable.   Total Session Time   Total Session Time (sum of timed and untimed services) 25

## 2023-06-29 NOTE — PLAN OF CARE
1530 - 1900 RN Shift Summary    Patient with soft BP, however MAP > 65. On room air, sats WDL, low peripheral perfusion - very hard to get the Spo2 waveform and reading. Given Zyprexa per MD for acute aggressive delirium; Patient at change of shift calm and cooperative, lethargic and confused. Urine brown, milky, thick. Nothing given PO at this time due to patient not following commands, being confused and lethargic / sleepy. Blood glucose WDL. Niece updated at bedside.

## 2023-06-29 NOTE — PLAN OF CARE
Goal Outcome Evaluation:  Problem: Risk for Delirium  Goal: Improved Behavioral Control  Outcome: Not Progressing     Problem: Violence Risk or Actual  Goal: Anger and Impulse Control  Outcome: Not Progressing       Oriented to self. Afib with RVR. Afebrile. Remains on room air. WOC consult and bedside swallow ordered. Patient strict NPO. Bilateral lower extrem with several pre-existing wounds. Cleansed sites with wound cleanser, applied nonstick barrier and wrapped with krillex. Small superficial moisture spots on bottom. Pressures running low throughout the night; received a total of 1500 mL NS bolus to achieve a MAP of 65+. Becomes agitated with all cares and attempts to grab and strike staff. Attempted zyprexa odt and patient repeatedly spit at nurse.

## 2023-06-29 NOTE — CONSULTS
[facility]    Stroke Consult Note    Reason for Consult: Stroke Code     Chief Complaint: Altered Mental Status, Hypoglycemia, and Hypotension      HPI  Joselin Sanchez is a 89 year old male with past medical history significant for HTN, PAD, DM2, heart failure, atrial fibrillation not on anticoagulation. He was admitted 6/28/23 for evaluation of altered mental status and found to have severe sepsis due to UTI. Initial CTH negative for acute pathology. Per family, on the day of admission, he was confused and having trouble speaking. He reported he could not see his niece when she came to visit.  Stroke code was activated today due to concern for left sided weakness.     Imaging Findings  IMPRESSION:  1. Findings concerning for acute/early subacute ischemic infarct  involving the left occipital lobe.  2. No acute intracranial hemorrhage, extra axial fluid collection, or  mass effect.  3. Brain atrophy and presumed chronic ischemic changes, as described.    CTA IMPRESSION:  1. No definite high-grade stenosis or large vessel occlusion involving  the major intracranial arteries.  2. Mild nonflow-limiting atherosclerosis of the bilateral carotid  siphons.  3. No intracranial aneurysm or high flow vascular malformation.  4. Mild bilateral carotid bifurcation atherosclerosis, left more than  right, with less than 50% stenosis by NASCET criteria.  5. Patent cervical vertebral arteries without flow-limiting stenosis.  6. 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 CTA of the chest could  be considered for further evaluation, as indicated.  7. Emphysema.    Intravenous Thrombolysis  Not given due to:   - established infarct on imaging    Endovascular Treatment  Not initiated due to absence of proximal vessel occlusion    Impression   Acute ischemic stroke of left  "occipital lobe due to cardioembolism    Recommendations  - ASA 81 mg daily  - Permissive HTN  - LDL, A1c, TTE  - MRI brain w/wo  - CTA chest per primary team  - Therapies, when able     Patient Follow-up     - final recommendation pending work-up    Thank you for this consult. We will continue to follow.      Evie Maharaj, CNP  Vascular Neurology    To page me or covering stroke neurology team member, click here: AMCOM  Choose \"On Call\" tab at top, then select \"NEUROLOGY/ALL SITES\" from middle drop-down box, press Enter, then look for \"stroke\" or \"telestroke\" for your site.    ______________________________________________________    Clinically Significant Risk Factors Present on Admission        # Hyperkalemia: Highest K = 6.7 mmol/L in last 2 days, will monitor as appropriate    # Hypercalcemia: corrected calcium is >10.1, will monitor as appropriate    # Hypoalbuminemia: Lowest albumin = 2.4 g/dL at 6/28/2023 12:50 PM, will monitor as appropriate   # Drug Induced Platelet Defect: home medication list includes an antiplatelet medication   # Hypertension: Home medication list includes antihypertensive(s)   # Acute Respiratory Failure: Documented O2 saturation < 91%.  Continue supplemental oxygen as needed     # Overweight: Estimated body mass index is 26.33 kg/m  as calculated from the following:    Height as of this encounter: 1.7 m (5' 6.93\").    Weight as of this encounter: 76.1 kg (167 lb 12.3 oz).            Past Medical History   Past Medical History:   Diagnosis Date     Anxiety      Chronic heart failure, unspecified heart failure type (H)      Chronic pain      CKD (chronic kidney disease) stage 3, GFR 30-59 ml/min (H)      Dementia (H)      Diabetes (H)      Dysphagia      Dysuria      Hypertension      Lymphedema      Mixed hyperlipidemia      Mood disturbance      SHEILA (obstructive sleep apnea)      Peripheral vascular disease (H)      Pressure ulcer of heel      Past Surgical History   No past " surgical history on file.  Medications   Home Meds  Prior to Admission medications    Medication Sig Start Date End Date Taking? Authorizing Provider   acetaminophen (TYLENOL) 500 MG tablet Take 1,000 mg by mouth every 8 hours as needed for pain   Yes Unknown, Entered By History   acetaminophen (TYLENOL) 500 MG tablet Take 1,000 mg by mouth every morning   Yes Unknown, Entered By History   albuterol (PROAIR HFA/PROVENTIL HFA/VENTOLIN HFA) 108 (90 Base) MCG/ACT inhaler Inhale 2 puffs into the lungs every 4 hours as needed for shortness of breath or wheezing   Yes Unknown, Entered By History   aspirin 81 MG EC tablet Take 81 mg by mouth daily   Yes Unknown, Entered By History   Carboxymethylcellulose Sodium (REFRESH LIQUIGEL) 1 % GEL Apply 1 drop to eye 4 times daily   Yes Unknown, Entered By History   diclofenac (VOLTAREN) 1 % topical gel Apply topically 2 times daily   Yes Unknown, Entered By History   docusate sodium (COLACE) 100 MG capsule Take 100 mg by mouth 2 times daily   Yes Unknown, Entered By History   folic acid (FOLVITE) 1 MG tablet Take 1 mg by mouth daily   Yes Unknown, Entered By History   furosemide (LASIX) 20 MG tablet Take 20 mg by mouth daily   Yes Unknown, Entered By History   lisinopril (ZESTRIL) 2.5 MG tablet Take 2.5 mg by mouth daily   Yes Unknown, Entered By History   metFORMIN (GLUCOPHAGE) 500 MG tablet Take 500 mg by mouth daily   Yes Unknown, Entered By History   oxyCODONE (ROXICODONE) 5 MG tablet Take 2.5 mg by mouth daily   Yes Unknown, Entered By History   potassium chloride ER (K-TAB/KLOR-CON) 10 MEQ CR tablet Take 20 mEq by mouth daily   Yes Unknown, Entered By History   senna-docusate (SENOKOT-S/PERICOLACE) 8.6-50 MG tablet Take 1 tablet by mouth 2 times daily   Yes Unknown, Entered By History   tamsulosin (FLOMAX) 0.4 MG capsule Take 0.4 mg by mouth daily   Yes Unknown, Entered By History       Scheduled Meds    sodium chloride 0.9%  1,000 mL Intravenous Once     acetaminophen   1,000 mg Oral QAM     aspirin  81 mg Oral Daily     docusate sodium  100 mg Oral BID     enoxaparin ANTICOAGULANT  30 mg Subcutaneous Q24H     folic acid  1 mg Oral Daily     insulin aspart  1-7 Units Subcutaneous TID AC     insulin aspart  1-5 Units Subcutaneous At Bedtime     piperacillin-tazobactam  3.375 g Intravenous Q6H     senna-docusate  1 tablet Oral BID     sodium chloride (PF)  10-40 mL Intracatheter Q7 Days     sodium chloride (PF)  3 mL Intracatheter Q8H     tamsulosin  0.4 mg Oral Daily       Infusion Meds    sodium chloride 100 mL/hr at 06/29/23 0751       PRN Meds  acetaminophen **OR** acetaminophen, albuterol, glucose **OR** dextrose **OR** glucagon, lidocaine 4%, lidocaine (buffered or not buffered), lidocaine (buffered or not buffered), LORazepam, naloxone **OR** naloxone **OR** naloxone **OR** naloxone, OLANZapine zydis, ondansetron **OR** ondansetron, oxyCODONE, polyethylene glycol, sodium chloride (PF), sodium chloride (PF), sodium chloride (PF), sodium chloride (PF)    Allergies   Allergies   Allergen Reactions     Gabapentin      Family History   No family history on file.  Social History        Review of Systems   Review of systems not obtained due to patient factors - confusion       PHYSICAL EXAMINATION  Temp:  [96.2  F (35.7  C)-98.1  F (36.7  C)] 98  F (36.7  C)  Pulse:  [] 103  Resp:  [8-26] 23  BP: ()/(29-78) 90/78  SpO2:  [78 %-100 %] 99 %     Neurologic  Mental Status:  alert to voice, follows some simple commands, speech quality poor per family  Cranial Nerves:  inconsistent blink to threat, face grossly symmetric, does not protrude tongue  Motor:  normal muscle tone and bulk, no abnormal movements, bilateral UEs with mild drift, moves bilateral LEs but not antigravity  Reflexes:  toes down-going  Sensory:  light touch sensation intact and symmetric throughout upper and lower extremities  Coordination:  deferred  Station/Gait:  deferred    Dysphagia Screen  Dysarthria or  facial droop present - Maintain NPO, consult SLP    Stroke Scales    NIHSS  1a. Level of Consciousness 1-->Not alert, but arousable by minor stimulation to obey, answer, or respond   1b. LOC Questions 2-->Answers neither question correctly   1c. LOC Commands 1-->Performs one task correctly   2.   Best Gaze 0-->Normal   3.   Visual 0-->No visual loss   4.   Facial Palsy 0-->Normal symmetrical movements   5a. Motor Arm, Left 1-->Drift, limb holds 90 (or 45) degrees, but drifts down before full 10 seconds, does not hit bed or other support   5b. Motor Arm, Right 1-->Drift, limb holds 90 (or 45) degrees, but drifts down before full 10 secs, does not hit bed or other support   6a. Motor Leg, Left 3-->No effort against gravity, leg falls to bed immediately   6b. Motor Leg, right 3-->No effort against gravity, leg falls to bed immediately   7.   Limb Ataxia 0-->Absent   8.   Sensory 0-->Normal, no sensory loss   9.   Best Language 2-->Severe aphasia, all communication is through fragmentary expression, great need for inference, questioning, and guessing by the listener. Range of information that can be exchanged is limited, listener carries burden of. . . (see row details)   10. Dysarthria 2-->Severe dysarthria, patients speech is so slurred as to be unintelligible in the absence of or out of proportion to any dysphasia, or is mute/anarthric   11. Extinction and Inattention  0-->No abnormality   Total 16 (06/29/23 1230)         Imaging  I personally reviewed all imaging; relevant findings per HPI.     Lab Results Data   CBC  Recent Labs   Lab 06/29/23  0545 06/28/23  1250   WBC 10.6 11.5*   RBC 3.88* 4.19*   HGB 12.1* 13.4   HCT 39.1* 43.2   * 193     Basic Metabolic Panel    Recent Labs   Lab 06/29/23  0909 06/29/23  0732 06/29/23  0545 06/28/23  2159 06/28/23  1841 06/28/23  1519 06/28/23  1445 06/28/23  1258 06/28/23  1250   NA  --   --  143  --  141  --   --   --  136   POTASSIUM  --   --  5.1  --  5.6*  5.6*   --  5.6*  --  6.7*   CHLORIDE  --   --  115*  --  109*  --   --   --  104   CO2  --   --  15*  --  18*  --   --   --  15*   BUN  --   --  49.2*  --  60.9*  --   --   --  68.3*   CR  --   --  1.78*  --  2.29*  --   --   --  2.60*   GLC 92 91 93   < > 63*   < >  --    < > 208*   BERHANE  --   --  8.8  --  9.5  --   --   --  10.0*    < > = values in this interval not displayed.     Liver Panel  Recent Labs   Lab 06/28/23  1250   PROTTOTAL 6.8   ALBUMIN 2.4*   BILITOTAL 0.4   ALKPHOS 115   AST 21   ALT 7     INR  No lab results found.   Lipid Profile  No lab results found.  A1C    Recent Labs   Lab Test 06/28/23  1250   A1C 5.8*     Troponin  No results for input(s): CTROPT, TROPONINIS, TROPONINI, GHTROP in the last 168 hours.       Stroke Code Data Data   Stroke Code Data  (for stroke code without tele)  Stroke code activated 06/29/23   1147   First stroke provider response 06/29/23   1150   Last known normal         Time of discovery   (or onset of symptoms) 06/29/23   1130   Head CT read by Stroke Neuro Dr/Provider 06/29/23   1211   Was stroke code de-escalated? Yes 06/29/23 1230            I personally examined and evaluated the patient today. At the time of my evaluation and management the patient was in critical condition today due to encephalopathy. I personally managed examination. Key decisions made today included imaging. I spent a total of 60 minutes providing critical care services, evaluating the patient, directing care and reviewing laboratory values and radiologic reports.

## 2023-06-29 NOTE — CONSULTS
[facility] Nurse Inpatient Assessment     Consulted for:  Rt heel, shins, under breasts    Summary:    1.  BLE with extensive diffuse small ulcers, L>R, unclear etiology, reported neglect.  Hx debridement a few months ago in [place] and there are detailed notes from wound clinic visits in chart.  Per chart review, pt had some issues with compliance due to transportation issues, language barriers, behavior issues, was moved to a care facility in [facility] a few months ago, family concerned his wounds are not being well cared for here.   2.  Right posterior heel: necrotic Unstageable pressure injury, no obvious s/s infection but may benefit from Podiatry consult.   3.  Bilateral skin folds under sides of breast area have superficial breakdown from friction, moisture, neglect.   4.  Buttocks/coccyx: healing small sores, pressure vs moisture/friction, present on admission.      Patient History (according to provider note(s):      Joselin Sanchez is a 89 year old male admitted on 6/28/2023. He has a past medical history of hypertension, diabetes mellitus type 2, chronic atrial fibrillation, not on anticoagulation, chronic systolic congestive heart failure, last EF 40 to 45% in July 2020, dyslipidemia, diabetic foot ulcer, lymphedema, dementia, BPH who was sent from his facility for evaluation of altered mental status.    Areas Assessed:      Areas visualized during today's visit: skin folds, legs, buttock area    Wound location:  Bilateral lower legs    Last photo: 6-29-23 6-29-23 LLE      6-29-23 lateral LLE      6-29-23 RLE      Wound due to: Unknown Etiology, possible venous/edema issues initially; has a possible vasculitic appearance  Wound history/plan of care: chronic wounds that have worsened in care facility per niece; hx debridement at [place] a few months ago  Wound base: mostly moist red tissue with some crusting and scant brown eschar and yellow slough     Palpation of  the wound bed: normal      Drainage: moderate     Description of drainage: serosanguinous     Measurements (length x width x depth, in cm): diffuse scattered wounds, L>R, many are rounded and punched out appearance, 0.5-2cm diameter average, 0.2-0.3cm depth average     Tunneling: N/A     Undermining: N/A  Periwound skin: Dry/scaly      Color: pink      Temperature: normal   Odor: minimal  Pain: moderate, tension to hands, feet and body and facial expression of distress    Pain interventions prior to dressing change: slow and gentle cares   Treatment goal: Heal , Drainage control, Infection control/prevention, Pain control, Protection and Remove necrotic tissue  STATUS: initial assessment  Supplies ordered: gathered and at bedside      Wound location:  Right posterior heel    Last photo: 6-29-23      Wound due to: Pressure Injury, Unstageable  Wound history/plan of care: present on admission  Wound base: mix brown eschar and yellow slough, soft, moist     Palpation of the wound bed: boggy but not acutely fluctuant     Drainage: small     Description of drainage: yellow     Measurements (length x width x depth, in cm): approx 2 x 2 x 0.4+cm      Tunneling: N/A     Undermining: N/A  Periwound skin: Intact and Erythema- blanchable      Color: pink      Temperature: normal   Odor: minimal  Pain: moderate, tension to hands, feet and body and facial expression of distress    Pain interventions prior to dressing change: slow and gentle cares   Treatment goal: Drainage control, Infection control/prevention, Pain control, Protection and Soften necrotic tissue  STATUS: initial assessment  Supplies ordered: at bedside      Wound location:  Bilateral sides of breasts, skin folds    Last photo: 6-29-23 left side      6-29-23 right side      Wound due to: Friction, Intertrigo and Moisture Associated Skin Damage (MASD)  Wound history/plan of care: per niece, pt hasn't been cleaned in this area in weeks; small skin folds that rub  together  Wound base: semi-moist pink tissue     Palpation of the wound bed: normal      Drainage: scant     Description of drainage: serosanguinous     Measurements (length x width x depth, in cm): approx 3 x 5 x 0.1cm areas     Tunneling: N/A     Undermining: N/A  Periwound skin: Dry/scaly      Color: normal to pink      Temperature: normal   Odor: none  Pain: mild, tender  Pain interventions prior to dressing change: slow and gentle cares   Treatment goal: Heal  and Protection  STATUS: initial assessment  Supplies ordered: supplies stored on unit      Wound location:  Bilateral buttocks, coccyx area, perineal area    Last photo: 6-29-23      Wound due to: pressure vs friction/moisture/shear  Wound history/plan of care: present on admission; pt needs assist x 2 for cares and repo  Wound base: pink-yellow moist tissue on right side and coccyx, and lightly resurfaced pink tissue left buttock and perineal area, inner thighs     Palpation of the wound bed: normal      Drainage: scant     Description of drainage: serous     Measurements (length x width x depth, in cm): diffuse small superficial areas     Tunneling: N/A     Undermining: N/A  Periwound skin: Dry/scaly      Color: normal and consistent with surrounding tissue      Temperature: normal   Odor: none  Pain: mild, tender  Pain interventions prior to dressing change: slow and gentle cares   Treatment goal: Heal  and Protection  STATUS: initial assessment  Supplies ordered: supplies stored on unit       Treatment Plan:     BLE and right heel wound(s): Daily    1.  Cleanse intact skin on lower legs and feet with Juan Ramon perineal lotion and soft dry wipes, including wiping between toes.   2.  Apply Sween 24 cream to intact skin.  3.  Cleanse wounds with Vashe-moist gauze.  4.  Apply Plurogel (# [id number]) and Xeroform (#[id number]) gauze to leg wounds:  Spread out the Xeroform onto its paper wrapping.  Apply a thin layer of Plurogel to the Xeroform using the back of a  plastic spoon.  Apply the gelled gauze to the wounds, cut to fit as needed but keep in large pieces where possible.  Will need approx 3 packets to cover all areas.   5.  Cover with gauze pads or ABDs and Kerlix.  6.  Right heel: swab periwound with skin prep, let dry.  Apply Plurogel to a Mepilex and press onto wound.   7.  Elevate legs; float heels completely. Consider Prevalon boots (# [id number]).    Bilateral breast/skin folds:  Daily and prn:  1.  Cleanse with Juan Ramon cleanser and soft dry wipes.   2.  Place InterDry into the skin folds as needed.      Buttock wounds: every other day and prn:  1.  Cleanse with wound cleanser.  2.  Swab periwounds with Cavilon no-sting skin barrier, let dry.  3.  Cover with Mepilex.  Angle the dressing(s) as needed to get a good seal and not bridge over perianal area.   4.  PIP measures.     Pressure Injury Prevention (PIP) Plan:  -If patient is declining pressure injury prevention interventions: Explore reason why and address patient's concerns  -Mattress: low air loss  -HOB: Maintain at or below 30 degrees, unless contraindicated  -Repositioning in bed: Every 1-2 hours , Left/right positioning; avoid supine, Raise foot of bed prior to raising head of bed, to reduce patient sliding down (shear) and Frequent microturns using TAPS wedges, as patient tolerates  -Heels: Keep elevated off mattress, Pillows under calves and Heel lift boots  -Protective Dressing: Mepilex  -Chair positioning: Chair cushion (#[id number])  and Assist patient to reposition hourly   -Moisture Management: Perineal cleansing /protection: Follow Incontinence Protocol, Avoid brief in bed, Clean and dry skin folds with bathing , Consider InterDry (#[id number]) between folds and Moisturize dry skin  -Under Devices: Inspect skin under all medical devices during skin inspection , Ensure tubes are stabilized without tension and Ensure patient is not lying on medical devices or equipment when repositioned       Orders:  Written    RECOMMEND PRIMARY TEAM ORDER: Podiatry consult  Education provided: plan of care, Moisture management and Off-loading pressure  Discussed plan of care with: Patient, Family and Nurse  WOC nurse follow-up plan: weekly and prn  Notify WOC if wound(s) deteriorate.  Nursing to notify the Provider(s) and re-consult the WOC Nurse if new skin concern.    DATA:     Current support surface: Standard  Low air loss mattress in ICU  Containment of urine/stool: Incontinence Protocol and Indwelling catheter  BMI: Body mass index is 26.33 kg/m .   Active diet order: Orders Placed This Encounter      NPO for Medical/Clinical Reasons Except for: No Exceptions     Output: I/O last 3 completed shifts:  In: 4806.33 [I.V.:3806.33; IV Piggyback:1000]  Out: 1380 [Urine:1380]     Labs: Recent Labs   Lab 06/29/23  0545 06/28/23  1250   ALBUMIN  --  2.4*   HGB 12.1* 13.4   WBC 10.6 11.5*   A1C  --  5.8*     Pressure injury risk assessment:   Sensory Perception: 3-->slightly limited  Moisture: 3-->occasionally moist  Activity: 1-->bedfast  Mobility: 2-->very limited  Nutrition: 1-->very poor  Friction and Shear: 1-->problem  Tushar Score: 11    Apurva Huggins RN CWOCN  -Securely message with [employer] (more info) - can reach individually by name or search 'WOC Nurse' (Amy) to reach all current WOCs on duty.  -WOC Office Phone: [phone]

## 2023-06-30 NOTE — PROGRESS NOTES
Neuro:  PERRL.  Moves all extremities. Lethargic at 2000 neuro assessment. Followed commands, was alert, and cooperative at the 0000 neuro assessment with a [place] . Continues to mumble/garble speech. 0400 patient became agitated, restless, and difficult to redirect. MRI obtained showing Subacute ischemia left occipital lobe along with tiny focus posterior right frontal lobe near vertex. Neuro updated and stated they would consider coagulating patient in the next 24-48 hours.   Cardiovascular:  Afib. Soft BP overnight with MAP >65. No intervention needed.   Pulmonary:  RA. Productive cough  GI/:  Adequate UOP. Urine michael, cloudy, with sediment. BMx1  Endocrine: Glucose well controlled. On D5NS gtt for low CBGs  Skin:  Intact except wounds to BLE, R chest, and coccyx. Protective mepilex applied to sacrum, legs dressed per WOC.   Restraints:  Discontinued at 2300  AM labs noted  Family updated  Continue to monitor closely.    No lab results found in last 7 days.    No results found for: TROPI, TROPONIN, TROPR, TROPN    ROUTINE IP LABS (Last four results)  BMPRecent Labs   Lab 06/30/23  0119 06/29/23  2203 06/29/23 2003 06/29/23  1522 06/29/23  0732 06/29/23  0545 06/28/23  2159 06/28/23  1841 06/28/23  1519 06/28/23  1445 06/28/23  1258 06/28/23  1250   NA  --   --   --   --   --  143  --  141  --   --   --  136   POTASSIUM  --   --   --   --   --  5.1  --  5.6*  5.6*  --  5.6*  --  6.7*   CHLORIDE  --   --   --   --   --  115*  --  109*  --   --   --  104   BERHANE  --   --   --   --   --  8.8  --  9.5  --   --   --  10.0*   CO2  --   --   --   --   --  15*  --  18*  --   --   --  15*   BUN  --   --   --   --   --  49.2*  --  60.9*  --   --   --  68.3*   CR  --   --   --   --   --  1.78*  --  2.29*  --   --   --  2.60*   GLC 83 81 74 90   < > 93   < > 63*   < >  --    < > 208*    < > = values in this interval not displayed.     CBC  Recent Labs   Lab 06/29/23  0545 06/28/23  1250   WBC 10.6 11.5*   RBC  3.88* 4.19*   HGB 12.1* 13.4   HCT 39.1* 43.2   * 103*   MCH 31.2 32.0   MCHC 30.9* 31.0*   RDW 17.3* 17.5*   * 193     INRNo lab results found in last 7 days.  LACTIC ACID  Lactic Acid POCT (mmol/L)   Date Value   06/28/2023 1.3     Blood GlucoseNo results found for: BGM    Intake/Output Summary (Last 24 hours) at 6/30/2023 0455  Last data filed at 6/30/2023 0400  Gross per 24 hour   Intake 3752.25 ml   Output 1280 ml   Net 2472.25 ml

## 2023-06-30 NOTE — PROVIDER NOTIFICATION
Brief update    Low mag level earlier tonight    1g Mag IV ordered, give slower as may dip BPs    Not on protocol given renal insufficiency.    Discussed w nursing/    Valentino Marcus MD  12:58 AM      Low temps noted despite bear hugger    Bladder temp matches axillary temp.    Would trust bladder temp here. Likely related to sepsis. Generally improving, however, per nursing.    Continue external warming. Check rectal temp at next check; not sure why axillary would be same as antunez temp (would think lower).     Valentino Marcus MD  2:50 AM

## 2023-06-30 NOTE — PROGRESS NOTES
[facility]    Stroke Progress Note    Interval EventsNo acute events overnight. Blood cultures NGTD.     HPI Summary  89 year old male with past medical history significant for HTN, PAD, DM2, heart failure, atrial fibrillation not on anticoagulation. He was admitted 6/28/23 for evaluation of altered mental status and found to have severe sepsis due to UTI. Initial CTH negative for acute pathology. Per family, on the day of admission, he was confused and having trouble speaking. He reported he could not see his niece when she came to visit.  Stroke code was activated 6/29/23 due to concern for left sided weakness. MRI demonstrated a left occipital infarct.     Stroke Evaluation Summarized    MRI/Head CT 1. Subacute ischemia left occipital lobe along with tiny focus posterior right frontal lobe near vertex.  2. No evidence of a midline shift or hemorrhage.   Vasculature 1. No definite high-grade stenosis or large vessel occlusion involving the major intracranial arteries.  2. Mild nonflow-limiting atherosclerosis of the bilateral carotid siphons.  3. No intracranial aneurysm or high flow vascular malformation.  4. Mild bilateral carotid bifurcation atherosclerosis, left more than right, with less than 50% stenosis by NASCET criteria.  5. Patent cervical vertebral arteries without flow-limiting stenosis.  6. 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 CTA of the chest could  be considered for further evaluation, as indicated.  7. Emphysema.     Echocardiogram EF 40-45%, moderate global hypokinesia of the LV, LA moderately dilated   EKG/Telemetry afib with RVR   Other Testing Not Applicable      LDL  6/29/2023: 50 mg/dL   A1C  6/28/2023: 5.8 %   Troponin No lab value available in past 48 hrs       Impression   Acute ischemic stroke of left occipital and right  "frontal lobes due to cardioembolism in the setting of atrial fibrillation not on anticoagulation. Blood cultures NGTD, low suspicion for endocarditis.     Plan  - Recommend anticoagulation for secondary stroke prevention in the setting of atrial fibrillation, defer timing to primary team given multiple comorbidities. From stroke standpoint, aspirin could be discontinued when anticoagulation is started.  - LDL 50, can defer initiation of statin at this time. Within goal 40-70  - A1c within goal <7.0  - Goal BP <140/90 with tighter control associated with decreased overall CV risk, if tolerated  - Therapies, as able     Patient Follow-up    - in 6-8 weeks with general neurology ([phone])    No further stroke evaluation is recommended, so we will sign off. Please contact us with any additional questions.    Evie Maharaj, CNP  Vascular Neurology    To page me or covering stroke neurology team member, click here: AMCOM  Choose \"On Call\" tab at top, then select \"NEUROLOGY/ALL SITES\" from middle drop-down box, press Enter, then look for \"stroke\" or \"telestroke\" for your site.    ______________________________________________________    Clinically Significant Risk Factors        # Hyperkalemia: Highest K = 5.6 mmol/L in last 2 days, will monitor as appropriate  # Hypernatremia: Highest Na = 148 mmol/L in last 2 days, will monitor as appropriate    # Hypomagnesemia: Lowest Mg = 1.5 mg/dL in last 2 days, will replace as needed   # Hypoalbuminemia: Lowest albumin = 2.4 g/dL at 6/28/2023 12:50 PM, will monitor as appropriate            # Overweight: Estimated body mass index is 26.61 kg/m  as calculated from the following:    Height as of this encounter: 1.7 m (5' 6.93\").    Weight as of this encounter: 76.9 kg (169 lb 8.5 oz)., PRESENT ON ADMISSION            Medications   Scheduled Meds    acetaminophen  1,000 mg Oral QAM     aspirin  81 mg Oral Daily     docusate sodium  100 mg Oral BID     enoxaparin ANTICOAGULANT  " 40 mg Subcutaneous Q24H     fluconazole  200 mg Intravenous Q24H     folic acid  1 mg Intravenous Daily     insulin aspart  1-4 Units Subcutaneous Q4H     piperacillin-tazobactam  3.375 g Intravenous Q6H     senna-docusate  1 tablet Oral BID     sodium chloride (PF)  10-40 mL Intracatheter Q7 Days     sodium chloride (PF)  3 mL Intracatheter Q8H     thiamine  50 mg Intravenous Daily       Infusion Meds    dextrose 5% and 0.9% NaCl 75 mL/hr at 06/30/23 1135     norepinephrine         PRN Meds  acetaminophen **OR** acetaminophen, albuterol, glucose **OR** dextrose **OR** glucagon, lidocaine 4%, lidocaine (buffered or not buffered), lidocaine (buffered or not buffered), naloxone **OR** naloxone **OR** naloxone **OR** naloxone, OLANZapine zydis, ondansetron **OR** ondansetron, oxyCODONE, polyethylene glycol, sodium chloride (PF), sodium chloride (PF), sodium chloride (PF)       PHYSICAL EXAMINATION  Temp:  [92.5  F (33.6  C)-98.8  F (37.1  C)] 97  F (36.1  C)  Pulse:  [] 92  Resp:  [4-25] 17  BP: ()/(40-95) 121/60  SpO2:  [88 %-100 %] 100 %      Neurologic  Mental Status:  alert, follows some commands via iPad , speech limited, difficult to understand per   Cranial Nerves:  inconsistent blink to threat, face appears symmetric, moderate dysarthria, tongue protrusion midline  Motor:  legs not antigravity, bilateral UEs drift to bed  Reflexes:  deferred   Sensory:  light touch sensation intact and symmetric throughout upper and lower extremities  Coordination:  uable to assess  Station/Gait:  deferred    Stroke Scales    NIHSS  1a. Level of Consciousness 0-->Alert, keenly responsive   1b. LOC Questions 2-->Answers neither question correctly   1c. LOC Commands 1-->Performs one task correctly   2.   Best Gaze 0-->Normal   3.   Visual 0-->No visual loss   4.   Facial Palsy 0-->Normal symmetrical movements   5a. Motor Arm, Left 2-->Some effort against gravity, limb cannot get to or maintain  (if cued) 90 (or 45) degrees, drifts down to bed, but has some effort against gravity   5b. Motor Arm, Right 2-->Some effort against gravity, limb cannot get to or maintain (if cued) 90 (or 45) degrees, drifts down to bed, but has some effort against gravity   6a. Motor Leg, Left 3-->No effort against gravity, leg falls to bed immediately   6b. Motor Leg, right 3-->No effort against gravity, leg falls to bed immediately   7.   Limb Ataxia 0-->Absent   8.   Sensory 0-->Normal, no sensory loss   9.   Best Language 1-->Mild-to-moderate aphasia, some obvious loss of fluency or facility of comprehension, without significant limitation on ideas expressed or form of expression. Reduction of speech and/or comprehension, however, makes conversation. . . (see row details)   10. Dysarthria 1-->Mild-to-moderate dysarthria, patient slurs at least some words and, at worst, can be understood with some difficulty   11. Extinction and Inattention  0-->No abnormality   Total 15 (06/30/23 1607)       Imaging  I personally reviewed all imaging; relevant findings per HPI.     Lab Results Data   CBC  Recent Labs   Lab 06/30/23  0551 06/29/23  0545 06/28/23  1250   WBC 7.1 10.6 11.5*   RBC 3.67* 3.88* 4.19*   HGB 11.6* 12.1* 13.4   HCT 37.1* 39.1* 43.2   * 144* 193     Basic Metabolic Panel    Recent Labs   Lab 06/30/23  1259 06/30/23  1138 06/30/23  1028 06/30/23  1008 06/30/23  0551 06/29/23  0732 06/29/23  0545 06/28/23  2159 06/28/23  1841   NA  --   --   --   --  148*  --  143  --  141   POTASSIUM  --   --   --   --  4.1  --  5.1  --  5.6*  5.6*   CHLORIDE  --   --   --   --  123*  --  115*  --  109*   CO2  --   --   --   --  15*  --  15*  --  18*   BUN  --   --   --   --  30.5*  --  49.2*  --  60.9*   CR  --   --   --   --  1.17  --  1.78*  --  2.29*   GLC 71 73 92   < > 84   < > 93   < > 63*   BERHANE  --   --   --   --  8.5  --  8.8  --  9.5    < > = values in this interval not displayed.     Liver Panel  Recent Labs   Lab  06/28/23  1250   PROTTOTAL 6.8   ALBUMIN 2.4*   BILITOTAL 0.4   ALKPHOS 115   AST 21   ALT 7     INR  No lab results found.   Lipid Profile    Recent Labs   Lab Test 06/29/23  0545   CHOL 105   HDL 36*   LDL 50   TRIG 94     A1C    Recent Labs   Lab Test 06/28/23  1250   A1C 5.8*     Troponin  No results for input(s): CTROPT, TROPONINIS, TROPONINI, GHTROP in the last 168 hours.       Data   I personally examined and evaluated the patient today. At the time of my evaluation and management the patient was in critical condition today due to acute stroke. I personally managed examination. Key decisions made today included stroke evaluation. I spent a total of 50 minutes providing critical care services, evaluating the patient, directing care and reviewing laboratory values and radiologic reports.

## 2023-06-30 NOTE — PLAN OF CARE
Shift: [id number]      Pt's neuro status is very tricky to assess, even with  and family's help, he is unwilling to follow commands consistently. BP's soft and labile, no pressers this shift. Blood sugar was 56 this AM-D50 given and D5NS restarted. NPO pending speech eval. Niece and nephew updated by RN at the bedside.

## 2023-06-30 NOTE — CONSULTS
"CLINICAL NUTRITION SERVICES  -  ASSESSMENT NOTE      Future/Additional Recommendations: If unable to advance diet in the next 24-48 hours (and aggressive cares desired), recommend TF, ie. Jevity 1.5 at 60 mL/hr= 2160 kcal (28 kcal/kg), 92 g protein (1.2 g/kg), 311 g CHO, 30 g fiber, 1094 mL H2O  Flushes 60 mL every 4 hours    Malnutrition: % Weight Loss:  > 7.5% in 3 months (severe malnutrition)  % Intake:  Unable to determine   Subcutaneous Fat Loss:  Orbital region moderate depletion  Muscle Loss:  Temporal region severe depletion and Clavicle bone region severe depletion  Fluid Retention:  None noted    Malnutrition Diagnosis: Severe malnutrition  In Context of:  Acute illness or injury        REASON FOR ASSESSMENT  Joselin Sanchez is a 89 year old male seen by Registered Dietitian for Admission Nutrition Risk Screen for   Have you recently lost weight without trying? - Unsure   Have you eaten poorly because of a decreased appetite? - Yes       NUTRITION HISTORY  - Unable to obtain nutrition history as patient confused and unable to provide.  Noted that he does live in a NH and receives a Consistent CHO diet (level 7) with Thin Liquids (level 0).      CURRENT NUTRITION ORDERS  Diet Order:     NPO   Day #3 NPO     Current Intake/Tolerance:  N/A      NUTRITION FOCUSED PHYSICAL ASSESSMENT FOR DIAGNOSING MALNUTRITION)  Yes (brief, upper extremities only d/t agitation)               Observed:    Muscle wasting (refer to documentation in Malnutrition section) and Subcutaneous fat loss (refer to documentation in Malnutrition section)    Obtained from Chart/Interdisciplinary Team:  Non-healing wounds -- bilateral legs, heel, breasts, and buttocks/coccyx    ANTHROPOMETRICS  Height: 5'7\"  Weight: 76.1 kg (168#)(6/29)  Body mass index is 26.3 kg/m   Weight Status:  Overweight BMI 25-29.9  IBW: 67.3 kg  % IBW: 113%  Weight History:   Wt Readings from Last 10 Encounters:   06/30/23 76.9 kg (169 lb 8.5 oz)     Per Care Everywhere " -->  229# 3/2023  261# 2012    Down > 60# in the last 3 months (26%) which is severe      LABS  Labs reviewed    MEDICATIONS  Folic Acid + Thiamine  D5 at 75 mL/hr= 90 g CHO, 306 kcal       ASSESSED NUTRITION NEEDS PER APPROVED PRACTICE GUIDELINES:    Dosing Weight 76.1 kg   Estimated Energy Needs: 5933-5820 kcals (25-30 Kcal/Kg)  Justification: maintenance  Estimated Protein Needs:  grams protein (1.2-1.5 g pro/Kg)  Justification: hypercatabolism with acute illness  Estimated Fluid Needs: 3791-2023 mL (1 mL/Kcal)  Justification: maintenance    MALNUTRITION:  % Weight Loss:  > 7.5% in 3 months (severe malnutrition)  % Intake:  Unable to determine   Subcutaneous Fat Loss:  Orbital region moderate depletion  Muscle Loss:  Temporal region severe depletion and Clavicle bone region severe depletion  Fluid Retention:  None noted    Malnutrition Diagnosis: Severe malnutrition  In Context of:  Acute illness or injury    NUTRITION DIAGNOSIS:  Inadequate protein-energy intake related to NPO with SLP eval pending as evidenced by meeting 0% needs       NUTRITION INTERVENTIONS  Recommendations / Nutrition Prescription  If unable to advance diet in the next 24-48 hours (and aggressive cares desired), recommend TF, ie. Jevity 1.5 at 60 mL/hr= 2160 kcal (28 kcal/kg), 92 g protein (1.2 g/kg), 311 g CHO, 30 g fiber, 1094 mL H2O  Flushes 60 mL every 4 hours     Implementation  Nutrition education: Not appropriate at this time due to patient condition  Collaboration and Referral of Nutrition care:  Patient discussed today during interdisciplinary bedside rounds     Nutrition Goals  Patient will advanced diet vs initiate TF within the next 24-48 hours     MONITORING AND EVALUATION:  Progress towards goals will be monitored and evaluated per protocol and Practice Guidelines    Portia Dove, RD, LD, CNSC   Clinical Dietitian - [facility]

## 2023-06-30 NOTE — CONSULTS
[place] [facility]  CONSULTATION NOTE    CHIEF COMPLAINT:      I was asked by Allyssa Paz to evaluate this patient for right foot     PATIENT HISTORY:  Joselin Sacnhez is a 89 year old male  with a past medical history significant for what's listed below. He was admitted on 6/28 for sepsis, likely due to a UTI. He has a history of dementia with an unclear of baseline mental status. His family is at bedside and states he's been improving.   -Podiatry has been consulted for his right foot. He was admitted at [facility] in [place] from march - April and had debridement of his legs. Per family member, sites are improving. States heel ulcer has been present for several months though. Patient denies pain, but has pain to touch.         Review of Systems:  A 10 point review of systems was performed and is positive for that noted above in the patient history.  All other areas are negative.     PAST MEDICAL HISTORY:   Past Medical History:   Diagnosis Date     Anxiety      Chronic heart failure, unspecified heart failure type (H)      Chronic pain      CKD (chronic kidney disease) stage 3, GFR 30-59 ml/min (H)      Dementia (H)      Diabetes (H)      Dysphagia      Dysuria      Hypertension      Lymphedema      Mixed hyperlipidemia      Mood disturbance      SHEILA (obstructive sleep apnea)      Peripheral vascular disease (H)      Pressure ulcer of heel         PAST SURGICAL HISTORY: No past surgical history on file.     MEDICATIONS:  Reviewed in Epic. Current.     ALLERGIES:    Allergies   Allergen Reactions     Gabapentin         SOCIAL HISTORY:   Social History     Socioeconomic History     Marital status: Patient Declined     Spouse name: Not on file     Number of children: Not on file     Years of education: Not on file     Highest education level: Not on file   Occupational History     Not on file   Tobacco Use     Smoking status: Not on file     Smokeless tobacco: Not on file   Substance  "and Sexual Activity     Alcohol use: Not on file     Drug use: Not on file     Sexual activity: Not on file   Other Topics Concern     Not on file   Social History Narrative     Not on file     Social Determinants of Health     Financial Resource Strain: Not on file   Food Insecurity: Not on file   Transportation Needs: Not on file   Physical Activity: Not on file   Stress: Not on file   Social Connections: Not on file   Intimate Partner Violence: Not on file   Housing Stability: Not on file        FAMILY HISTORY: No family history on file.     EXAM:Vitals: /59   Pulse (!) 133   Temp 97.7  F (36.5  C)   Resp 11   Ht 1.7 m (5' 6.93\")   Wt 76.9 kg (169 lb 8.5 oz)   SpO2 93%   BMI 26.61 kg/m    BMI= Body mass index is 26.61 kg/m .    LABS:     Last Comprehensive Metabolic Panel:  Sodium   Date Value Ref Range Status   06/30/2023 148 (H) 136 - 145 mmol/L Final     Potassium   Date Value Ref Range Status   06/30/2023 4.1 3.4 - 5.3 mmol/L Final     Chloride   Date Value Ref Range Status   06/30/2023 123 (H) 98 - 107 mmol/L Final     Carbon Dioxide (CO2)   Date Value Ref Range Status   06/30/2023 15 (L) 22 - 29 mmol/L Final     Anion Gap   Date Value Ref Range Status   06/30/2023 10 7 - 15 mmol/L Final     GLUCOSE BY METER POCT   Date Value Ref Range Status   06/30/2023 73 70 - 99 mg/dL Final     Urea Nitrogen   Date Value Ref Range Status   06/30/2023 30.5 (H) 8.0 - 23.0 mg/dL Final     Creatinine   Date Value Ref Range Status   06/30/2023 1.17 0.67 - 1.17 mg/dL Final     GFR Estimate   Date Value Ref Range Status   06/30/2023 56 (L) >60 mL/min/1.73m2 Final     Calcium   Date Value Ref Range Status   06/30/2023 8.5 8.2 - 9.6 mg/dL Final     Lab Results   Component Value Date    WBC 7.1 06/30/2023     Lab Results   Component Value Date    RBC 3.67 06/30/2023     Lab Results   Component Value Date    HGB 11.6 06/30/2023     Lab Results   Component Value Date    HCT 37.1 06/30/2023     Lab Results   Component " Value Date     06/30/2023      No results found for: INR     General appearance: Patient is alert and fully cooperative with history & exam.  No sign of distress is noted during the visit.      Respiratory: Breathing is regular & unlabored while sitting.      HEENT: Hearing is intact to spoken word.  Speech is clear.  No gross evidence of visual impairment that would impact ambulation.      Dermatologic: Right posterior heel: fibro-necrotic wound bed. No cellulitis or significant drainage to site. Palpable bone to site. Site appears tender to touch. No ascending cellulitis      Vascular: Dorsalis pedis and posterior tibial pulses are difficult to palpate & regular bilaterally.  CFT and skin temperature is normal to both lower extremities.       Neurologic: Lower extremity sensation is diminished, bilateral foot, to light touch.  No evidence of neurological-based weakness or contracture in the lower extremities.       Musculoskeletal: Patient is ambulatory without an assistive device or brace.  No gross foot or ankle deformity noted.       Psychiatric: Baseline dementia. Slovenian speaking       All cultures:  Recent Labs   Lab 06/29/23  1930 06/29/23  1929 06/28/23  1251 06/28/23  1250   CULTURE No growth after 12 hours No growth after 12 hours >100,000 CFU/mL Candida dubliniensis* Positive on the 1st day of incubation*  Corynebacterium striatum*  No growth after 1 day        IMAGING: None per LE    ASSESSMENT:  1. Right posterior heel ulcer      MEDICAL DECISION MAKING:   -Discussed all findings with patient and family. Chart and imaging reviewed.   -Right posterior heel ulcer with necrotic wound bed. Painful on examination. No obvious signs of infection or osteomyelitis, but difficult to examen depth or extent of ulcer.  -XR ordered to evaluate for osteomyelitis. If this is noted to be positive, may need MRI and vascular studies. Also may likely need conversation with family regarding how aggressive  treatment would be for this, given his advanced age, dementia and other commorbidities.  -Recommend offloading of site all times   -Cont wound care   -Will follow up after xray is complete    Thank you for the consultation request and the opportunity to participate in the care of Joselin Lamb DPM   [place] [facility] of Podiatry/Foot & Ankle Surgery  [phone]

## 2023-06-30 NOTE — CONSULTS
Care Management Initial Consult    General Information  Assessment completed with: Care Team Member, [facility] Nurse  Type of CM/SW Visit: Initial Assessment  Primary Care Provider verified and updated as needed: Yes (PCP at [facility] is Alistair Ross; Dr. Corina Bojorquez was PCP at [facility] prior)   Readmission within the last 30 days: no previous admission in last 30 days      Reason for Consult: discharge planning  Advance Care Planning:    niece will send document  General Information Comments: romulo    Communication Assessment  Patient's communication style: spoken language (non-English)    Hearing Difficulty or Deaf: no      Cognitive  Cognitive/Neuro/Behavioral: .WDL except  Level of Consciousness: confused  Arousal Level: arouses to voice  Orientation: other (see comments) (not answering orientation questions)  Mood/Behavior: uncooperative  Best Language: 2 - Severe aphasia  Speech: incomprehensible sounds    Living Environment:   People in home: facility resident     Current living Arrangements: residential facility  Name of Facility: [facility]   Able to return to prior arrangements:  Yes; TBD    Family/Social Support:  Care provided by:  (staff)  Provides care for: no one  Marital Status:  (not known)  Support system: (niece, nephew)          Description of Support System: Supportive, Involved       Current Resources:   Patient receiving home care services: No  Community Resources: Transportation Services  Equipment currently used at home:  Lift, hospital bed  Supplies currently used at home: Wound Care Supplies    Employment/Financial:  Employment Status: retired     Financial Concerns:   None noted   Finance Comments: UCARE/[facility]ARE [facility] DUAL insurance  Does the patient's insurance plan have a 3 day qualifying hospital stay waiver?  No    Functional Status:  Prior to admission patient needed assistance:   Dependent ADLs:: Bathing, Dressing, Grooming, Incontinence, Positioning,  "Transfers, Wheelchair-with assist (can feed self and use a phone \"I hope he can go back to that\")  Dependent IADLs:: Cleaning, Cooking, Laundry, Shopping, Meal Preparation, Medication Management, Money Management, Transportation, Incontinence     Mental Health Status:  Mental Health Status: No Current Concerns       Chemical Dependency Status:  Chemical Dependency Status: No Current Concerns        Values/Beliefs:  Spiritual, Cultural Beliefs, Methodist Practices, Values that affect care: no          Values/Beliefs Comment: Sikhism - would like visit from imam and koran reading even youtube or video    Additional Information:  CM team consulted for discharge planning as well as Patient Safety Issues (Child/Adult Protection/VA);     Please note, a VA report has been already filed by ER team, VA report file number: [id number].    Noted ICU staff is having a difficult time assessing pt orientation even with use of , and baseline unknown.      CM team called (facility name) [facility], phone [phone] and requested to speak with staff who normally care for pt, to request background information on pt's baseline and place of living.  Spoke with LTC nurse who provided the following information:     1. In what kind of facility does pt reside (SNF, [facility], group home)? LTC, Name of building/unit: 2nd floor    2. What do you know about pt's baseline cognition and mobility? Very confused, very combative, resistive of cares, went a month where he would throw stuff at us, called us fat cows or uses the 'b' word when we tried to do wound care and any type of care for him; up with a lift, will refuse to get up in chair; when we can he yells until he can go back in the bed.   3.  Does he have a bed hold? Yes, MA bed hold.  Daughter may say she does not want him to return.   4. Does facility manage medications?   yes   contracted pharmacy? Name:  [facility] team aware per notes \"Niece is expressing that she " "does not want him to return to care facility.\"  CM team will continue to follow for discharge planning.     Attempted to reach pt eliu Yepez via phone [phone]; reached voicemail and left simple voicemail providing ICU CM callback number [phone].    + received callback and obtained information filed in assessment section.    Eliu Yepez states she does have a health care directive which CM-RN requested be emailed to [email or web address].  Eliu explains she knows her uncle can be difficult but \"he has rights too\" \"they just shut the door on him.\"  She is hopeful for, when medically appropriate, for referrals to be made elsewhere; she is hoping he can regain ability to feed self and answer/dial phone.  CM team will assist with referrals to TCU for rehab vs LTC with therapy as appropriate.  Eilu lives in [place] and could not take pt home with her but referrals could be sent with her location in mind.  Eliu states prayer is important to patient and a spiritual health referral for [employer] would be helpful (done).      CM will continue to follow for discharge planning.    Ginger Magana RN, BSN, PHN  [facility]  Inpatient Care Management - FLOAT  ICU CM RN Mobile: [phone] daily 7:30-4:00      "

## 2023-06-30 NOTE — PLAN OF CARE
Neuro:  Lethargic, mumble/garble speech. PERRL.  Moves all extremities , not fallow commands.   Cardiovascular:  Afib. Soft BP overnight with MAP >65. No intervention needed at this time. For pain oxycodone was given x 1.    Pulmonary:  RA. LSC diminish. Non productive cough.  GI/: NPO, per speech slabs and sips water is okay.  Adequate UOP. Urine michael, cloudy, with sediment. No BM  this shift.  Endocrine: Glucose well controlled. On D5NS gtt for low CBGs  Skin:  Intact except wounds to BLE, R chest, and coccyx. Protective mepilex applied to sacrum, legs dressed per WOC.   Family : sister and nephew at bedside, update with plan of care.

## 2023-06-30 NOTE — PROGRESS NOTES
PICC order, and initially attempted in right basilic, but unable to thread PICC past shoulder.  Midline kit obtained, 15cm midline placed through initial insertion without complication.  ICU MD and RN aware of PICC placement issue and conversion to midline procedure.

## 2023-06-30 NOTE — PROGRESS NOTES
[facility]    Medicine Progress Note - Hospitalist Service    Date of Admission:  6/28/2023    Assessment & Plan   Joselin Sanchez is a 89 year old male admitted on 6/28/2023. He has a past medical history of hypertension, diabetes mellitus type 2, chronic atrial fibrillation, not on anticoagulation, chronic systolic congestive heart failure, last EF 40 to 45% in July 2020, dyslipidemia, diabetic foot ulcer, lymphedema, dementia, BPH who was sent from his facility for evaluation of altered mental status..     # Severe sepsis due to UTI  # Septic shock   - Presented with altered mental status  - Was in A-fib with RVR upon arrival in ER, blood pressure also on lower side, lowest BP 85/62  - White blood cells on admission 11.5 lactic acid 1.3; Pro-Rayn 0.26  - Chest x-ray with no infiltrates  - Nails Catheter was placed in ER, draining cloudy, milky-appearance urine; UA was not able to be performed by the lab as of purulent urine  - CT abdomen/ pelvis- Sequela of chronic bladder outlet obstruction, likely secondary to prostatomegaly  - Unclear if he had urinary retention as Nails catheter was placed for CT imaging  - in ER - he was given a bolus of normal saline 1000 ml and LR 500cc and 1 dose of Zosyn  - Admitted to ICU; continued on IV Zosyn  - 6/29 - Blood pressure improved initially but was intermittently hypotensive overnight  - received a total of 1500cc NS, BP temporary improved, hypotensive again in am 66/40; another bolud NS 1000cc ordered  - BP slightly better 85/54 and mentation better  - he is Full Code as per his records;  - Echo- EF 40-45%. There is moderate global hypokinesia of the left ventricle; he has no history of systolic heart failure with similar EF  - Blood cultures from 6/28, 1 of 2 bottles positive for gram-positive bacilli, resembling diphtheroids; suspected contamination  - Repeat blood culture  - Urine culture positive for 50,000-100,000 colony of yeast  - Given his a  severe sepsis; started on fluconazole 400 mg IV daily on 6/30- - Continue with IV Zosyn at this time  - Discussed with he is a niece. Lucho regarding goals of care; she would like him to have active treatment including IV antibiotics, IV fluids and pressors if needed  - PICC line ordered  - Intensivist consult appreciated  - levophed ordered but not needed  - was hypothermic last night, now temp- WNL  - WBc down to 7.1  - BP improved today   - Follow-up blood cultures and urine cultures  - Monitor fever curve and white blood cells trend     # Acute/ early subacute ischemic infarct  - he has a history of A-fib, not on anticoagulation prior to admission  -Presented with altered mental status, onitially thought to be related to severe sepsis  - CT head on admission negative for acute pathology  - 6/28- noted to have left-sided weakness  - Code stroke called  - CT head- findings concerning for acute/early subacute ischemic infarct  involving the left occipital lobe; No acute intracranial hemorrhage, extra axial fluid collection, or mass effect; Brain atrophy and presumed chronic ischemic changes   - CTA head and neck   1. No definite high-grade stenosis or large vessel occlusion involving the major intracranial arteries.  2. Mild nonflow-limiting atherosclerosis of the bilateral carotid siphons.  3. No intracranial aneurysm or high flow vascular malformation.  4. Mild bilateral carotid bifurcation atherosclerosis, left more than right, with less than 50% stenosis by NASCET criteria.  5. Patent cervical vertebral arteries without flow-limiting stenosis.  6. 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 CTA of the chest could be considered for further evaluation, as indicated.    - Stroke neuro consult appreciated  - He is already on aspirin 81 mg p.o. daily  - MRI of the brain-   Subacute  ischemia left occipital lobe along with tiny focus posterior right frontal lobe near vertex  - Echo-  EF 40-45%. There is moderate global hypokinesia of the left ventricle;  - Consider CTA of the chest tomorrow for further evaluation of thrombosed saccular pseudoaneurysm, if creatinine is stable, likely tomorrow  - Stroke Neuro- recommended anticoagulation for secondary stroke prevention in the setting of atrial fibrillation; risks and benefits of starting anticoagulation to be addressed with his family   - PT/OT  - SLP-recommended n.p.o; he may need NG tube for alternate way of nutrition    # Altered mental status, multifactorial  # Underlying dementia  - most likely due to severe sepsis in the setting of underlying dementia; low blood sugars may also contributing; also found to have an acute stroke-see above  -Treat sepsis as above  - Stroke work-up as above  - Gentle redirectioning, avoid sedating or pain medications as able  - Will need PT/OT evaluation when appropriate  - /care coordinator consult; reported the patient being a vulnerable adult; he was admitted to [facility] the end of March/23 and discharged to [facility]     # KIMO, improving  # Hyperkalemia, resolved  - Creatinine on admission 2.6; Potassium on admission 6.7  - Recommended CKD stage III in Care Everywhere but recent BMP on June/9//23 showed a potassium of 4.1, creatinine 0.86  - This is likely related to severe sepsis, decreased intravascular volume, hypotension  - Noted that prior to admission he was on Lasix, lisinopril, potassium supplementation  - CT of the abdomen and pelvis- Sequela of chronic bladder outlet obstruction, likely secondary to prostatomegaly  - EKG with A-fib with RVR, no evidence of peaked T waves  - Unclear if he had urinary retention as Nails catheter was placed for CT imaging  - FeNa 2.4, urine cr 46.1  - He was given a bolus of normal saline; calcium gluconate, insulin with  dextrose, ampule of bicarb  - Creatinine improved to 2.29-- > 1.78--1.17  - Repeat potassium down to 5.6--5.1--4.1  - Telemetry  - Continue with IV fluids normal saline at 100 cc/h for now; monitor for fluid overload given history of systolic heart failure  - Avoid nephrotoxic drugs  - Hold prior to admission lisinopril, Lasix, potassium supplementation  - Repeat BMP in a.m     # Diabetes mellitus type 2  # Hypoglycemia  - PTA on metformin 500 mg p.o. daily  - Reportedly had low blood sugars upon EMS arrival of 50; D10 was given by EMS  - Blood sugars improved to 110 but then down to 75 last night  - Blood sugars 80--93  - Hemoglobin A1c is low 5.8  - Hold prior to admission metformin and likely not need to resume it after discharge  - Accu-Cheks every 4 hours as he is n.p.o.   - restart D5 NS at 75cc/h since he is npo with borderline BS (not ideal given the new stroke)  - Hypoglycemia protocol  - may need NGT for TF     # A-fib with RVR   - History of chronic A-fib, not on anticoagulation at home  - Was in A-fib with RVR upon arrival  - Received multiple boluses of normal saline since arrival due to hypotension and severe sepsis  - Treat UTI as above  - Currently on D5NS at 75cc/h  -Telemetry-heart rate in 90- 100s     # Systolic congestive heart failure  # h/o Hypertension  - A prior echocardiogram in July/2020 showed EF of 40 to 45%, LV hypokinesis  - PTA on lisinopril 2.5 mg p.o. daily, Lasix 20 mg p.o. daily, along with potassium chloride 20 mg p.o. daily.  - Presented now with evidence of KIMO I and hyperkalemia  - He will need to have his KIMO and hyperkalemia corrected, holding his prior to admission lisinopril, Lasix and KCl for now  - Received multiple boluses NS   - Echo- The visual ejection fraction is 40-45%.There is moderate global hypokinesia of the left ventricle- seems similar to prior Echo  - Monitor fluid status; he does not seem to have evidence of fluid overload at this time.     # Peripheral  "arterial disease  # Diabetic foot ulcer (right heel)  # History of lymphedema  # History of polymicrobial bacterial infection of the legs  - He had an admission to [facility] in [place] from 3/27 through 4/4/2023 for polymicrobial bacterial infection of his legs.  He was taken to the OR by surgery for debridement; he was treated with IV Zosyn for 14 days  - He is wounds does not seem to be infected at this time  - Wound care consult requested  - Podiatry consult  - Xray rt foot to r/o osteomyelitis; if there is concern for MYRNA on Xray- will need MRI rt foot and vascular studies, depending on goals of care  - offloading of site all times     # BPH  - PTA Flomax resumed on admission, now discontinued because of hypotension   - he has Nails cath currently     # Goals of care  - as per his papers- he is full code; he is critically ill at this time; I did discuss with that his  niece Lucho over the phone; I informed her about his current condition including severe sepsis associated with the hypotension for which she is getting multiple boluses of IV fluids; I expressed my concern that that his blood pressure might not stabilize despite aggressive IV hydration;  I also discussed with her that given his advanced age and underlying dementia, very aggressive measures might be futile; I did try to discuss possible transitioning to comfort care but she did not seem to be interested at this time.  She said that she wants us to help him in any way to get better.  She acknowledges that \"he neglected himself for a long time\" before he went to [facility].            Diet: NPO for Medical/Clinical Reasons Except for: No Exceptions    DVT Prophylaxis: Enoxaparin (Lovenox) SQ  Nails Catheter: PRESENT, indication: Obstruction;Retention  Lines: PRESENT             Cardiac Monitoring: ACTIVE order. Indication: Electrolyte Imbalance (24 hours)- Magnesium <1.3 mg/ml; Potassium < =2.8 or > 5.5 mg/ml  Code Status: " "Full Code      Clinically Significant Risk Factors        # Hyperkalemia: Highest K = 5.6 mmol/L in last 2 days, will monitor as appropriate  # Hypernatremia: Highest Na = 148 mmol/L in last 2 days, will monitor as appropriate    # Hypomagnesemia: Lowest Mg = 1.5 mg/dL in last 2 days, will replace as needed   # Hypoalbuminemia: Lowest albumin = 2.4 g/dL at 6/28/2023 12:50 PM, will monitor as appropriate            # Overweight: Estimated body mass index is 26.61 kg/m  as calculated from the following:    Height as of this encounter: 1.7 m (5' 6.93\").    Weight as of this encounter: 76.9 kg (169 lb 8.5 oz)., PRESENT ON ADMISSION  # Severe Malnutrition: based on nutrition assessment, PRESENT ON ADMISSION        Disposition Plan    discharge in 3-4 days?      Allyssa Paz MD  Hospitalist Service  [facility]  Securely message with [employer] (more info)  Text page via [facility] Paging/Directory   ______________________________________________________________________    Interval History   BP improved today but still soft; did not require pressors or boluses  - as per report- was awake in am, sleeping at the time of my visit  - no reported chest pain or SOB  - BS on lower side  - remains npo as per SLP      Physical Exam   Vital Signs: Temp: 96.8  F (36  C) Temp src: Bladder BP: 95/51 Pulse: 85   Resp: 11 SpO2: 100 % O2 Device: None (Room air)    Weight: 169 lbs 8.54 oz    General: sleeping, NAD  HEENT: Head is normocephalic, atraumatic oromucosa is moist  CV: Irregularly irregular, mild tachycardia, no murmurs, rubs  GI-abdomen is soft, nontender,  No rebound, guarding  : Nails catheter in place draining yellow urine  Extremities: Right heel ulcer; extensive wound over his left shin covered with gauze; there is some skin laceration of right lateral aspect of his shin; also some skin excoriation underneath his breasts.   Neurologic: sleeping  Psych: not able to assess    Medical Decision Making "       Medical Decision Making       Tests REVIEWED in the past 24 hours:  - [facility]  - CBC      Allyssa Paz MD          Data     I have personally reviewed the following data over the past 24 hrs:    7.1  \   11.6 (L)   / 135 (L)     148 (H) 123 (H) 30.5 (H) /  71   4.1 15 (L) 1.17 \       Imaging results reviewed over the past 24 hrs:   Recent Results (from the past 24 hour(s))   XR Thoracic Lumbar Standing 2 Views    Narrative    EXAM: XR THORACIC LUMBAR STANDING 2 VIEWS  LOCATION: [facility]  DATE: 6/29/2023    INDICATION: Assess hard ware for MRI  COMPARISON: None.      Impression    IMPRESSION: In the thoracolumbar region there is no evidence of hardware.    MR Brain w/o & w Contrast    Narrative    EXAM: MR BRAIN W/O and W CONTRAST  LOCATION: [facility]  DATE: 6/29/2023    INDICATION: Follow-up stroke.  COMPARISON: 29 2023.  CONTRAST: 8mL Gadavist  TECHNIQUE: MRI brain without and with contrast.    FINDINGS: On the diffusion-weighted images there is ischemia involving the left occipital lobe along with a tiny focus within the subcortical white matter of the posterior right frontal lobe near the vertex. No other focus of ischemia or restricted   diffusion is visualized. There is no discrete mass lesion or midline shift. There is no acute extra-axial fluid collection or acute intraparenchymal hemorrhage. There are appropriate flow voids within the cavernous portions of the internal carotid   arteries and the basilar artery. The areas of ischemia visualized on the FLAIR and diffusion-weighted images are visualized on the FLAIR and T2 to weighted images consistent with subacute ischemia. On the FLAIR and T2-weighted images there are multiple   foci of high signal within the periventricular and subcortical white matter. The ventricular system, basal cisterns and the cortical sulci are consistent with diffuse volume loss. Following the administration of  contrast no abnormal enhancement is   visualized.    There is no evidence of cerebellar tonsillar ectopia. Corpus callosum and the sella region have appropriate configuration and signal intensity for the patient's. The orbit regions are unremarkable. There is no significant paranasal sinus disease. The   mastoid air cells and the middle ear regions are clear.      Impression    IMPRESSION:  1. Subacute ischemia left occipital lobe along with tiny focus posterior right frontal lobe near vertex.  2. No evidence of a midline shift or hemorrhage.  3. No abnormal enhancement.  4. Diffuse age related changes.    These findings were communicated by phone to Nurse Erich Henley at 12:29 AM on 06/30/2023.

## 2023-06-30 NOTE — PLAN OF CARE
Shift 5630-3326: Hypotension. Neuro: FRANCHESKA orientation, does not follow commands, moves all extremities, mostly mumbles/garbles speech ex able to say a clear word at times. Behavior/mood: intermittent agitation, combativeness. Pulm: Lungs: clear/diminished in upper lobes, diminishes in bases bilaterally, on RA. GI/: BS hypo, no BM. Void: Nails in place: WDL output. Diet: NPO. Access: PIV's, midline.     AM Shift:  - Hypotension--IV Fluid boluses given (see MAR), levo gtt ordered (not started at this time)  - Midline place (PICC was initially attempted)  - Podiatry consult ordered  - CT completed, MRI ordered

## 2023-06-30 NOTE — PROGRESS NOTES
[facility] called to discuss MRI findings. Notified Neuro critical care regarding MRI results.

## 2023-06-30 NOTE — PLAN OF CARE
Goal Outcome Evaluation:      Plan of Care Reviewed With:  (Interdisciplinary bedside rounds)          Outcome Evaluation: NPO pending SLP eval -- if unable to advance diet in the next 24-48 hours, consider TF    Portia Dove RD, LD, CNSC   Clinical Dietitian - [facility]

## 2023-07-01 NOTE — PLAN OF CARE
Physical Therapy: Orders received. Chart reviewed and discussed with care team.? Physical Therapy not indicated due to pt using lift for transfers at LTC facility, often refusing therapy. OT to trial initiation of therapy in hospital as pt/family's goal is for pt to be able to use the phone and participate in self-feeding again. Will defer PT to next level of care at this time. It pt successfully participates in OT and is making gains with mobility, will initiate PT intervention with new orders.? Defer discharge recommendations to OT/medical team, anticipate return to LTC/TCU.? Will complete orders.

## 2023-07-01 NOTE — PROVIDER NOTIFICATION
Paged hospitalist  VICENTA Coughing/Choking after water given. Will place back on NPO. Do you want a chest x ray to r/o aspiration?

## 2023-07-01 NOTE — PROGRESS NOTES
[place] [facility]      ASSESSMENT:  89-year old male with past medical history including hypertension, diabetes mellitus type 2, chronic atrial fibrillation, not on anticoagulation, chronic systolic congestive heart failure, last EF 40 to 45% in July 2020, dyslipidemia, diabetic foot ulcer, lymphedema, dementia, BPH who was sent from his facility for evaluation of altered mental status and is in ICU being treated for severe sepsis/ septic shock due to UTI.    Podiatry consulted for posterior left heel wound.  XR negative for any bone loss that would support the diagnosis of osteomyelitis.  Although the wound base is fibro-necrotic, there are no clinical signs of infection.    MEDICAL DECISION MAKING:  Given that there are no clinical signs of infection and the negative XR for calcaneal osteomyelitis, there is no indication for any surgical intervention at this time.      I will consult the [facility] RN service for wound care recommendations.   The wound base might need excisional debridement at some point and given his tenderness in the region, likely would be done in the OR.  No urgency.     Another concern is possible PAD. He might not have adequate vascular status for healing this wound.      At his age, and current medical status, I am not sure how aggressive treatment should be.     I will defer decision for vascular testing, additional workup.  to internal medicine.  There is no current need for foot surgery.   If wound cannot be converted to a healthier wound bed with local wound cares, please re-consult podiatry for possible excisional debridement in OR.    Thank you.       Urbano Powers DPM, FACFAS, MS  [facility] Department of Podiatry/Foot & Ankle Surgery  [phone]      _______________________________________________________________________        EXAM:    B/P: 112/77, T: 97, P: 93, R: 12    Vascular: pedal pulses on the left are not readily palpable.  Generalized LE  edema    Neurological: pain with movement of the left leg    Dermatological: Right posterior heel: fibro-necrotic wound bed. No cellulitis or significant drainage to site. Palpable bone to site. Site appears tender to touch.     EXAM: XR CALCANEUS PORT RIGHT G/E 2 VIEWS  LOCATION: [facility]  DATE: 6/30/2023     INDICATION: right heel ulcer   evaluate for osteomyelitis  COMPARISON: None.                                                                   IMPRESSION: 2 cm partial-thickness soft tissue ulceration overlying the posterior margin of the calcaneus. No osseous volume loss suggest osteomyelitis. Adjacent soft tissue. Osteopenia. No ankle joint effusion.    Labs:     No results found for: INR  No components found for: ESR  @BREIFLAB(crp)@    Lab Results   Component Value Date    WBC 7.1 06/30/2023     Lab Results   Component Value Date    RBC 3.67 06/30/2023     Lab Results   Component Value Date    HGB 11.6 06/30/2023     Lab Results   Component Value Date    HCT 37.1 06/30/2023     No components found for: MCT  Lab Results   Component Value Date     06/30/2023     Lab Results   Component Value Date    MCH 31.6 06/30/2023     Lab Results   Component Value Date    MCHC 31.3 06/30/2023     Lab Results   Component Value Date    RDW 17.9 06/30/2023     Lab Results   Component Value Date     06/30/2023       Hemoglobin   Date Value Ref Range Status   06/30/2023 11.6 (L) 13.3 - 17.7 g/dL Final

## 2023-07-01 NOTE — CONSULTS
Clinical Nutrition Brief Note    Received Provider Order to start tube feeding   See RD assessment note from yesterday 6/30 for more details   Chart reviewed  Nasoenteric feeding tube ordered - not yet placed/confirmed     Labs and medications reviewed   - D5 IVF at 75 mL/hr = 90 g CHO, 306 kcal   - Na 153 (H), K 4.4, Mg 1.8 (6/30), Phos 3.1 (6/29)    ASSESSED NEEDS:  DW 76.1 kg   Energy Needs: 0188-6257 kcals (25-30 Kcal/Kg): maintenance  Protein Needs:  grams (1.2-1.5 g pro/Kg): hypercatabolism with acute illness    Once FT access confirmed, begin TF Jevity 1.5 at 15 mL/hr. Advance by 15 mL every 12 hours to goal rate of 60 mL/hr. Hold on advancing TF further if refeeding labs low (K <3, Mg <1.5, Phos <1.9). Replace and recheck electrolytes daily as needed. Consider change IVF to non-dextrose with TF start to prevent overfeeding     Jevity 1.5 @ goal of 60ml/hr (1440ml/day) provides: 2160 kcals (28 kcal/kg), 91 g PRO (1.2 g/kg), 1094 ml free H20, 310 g CHO, and 30 g fiber daily  Free water flush 60 mL q 4 hrs while on IVF (can increase once off IVF - or continue with patency flushes per MD given h/o CKD/CHF and current hypernatremia)    Once at TF goal, total provisions with D5 IVF if remains = 2466 kcal (108% estimated needs), 400 g CHO    Will continue to follow per protocol and adjust order as needed     Myriam Peoples, RD, LD  Clinical Dietitian   Weekend pager [phone]

## 2023-07-01 NOTE — PROGRESS NOTES
"      [facility]    Stroke Progress Note    Interval EventsNotified by RN regarding L pupil >R pupil and irregular. Pupil exam not documented in prior stroke notes. Family unable to state whether patient has had prior surgery on his eyes. Exam otherwise stable/improving, patient is alert.    Head CT reviewed and shows expected evolution of L occipital infarct, no hemorrhage or other acute findings.    No change to prior assessment/recommendations. Please see my colleague Evie Maharaj CNP note from yesterday (6/30) for full assessment and plan.      No further stroke evaluation is recommended, so we will sign off. Please contact us with any additional questions.    The Stroke Staff is Dr. Hernandez.     Susu Solis PA-C  Vascular Neurology Fellow    To page me or covering stroke neurology team member, click here: [email or web address]  Choose \"On Call\" tab at top, then select \"NEUROLOGY/ALL SITES\" from middle drop-down box, press Enter, then look for \"stroke\" or \"telestroke\" for your site.  "

## 2023-07-01 NOTE — PROGRESS NOTES
[facility]    Medicine Progress Note - Hospitalist Service    Date of Admission:  6/28/2023    Assessment & Plan   Joselin Sanchez is a 89 year old male admitted on 6/28/2023. He has a past medical history of hypertension, diabetes mellitus type 2, chronic atrial fibrillation, not on anticoagulation, chronic systolic congestive heart failure, last EF 40 to 45% in July 2020, dyslipidemia, diabetic foot ulcer, lymphedema, dementia, BPH who was sent from his facility for evaluation of altered mental status..     # Severe sepsis due to UTI  # Septic shock, resolved  # Bacteriemia  - Presented with altered mental status  - Was in A-fib with RVR upon arrival in ER, blood pressure also on lower side, lowest BP 85/62  - White blood cells on admission 11.5 lactic acid 1.3; Pro-Ryan 0.26  - Chest x-ray with no infiltrates  - Nails Catheter was placed in ER, draining cloudy, milky-appearance urine; UA was not able to be performed by the lab as of purulent urine  - CT abdomen/ pelvis- Sequela of chronic bladder outlet obstruction, likely secondary to prostatomegaly  - Unclear if he had urinary retention as Nails catheter was placed for CT imaging  - in ER - he was given a bolus of normal saline 1000 ml and LR 500cc and 1 dose of Zosyn  - Admitted to ICU; continued on IV Zosyn  - 6/29 - Blood pressure improved initially but was intermittently hypotensive overnight  - received a total of 1500cc NS, BP temporary improved, hypotensive again in am 66/40; another bolud NS 1000cc ordered  - BP slightly better 85/54 and mentation better  - he is Full Code as per his records;  - Echo- EF 40-45%. There is moderate global hypokinesia of the left ventricle; he has no history of systolic heart failure with similar EF  - Intensivist consult appreciated  - levophed ordered but not needed  - BP improved  - was hypothermic 2 nights ago, now temp- WNL  - WBc down to 7.1  - Blood cultures from 6/28, 1 of 2 bottles positive  for Corynebacterium striatum, and 1/2 bottles positive for GPC in clusters, initially suspected contamination  - Urine culture positive for 50,000-100,000 colony of yeast/Candida Dubliniensis  - Given his a severe sepsis; started on fluconazole 400 mg IV daily on 6/29, day 3  - Continue with IV Zosyn at this time, day 4  - Discussed with his a niece. Lucho regarding goals of care; she would like him to have active treatment including IV antibiotics, IV fluids and pressors if needed  - repeat BC on 6/29- 1/2 bottles again positive for GPC in clusters  - MRSA swab  - start Vanco as per pharmacy dosing  - repeat BC  - ID consult  - Follow-up blood cultures and urine cultures  - Monitor fever curve and white blood cells trend     # Acute/ early subacute ischemic infarct  - he has a history of A-fib, not on anticoagulation prior to admission  -Presented with altered mental status, onitially thought to be related to severe sepsis  - CT head on admission negative for acute pathology  - 6/28- noted to have left-sided weakness  - Code stroke called  - CT head- findings concerning for acute/early subacute ischemic infarct  involving the left occipital lobe; No acute intracranial hemorrhage, extra axial fluid collection, or mass effect; Brain atrophy and presumed chronic ischemic changes   - CTA head and neck   1. No definite high-grade stenosis or large vessel occlusion involving the major intracranial arteries.  2. Mild nonflow-limiting atherosclerosis of the bilateral carotid siphons.  3. No intracranial aneurysm or high flow vascular malformation.  4. Mild bilateral carotid bifurcation atherosclerosis, left more than right, with less than 50% stenosis by NASCET criteria.  5. Patent cervical vertebral arteries without flow-limiting stenosis.  6. 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 CTA  of the chest could be considered for further evaluation, as indicated.    - Stroke neuro consult appreciated  - PTA on aspirin 81 mg p.o. daily  - MRI of the brain-   Subacute ischemia left occipital lobe along with tiny focus posterior right frontal lobe near vertex  - Echo-  EF 40-45%. 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 No evidence of active hemorrhage.  - 7/1- concern for left pupil irregular and larger that rt pupil (unclear if new)  - repeat CT head-  Subacute infarct left occipital lobe as seen on previous MRI, no hemorrhagic transformation     - Stroke Neuro- recommended anticoagulation for secondary stroke prevention in the setting of atrial fibrillation (see note from 6/30)  - 7/1- start heparin drip with no boluses; stop PTA ASA now; discontinue Lovenox  - called niece to discuss anticoagulation with her, left a message, no call back    - PT/OT- rec LTC  - SLP-initially rec n.p.o  - NGT placed today and TF started as per nutritionist  - 7/1- SLP allowed full liquid diet but I was paged by RN because he had a coughing spell/chocking with water  - keep npo for now with NGT feedings  - SLP to reassess iin the following days  - depending on progress and goals of care- he may need PEG?    # Altered mental status, multifactorial, improved  # Underlying dementia  - most likely due to severe sepsis in the setting of underlying dementia; low blood sugars may also contributing; also found to have an acute stroke-see above  -Treat sepsis as above  - Stroke work-up as above  - Gentle redirectioning, avoid sedating or pain medications as able  - PT/OT rec LTC  - /care coordinator consult; reported the patient being a vulnerable adult; he was admitted to [facility] the end of March/23 and discharged to [facility]     # KIMO, improving  #  Hyperkalemia, resolved  - Creatinine on admission 2.6; Potassium on admission 6.7  - Recommended CKD stage III in Care Everywhere but recent BMP on June/9//23 showed a potassium of 4.1, creatinine 0.86  - This is likely related to severe sepsis, decreased intravascular volume, hypotension  - Noted that prior to admission he was on Lasix, lisinopril, potassium supplementation  - CT of the abdomen and pelvis- Sequela of chronic bladder outlet obstruction, likely secondary to prostatomegaly  - EKG with A-fib with RVR, no evidence of peaked T waves  - Unclear if he had urinary retention as Nails catheter was placed for CT imaging  - FeNa 2.4, urine cr 46.1  - He was given a bolus of normal saline; calcium gluconate, insulin with dextrose, ampule of bicarb  - Creatinine improved to 2.29-- > 1.78--1.17--1.03  - Repeat potassium down to 5.6--5.1--4.1  - Telemetry  - currently on D5NS at 75cc/h given borderline low BS  - Avoid nephrotoxic drugs  - Continue to hold prior to admission lisinopril, Lasix, potassium supplementation  - Repeat BMP in a.m     # Diabetes mellitus type 2  # Hypoglycemia  - PTA on metformin 500 mg p.o. daily  - Reportedly had low blood sugars upon EMS arrival of 50; D10 was given by EMS  - Blood sugars improved to 110 but then down to 75 last night  - Blood sugars 80--93  - Hemoglobin A1c is low 5.8  - Hold prior to admission metformin and likely not need to resume it after discharge  - Accu-Cheks every 4 hours as he is n.p.o.   - restart D5 NS at 75cc/h since he is npo with borderline BS (not ideal given the new stroke)  - started on NGT feeding today, if BS stable, will stop D5 NS tomorrow  - Hypoglycemia protocol    # A-fib with RVR   - History of chronic A-fib, not on anticoagulation at home  - Was in A-fib with RVR upon arrival  - Received multiple boluses of normal saline since arrival due to hypotension and severe sepsis  - Treat UTI as above  - Currently on D5NS at 75cc/h  -Telemetry-heart  rate in 90- 100s     # Systolic congestive heart failure  # h/o Hypertension  - A prior echocardiogram in July/2020 showed EF of 40 to 45%, LV hypokinesis  - PTA on lisinopril 2.5 mg p.o. daily, Lasix 20 mg p.o. daily, along with potassium chloride 20 mg p.o. daily.  - Presented now with evidence of KIMO I and hyperkalemia  - He will need to have his KIMO and hyperkalemia corrected, holding his prior to admission lisinopril, Lasix and KCl for now  - Received multiple boluses NS   - Echo- The visual ejection fraction is 40-45%.There is moderate global hypokinesia of the left ventricle- seems similar to prior Echo  - Monitor fluid status;   - he does not seem to have evidence of fluid congestion n his lungs but his rt leg is more swollen  - consider resuming PTA Lasix if BP stable      # Peripheral arterial disease  # Diabetic foot ulcer (right heel)  # History of lymphedema  # History of polymicrobial bacterial infection of the legs  - He had an admission to [facility] in [place] from 3/27 through 4/4/2023 for polymicrobial bacterial infection of his legs.  He was taken to the OR by surgery for debridement; he was treated with IV Zosyn for 14 days  - He is wounds does not seem to be infected at this time  - Wound care consult requested  - Podiatry consult appreciated  - Xray rt foot - 2 cm partial-thickness soft tissue ulceration overlying the posterior margin of the calcaneus. No osseous volume loss suggest osteomyelitis  - as per Podiatry- The wound base might need excisional debridement at some point and given his tenderness in the region, likely would be done in the OR.  No urgency.   - If wound cannot be converted to a healthier wound bed with local wound cares, please re-consult podiatry for possible excisional debridement in OR.    - offloading of site all times     # BPH  - Nails placed in ER  - PTA Flomax resumed on admission, now discontinued because of hypotension   - he has Nails cath  "currently   - will resume Flomax now given that BP improved  - consider TOV in the next few days    # Dysphagia   - SLP-initially rec n.p.o  - NGT placed today and TF started as per nutritionist  - 7/1- SLP allowed full liquid diet but I was paged by RN because he had a coughing spell/chocking with water  - keep npo for now with NGT feedings  - SLP to reassess iin the following days  - depending on progress and goals of care- he may need PEG?    # Hypernatremia  - Na 153  - likely due to free water deficit  - currently getting D5 NS at 75cc.h; also getting free water flushes as per nutritionist  - BMP in am    # Right hip pain  - X ray rt hip- no fracture  - pain management     # Goals of care  - as per his papers- he is full code; he is critically ill at this time; I did discuss with that his  niece Lucho over the phone; I informed her about his current condition including severe sepsis associated with the hypotension for which she is getting multiple boluses of IV fluids; I expressed my concern that that his blood pressure might not stabilize despite aggressive IV hydration;  I also discussed with her that given his advanced age and underlying dementia, very aggressive measures might be futile; I did try to discuss possible transitioning to comfort care but she did not seem to be interested at this time.  She said that she wants us to help him in any way to get better.  She acknowledges that \"he neglected himself for a long time\" before he went to [facility].  - if he remains high risk of aspiration, will need discussion about alternate ways of nutrition.           Diet: NPO for Medical/Clinical Reasons Except for: No Exceptions  Adult Formula Drip Feeding: Continuous Jevity 1.5; Other - Specify in Comment; Goal Rate: 60; mL/hr; Once placement confirmed, begin TF at 15 mL/hr and advance by 15 mL every 12 hours to goal; Do not advance tube feeding rate unless K+ is = or > 3...    DVT Prophylaxis: heparin " "drip  Nails Catheter: PRESENT, indication: Obstruction;Retention  Lines: PRESENT             Cardiac Monitoring: ACTIVE order. Indication: Electrolyte Imbalance (24 hours)- Magnesium <1.3 mg/ml; Potassium < =2.8 or > 5.5 mg/ml  Code Status: Full Code      Clinically Significant Risk Factors         # Hypernatremia: Highest Na = 153 mmol/L in last 2 days, will monitor as appropriate    # Hypomagnesemia: Lowest Mg = 1.5 mg/dL in last 2 days, will replace as needed   # Hypoalbuminemia: Lowest albumin = 2.4 g/dL at 6/28/2023 12:50 PM, will monitor as appropriate            # Overweight: Estimated body mass index is 27.3 kg/m  as calculated from the following:    Height as of this encounter: 1.7 m (5' 6.93\").    Weight as of this encounter: 78.9 kg (173 lb 15.1 oz)., PRESENT ON ADMISSION  # Severe Malnutrition: based on nutrition assessment, PRESENT ON ADMISSION        Disposition Plan    discharge in 3-4 days?      Allyssa Paz MD  Hospitalist Service  [facility]  Securely message with [employer] (more info)  Text page via [facility] Paging/Directory   ______________________________________________________________________    Interval History   BP improved today and stable  - he is awake, alert, responds \"yes\" or \"no\" to simple questions;   - denies SOB, no chest pain  - reports right hip pain  - he was allowed to have full liquid diet today but he choked and coughed  - I called his niece Lucho to update her about multiple medical issues and I left a message asking to call back, no call back .      Physical Exam   Vital Signs: Temp: 97  F (36.1  C) Temp src: Bladder BP: 112/77 Pulse: 93   Resp: 12 SpO2: 98 % O2 Device: None (Room air)    Weight: 173 lbs 15.09 oz    General: awake, alert,   HEENT: Head is normocephalic, atraumatic oromucosa is moist  CV: Irregularly irregular, mild tachycardia, no murmurs, rubs  GI-abdomen is soft, nontender,  No rebound, guarding  : Nails catheter in place " draining yellow urine  Extremities: Right heel ulcer; extensive wound over his left shin covered with gauze; there is some skin laceration of right lateral aspect of his shin; also some skin excoriation underneath his breasts.   Neurologic: generalized weakness  Psych: normal mood    Medical Decision Making       65 MINUTES SPENT BY ME on the date of service doing chart review, history, exam, documentation & further activities per the note.  MANAGEMENT DISCUSSED with the following over the past 24 hours: RN   NOTE(S)/MEDICAL RECORDS REVIEWED over the past 24 hours: Podiatry note, Neurology note  Tests ORDERED & REVIEWED in the past 24 hours:  - Xray rt hip, CTA chest      Allyssa Paz MD          Data     I have personally reviewed the following data over the past 24 hrs:    N/A  \   N/A   / N/A     153 (H) 131 (H) 20.0 /  84   4.4 17 (L) 1.03 \       Imaging results reviewed over the past 24 hrs:   Recent Results (from the past 24 hour(s))   XR Calcaneus Port Right G/E 2 Views    Narrative    EXAM: XR CALCANEUS PORT RIGHT G/E 2 VIEWS  LOCATION: [facility]  DATE: 6/30/2023    INDICATION: right heel ulcer   evaluate for osteomyelitis  COMPARISON: None.      Impression    IMPRESSION: 2 cm partial-thickness soft tissue ulceration overlying the posterior margin of the calcaneus. No osseous volume loss suggest osteomyelitis. Adjacent soft tissue. Osteopenia. No ankle joint effusion.   XR Abdomen Port 1 View    Narrative    EXAM: XR ABDOMEN PORT 1 VIEW  LOCATION: [facility]  DATE: 7/1/2023    INDICATION: Nasogastric tube placement.  COMPARISON: CT abdomen pelvis 06/28/2023.      Impression    IMPRESSION: Feeding tube tip is in the expected location of the stomach body. Internal stylette remains in place. No dilated bowel loops in the upper abdomen. Visualized lung bases are clear.

## 2023-07-01 NOTE — PROGRESS NOTES
SPIRITUAL HEALTH SERVICES: Tele-Encounter  Patient Location: ICU  Spoke with: Ptnt's Niece Mariama    Referral Source:  Consult request for an Imam.    Intervention: Reviewed documentation.  Ptnt Joselin was sleeping when I visited and no family was present.     Nursing shared his niece is the main contact; I rang his niece Mariama at number listed in Epic.     I informed her that we don't have an Imam available today but would try to have a Church  visit when possible, if he is able to speak.    Mariama said she thought he was doing better today and would appreciate the effort. She added that she and her family also might be able to bring someone they know into hospital to visit, which I encouraged.    Plan: I've triaged for  follow-up.    Rev Xiao Chi  Associate   [facility] Phone Line [phone]  [place] (Tuesdays & Thursdays) [phone]  ______________________________    Type of service:  Telephone Visit     has received verbal consent for a TelephoneVisit from the patient? Yes    Distance Provider Location: designated [place] office or home office (secure setting)    Mode of Communication: telephone (via "[employer] phone or [employer] tele-call-number ([phone]))

## 2023-07-01 NOTE — PLAN OF CARE
Patient alert and oriented to self only. Garbled speech. Restless but redirectable. Follows commands intermittently. Moves extremities purposely. Pupils PERRL. Dressings to Coccyx and LEs. Blood cultures positive gram positive cocci in clusters, and rapid ID with not targets, MD notified ; no new orders  Nails patent with adequate output. No BM overnight.

## 2023-07-01 NOTE — PROGRESS NOTES
07/01/23 1053   Appointment Info   Signing Clinician's Name / Credentials (OT) Ronal Rose EdD, OTR/L       Present no   Language Family members there during this session - would need a Ethiopian  to be available going forward   Living Environment   People in Home facility resident   Current Living Arrangements extended care facility   Home Accessibility no concerns   Transportation Anticipated agency   Living Environment Comments Pt living in a LTC facility.  Chart notes indicate pt needing lift for out of bed activity, help with most ADLS.  Niece present during session, stated that pt was doing some self-feeding and using the phone, hopes that he can get back to that   Self-Care   Usual Activity Tolerance poor   Current Activity Tolerance poor   Regular Exercise No   Equipment Currently Used at Home lift device  (Mario lift)   Fall history within last six months no   Activity/Exercise/Self-Care Comment pt has recently refused therapy at LTC facility   General Information   Onset of Illness/Injury or Date of Surgery 06/30/23   Referring Physician Allyssa Paz   Patient/Family Therapy Goal Statement (OT) Niece has goal of getting pt as healthy as she can, hopefully back to using the phone and self-feeding   Additional Occupational Profile Info/Pertinent History of Current Problem Pt is a 89 year old male admitted from LTC with sepsis, altered mental status, and possible stroke.  Subacute left occipital infarct identified with testing here.  Pt speaks Ethiopian and does not understand English.  Niece doing interpreting with pt during this session.  PMH includes HTN, DM2, chronic Afib, chronic CHF, diabetic foot ulcer, lymphedema, dyslipidemia, dementia   Performance Patterns (Routines, Roles, Habits) Pt has had limited activity at LTC and has often refused therapy.  Communication is difficult with the language difference and the dementia.   Existing Precautions/Restrictions fall    General Observations and Info Pt alert and in bed, talking to his niece and her SO   Cognitive Status Examination   Orientation Status person  (niece reports that pt knows her but otherwise not oriented)   Visual Perception   Visual Impairment/Limitations corrective lenses full-time   Pain Assessment   Patient Currently in Pain Yes, see Vital Sign flowsheet   Range of Motion Comprehensive   General Range of Motion upper extremity range of motion deficits identified   Comment, General Range of Motion Appears to have close to WFL range in arms.  Not consistent with moving his arms on his own   Strength Comprehensive (MMT)   General Manual Muscle Testing (MMT) Assessment upper extremity strength deficits identified   Comment, General Manual Muscle Testing (MMT) Assessment has some weakness in both arms but did try sporatically resisting me.  General weakness and disability   Coordination   Upper Extremity Coordination No deficits were identified   Clinical Impression   Criteria for Skilled Therapeutic Interventions Met (OT) Yes, treatment indicated   OT Diagnosis decreased independence and safety for ADLS and IADLS   Influenced by the following impairments weakness, confusion, language barrier, resists activity at times   OT Problem List-Impairments impacting ADL problems related to;activity tolerance impaired;cognition;inability to direct their own care;range of motion (ROM);strength   ADL comments/analysis pt did try some sporatic motions to use his left hand for cares.   Assessment of Occupational Performance 5 or more Performance Deficits   Identified Performance Deficits decreased independence and safety for bathing, dressing, functional mobility, feeding, leisure and exercise tasks/routine   Planned Therapy Interventions (OT) ADL retraining;strengthening;ROM   Clinical Decision Making Complexity (OT) low complexity   Risk & Benefits of therapy have been explained evaluation/treatment results reviewed;care  plan/treatment goals reviewed;caregiver  (eliu and her SO)   Clinical Impression Comments will try a trial of therapy with  support as able   OT Total Evaluation Time   OT Eval, Low Complexity Minutes (73218) 15   OT Goals   Therapy Frequency (OT) 3 times/wk   OT Predicted Duration/Target Date for Goal Attainment 07/08/23   OT Goals Hygiene/Grooming;OT Goal 1   OT: Hygiene/Grooming moderate assist;using adaptive equipment   OT: Goal 1 Pt will participate in 10+ minutes B UE AROM to increase endurance for assisting caregivers at [facility]   Interventions   Interventions Quick Adds Therapeutic Activity   Therapeutic Activities   Therapeutic Activity Minutes (17471) 25   Symptoms noted during/after treatment fatigue;shortness of breath   Treatment Detail/Skilled Intervention OT: Pt awake and communicating with his amado who was in the room.  Complaining of different pains and asking niken to remove lines.  Pt is [place], does not understand English.  Eliu agreed to spend time with me and pt to try some activity.  Pt was able to do a basic UE AROM routine with me using both arms.  Able to get about 60 degrees of shoulder flexion in both arms using AAROM.  Completed 10 B UE shoulder and elbow exercises with Min A for AAROM.  Doing more of this himself the more we did.  Some desatting with activity to about 87%.  Also worked to put his glasses on his face, eliu indicates he wears glasses all the time.  Amado provided confirmation of the assist the patient needs at [facility].  Is a total lift for bed mobility with the harman lift.  May do some hands to face activity for grooming and self-feeding but this has been inconsistent.  Will need an  to communicate and nejessee says pt may refuse people or activity if she is not there.  Pt was very focused on amado helping him with various pains and complaints while she was here.  Will try a trial of therapy to see if pt is able to cooperate more with caregivers.   OT  Discharge Planning   OT Plan OT Plan.  Chetna  for session.  Command following with one step grooming and exercise activity.   OT Discharge Recommendation (DC Rec) Long term care facility   OT Rationale for DC Rec Pt has several medical issues and has been in an SNF for about 3-4 months.  Dependent in most ADLS, will need 24/7 assistance by staff at LTC facility.  Will try a trial of therapy to see if pt will increase his efforts to work with caregivers   OT Brief overview of current status Dependent/max A for most ADLS.  May be able to try to get pt to cooperate with simple feeding/grooming tasks.   Total Session Time   Timed Code Treatment Minutes 25   Total Session Time (sum of timed and untimed services) 40

## 2023-07-02 PROBLEM — R78.81 GRAM-POSITIVE BACTEREMIA: Status: ACTIVE | Noted: 2023-01-01

## 2023-07-02 PROBLEM — B37.7: Status: ACTIVE | Noted: 2023-01-01

## 2023-07-02 PROBLEM — R54 OLD AGE: Chronic | Status: ACTIVE | Noted: 2023-01-01

## 2023-07-02 PROBLEM — B37.49 CANDIDA UTI: Status: ACTIVE | Noted: 2023-01-01

## 2023-07-02 NOTE — PLAN OF CARE
Neuro: Confused; not oriented. Possible slight face droop. Left pupil 3mm and irregualr. Right pupil 2mm. Hospitalist and Neuro crit informed. CT stable.   Cardio: A-fib. BP WDL  Resp: RA. LS course crackles in NIMO  GI: Loose BM. BS active  : Nails intact. Yelena sediment'  Skin: Coccyx wound and RLE wound cleaned and dressed.   Pain: Moans and c/o pain when moving. PRN oxy  Activity: Lift  1600: Patient had a choking/coughing fit on water. Vagled and had a 9 sec pause. NIMO course crackles. O2 satting okay. Hospitalist informed. Monitor for now. PUT BACK ON NPO

## 2023-07-02 NOTE — PLAN OF CARE
Goal Outcome Evaluation:    Summary: Sepsis d/t UTI. Out of ICU 7/1.  DATE & TIME: 7/2/2023 days  Cognitive Concerns/ Orientation : Alert, patient aware of current situation.     BEHAVIOR & AGGRESSION TOOL COLOR: Green  CIWA SCORE: NA   ABNL VS/O2: VSS on room air.   MOBILITY: Ax2 Lift T/R  PAIN MANAGMENT: Chronic lower extremity ulcers, - Scheduled Voltaren gel. Denied pain at rest   DIET: Advanced to full liquid. Started TF on 7/1. D/t high likelihood of refeeding syndrome feeding rate was increased at 1300 to 45ml/hr (goal is 60) q4h 60 ml   BOWEL/BLADDER: Nails patent. Incont of bowel.  ABNL LAB/BG: UC (+) yeast, on IV antifungal. Maintenance fluids discontinued /114. Xa 0.52  DRAIN/DEVICES: Midline infusing heparin @ 450u/hr  and 2 PIVs.   TELEMETRY RHYTHM: Afib CVR  SKIN: Chronic wounds lower extremities, wound care orders in place. Reddened coccyx - wound care due tomorrow. T/R, barrier cream.  TESTS/PROCEDURES: Pending blood cultures  D/C DAY/GOALS/PLACE: TBD - back to LTC?  OTHER IMPORTANT INFO: Code stroke earlier in stay - neurology signed off. ID following. Speaks and understands very minimal english. Nephew translated during beginning of shift

## 2023-07-02 NOTE — PLAN OF CARE
Goal Outcome Evaluation:    Summary: Sepsis d/t UTI. Out of ICU 7/1.  DATE & TIME: 7/1-7/2 [id number]  Cognitive Concerns/ Orientation : Alert, patient aware of current situation.     BEHAVIOR & AGGRESSION TOOL COLOR: Green  CIWA SCORE: NA   ABNL VS/O2: VSS on room air.   MOBILITY: Ax2 Lift  PAIN MANAGMENT: Chronic lower extremity ulcers, painful when touching. Scheduled Voltaren gel. PRN Tylenol and Oxy offered, declined  DIET: NPO due to dysphagia. Started TF on 7/1. Increase rate next at 10am 7/2.   BOWEL/BLADDER: Nails patent. Incont of bowel.  ABNL LAB/BG: UC (+) yeast, on IV antifungal. Low blood sugars at times - on D5NS. Xa 10 0.67, BS 86, 103  DRAIN/DEVICES: Midline infusing heparin @ 650u/hr  and 2 PIVs.   TELEMETRY RHYTHM: Afib CVR  SKIN: Chronic wounds lower extremities, wound care orders in place. Reddened coccyx. T/R, barrier cream.  TESTS/PROCEDURES:   D/C DAY/GOALS/PLACE: TBD - back to LTC?  OTHER IMPORTANT INFO: Code stroke earlier in stay - neurology signed off. ID following. Speaks and understands very minimal english.

## 2023-07-02 NOTE — PROGRESS NOTES
Summary: Sepsis d/t UTI. Out of ICU 7/1.  DATE & TIME: 7/1 Evening  Cognitive Concerns/ Orientation : FRAN[place] -  having hard time hearing/understanding patient. Family in room states he is not confused.   BEHAVIOR & AGGRESSION TOOL COLOR: Green  CIWA SCORE: NA   ABNL VS/O2: VSS on room air. Has had soft blood pressures at times.  MOBILITY: Ax2 Lift  PAIN MANAGMENT: Chronic lower extremity ulcers, painful when touching. Scheduled Voltaren gel. PRN Tylenol and Oxy given x1.  DIET: NPO due to dysphagia. Started TF on 7/1. Increase rate next at 10am 7/2.   BOWEL/BLADDER: Nails patent. Incont of bowel.  ABNL LAB/BG: UC (+) yeast, on IV antifungal. Low blood sugars at times - on D5NS.  DRAIN/DEVICES: Midline and 2 PIVs.   TELEMETRY RHYTHM: Afib CVR  SKIN: Chronic wounds lower extremities, wound care orders in place. Reddened coccyx. T/R, barrier cream.  TESTS/PROCEDURES:   D/C DAY/GOALS/PLACE: TBD - back to LTC?  OTHER IMPORTANT INFO: Code stroke earlier in stay - neurology signed off. ID following. Speaks and understands very minimal english.

## 2023-07-02 NOTE — CONSULTS
"Note INFECTIOUS DISEASES CONSULTATION NOTE  Covering for Kavin Vann & Joselin     Date 2023     Name /  Joselin Scottled   1924     MRN [id number]     Thank you for asking us to see Joselin Sanchez for infectious diseases evaluation  REQUESTING PROVIDER Dr Paz  REASON FOR CONSULT   Patient to be seen Routine - within 24 hours   Reason for Consult 2 BC positive for GPC in clusters       CHIEF COMPLAINT    Altered mental state    HPI    89 yo with DM, A fib, chronic CHF, diabetic foot ulcer, dementia, BPH  Lives in facility    6.28 Sent to ER from living facility for noted altered mental state  Nails noted cloudy, \"milky\" urine  6.29 Admission blood culture noted Corynebacterium striatum      ROS  Pt not communicative to give ROS  Rest of 15 pt ROS negative      OTHER HISTORY  PFSH  Past Medical History:   Diagnosis Date     Anxiety      Candida sepsis (H) 2023    Concurrent w Candida UTI     Candida UTI 2023     Chronic heart failure, unspecified heart failure type (H)      Chronic pain      CKD (chronic kidney disease) stage 3, GFR 30-59 ml/min (H)      Dementia (H)      Diabetes (H)      Dysphagia      Dysuria      Hypertension      Lymphedema      Mixed hyperlipidemia      Mood disturbance      Old age 2023    Year of birth      SHEILA (obstructive sleep apnea)      Peripheral vascular disease (H)      Pressure ulcer of heel      Surg History          Social History     Socioeconomic History     Marital status: Patient Declined     Spouse name: None     Number of children: None     Years of education: None     Highest education level: None     Allergies   Allergen Reactions     Gabapentin          EXAM  /74 (BP Location: Left arm)   Pulse 99   Temp 97.9  F (36.6  C) (Oral)   Resp 16   Ht 1.7 m (5' 6.93\")   Wt 78.9 kg (173 lb 15.1 oz)   SpO2 96%   BMI 27.30 kg/m      genl   In bed     neuro   Eyes open briefly  No apparent attempt to speak  Will move arms / legs a bit     lungs   " Respiring wo apparent trouble     abd   Soft  No apparent tenderness to palpation     skin   Skin of feet peeling, dyspigmented  Both lower legs wrapped in kerlex with some staining / serous-looking stain showing through     extrem   2-4 + edema both lower legs / feet   CV   No murmur for me  Irregular rhythm       DATA  Images  Body CT      Head MRI    Microbiology test results  6/28 BC 1 bottle Corynebacterium striatum  6/28 BC 1 bottle GPC clusters (second / different set from C striatum) - no ID yet  6/29 BC 1 bottle GPC clusters - no ID yet  7/1 BC Neg so far (? One or two sets)    LABS  Recent Labs   Lab Test 07/02/23  0636 06/30/23  0551 06/29/23  0545 06/28/23  1250   WBC 8.2 7.1   < > 11.5*   AST  --   --   --  21   ALT  --   --   --  7   BILITOTAL  --   --   --  0.4   ALKPHOS  --   --   --  115    < > = values in this interval not displayed.          ASSESSMENT   MED DECISION MAKING  Patient Active Problem List   Diagnosis Code     Transient alteration of awareness R40.4     Hypoglycemia E16.2     Sepsis with acute renal failure without septic shock, due to unspecified organism, unspecified acute renal failure type (H) A41.9, R65.20, N17.9     Candida UTI B37.49     Old age R54     Candida sepsis (H) B37.7     Gram-positive bacteremia R78.81     Confusing blood culture report, but seems likely only 1/2 sets with Corynebacterium and only 1/2 sets on day of admission with GPC (not yet identified). A blood culture on next day also with GPC. Not clear yet if same GPC as on day of admit.    Many reasons to have infection (skin of legs/feet, ? Risk aspiration, risk C diff, risk UTI)  Has Candiduria and had milky urine. At risk for true UTI.    Follow-up on ID of all the blood isolates  Follow-up on repeat blood cultures  For now, fluconazole for Candida UTI and iv vanco for C striatum in 1 blood culture and GPC in others    I would support DNR status if family considered. I think if patient arrested in  hospital that chance of recovery to return to precode state would be extremely small but with high risk for suffering related to attempt to resuscitate (chest compressions, intubation, etc).         Bill Crump MD  Covering for Marsha Vale & [facility]  Infectious Disease service  Current Meds Reviewed  Current Facility-Administered Medications   Medication     acetaminophen (TYLENOL) tablet 650 mg    Or     acetaminophen (TYLENOL) Suppository 650 mg     acetaminophen (TYLENOL) tablet 1,000 mg     albuterol (PROVENTIL HFA/VENTOLIN HFA) inhaler     carboxymethylcellulose PF (REFRESH LIQUIGEL) 1 % ophthalmic gel 1 drop     dextrose 10% infusion     dextrose 5% and 0.9% NaCl infusion     glucose gel 15-30 g    Or     dextrose 50 % injection 25-50 mL    Or     glucagon injection 1 mg     diclofenac (VOLTAREN) 1 % topical gel 2 g     docusate (COLACE) 50 MG/5ML liquid 100 mg     dutasteride (AVODART) capsule 0.5 mg     fluconazole (DIFLUCAN) injection 300 mg     folic acid injection 1 mg     heparin infusion 25,000 units in D5W 250 mL ANTICOAGULANT     insulin aspart (NovoLOG) injection (RAPID ACTING)     lidocaine (LMX4) cream     lidocaine 1 % 0.1-1 mL     lidocaine 1 % 0.1-5 mL     naloxone (NARCAN) injection 0.2 mg    Or     naloxone (NARCAN) injection 0.4 mg    Or     naloxone (NARCAN) injection 0.2 mg    Or     naloxone (NARCAN) injection 0.4 mg     OLANZapine zydis (zyPREXA) ODT tab 5 mg     ondansetron (ZOFRAN ODT) ODT tab 4 mg    Or     ondansetron (ZOFRAN) injection 4 mg     oxyCODONE IR (ROXICODONE) half-tab 2.5 mg     piperacillin-tazobactam (ZOSYN) 4.5 g vial to attach to  mL bag     polyethylene glycol (MIRALAX) Packet 17 g     potassium phosphate 9 mmol in D5W 250 mL intermittent infusion     senna-docusate (SENOKOT-S/PERICOLACE) 8.6-50 MG per tablet 1 tablet     sodium chloride (PF) 0.9% PF flush 10-20 mL     sodium chloride (PF) 0.9% PF flush 10-40 mL     sodium chloride (PF) 0.9% PF flush 10-40  mL     sodium chloride (PF) 0.9% PF flush 3 mL     sodium chloride (PF) 0.9% PF flush 3 mL     thiamine (B-1) injection 50 mg     vancomycin (VANCOCIN) 1000 mg in dextrose 5% 200 mL PREMIX

## 2023-07-02 NOTE — PROGRESS NOTES
[facility]    Medicine Progress Note - Hospitalist Service    Date of Admission:  6/28/2023    Assessment & Plan   Joselin Sanchez is a 89 year old male admitted on 6/28/2023. He has a past medical history of hypertension, diabetes mellitus type 2, chronic atrial fibrillation, not on anticoagulation, chronic systolic congestive heart failure, last EF 40 to 45% in July 2020, dyslipidemia, diabetic foot ulcer, lymphedema, dementia, BPH who was sent from his facility for evaluation of altered mental status..     # Severe sepsis due to UTI  # Septic shock, resolved  # Bacteriemia  - Presented with altered mental status  - Was in A-fib with RVR upon arrival in ER, blood pressure also on lower side, lowest BP 85/62  - White blood cells on admission 11.5 lactic acid 1.3; Pro-Ryan 0.26  - Chest x-ray with no infiltrates  - Nails Catheter was placed in ER, draining cloudy, milky-appearance urine; UA was not able to be performed by the lab as of purulent urine  - CT abdomen/ pelvis- Sequela of chronic bladder outlet obstruction, likely secondary to prostatomegaly  - Unclear if he had urinary retention as Nails catheter was placed for CT imaging  - in ER - he was given a bolus of normal saline 1000 ml and LR 500cc and 1 dose of Zosyn  - Admitted to ICU; continued on IV Zosyn  - 6/29 - Blood pressure improved initially but was intermittently hypotensive overnight  - received a total of 1500cc NS, BP temporary improved, hypotensive again in am 66/40; another bolud NS 1000cc ordered  - BP slightly better 85/54 and mentation better  - he is Full Code as per his records;  - Echo- EF 40-45%. There is moderate global hypokinesia of the left ventricle; he has no history of systolic heart failure with similar EF  - Intensivist consult appreciated  - levophed ordered but not needed  - BP improved  - was hypothermic 2 nights ago, now temp- WNL  - WBc down to 7.1  - Blood cultures from 6/28, 1 of 2 bottles positive  for Corynebacterium striatum, and 1/2 bottles positive for GPC in clusters, initially suspected contamination  - Urine culture positive for 50,000-100,000 colony of yeast/Candida Dubliniensis  - Given his a severe sepsis; started on fluconazole 400 mg IV daily on 6/29, day 4  - Continue with IV Zosyn at this time, day 5  - Discussed with his a niece. Lucho regarding goals of care; she would like him to have active treatment including IV antibiotics, IV fluids and pressors if needed  - repeat BC on 6/29- 1/2 bottles again positive for GPC in clusters  - MRSA swab  - 7/1- started Vanco as per pharmacy dosing  - repeat BC  - ID consult appreciated  - Follow-up ID  - Monitor fever curve and white blood cells trend     # Acute/ early subacute ischemic infarct  - he has a history of A-fib, not on anticoagulation prior to admission  -Presented with altered mental status, onitially thought to be related to severe sepsis  - CT head on admission negative for acute pathology  - 6/28- noted to have left-sided weakness  - Code stroke called  - CT head- findings concerning for acute/early subacute ischemic infarct  involving the left occipital lobe; No acute intracranial hemorrhage, extra axial fluid collection, or mass effect; Brain atrophy and presumed chronic ischemic changes   - CTA head and neck   1. No definite high-grade stenosis or large vessel occlusion involving the major intracranial arteries.  2. Mild nonflow-limiting atherosclerosis of the bilateral carotid siphons.  3. No intracranial aneurysm or high flow vascular malformation.  4. Mild bilateral carotid bifurcation atherosclerosis, left more than right, with less than 50% stenosis by NASCET criteria.  5. Patent cervical vertebral arteries without flow-limiting stenosis.  6. 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 CTA of the chest  could be considered for further evaluation, as indicated.    - Stroke neuro consult appreciated  - PTA on aspirin 81 mg p.o. daily  - MRI of the brain-   Subacute ischemia left occipital lobe along with tiny focus posterior right frontal lobe near vertex  - Echo-  EF 40-45%. 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 No evidence of active hemorrhage.  - 7/1- concern for left pupil irregular and larger that rt pupil (unclear if new)  - repeat CT head-  Subacute infarct left occipital lobe as seen on previous MRI, no hemorrhagic transformation     - Stroke Neuro- recommended anticoagulation for secondary stroke prevention in the setting of atrial fibrillation (see note from 6/30)  - 7/1- start heparin drip with no boluses; stop PTA ASA now; discontinue Lovenox  - called niece 7/1 and 7/2  to discuss anticoagulation with her, left a message, no call back   - pharm liaison consult for DOAC coverage  - PT/OT- rec LTC  - SLP-initially rec n.p.o  - NGT placed 7/1 and TF started as per nutritionist  - 7/1- SLP allowed full liquid diet but I was paged by RN because he had a coughing spell/chocking with water  - SLP reassessed today- rec full liquid diet with mildly thick liquids  - depending on progress and goals of care- he may need PEG?    # Acute encephalopathy, multifactorial, infectious, metabolic, improved  # Underlying dementia  - most likely due to severe sepsis in the setting of underlying dementia; low blood sugars may also contributing; also found to have an acute stroke-see above  -Treat sepsis as above  - Stroke work-up as above  - Gentle redirectioning, avoid sedating or pain medications as able  - PT/OT rec LTC  - /care coordinator consult; reported the patient being a vulnerable adult; he was admitted to [facility] the end of March/23 and  discharged to [facility]     # KIMO, resolved  # Hyperkalemia, resolved  - Creatinine on admission 2.6; Potassium on admission 6.7  - Recommended CKD stage III in Care Everywhere but recent BMP on June/9//23 showed a potassium of 4.1, creatinine 0.86  - This is likely related to severe sepsis, decreased intravascular volume, hypotension  - Noted that prior to admission he was on Lasix, lisinopril, potassium supplementation  - CT of the abdomen and pelvis- Sequela of chronic bladder outlet obstruction, likely secondary to prostatomegaly  - EKG with A-fib with RVR, no evidence of peaked T waves  - Unclear if he had urinary retention as Nails catheter was placed for CT imaging  - FeNa 2.4, urine cr 46.1  - He was given a bolus of normal saline; calcium gluconate, insulin with dextrose, ampule of bicarb  - Creatinine improved to 2.29-- > 1.78--1.17--1.03--0.97  - Repeat potassium down to 5.6--5.1--4.1  - Telemetry  - stop iv fluids now  - Avoid nephrotoxic drugs  - Continue to hold prior to admission lisinopril, Lasix, potassium supplementation  - Repeat BMP in a.m     # Diabetes mellitus type 2  # Hypoglycemia  - PTA on metformin 500 mg p.o. daily  - Reportedly had low blood sugars upon EMS arrival of 50; D10 was given by EMS  - Blood sugars improved to 110 but then down to 75 last night  - Blood sugars 80--93  - Hemoglobin A1c is low 5.8  - Hold prior to admission metformin and likely not need to resume it after discharge  - Accu-Cheks every 4 hours as he is n.p.o.   - was on D5 NS at 75cc/h while he was npo with borderline BS (not ideal given the new stroke)  - started on NGT feedings 7/1  - BS stable, stop D5 NS today   - Hypoglycemia protocol    # A-fib with RVR   - History of chronic A-fib, not on anticoagulation at home  - Was in A-fib with RVR upon arrival  - Received multiple boluses of normal saline since arrival due to hypotension and severe sepsis  - Treat UTI as above  -Telemetry-heart rate in 90-  100s     # Systolic congestive heart failure  # h/o Hypertension  - A prior echocardiogram in July/2020 showed EF of 40 to 45%, LV hypokinesis  - PTA on lisinopril 2.5 mg p.o. daily, Lasix 20 mg p.o. daily, along with potassium chloride 20 mg p.o. daily.  - Presented now with evidence of KIMO I and hyperkalemia  - He will need to have his KIMO and hyperkalemia corrected, holding his prior to admission lisinopril, Lasix and KCl for now  - Received multiple boluses NS   - Echo- The visual ejection fraction is 40-45%.There is moderate global hypokinesia of the left ventricle- seems similar to prior Echo  - Monitor fluid status;   - he does not seem to have evidence of fluid congestion n his lungs but his rt leg is more swollen  - consider resuming PTA Lasix as indicated, maybe 7/3 if Na continues to improve     # Peripheral arterial disease  # Diabetic foot ulcer (right heel)  # History of lymphedema  # History of polymicrobial bacterial infection of the legs  - He had an admission to [facility] in [place] from 3/27 through 4/4/2023 for polymicrobial bacterial infection of his legs.  He was taken to the OR by surgery for debridement; he was treated with IV Zosyn for 14 days  - He is wounds does not seem to be infected at this time  - Wound care consult requested  - Podiatry consult appreciated  - Xray rt foot - 2 cm partial-thickness soft tissue ulceration overlying the posterior margin of the calcaneus. No osseous volume loss suggest osteomyelitis  - as per Podiatry- The wound base might need excisional debridement at some point and given his tenderness in the region, likely would be done in the OR.  No urgency.   - If wound cannot be converted to a healthier wound bed with local wound cares, please re-consult podiatry for possible excisional debridement in OR.    - offloading of site all times   - Discussed with his niece, she does not want him to have surgery or amputation.  - Continue wound care    #  "BPH  - Nails placed in ER  - PTA Flomax resumed on admission, now discontinued because of hypotension   - he has Nails cath currently   - will resume Flomax now given that BP improved  - consider TOV in the next few days    # Dysphagia   - SLP-initially rec n.p.o  - NGT placed today and TF started as per nutritionist  - 7/1- SLP allowed full liquid diet but I was paged by RN because he had a coughing spell/chocking with water  - SLP reassessed today- rec full liquid diet with mildly thick liquids  - depending on progress and goals of care- he may need PEG?    # Hypernatremia  - Na 153  - likely due to free water deficit  - getting free water flushes as per nutritionist  - Na 150 today  - BMP in am    # Right hip pain  - X ray rt hip- no fracture  - pain management     # Goals of care  - as per his papers- he is full code; he is critically ill at this time; I did discuss with that his  niece Lucho over the phone; I informed her about his current condition including severe sepsis associated with the hypotension for which she is getting multiple boluses of IV fluids; I expressed my concern that that his blood pressure might not stabilize despite aggressive IV hydration;  I also discussed with her that given his advanced age and underlying dementia, very aggressive measures might be futile; I did try to discuss possible transitioning to comfort care but she did not seem to be interested at this time.  She said that she wants us to help him in any way to get better.  She acknowledges that \"he neglected himself for a long time\" before he went to [facility].  - if he remains high risk of aspiration, will need discussion about alternate ways of nutrition.   - he has multiple complex medical problems including known systolic heart failure, diabetes mellitus, atrial fibrillation, chronic wounds, dementia.  He was admitted with severe sepsis, acute kidney injury, hyperkalemia, stroke, acute encephalopathy.  He was " "seen by multiple specialists including Stroke neurology and ID.  I talked to with his niece, Lucho on 6/29 and at the time he wanted him to continue with aggressive measures; she was did not open to discuss his overall poor prognosis and was not considering a less aggressive approach at that time  - called his niece on 7/1 and left a message, no call back  - called niece again 7/2- left a message again.    Addendum: NieceMariama called back; I updated her about his current medical issues; I told her that he seems to be doing better compared with admission day but I am concerned about his complex medical problems, heart failure, dysphagia, multiple leg wounds, suspected peripheral arterial disease, dementia and anticipation that he would further deteriorate in the future.  She is aware of his overall poor prognosis but wants to continue with active treatment at this time.  I also discussed the CODE STATUS with her.  She stated that \" they are Muslims\" and they want aggressive management, including cardiac and respiratory resuscitation, she wants him to be FULL CODE.    - palliative care consult for goals of care.     Diet: Adult Formula Drip Feeding: Continuous Jevity 1.5; Other - Specify in Comment; Goal Rate: 60; mL/hr; Once placement confirmed, begin TF at 15 mL/hr and advance by 15 mL every 12 hours to goal; Do not advance tube feeding rate unless K+ is = or > 3...  Combination Diet Full Liquid Diet    DVT Prophylaxis: heparin drip  Nails Catheter: PRESENT, indication: Obstruction;Retention  Lines: PRESENT             Cardiac Monitoring: ACTIVE order. Indication: septic shock  Code Status: Full Code      Clinically Significant Risk Factors         # Hypernatremia: Highest Na = 153 mmol/L in last 2 days, will monitor as appropriate    # Hypomagnesemia: Lowest Mg = 1.6 mg/dL in last 2 days, will replace as needed   # Hypoalbuminemia: Lowest albumin = 2.4 g/dL at 6/28/2023 12:50 PM, will monitor as appropriate   # " "Thrombocytopenia: Lowest platelets = 126 in last 2 days, will monitor for bleeding          # Overweight: Estimated body mass index is 27.3 kg/m  as calculated from the following:    Height as of this encounter: 1.7 m (5' 6.93\").    Weight as of this encounter: 78.9 kg (173 lb 15.1 oz)., PRESENT ON ADMISSION  # Severe Malnutrition: based on nutrition assessment, PRESENT ON ADMISSION        Disposition Plan    discharge in 3-4 days?      Allyssa Paz MD  Hospitalist Service  [facility]  Securely message with [employer] (more info)  Text page via [facility] Paging/Directory   ______________________________________________________________________    Interval History    Doing better, was awake and alert in am  - BP remains stable   - responds \"yes\" or \"no\" to simple questions;   - denies SOB, no chest pain    - talked with his niece Lucho again to update her about multiple medical issues-see above      Physical Exam   Vital Signs: Temp: 97.9  F (36.6  C) Temp src: Oral BP: 128/74 Pulse: 99   Resp: 16 SpO2: 96 % O2 Device: None (Room air)    Weight: 173 lbs 15.09 oz    General: awake, alert,   HEENT: Head is normocephalic, atraumatic oral mucosa is moist  CV: Irregularly irregular, mild tachycardia, no murmurs, rubs  GI-abdomen is soft, nontender,  No rebound, guarding  : Nails catheter in place draining yellow urine  Extremities: Right heel ulcer; extensive wound over his left shin covered with gauze; there is some skin laceration of right lateral aspect of his shin; also some skin excoriation underneath his breasts.   Neurologic: generalized weakness  Psych: normal mood    Medical Decision Making       60 MINUTES SPENT BY ME on the date of service doing chart review, history, exam, documentation & further activities per the note.  MANAGEMENT DISCUSSED with the following over the past 24 hours: RN, niece   NOTE(S)/MEDICAL RECORDS REVIEWED over the past 24 hours: ID note  Tests ORDERED & " REVIEWED in the past 24 hours:  - BMP  - CBC      Allyssa Paz MD          Data     I have personally reviewed the following data over the past 24 hrs:    8.2  \   11.7 (L)   / 126 (L)     150 (H) 128 (H) 15.0 /  118 (H)   3.6 16 (L) 0.97 \       Imaging results reviewed over the past 24 hrs:   Recent Results (from the past 24 hour(s))   XR Hip Right 1 View    Narrative    EXAM: XR HIP RIGHT 1 VIEW  LOCATION: [facility]  DATE: 7/1/2023    INDICATION: Right hip pain, rule out fracture.  COMPARISON: None.      Impression    IMPRESSION: Single view of the right hip is negative for fracture or dislocation. Degenerative change at the hip joint. Diffuse demineralization.

## 2023-07-02 NOTE — PROGRESS NOTES
CLINICAL NUTRITION SERVICES - BRIEF NOTE    - TF started yesterday. Patient at refeeding risk so monitoring labs.  - Phos this AM: 2.0 (L)  - Mg 1.6 (L)  - K: WNL    - Spoke with nurse. Discussed holding on increasing TF rate this AM until lytes replaced and WNL. OK to advance rate later today once lytes within normal limits.    Yana Perez  Clinical Dietitian - [facility]

## 2023-07-02 NOTE — PROVIDER NOTIFICATION
MD Notification    Notified Person: MD    Notified Person Name: Dr Paz    Notification Date/Time: 7/1 2110    Notification Interaction: Volcera text    Purpose of Notification: Dr Paz. When patient transferred out of ICU today, the D5NS fluids got dc'd. Do you still want this running? Just checked his blood sugar and it's in the 70s.      Orders Received: Restart D5NS    Comments:

## 2023-07-02 NOTE — PHARMACY-VANCOMYCIN DOSING SERVICE
"Pharmacy Vancomycin Initial Note  Date of Service 2023  Patient's  1924  89 year old, male    Indication: Bacteremia    Current estimated CrCl = Estimated Creatinine Clearance: 43.6 mL/min (based on SCr of 1.03 mg/dL).    Creatinine for last 3 days  2023:  5:45 AM Creatinine 1.78 mg/dL  2023:  5:51 AM Creatinine 1.17 mg/dL  2023:  6:59 AM Creatinine 1.03 mg/dL    Recent Vancomycin Level(s) for last 3 days  No results found for requested labs within last 3 days.      Vancomycin IV Administrations (past 72 hours)      No vancomycin orders with administrations in past 72 hours.                Nephrotoxins and other renal medications (From now, onward)    Start     Dose/Rate Route Frequency Ordered Stop    23 2200  vancomycin (VANCOCIN) 1000 mg in dextrose 5% 200 mL PREMIX         1,000 mg  200 mL/hr over 1 Hours Intravenous EVERY 24 HOURS 23 2130  vancomycin (VANCOCIN) 1,500 mg in 0.9% NaCl 250 mL intermittent infusion         1,500 mg  over 90 Minutes Intravenous ONCE 23 0900  piperacillin-tazobactam (ZOSYN) 4.5 g vial to attach to  mL bag        Note to Pharmacy: For [facility], [facility] and [facility]: For Zosyn-naive patients, use the \"Zosyn initial dose + extended infusion\" order panel.    4.5 g  over 30 Minutes Intravenous EVERY 6 HOURS 23 0847            Contrast Orders - past 72 hours (72h ago, onward)    Start     Dose/Rate Route Frequency Stop    23 1200  iopamidol (ISOVUE-370) solution 80 mL         80 mL Intravenous ONCE 23 1158    23 2330  gadobutrol (GADAVIST) injection 8 mL         8 mL Intravenous ONCE 23 2343    23 1200  iopamidol (ISOVUE-370) solution 125 mL         125 mL Intravenous ONCE 23 1205          [employer] Prediction of Planned Initial Vancomycin Regimen     Loading dose: 1500 mg at 22:00 2023.  Regimen: 1000 mg IV every 24 hours.  Start time: 21:29 on 2023  Exposure " target: AUC24 (range)400-600 mg/L.hr   AUC24,ss: 488 mg/L.hr  Probability of AUC24 > 400: 72 %  Ctrough,ss: 15.6 mg/L  Probability of Ctrough,ss > 20: 27 %  Probability of nephrotoxicity (Lodise ALEXIA 2009): 11 %       Plan:  1. Start vancomycin  1.5gm LD x1, then 1gm q24h   2. Vancomycin monitoring method: AUC  3. Vancomycin therapeutic monitoring goal: 400-600 mg*h/L  4. Pharmacy will check vancomycin levels as appropriate in 1-3 Days.    5. Serum creatinine levels will be ordered daily for the first week of therapy and at least twice weekly for subsequent weeks.      Dorinda Gardner [facility]

## 2023-07-03 NOTE — PROGRESS NOTES
[facility]    Medicine Progress Note - Hospitalist Service    Date of Admission:  6/28/2023    Assessment & Plan   Joselin Sanchez is a 89 year old male admitted on 6/28/2023. He has a past medical history of hypertension, diabetes mellitus type 2, chronic atrial fibrillation, not on anticoagulation, chronic systolic congestive heart failure, last EF 40 to 45% in July 2020, dyslipidemia, diabetic foot ulcer, lymphedema, dementia, BPH who was sent from his facility for evaluation of altered mental status..     # Severe sepsis due to UTI  # Septic shock, resolved  # Bacteriemia  - Presented with altered mental status  - Was in A-fib with RVR upon arrival in ER, blood pressure also on lower side, lowest BP 85/62  - White blood cells on admission 11.5 lactic acid 1.3; Pro-Ryan 0.26  - Chest x-ray with no infiltrates  - Nails Catheter was placed in ER, draining cloudy, milky-appearance urine; UA was not able to be performed by the lab as of purulent urine  - CT abdomen/ pelvis- Sequela of chronic bladder outlet obstruction, likely secondary to prostatomegaly  - Unclear if he had urinary retention as Nails catheter was placed for CT imaging  - in ER - he was given a bolus of normal saline 1000 ml and LR 500cc and 1 dose of Zosyn  - Admitted to ICU; continued on IV Zosyn  - 6/29 - Blood pressure improved initially but was intermittently hypotensive overnight  - received a total of 1500cc NS, BP temporary improved, hypotensive again in am 66/40; another bolud NS 1000cc ordered  - BP slightly better 85/54 and mentation better  - he is Full Code as per his records;  - Echo- EF 40-45%. There is moderate global hypokinesia of the left ventricle; he has no history of systolic heart failure with similar EF  - Intensivist consult appreciated  - levophed ordered but not needed  - BP improved  - was hypothermic 2 nights ago, now temp- WNL  - WBc down to 7.1  - Blood cultures from 6/28, 1 of 2 bottles positive  for Corynebacterium striatum, and 1/2 bottles positive for GPC in clusters, initially suspected contamination  - Urine culture positive for 50,000-100,000 colony of yeast/Candida Dubliniensis  - Given his a severe sepsis; started on fluconazole 400 mg IV daily on 6/29, day 4    - repeat BC on 6/29- 1/2 bottles again positive for GPC in clusters  - MRSA swab   Per ID   - repeat BC  - Follow-up ID  - Monitor fever curve and white blood cells trend     # Acute/ early subacute ischemic infarct  - he has a history of A-fib, not on anticoagulation prior to admission  -Presented with altered mental status, onitially thought to be related to severe sepsis  - CT head on admission negative for acute pathology  - 6/28- noted to have left-sided weakness  - Code stroke called  - CT head- findings concerning for acute/early subacute ischemic infarct  involving the left occipital lobe; No acute intracranial hemorrhage, extra axial fluid collection, or mass effect; Brain atrophy and presumed chronic ischemic changes   - CTA head and neck   1. No definite high-grade stenosis or large vessel occlusion involving the major intracranial arteries.  2. Mild nonflow-limiting atherosclerosis of the bilateral carotid siphons.  3. No intracranial aneurysm or high flow vascular malformation.  4. Mild bilateral carotid bifurcation atherosclerosis, left more than right, with less than 50% stenosis by NASCET criteria.  5. Patent cervical vertebral arteries without flow-limiting stenosis.  6. 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 CTA of the chest could be considered for further evaluation, as indicated.    - Stroke neuro consult appreciated  - PTA on aspirin 81 mg p.o. daily  - MRI of the brain-   Subacute ischemia left occipital lobe along with tiny focus posterior right frontal lobe near vertex  - Echo-  EF  40-45%. 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 No evidence of active hemorrhage.  - 7/1- concern for left pupil irregular and larger that rt pupil (unclear if new)  - repeat CT head-  Subacute infarct left occipital lobe as seen on previous MRI, no hemorrhagic transformation     - Stroke Neuro- recommended anticoagulation for secondary stroke prevention in the setting of atrial fibrillation (see note from 6/30)  - 7/1- start heparin drip with no boluses; stop PTA ASA now; discontinue Lovenox  - called niece 7/1 and 7/2  to discuss anticoagulation with her, left a message, no call back   - pharm liaison consult for DOAC coverage  - PT/OT- rec LTC  - SLP-initially rec n.p.o  - NGT placed 7/1 and TF started as per nutritionist  - 7/1- SLP allowed full liquid diet but I was paged by RN because he had a coughing spell/chocking with water  - SLP reassessed today- rec full liquid diet with mildly thick liquids  - depending on progress and goals of care- he may need PEG?    # Acute encephalopathy, multifactorial, infectious, metabolic, improved  # Underlying dementia  - most likely due to severe sepsis in the setting of underlying dementia; low blood sugars may also contributing; also found to have an acute stroke-see above  -Treat sepsis as above  - Stroke work-up as above  - Gentle redirectioning, avoid sedating or pain medications as able  - PT/OT rec LTC  - /care coordinator consult; reported the patient being a vulnerable adult; he was admitted to [facility] the end of March/23 and discharged to [facility]     # KIMO, resolved  # Hyperkalemia, resolved  - Creatinine on admission 2.6; Potassium on admission 6.7  - Recommended CKD stage III in Care Everywhere but recent BMP on June/9//23 showed a potassium of 4.1, creatinine 0.86  - This is  likely related to severe sepsis, decreased intravascular volume, hypotension  - Noted that prior to admission he was on Lasix, lisinopril, potassium supplementation  - CT of the abdomen and pelvis- Sequela of chronic bladder outlet obstruction, likely secondary to prostatomegaly  - EKG with A-fib with RVR, no evidence of peaked T waves  - Unclear if he had urinary retention as Nails catheter was placed for CT imaging  - FeNa 2.4, urine cr 46.1  - He was given a bolus of normal saline; calcium gluconate, insulin with dextrose, ampule of bicarb  - Creatinine improved to 2.29-- > 1.78--1.17--1.03--0.97  - Repeat potassium down to 5.6--5.1--4.1  - Telemetry  - stop iv fluids now  - Avoid nephrotoxic drugs  - Continue to hold prior to admission lisinopril, Lasix, potassium supplementation  - Repeat BMP in a.m     # Diabetes mellitus type 2  # Hypoglycemia  - PTA on metformin 500 mg p.o. daily  - Reportedly had low blood sugars upon EMS arrival of 50; D10 was given by EMS  - Blood sugars improved to 110 but then down to 75 last night  - Blood sugars 80--93  - Hemoglobin A1c is low 5.8  - Hold prior to admission metformin and likely not need to resume it after discharge  - Accu-Cheks every 4 hours as he is n.p.o.   - was on D5 NS at 75cc/h while he was npo with borderline BS (not ideal given the new stroke)  - started on NGT feedings 7/1  - BS stable, stop D5 NS today   - Hypoglycemia protocol    # A-fib with RVR   - History of chronic A-fib, not on anticoagulation at home  - Was in A-fib with RVR upon arrival  - Received multiple boluses of normal saline since arrival due to hypotension and severe sepsis  - Treat UTI as above  -Telemetry-heart rate in 90- 100s     # Systolic congestive heart failure  # h/o Hypertension  - A prior echocardiogram in July/2020 showed EF of 40 to 45%, LV hypokinesis  - PTA on lisinopril 2.5 mg p.o. daily, Lasix 20 mg p.o. daily, along with potassium chloride 20 mg p.o. daily.  - Presented now  with evidence of KIMO I and hyperkalemia  - He will need to have his KIMO and hyperkalemia corrected, holding his prior to admission lisinopril, Lasix and KCl for now  - Received multiple boluses NS   - Echo- The visual ejection fraction is 40-45%.There is moderate global hypokinesia of the left ventricle- seems similar to prior Echo  - Monitor fluid status;   - he does not seem to have evidence of fluid congestion n his lungs but his rt leg is more swollen  - consider resuming PTA Lasix as indicated, maybe 7/3 if Na continues to improve     # Peripheral arterial disease  # Diabetic foot ulcer (right heel)  # History of lymphedema  # History of polymicrobial bacterial infection of the legs  - He had an admission to [facility] in [place] from 3/27 through 4/4/2023 for polymicrobial bacterial infection of his legs.  He was taken to the OR by surgery for debridement; he was treated with IV Zosyn for 14 days  - He is wounds does not seem to be infected at this time  - Wound care consult requested  - Podiatry consult appreciated  - Xray rt foot - 2 cm partial-thickness soft tissue ulceration overlying the posterior margin of the calcaneus. No osseous volume loss suggest osteomyelitis  - as per Podiatry- The wound base might need excisional debridement at some point and given his tenderness in the region, likely would be done in the OR.  No urgency.   - If wound cannot be converted to a healthier wound bed with local wound cares, please re-consult podiatry for possible excisional debridement in OR.    - offloading of site all times   - Discussed with his niece, she does not want him to have surgery or amputation.  - Continue wound care    # BPH  - Nails placed in ER  - PTA Flomax resumed on admission, now discontinued because of hypotension   - he has Nails cath currently   - will resume Flomax now given that BP improved  - consider TOV in the next few days    # Dysphagia   - SLP-initially rec n.p.o  - NGT  "placed today and TF started as per nutritionist  - 7/1- SLP allowed full liquid diet but I was paged by RN because he had a coughing spell/chocking with water  - SLP reassessed today- rec full liquid diet with mildly thick liquids  - depending on progress and goals of care- he may need PEG?    # Hypernatremia  - Na 153  - likely due to free water deficit  - getting free water flushes as per nutritionist  - Na 150 today  - BMP in am    # Right hip pain  - X ray rt hip- no fracture  - pain management     # Goals of care  - as per his papers- he is full code; he is critically ill at this time; I did discuss with that his  niece Lucho over the phone; I informed her about his current condition including severe sepsis associated with the hypotension for which she is getting multiple boluses of IV fluids; I expressed my concern that that his blood pressure might not stabilize despite aggressive IV hydration;  I also discussed with her that given his advanced age and underlying dementia, very aggressive measures might be futile; I did try to discuss possible transitioning to comfort care but she did not seem to be interested at this time.  She said that she wants us to help him in any way to get better.  She acknowledges that \"he neglected himself for a long time\" before he went to [facility].  - if he remains high risk of aspiration, will need discussion about alternate ways of nutrition.   - he has multiple complex medical problems including known systolic heart failure, diabetes mellitus, atrial fibrillation, chronic wounds, dementia.  He was admitted with severe sepsis, acute kidney injury, hyperkalemia, stroke, acute encephalopathy.  He was seen by multiple specialists including Stroke neurology and ID.  I talked to with his niece, Lucho on 6/29 and at the time he wanted him to continue with aggressive measures; she was did not open to discuss his overall poor prognosis and was not considering a less " "aggressive approach at that time  - called his niece on 7/1 and left a message, no call back  - called niece again 7/2- left a message again.    Addendum: Mariama Shipman called back; I updated her about his current medical issues; I told her that he seems to be doing better compared with admission day but I am concerned about his complex medical problems, heart failure, dysphagia, multiple leg wounds, suspected peripheral arterial disease, dementia and anticipation that he would further deteriorate in the future.  She is aware of his overall poor prognosis but wants to continue with active treatment at this time.  I also discussed the CODE STATUS with her.  She stated that \" they are Muslims\" and they want aggressive management, including cardiac and respiratory resuscitation, she wants him to be FULL CODE.    - palliative care consult for goals of care.     Diet: Adult Formula Drip Feeding: Continuous Jevity 1.5; Other - Specify in Comment; Goal Rate: 60; mL/hr; Once placement confirmed, begin TF at 15 mL/hr and advance by 15 mL every 12 hours to goal; Do not advance tube feeding rate unless K+ is = or > 3...  Combination Diet Full Liquid Diet    DVT Prophylaxis: heparin drip  Nails Catheter: PRESENT, indication: Obstruction  Lines: PRESENT             Cardiac Monitoring: ACTIVE order. Indication: septic shock  Code Status: Full Code      Clinically Significant Risk Factors         # Hypernatremia: Highest Na = 153 mmol/L in last 2 days, will monitor as appropriate    # Hypomagnesemia: Lowest Mg = 1.6 mg/dL in last 2 days, will replace as needed   # Hypoalbuminemia: Lowest albumin = 2.4 g/dL at 6/28/2023 12:50 PM, will monitor as appropriate   # Thrombocytopenia: Lowest platelets = 126 in last 2 days, will monitor for bleeding          # Overweight: Estimated body mass index is 27.3 kg/m  as calculated from the following:    Height as of this encounter: 1.7 m (5' 6.93\").    Weight as of this encounter: 78.9 kg (173 lb " "15.1 oz).   # Severe Malnutrition: based on nutrition assessment         Disposition Plan      Expected Discharge Date: 07/05/2023         discharge in 3-4 days?      Radhames Glez MD  Hospitalist Service  [facility]  Securely message with [facility] (more info)  Text page via [employer] Paging/Directory   ______________________________________________________________________    Interval History    Doing better, was awake and alert in am  - BP remains stable   - responds \"yes\" or \"no\" to simple questions;   - denies SOB, no chest pain    - talked with his niece Lucho again to update her about multiple medical issues-see above      Physical Exam   Vital Signs: Temp: 97.8  F (36.6  C) Temp src: Axillary BP: 122/77 Pulse: 104   Resp: 16 SpO2: 97 % O2 Device: None (Room air)    Weight: 173 lbs 15.09 oz    General: awake, alert,   HEENT: Head is normocephalic, atraumatic oral mucosa is moist  CV: Irregularly irregular, mild tachycardia, no murmurs, rubs  GI-abdomen is soft, nontender,  No rebound, guarding  : Nails catheter in place draining yellow urine  Extremities: Right heel ulcer; extensive wound over his left shin covered with gauze; there is some skin laceration of right lateral aspect of his shin; also some skin excoriation underneath his breasts.   Neurologic: generalized weakness  Psych: normal mood    Medical Decision Making       60 MINUTES SPENT BY ME on the date of service doing chart review, history, exam, documentation & further activities per the note.  MANAGEMENT DISCUSSED with the following over the past 24 hours: RN, niece   NOTE(S)/MEDICAL RECORDS REVIEWED over the past 24 hours: ID note  Tests ORDERED & REVIEWED in the past 24 hours:  - BMP  - CBC      Radhames Glez MD          Data     I have personally reviewed the following data over the past 24 hrs:    N/A  \   N/A   / N/A     153 (H) 123 (H) 12.8 /  103 (H)   3.6 19 (L) 0.88 \       Imaging results " reviewed over the past 24 hrs:   No results found for this or any previous visit (from the past 24 hour(s)).

## 2023-07-03 NOTE — PLAN OF CARE
Goal Outcome Evaluation:      Plan of Care Reviewed With: patient    Overall Patient Progress: no changeOverall Patient Progress: no change    Summary: Sepsis d/t UTI. Out of ICU 7/1.  DATE & TIME: 7/3/23 4927-7748  Cognitive Concerns/ Orientation : Alert, patient aware of current situation.     BEHAVIOR & AGGRESSION TOOL COLOR: Green  CIWA SCORE: NA   ABNL VS/O2: tachy at cecelia with HR between 90s-100s, other VSS, O2 97% RA   MOBILITY: Ax2 Lift T/R, c/o BLE pain with movement.   PAIN MANAGMENT: received prn Oxycodone x1 for Rt foot pain x1, mostly denies pain at rest.   DIET: Full liquid with mildly thick liquid, tolerated some liquids, poor appetite. TF infusing 45 mL/hr, continuing same rate until tomorrow, not increased per dietician due to electrolytes abnormality. Free water flushes increased from 60 mL to 120 q4h. Able to swallow pills whole.   BOWEL/BLADDER: Nails patent. Incont of bowel, frequent loose stools, held scheduled stool softeners.   ABNL LAB/BG: UC (+) yeast, on IV antifungal. Heparin XA 0.17 (goal 0.25-0.5), recheck at 1652, Phos 1.8, replaced, recheck at 1900. , 113, 117  DRAIN/DEVICES: Midline infusing heparin gtt at 600 Units/hr and 2 PIVs saline locked.   TELEMETRY RHYTHM: Afib CVR  SKIN: BLE and anita/buttocks area wound cares done per WOC order, dressing CDI. Generalized +2-3 edema, BLE wounds very tender to touch.   TESTS/PROCEDURES: none  D/C DAY/GOALS/PLACE: TBD  OTHER IMPORTANT INFO: ID following. Speaks and understands very minimal English. On IV Zosyn q6h and IV Diflucan q24h.

## 2023-07-03 NOTE — PLAN OF CARE
Goal Outcome Evaluation:    Summary: Sepsis d/t UTI. Out of ICU 7/1.  DATE & TIME: 7/2-7/3 4562-2911  Cognitive Concerns/ Orientation : Alert, patient aware of current situation.     BEHAVIOR & AGGRESSION TOOL COLOR: Green  CIWA SCORE: NA   ABNL VS/O2: VSS on room air.   MOBILITY: Ax2 Lift T/R  PAIN MANAGMENT: Chronic lower extremity ulcers, - Scheduled Voltaren gel. Denied pain at rest   DIET: Advanced to full liquid today after SLP eval. Started TF on 7/1. Feeding rate not increased per dietician due to low electrolytes, Increase in AM if WNL.  BOWEL/BLADDER: Nails patent. Incont of bowel.  ABNL LAB/BG: UC (+) yeast, on IV antifungal. Maintenance fluids discontinued Heparin XA check in AM. Phos 2.0 - recheck in AM.  DRAIN/DEVICES: Midline infusing heparin  and 2 PIVs.   TELEMETRY RHYTHM: Afib CVR  SKIN: Chronic wounds lower extremities, wound care orders in place. Reddened coccyx - wound care due tomorrow. T/R, barrier cream.  TESTS/PROCEDURES: Pending blood cultures  D/C DAY/GOALS/PLACE: TBD - back to LTC? Palliative consult ordered today.  OTHER IMPORTANT INFO: Code stroke earlier in stay - neurology signed off. ID following. Speaks and understands very minimal english.

## 2023-07-03 NOTE — PROGRESS NOTES
SPIRITUAL HEALTH SERVICES  SPIRITUAL ASSESSMENT Progress Note  [facility] MedUnit [facility]    REFERRAL SOURCE: family request for imam and recorded prayer/recitation    On this visit, provided Quran speaker to pt Joselin. He declined the speaker today, noting he was trying to sleep. Quran speaker is available should Joselin express the desire for it.    Left voicemail for Joselin's niece regarding imam availability for visit.    PLAN: Will await return phone call from niece. Spiritual Health remains available. Please contact as needs arise.    Shruti De Paz  Associate   [facility] Phone Line: [phone]  [facility] Pager: [phone]

## 2023-07-03 NOTE — CONSULTS
[facility]  WOC Nurse Inpatient Assessment     Consulted for:  New consult right heel 7/3; follow-up on Rt heel, shins, under breasts, buttocks    Summary:    1.  BLE with extensive diffuse small ulcers, L>R, unclear etiology, reported neglect.  Hx debridement a few months ago in [place] and there are detailed notes from wound clinic visits in chart.  Per chart review, pt had some issues with compliance due to transportation issues, language barriers, behavior issues, was moved to a care facility in [facility] a few months ago, family concerned his wounds are not being well cared for here.  7/3: improving slowly, continue daily dressing changes in hospital for now  2.  Right posterior heel: necrotic Unstageable pressure injury, no obvious s/s infection but may benefit from Podiatry consult.  7/3: Podiaty has seen, no urgent need for surgical debridement.  WO will start Triad paste for autolytic debridement.   3.  Bilateral skin folds under sides of breast area have superficial breakdown from friction, moisture, neglect.  7/3: left side nearly healed, right side has evolved, thin necrotic tissue  4.  Buttocks/coccyx: healing small sores, pressure vs moisture/friction, present on admission.  7/3: slowly improving    Patient History (according to provider note(s):      Joselin Sanchez is a 89 year old male admitted on 6/28/2023. He has a past medical history of hypertension, diabetes mellitus type 2, chronic atrial fibrillation, not on anticoagulation, chronic systolic congestive heart failure, last EF 40 to 45% in July 2020, dyslipidemia, diabetic foot ulcer, lymphedema, dementia, BPH who was sent from his facility for evaluation of altered mental status.    Areas Assessed:      Areas visualized during today's visit: skin folds, legs, buttock area    Wound location:  Bilateral lower legs    Last photo: 6-29-23        7-3-23 medial/anterior LLE        7-3-23 lateral LLE        7-3-23  RLE      Wound due to: Unknown Etiology, possible venous/edema issues initially; has a possible vasculitic appearance; possible neglect  Wound history/plan of care: chronic wounds that have worsened in care facility per niece; hx debridement at [place] a few months ago  Wound base: moist red-pink tissue     Palpation of the wound bed: normal      Drainage: moderate     Description of drainage: serosanguinous     Measurements (length x width x depth, in cm): diffuse scattered wounds, L>R, many are rounded and punched out appearance, 0.5-2cm diameter average, 0.1-0.2cm depth average     Tunneling: N/A     Undermining: N/A  Periwound skin: Dry/scaly, feet and legs more edematous 7/3      Color: pink      Temperature: normal   Odor: minimal  Pain: moderate, tension to hands, feet and body and facial expression of distress    Pain interventions prior to dressing change: slow and gentle cares   Treatment goal: Heal , Drainage control, Infection control/prevention, Pain control, Protection and Remove necrotic tissue  STATUS: improving  Supplies ordered: at bedside and ordered more Xeroform and Plurogel from [facility] 7/3      Wound location:  Right posterior heel    Last photo: 7-3-23        6-29-23      Wound due to: Pressure Injury, Unstageable  Wound history/plan of care: present on admission  Wound base: mix tan eschar and yellow slough, soft, moist     Palpation of the wound bed: boggy but not fluctuant     Drainage: small     Description of drainage: yellow     Measurements (length x width x depth, in cm): approx 2 x 2 x 0.4+cm      Tunneling: N/A     Undermining: N/A  Periwound skin: Intact and Erythema- blanchable      Color: pink      Temperature: normal   Odor: minimal  Pain: moderate, tension to hands, feet and body and facial expression of distress    Pain interventions prior to dressing change: slow and gentle cares   Treatment goal: Drainage control, Infection control/prevention, Pain control, Protection and Soften  necrotic tissue  STATUS: minimal change  Supplies ordered: at bedside      Wound location:  Bilateral sides of breasts, skin folds    Last photo: 6-29-23 left side - no new photo 7/3, appears mostly resolved        7-3-23 right breast fold        6-29-23 right breast fold      Wound due to: Friction, Intertrigo and Moisture Associated Skin Damage (MASD)  Wound history/plan of care: per niece, pt hasn't been cleaned in these areas in weeks; small skin folds that rub together  Wound base: yellow-tan smooth semi-moist necrotic tissue     Palpation of the wound bed: normal      Drainage: scant     Description of drainage: serosanguinous     Measurements (length x width x depth, in cm): approx 0.5 x 1 x 0.1+cm superior and 0.6 x 2.2 x 0.1+cm inferior     Tunneling: N/A     Undermining: N/A  Periwound skin: Dry/scaly, possible rash      Color: normal to pink      Temperature: normal   Odor: none  Pain: mild, tender  Pain interventions prior to dressing change: slow and gentle cares   Treatment goal: Heal  and Protection  STATUS: evolving but overall improving  Supplies ordered: supplies stored on unit      Wound location:  Bilateral buttocks, coccyx area, perineal area    Last photo: 6-29-23 - no new photo 7/3 but appears slightly improved      Wound due to: pressure vs friction/moisture/shear; pt incontinent of loose stools 7/3  Wound history/plan of care: present on admission; pt needs assist x 2 for cares and repo  Wound base: pink moist tissue on right side and coccyx, and lightly resurfaced pink tissue left buttock and perineal area, inner thighs     Palpation of the wound bed: normal      Drainage: scant     Description of drainage: serous     Measurements (length x width x depth, in cm): diffuse small superficial areas     Tunneling: N/A     Undermining: N/A  Periwound skin: Dry/scaly      Color: normal and consistent with surrounding tissue      Temperature: normal   Odor: none  Pain: mild, tender  Pain  interventions prior to dressing change: slow and gentle cares   Treatment goal: Heal  and Protection  STATUS: improving  Supplies ordered: supplies stored on unit       Treatment Plan:     Continue daily wound cares to BLE; eventually might decrease to every other day but for now daily cares are best.     BLE and right heel wound(s): Daily    1.  Cleanse intact skin on lower legs and feet with Juan Ramon perineal lotion and soft dry wipes, including wiping between toes.   2.  Apply Sween 24 cream to intact skin.  3.  Cleanse wounds with Vashe-moist gauze.  4.  Apply Plurogel (# 291141) and Xeroform (#[id number]) gauze to leg wounds:  Spread out the Xeroform onto its paper wrapping.  Apply a thin layer of Plurogel to the Xeroform using the back of a plastic spoon.  Apply the gelled gauze to the wounds, cut to fit as needed but keep in large pieces where possible.  Will need approx 3 packets to cover all areas.   5.  Cover with gauze pads or ABDs and Kerlix.  6.  Right heel: swab periwound with skin prep, let dry.  Apply Triad paste (approx 0.5cm thick) to center of a Mepilex 4x4 and press onto wound.   7.  Elevate legs; float heels completely. Consider Prevalon boots (# 749281) if tolerated.    Bilateral breast/skin folds:  Daily and prn:  1.  Cleanse with Juan Ramon cleanser and soft dry wipes.   2.  Apply antifungal powder to folds, brush off excess.  3.  Apply very thin layer Triad paste to right side wounds.  Ok to leave open to air.      Buttock wounds/ perineal cares: BID and prn:  1.  Cleanse with Juan Ramon perineal lotion.  2.  Antifungal powder prn to groin folds.   3.  Apply thin layer Triad paste to open areas.  Leave open to air; avoid Mepilex while having loose stools.   4.  PIP measures.  Add JAYLEEN pump to mattress.     Pressure Injury Prevention (PIP) Plan:  -If patient is declining pressure injury prevention interventions: Explore reason why and address patient's concerns  -Mattress: low air loss  -HOB: Maintain at or  below 30 degrees, unless contraindicated  -Repositioning in bed: Every 1-2 hours , Left/right positioning; avoid supine, Raise foot of bed prior to raising head of bed, to reduce patient sliding down (shear) and Frequent microturns using TAPS wedges, as patient tolerates  -Heels: Keep elevated off mattress, Pillows under calves and Heel lift boots  -Protective Dressing: Mepilex  -Chair positioning: Chair cushion (#145437)  and Assist patient to reposition hourly   -Moisture Management: Perineal cleansing /protection: Follow Incontinence Protocol, Avoid brief in bed, Clean and dry skin folds with bathing , Consider InterDry (#753952) between folds and Moisturize dry skin  -Under Devices: Inspect skin under all medical devices during skin inspection , Ensure tubes are stabilized without tension and Ensure patient is not lying on medical devices or equipment when repositioned       Orders: Reviewed and Updated    RECOMMEND PRIMARY TEAM ORDER: None, at this time  Education provided: plan of care, Moisture management and Off-loading pressure  Discussed plan of care with: Patient, Family and Nurse  WOC nurse follow-up plan: weekly and prn  Notify WOC if wound(s) deteriorate.  Nursing to notify the Provider(s) and re-consult the WOC Nurse if new skin concern.    DATA:     Current support surface: Standard  Low air loss mattress in ICU  Containment of urine/stool: Incontinence Protocol and Indwelling catheter  BMI: Body mass index is 27.3 kg/m .   Active diet order: Orders Placed This Encounter      Combination Diet Full Liquid Diet     Output: I/O last 3 completed shifts:  In: -   Out: 200 [Urine:200]     Labs:   Recent Labs   Lab 07/02/23  0636 06/29/23  0545 06/28/23  1250   ALBUMIN  --   --  2.4*   HGB 11.7*   < > 13.4   WBC 8.2   < > 11.5*   A1C  --   --  5.8*    < > = values in this interval not displayed.     Pressure injury risk assessment:   Sensory Perception: 2-->very limited  Moisture: 3-->occasionally  moist  Activity: 1-->bedfast  Mobility: 2-->very limited  Nutrition: 2-->probably inadequate  Friction and Shear: 1-->problem  Tushar Score: 11    Apurva Huggins, RN CWOCN  -Securely message with [employer] (more info) - can reach individually by name or search 'WOC Nurse' (Amy) to reach all current WOCs on duty.  -WOC Office Phone: [phone]

## 2023-07-03 NOTE — CONSULTS
Discharge Pharmacy Test Claim    Patient has $0 copays for eliquis and xarelto through patient's [facility] Medicare Dual plan.    Test Claim Copay   eliquis 0.00   xarelto 0.00     Shruti Bergeron 7/3  [facility] Liaison  Ph: [phone] Pager: [phone]   Securely message with the Vocera Web Console (learn more here)

## 2023-07-03 NOTE — PROGRESS NOTES
[facility]  Infectious Disease Progress Note          Assessment and Plan:   IMP 1 89-year-old male, acute encephalopathy and probable low-grade sepsis, source not entirely clear labs suggestive of infection, urine with Candida, 3 different organisms and blood cultures all likely are contaminants, chronic heel ulcer not infected looking, clinically improved on antibiotic  2 Initial and first follow-up blood cultures with 3 different organisms, Enterococcus, corynebacterium and a coag negative staph, almost certainly all are contaminants no logical true infection from any of these  3 Abnormal urinalysis and BPH with Candida in the urine significance unclear is not candidemia  4 Acute renal insufficiency, resolving  5 Dementia  6 Diabetes mellitus  7 Pressure foot heel wound not infected looking some necrosis  8 Lymphedema and some historical leg wounds no obvious cellulitis    REC 1 Short course of fluconazole for the urine, likely not significant probably 7 days to 100 mg fluconazole  2 DC Vanco, Zosyn for now but probably okay to Augmentin orally for another 5 days empiric treatment        Interval History:   no new complaints and doing well; no cp, sob, n/v/d, or abd pain. Reviewed all, seems like mentations back to baseline, no fever, urine with only Candida, blood cultures 3 different organisms the initial cultures and follow-up negative none of these look significant              Medications:       acetaminophen  1,000 mg Oral or Feeding Tube QAM     carboxymethylcellulose PF  1 drop Ophthalmic 4x Daily     diclofenac  2 g Topical BID     docusate  100 mg Oral or Feeding Tube BID     dutasteride  0.5 mg Oral Daily     fluconazole  300 mg Intravenous Q24H     folic acid  1 mg Intravenous Daily     insulin aspart  1-4 Units Subcutaneous Q4H     piperacillin-tazobactam  4.5 g Intravenous Q6H     senna-docusate  1 tablet Oral or Feeding Tube BID     sodium chloride (PF)  10-40 mL  "Intracatheter Q7 Days     sodium chloride (PF)  3 mL Intracatheter Q8H     sodium phosphate  15 mmol Intravenous Once     thiamine  50 mg Intravenous Daily                  Physical Exam:   Blood pressure 122/77, pulse 104, temperature 97.8  F (36.6  C), temperature source Axillary, resp. rate 16, height 1.7 m (5' 6.93\"), weight 78.9 kg (173 lb 15.1 oz), SpO2 97 %.  Wt Readings from Last 2 Encounters:   07/01/23 78.9 kg (173 lb 15.1 oz)     Vital Signs with Ranges  Temp:  [97.3  F (36.3  C)-97.9  F (36.6  C)] 97.8  F (36.6  C)  Pulse:  [] 104  Resp:  [16] 16  BP: (115-122)/(65-77) 122/77  SpO2:  [97 %-98 %] 97 %    Constitutional: Awake, alert, cooperative, no apparent distress Confused but awake and alert, some language barrier   Lungs: Clear to auscultation bilaterally, no crackles or wheezing   Cardiovascular: Regular rate and rhythm, normal S1 and S2, and no murmur noted   Abdomen: Normal bowel sounds, soft, non-distended, non-tender   Skin: No rashes, no cyanosis chronic  Edema Heel wd1 noted   Other:           Data:   All microbiology laboratory data reviewed.  Recent Labs   Lab Test 07/02/23  0636 06/30/23  0551 06/29/23  0545   WBC 8.2 7.1 10.6   HGB 11.7* 11.6* 12.1*   HCT 38.8* 37.1* 39.1*   * 101* 101*   * 135* 144*     Recent Labs   Lab Test 07/03/23  0902 07/02/23  0636 07/01/23  0659   CR 0.88 0.97 1.03     No lab results found.  No lab results found.    Invalid input(s): HEIDI    "

## 2023-07-03 NOTE — PROGRESS NOTES
Summary: Sepsis d/t UTI. Out of ICU 7/1.  DATE & TIME: 7/2/2023 Evening  Cognitive Concerns/ Orientation : Alert, patient aware of current situation.     BEHAVIOR & AGGRESSION TOOL COLOR: Green  CIWA SCORE: NA   ABNL VS/O2: VSS on room air.   MOBILITY: Ax2 Lift T/R  PAIN MANAGMENT: Chronic lower extremity ulcers, - Scheduled Voltaren gel. Denied pain at rest   DIET: Advanced to full liquid today after SLP eval. Started TF on 7/1. Feeding rate not increased per dietician due to low electrolytes. Increase tomorrow if lytes WNL.  BOWEL/BLADDER: Nails patent. Incont of bowel.  ABNL LAB/BG: UC (+) yeast, on IV antifungal. Maintenance fluids discontinued Heparin XA check in AM. Phos 2.0 - replaced and recheck in AM.  DRAIN/DEVICES: Midline infusing heparin  and 2 PIVs.   TELEMETRY RHYTHM: Afib CVR  SKIN: Chronic wounds lower extremities, wound care orders in place. Reddened coccyx - wound care due tomorrow. T/R, barrier cream.  TESTS/PROCEDURES: Pending blood cultures  D/C DAY/GOALS/PLACE: TBD - back to LTC? Palliative consult ordered today.  OTHER IMPORTANT INFO: Code stroke earlier in stay - neurology signed off. ID following. Speaks and understands very minimal english.

## 2023-07-03 NOTE — PROGRESS NOTES
CLINICAL NUTRITION SERVICES BRIEF NOTE  Refer to RD note dated  for full reassessment and  for TF start note    New Findings  - Labs reviewed:   Phos 1.8 (L), Mg 1.6 (L)  K NL  - Per RN - TF still running Jevity 1.5 @ 45 mL/hr. Noted increase in loose stooling, possible formula related vs use of laxatives.     - Stoolin/3 - x2   - x1   - x1  - Meds:   Colace BID - not given today (last given x1 on  & x1 on )  Senokot BID - not given today (last given x1  & x1 on )  Novolog Low sliding scale insulin   Phos replacement   Thiamine + Folic Acid daily    Intervention  - Replace Phos, recheck, hold advancement to goal until tomorrow. (OK to advance to goal if Phos >/=2.0).   - May consider change to fiber-free formula given increase in loose stooling per RN.   - Increased fluid flush to 120 mL q 4 hours for hypernatremia. Increase to 180 mL q 4 hours if Na remains elevated.  - D/w RN.     Jeanette Torres, MARISSA, LD  Pager: [phone]  Weekend Pager: [phone]

## 2023-07-04 NOTE — PLAN OF CARE
Goal Outcome Evaluation:      Plan of Care Reviewed With: patient    Overall Patient Progress: no change    DATE & TIME: 7/3/23 6246-9657  Cognitive Concerns/ Orientation : Alert, confused. FRANCHESKA complete orientation. Oriented to situation at times. Appears guarded at times, upset with cares if asleep. Pt explained fear of being alone, wanting to be checked on frequently. Speaks and understands very minimal English. Able to communicate most basic needs.  BEHAVIOR & AGGRESSION TOOL COLOR: Green  ABNL VS/O2: VSS on RA  MOBILITY: Ax2, Lift, T/R   PAIN MANAGMENT: 9/10 LE pain, mainly LLE. Rest promoted, repositioned. PRN Tylenol and oxy given for LLE pain  DIET: Full liquid with mildly thick liquid. TF infusing 45 mL/hr, continuing same rate until tomorrow, Phos improving so should be able to advance in AM pending nutrition approval. Free water flushes 120 q4h. Able to swallow pills whole.   BOWEL/BLADDER: Nails patent. Incont of bowel, frequent loose stools during day/abimbola, held scheduled stool softeners, no stools overnight    ABNL LAB/BG: Na 153, Mag 1.6 (recheck in AM), Phos 2.2 (replaced, recheck in AM). Hep 10A recheck 7/5 AM. , 119 & 107  DRAIN/DEVICES: Midline infusing heparin gtt at 600 Units/hr and PIV SL  TELEMETRY RHYTHM: Afib CVR  SKIN: BLE and anita/buttocks area wounds, dressing CDI. Generalized +2-3 edema. BLE wounds very tender to touch.   TESTS/PROCEDURES: None  D/C DAY/GOALS/PLACE: TBD  OTHER IMPORTANT INFO: ID following. On IV Diflucan q24h.

## 2023-07-04 NOTE — PLAN OF CARE
Goal Outcome Evaluation:      Plan of Care Reviewed With: patient, family    Summary: Sepsis d/t UTI. Out of ICU 7/1.  DATE & TIME: 7/4/23 2470-1065  Cognitive Concerns/ Orientation : oriented x3 Disoriented to situation. Speaks and understands very minimal English. Able to communicate most basic needs.  BEHAVIOR & AGGRESSION TOOL COLOR: Green  ABNL VS/O2: VSS on RA  MOBILITY: Ax2, Lift, T/R   PAIN MANAGMENT: denies   DIET: Full liquid with mildly thick liquid, poor appetite. TF infusing at goal of 60 mL/hr. Free water flushes 120 q4h. Able to swallow pills whole.   BOWEL/BLADDER: Nails patent. Incont of bowel. Loose stool x2.   ABNL LAB/BG: , Phos 2.0 (replaced) recheck tomorrow, Mg 1.5 (replaced) recheck at 2030. BG 96  DRAIN/DEVICES: Midline infusing heparin gtt at 600 Units/hr, Hep Xa recheck tomorrow morning. Lt IVSL.   TELEMETRY RHYTHM: Tele discontinued today.   SKIN: BLE and anita/buttocks area wounds. BLE dressings CDI, Generalized +2-3 edema.   TESTS/PROCEDURES: None  D/C DAY/GOALS/PLACE: TBD  OTHER IMPORTANT INFO: ID following. Requested SLP to re-evaluate patient tomorrow per MD request. Antibiotics changed to po diflucan daily and Augmentin BID.

## 2023-07-04 NOTE — PROGRESS NOTES
[facility]    Medicine Progress Note - Hospitalist Service    Date of Admission:  6/28/2023    Assessment & Plan   Joselin Sanchez is a 89 year old male admitted on 6/28/2023. He has a past medical history of hypertension, diabetes mellitus type 2, chronic atrial fibrillation, not on anticoagulation, chronic systolic congestive heart failure, last EF 40 to 45% in July 2020, dyslipidemia, diabetic foot ulcer, lymphedema, dementia, BPH who was sent from his facility for evaluation of altered mental status..     # Severe sepsis due to UTI  # Septic shock, resolved  # Bacteriemia  - Presented with altered mental status  - Was in A-fib with RVR upon arrival in ER, blood pressure also on lower side, lowest BP 85/62  - White blood cells on admission 11.5 lactic acid 1.3; Pro-Ryan 0.26  - Chest x-ray with no infiltrates  - Nails Catheter was placed in ER, UA was not able to be performed by the lab as of purulent urine  - CT abdomen/ pelvis- Sequela of chronic bladder outlet obstruction, likely secondary to prostatomegaly    - in ER - he was given IVF and 1 dose of Zosyn  - Admitted to ICU; continued on IV Zosyn  - 6/29 - Blood pressure improved initially but was intermittently hypotensive overnight  - he is Full Code as per his records;  - Echo- EF 40-45%.   - BP improved  - was hypothermic 2 nights ago, now temp- WNL  - WBc down to 7.1  - Blood cultures from 6/28, 1 of 2 bottles positive for Corynebacterium striatum, and 1/2 bottles positive for GPC in clusters, initially suspected contamination  - Urine culture positive for 50,000-100,000 colony of yeast/Candida Dubliniensis  - Given his a severe sepsis; started on fluconazole 400 mg IV daily on 6/29, day 4    - repeat BC on 6/29- 1/2 bottles again positive for GPC in clusters  - MRSA swab   Per ID   - repeat BC  - Follow-up ID  - Per ID stat Augmentin for 5 more days and Fluconazole  For 5-7 days  # Acute/ early subacute ischemic infarct  - he has a  history of A-fib, not on anticoagulation prior to admission  -Presented with altered mental status thought to be related to severe sepsis  - CT head on admission negative for acute pathology  - 6/28- noted to have left-sided weakness  - Code stroke called  - CT head- findings concerning for acute/early subacute ischemic infarct  involving the left occipital lobe; No acute intracranial hemorrhage, extra axial fluid collection, or mass effect; Brain atrophy and presumed chronic ischemic changes   - CTA head and neck   1. No definite high-grade stenosis or large vessel occlusion involving the major intracranial arteries.  2. Mild nonflow-limiting atherosclerosis of the bilateral carotid siphons.  3. No intracranial aneurysm or high flow vascular malformation.  4. Mild bilateral carotid bifurcation atherosclerosis, left more than right, with less than 50% stenosis by NASCET criteria.  5. Patent cervical vertebral arteries without flow-limiting stenosis.  6. 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 CTA of the chest could be considered for further evaluation, as indicated.    - Stroke neuro consult appreciated  - PTA on aspirin 81 mg p.o. daily  - MRI of the brain-   Subacute ischemia left occipital lobe along with tiny focus posterior right frontal lobe near vertex  - Echo-  EF 40-45%. 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 No evidence of active hemorrhage.  - 7/1- concern for left pupil irregular and larger that rt pupil (unclear if new)  - repeat CT head-  Subacute infarct left occipital lobe as seen on previous MRI, no hemorrhagic transformation     - Stroke Neuro- recommended anticoagulation for secondary stroke prevention in the setting of atrial  fibrillation (see note from 6/30)  - 7/1- start heparin drip with no boluses; stop PTA ASA now; discontinue Lovenox  - called niece 7/1 and 7/2  to discuss anticoagulation with her, left a message, no call back   - pharm liaison consult for DOAC coverage  - PT/OT- rec LTC  - SLP-initially rec n.p.o  - NGT placed 7/1 and TF started as per nutritionist  - 7/1- SLP allowed full liquid diet but I was paged by RN because he had a coughing spell/chocking with water  - SLP reassessed today- rec full liquid diet with mildly thick liquids  - depending on progress and goals of care- he may need PEG?    # Acute encephalopathy, multifactorial, infectious, metabolic, improved  # Underlying dementia  - most likely due to severe sepsis in the setting of underlying dementia; low blood sugars may also contributing; also found to have an acute stroke-see above  -Treat sepsis as above  - Stroke work-up as above  - Gentle redirectioning, avoid sedating or pain medications as able  - PT/OT rec LTC  - /care coordinator consult; reported the patient being a vulnerable adult; he was admitted to [facility] the end of March/23 and discharged to [facility]     # KIMO, resolved  # Hyperkalemia, resolved  - Creatinine on admission 2.6; Potassium on admission 6.7  - Recommended CKD stage III in Care Everywhere but recent BMP on June/9//23 showed a potassium of 4.1, creatinine 0.86  - This is likely related to severe sepsis, decreased intravascular volume, hypotension  - Noted that prior to admission he was on Lasix, lisinopril, potassium supplementation    - Telemetry  - Avoid nephrotoxic drugs  - Continue to hold prior to admission lisinopril, Lasix, potassium supplementation  - monitor BMP      # Diabetes mellitus type 2  # Hypoglycemia  - PTA on metformin 500 mg p.o. daily  - Hemoglobin A1c is low 5.8  - Hold prior to admission metformin and likely not need to resume it after discharge  - started on NGT  feedings 7/1  - BS stable  - Hypoglycemia protocol    # A-fib with RVR   - History of chronic A-fib, not on anticoagulation at home  - Was in A-fib with RVR upon arrival  - Received multiple boluses of normal saline since arrival due to hypotension and severe sepsis  - Treat UTI as above  -Telemetry-heart rate in 90- 100s     # Systolic congestive heart failure  # h/o Hypertension  - A prior echocardiogram in July/2020 showed EF of 40 to 45%, LV hypokinesis  - PTA on lisinopril 2.5 mg p.o. daily, Lasix 20 mg p.o. daily, along with potassium chloride 20 mg p.o. daily.  - Lisinopril resumed on 7/4 at 2.5 mgs daily  - Presented now with evidence of KIMO I and hyperkalemia  - Echo- The visual ejection fraction is 40-45%.  - Monitor fluid status;   - he does not seem to have evidence of fluid congestion n his lungs but his rt leg is more swollen  - consider resuming PTA Lasix as indicated, maybe 7/3 if Na continues to improve     # Peripheral arterial disease  # Diabetic foot ulcer (right heel)  # History of lymphedema  # History of polymicrobial bacterial infection of the legs  - He had an admission to [facility] in [place] from 3/27 through 4/4/2023 for polymicrobial bacterial infection of his legs.  He was taken to the OR by surgery for debridement; he was treated with IV Zosyn for 14 days  - He is wounds does not seem to be infected at this time  - Wound care consult requested  - Podiatry consult appreciated  - Xray rt foot - 2 cm partial-thickness soft tissue ulceration overlying the posterior margin of the calcaneus. No osseous volume loss suggest osteomyelitis  - as per Podiatry- The wound base might need excisional debridement at some point and given his tenderness in the region, likely would be done in the OR.  No urgency.   - If wound cannot be converted to a healthier wound bed with local wound cares, please re-consult podiatry for possible excisional debridement in OR.    - offloading of site  "all times   - Discussed with his niece, she does not want him to have surgery or amputation.  - Continue wound care    # BPH  - Nails placed in ER  - PTA Flomax resumed on admission, now discontinued because of hypotension   - he has Nails cath currently   - will resume Flomax now given that BP improved  - consider TOV in the next few days    # Dysphagia   - SLP-initially rec n.p.o  - NGT placed today and TF started as per nutritionist  - 7/1- SLP allowed full liquid diet but I was paged by RN because he had a coughing spell/chocking with water  - SLP reassessed today- rec full liquid diet with mildly thick liquids  - depending on progress and goals of care- he may need PEG?    # Hypernatremia  - Na 153  - likely due to free water deficit  - getting free water flushes as per nutritionist  - Na 150 today  - BMP in am    # Right hip pain  - X ray rt hip- no fracture  - pain management     # Goals of care  - as per his papers- he is full code; he is critically ill at this time; I did discuss with that his  niece Lucho over the phone; I informed her about his current condition including severe sepsis associated with the hypotension for which she is getting multiple boluses of IV fluids; I expressed my concern that that his blood pressure might not stabilize despite aggressive IV hydration;  I also discussed with her that given his advanced age and underlying dementia, very aggressive measures might be futile; I did try to discuss possible transitioning to comfort care but she did not seem to be interested at this time.  She said that she wants us to help him in any way to get better.  She acknowledges that \"he neglected himself for a long time\" before he went to [facility].  - if he remains high risk of aspiration, will need discussion about alternate ways of nutrition.   - he has multiple complex medical problems including known systolic heart failure, diabetes mellitus, atrial fibrillation, chronic wounds, " "dementia.  He was admitted with severe sepsis, acute kidney injury, hyperkalemia, stroke, acute encephalopathy.  He was seen by multiple specialists including Stroke neurology and ID.  I talked to with his nieceLucho on 6/29 and at the time he wanted him to continue with aggressive measures; she was did not open to discuss his overall poor prognosis and was not considering a less aggressive approach at that time  - called his niece on 7/1 and left a message, no call back  - called niece again 7/2- left a message again.    Addendum: Mariama Shipman called back; I updated her about his current medical issues; I told her that he seems to be doing better compared with admission day but I am concerned about his complex medical problems, heart failure, dysphagia, multiple leg wounds, suspected peripheral arterial disease, dementia and anticipation that he would further deteriorate in the future.  She is aware of his overall poor prognosis but wants to continue with active treatment at this time.  I also discussed the CODE STATUS with her.  She stated that \" they are Muslims\" and they want aggressive management, including cardiac and respiratory resuscitation, she wants him to be FULL CODE.    - palliative care consult for goals of care.     Diet: Adult Formula Drip Feeding: Continuous Jevity 1.5; Other - Specify in Comment; Goal Rate: 60; mL/hr; Once placement confirmed, begin TF at 15 mL/hr and advance by 15 mL every 12 hours to goal; Do not advance tube feeding rate unless K+ is = or > 3...  Combination Diet Full Liquid Diet    DVT Prophylaxis: heparin drip  Nails Catheter: PRESENT, indication: Retention  Lines: PRESENT             Cardiac Monitoring: ACTIVE order. Indication: septic shock  Code Status: Full Code      Clinically Significant Risk Factors         # Hypernatremia: Highest Na = 153 mmol/L in last 2 days, will monitor as appropriate    # Hypomagnesemia: Lowest Mg = 1.5 mg/dL in last 2 days, will replace as " "needed   # Hypoalbuminemia: Lowest albumin = 2.4 g/dL at 6/28/2023 12:50 PM, will monitor as appropriate   # Thrombocytopenia: Lowest platelets = 124 in last 2 days, will monitor for bleeding          # Overweight: Estimated body mass index is 27.3 kg/m  as calculated from the following:    Height as of this encounter: 1.7 m (5' 6.93\").    Weight as of this encounter: 78.9 kg (173 lb 15.1 oz).   # Severe Malnutrition: based on nutrition assessment         Disposition Plan      Expected Discharge Date: 07/06/2023        Discharge Comments: palliative consult for assistance of GOC discharge in 3-4 days?      Radhames Glez MD  Hospitalist Service  [facility]  Securely message with [employer] (more info)  Text page via [employer] Paging/Directory   ______________________________________________________________________    Interval History    Doing better, was awake and alert in am  - BP remains stable   - responds \"yes\" or \"no\" to simple questions;   - denies SOB, no chest pain    - talked with his niece Lucho again to update her about multiple medical issues-see above      Physical Exam   Vital Signs: Temp: 97.5  F (36.4  C) Temp src: Oral BP: 131/83 Pulse: 100   Resp: 18 SpO2: 97 % O2 Device: None (Room air)    Weight: 173 lbs 15.09 oz    General: awake, alert,   HEENT: Head is normocephalic, atraumatic oral mucosa is moist  CV: Irregularly irregular, mild tachycardia, no murmurs, rubs  GI-abdomen is soft, nontender,  No rebound, guarding  : Nails catheter in place draining yellow urine  Extremities: Right heel ulcer; extensive wound over his left shin covered with gauze; there is some skin laceration of right lateral aspect of his shin; also some skin excoriation underneath his breasts.   Neurologic: generalized weakness  Psych: normal mood    Medical Decision Making       60 MINUTES SPENT BY ME on the date of service doing chart review, history, exam, documentation & further " activities per the note.  MANAGEMENT DISCUSSED with the following over the past 24 hours: RNeliu   NOTE(S)/MEDICAL RECORDS REVIEWED over the past 24 hours: ID note  Tests ORDERED & REVIEWED in the past 24 hours:  - BMP  - CBC      Radhames Glez MD          Data     I have personally reviewed the following data over the past 24 hrs:    9.1  \   11.5 (L)   / 124 (L)     151 (H) 122 (H) 12.2 /  120 (H)   3.9 21 (L) 0.82 \       Imaging results reviewed over the past 24 hrs:   No results found for this or any previous visit (from the past 24 hour(s)).

## 2023-07-04 NOTE — PLAN OF CARE
Summary: Sepsis d/t UTI. Out of ICU 7/1.  DATE & TIME: 7/4/23 3883-6968  Cognitive Concerns/ Orientation : Alert, confused. Disoriented to time and situation. Speaks and understands very minimal English. Able to communicate most basic needs.  BEHAVIOR & AGGRESSION TOOL COLOR: Green  ABNL VS/O2: VSS on RA  MOBILITY: Ax2, Lift, T/R   PAIN MANAGMENT: Pain present in BLE legs. Voltaren and Tylenol given. Oxycodone available anytime.   DIET: Full liquid with mildly thick liquid. TF infusing at goal of 60 mL/hr. Free water flushes 120 q4h. Able to swallow pills whole.   BOWEL/BLADDER: Nails patent. Incont of bowel. Held scheduled bowel meds today.   ABNL LAB/BG: , Phos 2.0 (replaced) recheck tomorrow, Mg 1.5 (replaced) recheck at 2030. Q 4 hr blood sugar check   DRAIN/DEVICES: Midline infusing heparin gtt at 600 Units/hr. Recheck tomorrow morning. L IVSL.   TELEMETRY RHYTHM: Tele discontinued today.   SKIN: BLE and anita/buttocks area wounds. Wound cares completed this shift.  Generalized +2-3 edema. BLE wounds very tender to touch.   TESTS/PROCEDURES: None  D/C DAY/GOALS/PLACE: TBD  OTHER IMPORTANT INFO: ID following. Requested SLP to re-evaluate patient tomorrow per MD request. Antibiotics changed to po diflucan and Augmentin.

## 2023-07-04 NOTE — PROVIDER NOTIFICATION
MD Notification    Notified Person: MD    Notified Person Name: Heather    Notification Date/Time: 7/3/23 1950    Notification Interaction: Paged    Purpose of Notification: Per ID and hospitalist, IV Zosyn is to be discontinued but they missed removing the order. Can you discontinue?    Orders Received: Okay to discontinue.     Comments:

## 2023-07-04 NOTE — PROGRESS NOTES
[facility]  Infectious Disease Progress Note          Assessment and Plan:   IMP 1 89-year-old male, acute encephalopathy and probable low-grade sepsis, source not entirely clear labs suggestive of infection, urine with Candida, 3 different organisms and blood cultures all likely are contaminants, chronic heel ulcer not infected looking, clinically improved on antibiotic  2 Initial and first follow-up blood cultures with 3 different organisms, Enterococcus, corynebacterium and a coag negative staph, almost certainly all are contaminants no logical true infection from any of these  3 Abnormal urinalysis and BPH with Candida in the urine significance unclear is not candidemia  4 Acute renal insufficiency, resolving  5 Dementia  6 Diabetes mellitus  7 Pressure foot heel wound not infected looking some necrosis  8 Lymphedema and some historical leg wounds no obvious cellulitis    REC 1 Short course of fluconazole for the urine, likely not significant probably 7 days at 100 mg fluconazole(getting intravenous but if enteral access okay to go to that)  2 DC Vanco, Zosyn for now but probably okay to Augmentin orally for another 5 days empiric treatment at time of disposition plan and enteral access        Interval History:   no new complaints and doing well; no cp, sob, n/v/d, or abd pain. Reviewed all, seems like mentations back to baseline, no fever, urine with only Candida, blood cultures 3 different organisms the initial cultures and follow-up negative none of these look significant              Medications:       acetaminophen  1,000 mg Oral or Feeding Tube QAM     carboxymethylcellulose PF  1 drop Ophthalmic 4x Daily     diclofenac  2 g Topical BID     docusate  100 mg Oral or Feeding Tube BID     dutasteride  0.5 mg Oral Daily     fluconazole  100 mg Intravenous Q24H     folic acid  1 mg Intravenous Daily     insulin aspart  1-4 Units Subcutaneous Q4H     lisinopril  2.5 mg Oral Daily      "senna-docusate  1 tablet Oral or Feeding Tube BID     sodium chloride (PF)  10-40 mL Intracatheter Q7 Days     sodium chloride (PF)  3 mL Intracatheter Q8H     thiamine  50 mg Intravenous Daily     tolnaftate   Topical BID                  Physical Exam:   Blood pressure 131/83, pulse 100, temperature 97.5  F (36.4  C), temperature source Oral, resp. rate 18, height 1.7 m (5' 6.93\"), weight 78.9 kg (173 lb 15.1 oz), SpO2 97 %.  Wt Readings from Last 2 Encounters:   07/01/23 78.9 kg (173 lb 15.1 oz)     Vital Signs with Ranges  Temp:  [97.5  F (36.4  C)-98  F (36.7  C)] 97.5  F (36.4  C)  Pulse:  [] 100  Resp:  [16-18] 18  BP: (131-146)/(77-87) 131/83  SpO2:  [96 %-99 %] 97 %    Constitutional: Awake, alert, cooperative, no apparent distress Confused but awake and alert, some language barrier   Lungs: Clear to auscultation bilaterally, no crackles or wheezing   Cardiovascular: Regular rate and rhythm, normal S1 and S2, and no murmur noted   Abdomen: Normal bowel sounds, soft, non-distended, non-tender   Skin: No rashes, no cyanosis chronic  Edema Heel wd1 noted   Other:           Data:   All microbiology laboratory data reviewed.  Recent Labs   Lab Test 07/04/23 0719 07/02/23  0636 06/30/23  0551   WBC 9.1 8.2 7.1   HGB 11.5* 11.7* 11.6*   HCT 37.4* 38.8* 37.1*   * 102* 101*   * 126* 135*     Recent Labs   Lab Test 07/04/23  0719 07/03/23  0902 07/02/23  0636   CR 0.82 0.88 0.97     No lab results found.  No lab results found.    Invalid input(s):     "

## 2023-07-05 NOTE — PLAN OF CARE
Goal Outcome Evaluation:       Summary: Sepsis d/t UTI. Out of ICU 7/1.  DATE & TIME: 7/4/23 [id number]  Cognitive Concerns/ Orientation : oriented x3, Disoriented to situation. Harder to assess with language barrier. Speaks and understands very minimal English. Able to communicate most basic needs.  BEHAVIOR & AGGRESSION TOOL COLOR: Green- gets frustrated with turn and repo and other cares overnight.  ABNL VS/O2: VSS on RA- cont. PO  MOBILITY: Ax2, Lift, T/R q2h  PAIN MANAGMENT: denies at times- groans in pain with turns and cares but denies meds.   DIET: Full liquid with mildly thick liquid, poor appetite. TF infusing at goal of 60 mL/hr. Free water flushes 120 q4h. Able to swallow pills whole.   BOWEL/BLADDER: Nails patent. Incont of bowel. Loose stool x3.   ABNL LAB/BG: Na 151, Phos 2.0 recheck in AM, Mg 2.2 recheck in AM. Hep Xa 0.26 recheck in AM. , 111, 114  DRAIN/DEVICES: Midline infusing heparin gtt at 600 Units/hr. L PIV SL.   TELEMETRY RHYTHM: None   SKIN: BLE and anita/buttocks area wounds. BLE dressings CDI, Juan Ramon cream ad triad paste applied to anita area with each incont. Episode. +1-2 edema on BUE and +2-3 BLE edema.   TESTS/PROCEDURES: None  D/C DAY/GOALS/PLACE: pending palliative consult  OTHER IMPORTANT INFO: ID following. Requested SLP to re-evaluate patient tomorrow per MD request.

## 2023-07-05 NOTE — PROGRESS NOTES
[facility]  Infectious Disease Progress Note          Assessment and Plan:   IMP 1 89-year-old male, acute encephalopathy and probable low-grade sepsis, source not entirely clear labs suggestive of infection, urine with Candida, 3 different organisms and blood cultures all likely are contaminants, chronic heel ulcer not infected looking, clinically improved on antibiotic  2 Initial and first follow-up blood cultures with 3 different organisms, Enterococcus, corynebacterium and a coag negative staph, almost certainly all are contaminants no logical true infection from any of these  3 Abnormal urinalysis and BPH with Candida in the urine significance unclear is not candidemia  4 Acute renal insufficiency, resolving  5 Dementia  6 Diabetes mellitus  7 Pressure foot heel wound not infected looking some necrosis  8 Lymphedema and some historical leg wounds no obvious cellulitis    REC 1 Short course of fluconazole for the urine, likely not significant probably 7 days at 100 mg fluconazole(getting intravenous but if enteral access okay to go to that)  2  Augmentin orally for another 4 days empiric treatment at time of disposition plan and enteral access  We will sign off call if issues        Interval History:   no new complaints and doing well; no cp, sob, n/v/d, or abd pain. Reviewed all, seems like mentations back to baseline, no fever, urine with only Candida, blood cultures 3 different organisms the initial cultures and follow-up negative none of these look significant              Medications:       acetaminophen  1,000 mg Oral or Feeding Tube QAM     amoxicillin-clavulanate  875 mg Oral or Feeding Tube BID     carboxymethylcellulose PF  1 drop Ophthalmic 4x Daily     diclofenac  2 g Topical BID     docusate  100 mg Oral or Feeding Tube BID     dutasteride  0.5 mg Oral Daily     fluconazole  100 mg Oral Daily     folic acid  1 mg Intravenous Daily     insulin aspart  1-4 Units Subcutaneous  "Q4H     lisinopril  2.5 mg Oral Daily     miconazole   Topical BID     senna-docusate  1 tablet Oral or Feeding Tube BID     sodium chloride (PF)  10-40 mL Intracatheter Q7 Days     sodium chloride (PF)  3 mL Intracatheter Q8H     thiamine  50 mg Intravenous Daily                  Physical Exam:   Blood pressure (!) 141/74, pulse 107, temperature 97.8  F (36.6  C), temperature source Oral, resp. rate 18, height 1.7 m (5' 6.93\"), weight 78.9 kg (173 lb 15.1 oz), SpO2 97 %.  Wt Readings from Last 2 Encounters:   07/01/23 78.9 kg (173 lb 15.1 oz)     Vital Signs with Ranges  Temp:  [97.7  F (36.5  C)-97.9  F (36.6  C)] 97.8  F (36.6  C)  Pulse:  [] 107  Resp:  [16-18] 18  BP: (122-141)/(70-74) 141/74  SpO2:  [97 %-99 %] 97 %    Constitutional: Awake, alert, cooperative, no apparent distress Confused but awake and alert, some language barrier   Lungs: Clear to auscultation bilaterally, no crackles or wheezing   Cardiovascular: Regular rate and rhythm, normal S1 and S2, and no murmur noted   Abdomen: Normal bowel sounds, soft, non-distended, non-tender   Skin: No rashes, no cyanosis chronic  Edema Heel wd1 noted   Other:           Data:   All microbiology laboratory data reviewed.  Recent Labs   Lab Test 07/04/23 0719 07/02/23  0636 06/30/23  0551   WBC 9.1 8.2 7.1   HGB 11.5* 11.7* 11.6*   HCT 37.4* 38.8* 37.1*   * 102* 101*   * 126* 135*     Recent Labs   Lab Test 07/04/23  0719 07/03/23  0902 07/02/23  0636   CR 0.82 0.88 0.97     No lab results found.  No lab results found.    Invalid input(s):     "

## 2023-07-05 NOTE — PROVIDER NOTIFICATION
Notified provider about indwelling antunez catheter discussed removal or continued need.    Did provider choose to remove indwelling antunez catheter? No    Provider's antunez indication for keeping indwelling antunez catheter: Obstruction.    Is there an order for indwelling antunez catheter? Yes    *If there is a plan to keep antunez catheter in place at discharge daily notification with provider is not necessary, but please add a notation in the treatment team sticky note that the patient will be discharging with the catheter.

## 2023-07-05 NOTE — PROGRESS NOTES
CLINICAL NUTRITION SERVICES - REASSESSMENT NOTE      Recommendations Ordered by Registered Dietitian (RD):     - Continue current TF regimen.   - Increase flushes to 180 mL q 4 hours      Future/Additional Recommendations:     HOLD bowel meds for loose/frequent stools. If increased frequency ongoing can trial change in formula.    Malnutrition: 6/30  % Weight Loss:  > 7.5% in 3 months (severe malnutrition)  % Intake:  Unable to determine   Subcutaneous Fat Loss:  Orbital region moderate depletion  Muscle Loss:  Temporal region severe depletion and Clavicle bone region severe depletion  Fluid Retention:  None noted     Malnutrition Diagnosis: Severe malnutrition  In Context of:  Acute illness or injury     EVALUATION OF PROGRESS TOWARD GOALS   Diet:  Full Liquid  Taking small amounts of PO.    Nutrition Support:  TF advanced to goal yesterday:     Type of Feeding Tube: NGT (7/1)  Enteral Frequency:  Continuous  Enteral Regimen: Jevity 1.5 @ 60 mL/hr   Total Enteral Provisions: 1440 mL, 2160 kcals (28 kcal/kg), 91 g PRO (1.2 g/kg), 1094 ml free H20, 310 g CHO, and 30 g fiber daily  Free Water Flush: 120 mL q 4 hours     Intake/Tolerance:   - TF now at goal since yesterday, seems to tolerate.   - Stooling frequency increasing -  7/5 - BM x2 so far, Noted colace given x1 last night.    7/4 - BM x4   7/3 - BM x5 --> Senokot & Colace both given on 7/2 7/2 - BM x1    - Labs - not drawn yet today.     Electrolytes  Potassium (mmol/L)   Date Value   07/04/2023 3.9   07/03/2023 3.6   07/02/2023 3.6     Phosphorus (mg/dL)   Date Value   07/04/2023 2.0 (L)   07/03/2023 2.2 (L)   07/03/2023 1.8 (L)   07/02/2023 2.0 (L)   07/01/2023 2.2 (L)    Blood Glucose  Glucose (mg/dL)   Date Value   07/04/2023 103 (H)     GLUCOSE BY METER POCT (mg/dL)   Date Value   07/05/2023 114 (H)   07/05/2023 111 (H)   07/04/2023 107 (H)   07/04/2023 96   07/04/2023 114 (H)     Hemoglobin A1C (%)   Date Value   06/28/2023 5.8 (H)    Inflammatory  Markers  WBC Count (10e3/uL)   Date Value   07/04/2023 9.1   07/02/2023 8.2   06/30/2023 7.1     Albumin (g/dL)   Date Value   06/28/2023 2.4 (L)      Magnesium (mg/dL)   Date Value   07/04/2023 2.2   07/04/2023 1.5 (L)   07/03/2023 1.6 (L)     Sodium (mmol/L)   Date Value   07/04/2023 151 (H)   07/03/2023 153 (H)   07/02/2023 150 (H)    Renal  Urea Nitrogen (mg/dL)   Date Value   07/04/2023 12.2   07/03/2023 12.8   07/02/2023 15.0     Creatinine (mg/dL)   Date Value   07/04/2023 0.82   07/03/2023 0.88   07/02/2023 0.97     Additional  Triglycerides (mg/dL)   Date Value   06/29/2023 94     Ketones Urine (mg/dL)   Date Value   06/29/2023 Negative           - Weights   Date/Time Weight   07/01/23 0600 78.9 kg (173 lb 15.1 oz)   06/30/23 0600 76.9 kg (169 lb 8.5 oz)   06/29/23 0615 76.1 kg (167 lb 12.3 oz)   06/28/23 1226 81.6 kg (180 lb)       ASSESSED NUTRITION NEEDS:  Dosing Weight 76.1 kg   Estimated Energy Needs: 4697-1293 kcals (25-30 Kcal/Kg)  Justification: maintenance  Estimated Protein Needs:  grams protein (1.2-1.5 g pro/Kg)  Justification: hypercatabolism with acute illness  Estimated Fluid Needs: 6336-7451 mL (1 mL/Kcal)  Justification: maintenance    NEW FINDINGS:   - Meds: Colace/Senokot; folic acid; thiamine.     WOCN 7/3  -   Summary:     1.  BLE with extensive diffuse small ulcers, L>R, unclear etiology, reported neglect. 7/3: improving slowly, continue daily dressing changes in hospital for now   2.  Right posterior heel: necrotic Unstageable pressure injury, no obvious s/s infection but may benefit from Podiatry consult.  7/3: Podiaty has seen, no urgent need for surgical debridement.  WOC will start Triad paste for autolytic debridement.   3.  Bilateral skin folds under sides of breast area have superficial breakdown from friction, moisture, neglect.  7/3: left side nearly healed, right side has evolved, thin necrotic tissue   4.  Buttocks/coccyx: healing small sores, pressure vs  moisture/friction, present on admission.  7/3: slowly improving    Previous Goals:   Patient will advanced diet vs initiate TF within the next 24-48 hours   Evaluation: Met with TF start 7/1    Previous Nutrition Diagnosis:   Inadequate protein-energy intake related to NPO with SLP eval pending as evidenced by meeting 0% needs   Evaluation: Improving    CURRENT NUTRITION DIAGNOSIS  Malnutrition related to poor PO intakes at baseline as evidenced by wounds, weight loss, fat and muscle wasting, need for TF.     INTERVENTIONS  Recommendations / Nutrition Prescription  Diet per MD  Continue TF as ordered  Increase flushes to 180 mL q 4 hours for hydration     Implementation  Feeding Tube Flush: Increased     Goals  TF @ goal to provide % estimated needs.     MONITORING AND EVALUATION:  Progress towards goals will be monitored and evaluated per protocol and Practice Guidelines    Jeanette Torres RD, LD  Pager: [phone]  Weekend Pager: [phone]

## 2023-07-05 NOTE — PLAN OF CARE
"Summary: Sepsis d/t UTI. Out of ICU 7/1.  DATE & TIME: 7/5/2023 4370-0601  Cognitive Concerns/ Orientation: A&O x3. Disoriented to situation. Seems to understand english better then he can speak it, but able to communicate some english. Otherwise using Bahamian interpretor pad.  BEHAVIOR & AGGRESSION TOOL COLOR: Green- gets frustrated with turn and repo and other cares at times  ABNL VS/O2: VSS on RA.  MOBILITY: Ax2, Lift, T/R q2h. Up to chair x1 today, needed much encouragements, says \"I want to just do everything in bed\", educated.   PAIN MANAGMENT: denies, only discomfort with turns and when touching legs, declines intervention.    DIET: Fair appetite on full liquids, does not like that food he says. Speech advanced to soft bite sized diet and thin liquids today, has not tried yet, he really wants to NG out (educated if he can tolerate a meal tonight that it can hopefully come out soon). TF infusing at goal of 60 mL/hr. Free water flushes increased to 180 ml q4h. Able to swallow pills whole.   BOWEL/BLADDER: Antunez patent. Incont of bowel x1, soft/brown.   ABNL LAB/BG: Na 150, Phos 1.7- replaced and recheck at 2200, Hep Xa 0.30 recheck in AM. , 140  DRAIN/DEVICES: Midline infusing heparin gtt at 600 Units/hr. L PIV SL.   TELEMETRY RHYTHM: None   SKIN: BLE and anita/buttocks/breast area wounds. BLE dressings changed CDI, Juan Ramon cream ad triad paste applied to aniat area with each incontinence episode. +1-2 edema on BUE and +2-3 BLE edema-elevating all extremities on pillows.  TESTS/PROCEDURES: None  D/C DAY/GOALS/PLACE: pending palliative consult, if able to tolerate diet and stop tube feeds. Will need TCU vs LTC?   OTHER IMPORTANT INFO: ID following, continues on PO Fluconazole and Augmentin. Weight up 30lbs, with edema, no SOB or chest pain, MD notified and restarted lasix today. Keep antunez for a couple more days per Dr. Glez for I&O's as pt's anatomy not ideal for external cath. Trial void in a couple days.   "

## 2023-07-05 NOTE — PLAN OF CARE
Goal Outcome Evaluation:                 Outcome Evaluation: TF advanced to goal yesterday. Increase flushes to 180 mL q 4 hours for hydration. Labs not back yet.

## 2023-07-05 NOTE — PROGRESS NOTES
[facility]    Medicine Progress Note - Hospitalist Service    Date of Admission:  6/28/2023    Assessment & Plan   Joselin Sanchez is a 89 year old male admitted on 6/28/2023. He has a past medical history of hypertension, diabetes mellitus type 2, chronic atrial fibrillation, not on anticoagulation, chronic systolic congestive heart failure, last EF 40 to 45% in July 2020, dyslipidemia, diabetic foot ulcer, lymphedema, dementia, BPH who was sent from his facility for evaluation of altered mental status..     # Severe sepsis due to UTI  # Septic shock, resolved  # Bacteriemia  - Presented with altered mental status  - Was in A-fib with RVR upon arrival in ER, blood pressure also on lower side, lowest BP 85/62  - White blood cells on admission 11.5 lactic acid 1.3; Pro-Ryan 0.26  - Chest x-ray with no infiltrates  - Nails Catheter was placed in ER, UA was not able to be performed by the lab as of purulent urine  - CT abdomen/ pelvis- Sequela of chronic bladder outlet obstruction, likely secondary to prostatomegaly    - in ER - he was given IVF and 1 dose of Zosyn  - Admitted to ICU; continued on IV Zosyn  - 6/29 - Blood pressure improved initially but was intermittently hypotensive overnight  - he is Full Code as per his records;  - Echo- EF 40-45%.   - BP improved  - was hypothermic 2 nights ago, now temp- WNL  - WBc down to 7.1  - Blood cultures from 6/28, 1 of 2 bottles positive for Corynebacterium striatum, and 1/2 bottles positive for GPC in clusters, initially suspected contamination  - Urine culture positive for 50,000-100,000 colony of yeast/Candida Dubliniensis  - Given his a severe sepsis; started on fluconazole 400 mg IV daily on 6/29, day 4    - repeat BC on 6/29- 1/2 bottles again positive for GPC in clusters  - MRSA swab   Per ID   - repeat BC  - Follow-up ID  - Per ID stat Augmentin for 5 more days and Fluconazole  For 5-7 days    # Acute/ early subacute ischemic infarct  - he has  a history of A-fib, not on anticoagulation prior to admission  -Presented with altered mental status thought to be related to severe sepsis  - CT head on admission negative for acute pathology  - 6/28- noted to have left-sided weakness  - Code stroke called  - CT head- findings concerning for acute/early subacute ischemic infarct  involving the left occipital lobe; No acute intracranial hemorrhage, extra axial fluid collection, or mass effect; Brain atrophy and presumed chronic ischemic changes   - CTA head and neck   1. No definite high-grade stenosis or large vessel occlusion involving the major intracranial arteries.  2. Mild nonflow-limiting atherosclerosis of the bilateral carotid siphons.  3. No intracranial aneurysm or high flow vascular malformation.  4. Mild bilateral carotid bifurcation atherosclerosis, left more than right, with less than 50% stenosis by NASCET criteria.  5. Patent cervical vertebral arteries without flow-limiting stenosis.  6. 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 CTA of the chest could be considered for further evaluation, as indicated.    - Stroke neuro consult appreciated  - PTA on aspirin 81 mg p.o. daily  - MRI of the brain-   Subacute ischemia left occipital lobe along with tiny focus posterior right frontal lobe near vertex  - Echo-  EF 40-45%. 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 No evidence of active hemorrhage.  - 7/1- concern for left pupil irregular and larger that rt pupil (unclear if new)  - repeat CT head-  Subacute infarct left occipital lobe as seen on previous MRI, no hemorrhagic transformation     - Stroke Neuro- recommended anticoagulation for secondary stroke prevention in the setting of  atrial fibrillation (see note from 6/30)  - 7/1- start heparin drip with no boluses; stop PTA ASA now; discontinue Lovenox  - called niece 7/1 and 7/2  to discuss anticoagulation with her, left a message, no call back   - pharm liaison consult for DOAC coverage  - PT/OT- rec LTC  - SLP-initially rec n.p.o  - NGT placed 7/1 and TF started as per nutritionist  - 7/1- SLP allowed full liquid diet but I was paged by RN because he had a coughing spell/chocking with water  - SLP reassessed today- rec full liquid diet with mildly thick liquids  - depending on progress and goals of care- he may need PEG? I discussed the need for PEG tube  pending SLP tem today with his POA on 7/4 and she is OK with proceeding with the PEG tube if he fails SLP.       # Acute encephalopathy, multifactorial, infectious, metabolic, improved  # Underlying dementia  Stable,   - Gentle redirectioning, avoid sedating or pain medications as able  - PT/OT rec LTC  - /care coordinator consult; reported the patient being a vulnerable adult; he was admitted to [facility] the end of March/23 and discharged to [facility]     # KIMO, resolved  # Hyperkalemia, resolved  - Creatinine on admission 2.6; Potassium on admission 6.7  - Recommended CKD stage III in Care Everywhere but recent BMP on June/9//23 showed a potassium of 4.1, creatinine 0.86  - This is likely related to severe sepsis, decreased intravascular volume, hypotension  - Noted that prior to admission he was on Lasix, lisinopril, potassium supplementation    - Telemetry  - Avoid nephrotoxic drugs  - Continue to hold prior to admission lisinopril, Lasix, potassium supplementation  - monitor BMP      # Diabetes mellitus type 2  # Hypoglycemia  - PTA on metformin 500 mg p.o. daily  - Hemoglobin A1c is low 5.8  - Hold prior to admission metformin and likely not need to resume it after discharge  - started on NGT feedings 7/1  - BS stable  - Hypoglycemia  protocol    # A-fib with RVR   - History of chronic A-fib, not on anticoagulation at home  - Was in A-fib with RVR upon arrival  - Received multiple boluses of normal saline since arrival due to hypotension and severe sepsis  - Treat UTI as above  -Telemetry-heart rate in 90- 100s     # Systolic congestive heart failure  # h/o Hypertension  - A prior echocardiogram in July/2020 showed EF of 40 to 45%, LV hypokinesis  - PTA on lisinopril 2.5 mg p.o. daily, Lasix 20 mg p.o. daily, along with potassium chloride 20 mg p.o. daily.  - Lisinopril resumed on 7/4 at 2.5 mgs daily  - 7/5 resumed PTA Lasix 320 mg PO, for increasing weight and LE edema,      # Peripheral arterial disease  # Diabetic foot ulcer (right heel)  # History of lymphedema  # History of polymicrobial bacterial infection of the legs  - He had an admission to [facility] in [place] from 3/27 through 4/4/2023 for polymicrobial bacterial infection of his legs.  He was taken to the OR by surgery for debridement; he was treated with IV Zosyn for 14 days  - He is wounds does not seem to be infected at this time  - Wound care consult requested  - Podiatry consult appreciated  - Xray rt foot - 2 cm partial-thickness soft tissue ulceration overlying the posterior margin of the calcaneus. No osseous volume loss suggest osteomyelitis  - as per Podiatry- The wound base might need excisional debridement at some point and given his tenderness in the region, likely would be done in the OR.  No urgency.   - If wound cannot be converted to a healthier wound bed with local wound cares, please re-consult podiatry for possible excisional debridement in OR.    - offloading of site all times   - Discussed with his niece, she does not want him to have surgery or amputation.  - Continue wound care    # BPH  - Nails placed in ER  - PTA Flomax resumed on admission, now discontinued because of hypotension   - he has Nails cath currently  To be removed in a day or  "two  - resumed Flomax now given that BP improved  - consider TOV in the next few days    # Dysphagia   - SLP-initially rec n.p.o  - NGT placed today and TF started as per nutritionist  - 7/1- SLP allowed full liquid diet but I was paged by RN because he had a coughing spell/chocking with water  - SLP reassessed today- rec full liquid diet with mildly thick liquids  - depending on progress and goals of care- he may need PEG?  - SLP team to see today. if continues to fail the will proceed with PEG.    # Hypernatremia  - Na 150 today, improved from yesterday.  - likely due to free water deficit  - getting free water flushes as per nutritionist  - Na 150 today  - BMP in am    # Right hip pain  - X ray rt hip- no fracture  - pain management     # Goals of care  - as per his papers and his niece who is POA  he is full code;       Diet: Adult Formula Drip Feeding: Continuous Jevity 1.5; Other - Specify in Comment; Goal Rate: 60; mL/hr; OK to adv TF to goal rate of 60 mL/hr today; Do not advance tube feeding rate unless K+ is = or > 3.0, Mg++ is = or > 1.5, and Phos is = or > 1.9  Combination Diet Soft and Bite Sized Diet (level 6); Thin Liquids (level 0)    DVT Prophylaxis: heparin drip  Nails Catheter: PRESENT, indication: Retention  Lines: PRESENT             Cardiac Monitoring: None  Code Status: Full Code      Clinically Significant Risk Factors         # Hypernatremia: Highest Na = 151 mmol/L in last 2 days, will monitor as appropriate    # Hypomagnesemia: Lowest Mg = 1.5 mg/dL in last 2 days, will replace as needed   # Hypoalbuminemia: Lowest albumin = 2.4 g/dL at 6/28/2023 12:50 PM, will monitor as appropriate   # Thrombocytopenia: Lowest platelets = 124 in last 2 days, will monitor for bleeding          # Obesity: Estimated body mass index is 32.28 kg/m  as calculated from the following:    Height as of this encounter: 1.7 m (5' 6.93\").    Weight as of this encounter: 93.3 kg (205 lb 11 oz).   # Severe " "Malnutrition: based on nutrition assessment         Disposition Plan      Expected Discharge Date: 07/06/2023        Discharge Comments: palliative consult for assistance of GOC discharge in 3-4 days?      Radhames Glez MD  Hospitalist Service  [facility]  Securely message with [employer] (more info)  Text page via [employer] Paging/Directory   ______________________________________________________________________    Interval History    Doing better, was awake and alert in am  - BP remains stable   - responds \"yes\" or \"no\" to simple questions;   - denies SOB, no chest pain    - talked with his niece Lucho again to update her about multiple medical issues-see above      Physical Exam   Vital Signs: Temp: 97.8  F (36.6  C) Temp src: Oral BP: (!) 141/74 Pulse: 107   Resp: 18 SpO2: 97 % O2 Device: None (Room air)    Weight: 205 lbs 11.03 oz    General: awake, alert, in NAD   HEENT: HMM no oral trush NG tube in place  CV: Irregularly irregular, mild tachycardia, no murmurs, rubs      Medical Decision Making       60 MINUTES SPENT BY ME on the date of service doing chart review, history, exam, documentation & further activities per the note.  MANAGEMENT DISCUSSED with the following over the past 24 hours: RN, niece   NOTE(S)/MEDICAL RECORDS REVIEWED over the past 24 hours: ID note  Tests ORDERED & REVIEWED in the past 24 hours:  - BMP  - CBC      Radhames Glez MD          Data     I have personally reviewed the following data over the past 24 hrs:    N/A  \   N/A   / N/A     150 (H) 120 (H) 12.5 /  140 (H)   4.0 21 (L) 0.68 \       Imaging results reviewed over the past 24 hrs:   No results found for this or any previous visit (from the past 24 hour(s)).  "

## 2023-07-06 NOTE — PLAN OF CARE
Summary: Sepsis d/t UTI. Out of ICU 7/1. Hospital course complicated by dysphagia, stroke and Afib  DATE & TIME: 7/6/2023 [id number]  Cognitive Concerns/ Orientation: A&O x3. Disoriented to situation. Seems to understand english better then he can speak it, but able to communicate some english. Otherwise using [place] interpretor pad.  BEHAVIOR & AGGRESSION TOOL COLOR: Green- gets frustrated with turn and repo and other cares at times.   ABNL VS/O2: VSS on RA.  MOBILITY: Ax2, Lift, T/R q2h. Up to chair x1 today, needed much encouragement (pad chair with pillows). Refuses turns at times.   PAIN MANAGMENT: denies, only discomfort with turns and when touching legs, declines intervention.    DIET: Fair appetite on soft bite sized diet and thin liquids today. Encouraging PO-family allowed to bring soft foods from home, menu filled out with patient preferences on white board. Patient did not want to eat supper, feels full and not hungry (there is food family brought bedside). TF stopped around 1100 to encourage PO, to run 8pm-8am only. Flush 180ml q4hrs per MD patient care orders until midnight, then follow nutrition orders, last flush at 1700. Meds whole one at a time.   BOWEL/BLADDER: Nails patent. Incont of bowel x2, soft/brown.   ABNL LAB/BG: Na 146, Phos 1.7- replaced and recheck at 2200, Hep Xa 0.23, increased to 750 units/hr, recheck WNL, next check at 2230. , 104, 86  DRAIN/DEVICES: Midline infusing heparin gtt at 750 Units/hr- does not draw back blood, lab collect. L PIV SL. Jesu, trial void 1-2 days after diuresis.    TELEMETRY RHYTHM: None   SKIN: BLE and anita/buttocks/breast area wounds. BLE dressings changed today, CDI. Juan Ramon cream ad triad paste applied to anita/buttocks area with each incontinence episode; wounds are improving here per last pictures. +1-2 edema on BUE and +2-4 BLE edema-elevating all extremities on pillows. Rooke boots ordered and placed on pt today  TESTS/PROCEDURES: None  D/C  DAY/GOALS/PLACE: pending able to tolerate diet and stop tube feeds. Will need TCU vs LTC? Baseline does not walk per family.   OTHER IMPORTANT INFO: ID following, continues on PO Fluconazole and Augmentin. Lasix on hold due to sodium, possibly restart tomorrow pending labs (call MD if 1900 NA >146 or <140). Possible transition to PO anticoagulation soon per MD. Order to let patient sleep overnight. Palliative spoke with family today, see note.

## 2023-07-06 NOTE — PLAN OF CARE
Goal Outcome Evaluation:  Summary: Sepsis d/t UTI. Out of ICU 7/1.  DATE & TIME: 7/5/2023 8648-2365  Cognitive Concerns/ Orientation: A&O x3. Disoriented to situation. [place] interpretor pad.  BEHAVIOR & AGGRESSION TOOL COLOR: Green- gets frustrated with turn and repo and other cares at times  ABNL VS/O2: VSS on RA.  MOBILITY: Ax2, Lift, T/R q2h.   PAIN MANAGMENT: denies, only discomfort with turns and when touching legs, declines intervention.    DIET: Advanced to Soft bite sized and thin liquid (6,0), Poor oral intake. TF infusing at goal of 60 mL/hr. Free water flushes increased to 180 ml q4h. Able to swallow pills whole.   BOWEL/BLADDER: Antunez patent. Incont of bowel x1, large loose.   ABNL LAB/BG: Na 150, Phos 1.7  replaced and recheck in AM, Hep Xa 0.30 recheck in AM. , 99.  DRAIN/DEVICES: Midline infusing heparin gtt at 600 Units/hr. Recheck AM. L PIV SL.   TELEMETRY RHYTHM: None   SKIN: BLE and anita/buttocks/breast area wounds. BLE dressings changed AM shift, CDI, Juan Ramon cream and triad paste applied to anita area with each incontinence episode. +1-2 edema on BUE and +2-3 BLE edema-elevating all extremities on pillows.  TESTS/PROCEDURES: None  D/C DAY/GOALS/PLACE: pending palliative consult, if able to tolerate diet and stop tube feeds. Will need TCU vs LTC?   OTHER IMPORTANT INFO: ID following,  Weight up 30lbs, with edema, no SOB or chest pain, MD notified and restarted lasix today. Keep antunez for a couple more days per Dr. Glez for I&O's as pt's anatomy not ideal for external cath. Trial void in a couple days. MD okayed to cancel q4 BG.

## 2023-07-06 NOTE — PROGRESS NOTES
[facility]    Hospitalist Progress Note    Assessment & Plan   Date of Admission:  6/28/2023        Assessment & Plan  Joselin Sanchez is a 89 year old male admitted on 6/28/2023. He has a past medical history of hypertension, diabetes mellitus type 2, chronic atrial fibrillation, not on anticoagulation, chronic systolic congestive heart failure, last EF 40 to 45% in July 2020, dyslipidemia, diabetic foot ulcer, lymphedema, dementia, BPH who was sent from his facility for evaluation of altered mental status..     # Severe sepsis due to UTI  # Septic shock, resolved  # Bacteriemia  - Presented with altered mental status  - Was in A-fib with RVR upon arrival in ER, blood pressure also on lower side, lowest BP 85/62  - White blood cells on admission 11.5 lactic acid 1.3; Pro-Ryan 0.26  - Chest x-ray with no infiltrates  - Nails Catheter was placed in ER, UA was not able to be performed by the lab as of purulent urine  - CT abdomen/ pelvis- Sequela of chronic bladder outlet obstruction, likely secondary to prostatomegaly   - in ER - he was given IVF and 1 dose of Zosyn  - Admitted to ICU; continued on IV Zosyn  - 6/29 - Blood pressure improved initially but was intermittently hypotensive overnight  - he is Full Code as per his records;  - Echo- EF 40-45%.   - BP improved  - was hypothermic 2 nights ago, now temp- WNL  - WBc down to 7.1  - Blood cultures from 6/28, 1 of 2 bottles positive for Corynebacterium striatum, and 1/2 bottles positive for GPC in clusters, initially suspected contamination, growing anaerococcus species  - Urine culture positive >100,000 colony of yeast/Candida Dubliniensis  - Given his a severe sepsis; started on fluconazole 400 mg IV daily on 6/29, day 8  -ID consulted, zosyn changed to augmentin, day 9 abx  - repeat BC on 6/29- 1/2 bottles again positive for GPC in clusters--> staph pettenkferi  -Vancomycin stopped per ID  - MRSA swab  - repeat bld cx 7/1, 7/2 ngtd  - MRSA  cx nares negative  -pt has been afebrile. WBC 11.5 admission--> normalized to 8-9 range  -clinically improving. More verbal today. Eating better    Plan;   - Followed by ID  - Per ID started Augmentin for 4 more days and Fluconazole  For 7 days total (last day)     # Acute/ early subacute ischemic infarct  - he has a history of A-fib, not on anticoagulation prior to admission  -Presented with altered mental status thought to be related to severe sepsis  - CT head on admission negative for acute pathology  - 6/28- noted to have left-sided weakness  - Code stroke called  - CT head- findings concerning for acute/early subacute ischemic infarct  involving the left occipital lobe; No acute intracranial hemorrhage, extra axial fluid collection, or mass effect; Brain atrophy and presumed chronic ischemic changes   - CTA head and neck   1. No definite high-grade stenosis or large vessel occlusion involving the major intracranial arteries.  2. Mild nonflow-limiting atherosclerosis of the bilateral carotid siphons.  3. No intracranial aneurysm or high flow vascular malformation.  4. Mild bilateral carotid bifurcation atherosclerosis, left more than right, with less than 50% stenosis by NASCET criteria.  5. Patent cervical vertebral arteries without flow-limiting stenosis.  6. 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 CTA of the chest could be considered for further evaluation, as indicated.     - Stroke neuro consult appreciated  - PTA on aspirin 81 mg p.o. daily  - MRI of the brain-   Subacute ischemia left occipital lobe along with tiny focus posterior right frontal lobe near vertex    -acute stroke causing L sided weakness, initiated on heparin gtt    - Echo-  EF 40-45%. 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 No evidence of active hemorrhage.  - 7/1- concern for left pupil irregular and larger that rt pupil (unclear if new)  - repeat CT head-  Subacute infarct left occipital lobe as seen on previous MRI, no hemorrhagic transformation     - Stroke Neuro- recommended anticoagulation for secondary stroke prevention in the setting of atrial fibrillation (see note from 6/30)  - 7/1- start heparin drip with no boluses; stop PTA ASA now; discontinue Lovenox  - called niece 7/1 and 7/2  to discuss anticoagulation with her, left a message, no call back   - pharm liaison consult for DOAC coverage  - PT/OT- rec LTC  - SLP-initially rec n.p.o  - NGT placed 7/1 and TF started as per nutritionist  - 7/1- SLP allowed full liquid diet but I was paged by RN because he had a coughing spell/chocking with water  - SLP reassessed- rec full liquid diet with mildly thick liquids  - depending on progress and goals of care- he may need PEG? I discussed the need for PEG tube  pending SLP tem with his POA on 7/4 and she is OK with proceeding with the PEG tube if he fails SLP.   -pt progressed to soft, and bite sized diet level 6 with thin liquids level 0.   -Na down to 146 today  -clinically improving. Pt is nonambulatory at baseline.   More alert and conversant today.  Using   Taking po steadily better  Pt requesting feeding tube removal. Discussed with patient and family that FT can be removed if taking po well.     Plan   - follow po intake, nutrition and speech following  - follow bmp, adjust free water flushes as needed,   -am bmp  -diet per speech,   - if taking po well today can remove feeding tube  -        # Acute encephalopathy, multifactorial, infectious, metabolic, improved  # Underlying dementia  Stable,   - Gentle redirectioning, avoid sedating or pain medications as able  - PT/OT rec LTC  - /care coordinator consult; reported the patient being a  vulnerable adult; he was admitted to [facility] the end of March/23 and discharged to [facility]  -correct Na. Down to 146 today.   -clinically improving, more alert and conversant today. Eating much better.     Plan;   -mobilize, orient  - limit sedating medications  -tx infection  -stop prn oxycodone as not needing. Follow   -thiamine course  -limit vitals at night if stable. Order placed.      # KIMO, resolved  # Hyperkalemia, resolved  - Creatinine on admission 2.6; Potassium on admission 6.7  - Recommended CKD stage III in Care Everywhere but recent BMP on June/9//23 showed a potassium of 4.1, creatinine 0.86  - This is likely related to severe sepsis, decreased intravascular volume, hypotension  - Noted that prior to admission he was on Lasix, lisinopril, potassium supplementation  -initially held lasix, Lisinopril but restarted 7/4   -BMP 7/6, Na down to 146. Cr and K wnl.   Taking po well.      Plan;   - Telemetry  - Avoid nephrotoxic drugs  - will rehold lasix until Na back to wnl. Am bmp  -cont Lisinopril  - monitor BMP      # Diabetes mellitus type 2  # Hypoglycemia  - PTA on metformin 500 mg p.o. daily  - Hemoglobin A1c is low 5.8  - Hold prior to admission metformin and likely not need to resume it after discharge  - started on NGT feedings 7/1  - BS stable in 100 range.   - Hypoglycemia protocol  -cont with low resist ISS q4 hour protocol as on TFs--> change to qid with meals now. Changed to nocturnal feeding starting tonight.      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  - Treat UTI as above  -Telemetry-heart rate in  90- 100s  -sbp good control  - K and Mg have been wnl  -Hb has been stable. No bleeding     - follow lytes, Tele, heparin gtt for now, change to po doac if doing well.   - consider bber     # Systolic congestive heart failure  # h/o Hypertension  - A prior echocardiogram in July/2020 showed EF of 40 to 45%, LV hypokinesis  - PTA on lisinopril 2.5 mg p.o. daily, Lasix 20 mg p.o. daily, along with potassium chloride 20 mg p.o. daily. Held on admission given sepsis, KIMO, hypotension  - Lisinopril resumed on 7/4 at 2.5 mgs daily  - 7/5 resumed PTA Lasix 320 mg PO, for increasing weight and LE edema,   -wts have been variable   -Na down to 146. Hold lasix for now until Na back to wnl then resume.   -daily wt  -restart diuretic once Na wnl      # Peripheral arterial disease  # Diabetic foot ulcer (right heel)  # History of lymphedema  # History of polymicrobial bacterial infection of the legs  - He had an admission to [facility] in [place] from 3/27 through 4/4/2023 for polymicrobial bacterial infection of his legs.  He was taken to the OR by surgery for debridement; he was treated with IV Zosyn for 14 days  - He is wounds does not seem to be infected at this time  - Wound care consult requested  - Podiatry consult appreciated  - Xray rt foot - 2 cm partial-thickness soft tissue ulceration overlying the posterior margin of the calcaneus. No osseous volume loss suggest osteomyelitis  - as per Podiatry- The wound base might need excisional debridement at some point and given his tenderness in the region, likely would be done in the OR.  No urgency.   - If wound cannot be converted to a healthier wound bed with local wound cares, please re-consult podiatry for possible excisional debridement in OR.     Plan-  - offloading of site all times   - Discussed with his niece, she does not want him to have surgery or amputation.  - Continue wound care  -follow for need of podiatry intervention in future. Family not  want pt to have surgery     # BPH  - Nails placed in ER on admission   - PTA Flomax resumed on admission, then discontinued because of hypotension   - he has Nails cath currently  To be removed in a day or two  - resumed Flomax now given that BP improved  - consider TOV in the next few days     # Dysphagia   - SLP-initially rec n.p.o  - NGT placed today and TF started as per nutritionist  - 7/1- SLP allowed full liquid diet but I was paged by RN because he had a coughing spell/chocking with water  - SLP reassessed - rec full liquid diet with mildly thick liquids  - depending on progress and goals of care- he may need PEG?  - SLP team following, if continues to fail the will proceed with PEG.  -diet advanced per speech to bite sized diet level 6 with thin liquids level 0.   -eating well with current diet per speech.   - speech and nutrition following       # Hypernatremia  - wnl on admission, peaked at 153 following treatment in icu  -in 150 range for 5 days.   - on free water replacement  - Na down to 146 on 7/6.   Plan-  Cont current free water flushes  - am bmp, PM Na to follow trend and parameters to call     # Right hip pain  - X ray rt hip- no fracture  - pain management      # Goals of care  - as per his papers and his niece who is POA  he is full code;   -7/6. Had long discussion with pt's POA, niece today. Discussed his guarded prognosis and high risk for future hospitalization. Discussed code status and medical details that entail full code status. I stated that DNR/DNI is medically appropriate and full resuscitation would not very likely worsen his quality of life and very likely futile care.  We also discussed the concept of comfort care/hospice care in detail.   She was very engaged in the conversation and is open to speaking with palliative care team. She wishes to speak with her family and community regarding his advanced directives and goals of care as well.          Diet: Adult Formula Drip Feeding:  "Continuous Jevity 1.5; Other - Specify in Comment; Goal Rate: 60; mL/hr; OK to adv TF to goal rate of 60 mL/hr today; Do not advance tube feeding rate unless K+ is = or > 3.0, Mg++ is = or > 1.5, and Phos is = or > 1.9  Combination Diet Soft and Bite Sized Diet (level 6); Thin Liquids (level 0)    DVT Prophylaxis: heparin drip  Nails Catheter: PRESENT, indication: Retention  Lines: PRESENT             Cardiac Monitoring: None  Code Status: Full Code             Clinically Significant Risk Factors []Expand by Default        # Hypernatremia: Highest Na = 151 mmol/L in last 2 days, will monitor as appropriate    # Hypomagnesemia: Lowest Mg = 1.5 mg/dL in last 2 days, will replace as needed   # Hypoalbuminemia: Lowest albumin = 2.4 g/dL at 6/28/2023 12:50 PM, will monitor as appropriate   # Thrombocytopenia: Lowest platelets = 124 in last 2 days, will monitor for bleeding          # Obesity: Estimated body mass index is 32.28 kg/m  as calculated from the following:    Height as of this encounter: 1.7 m (5' 6.93\").    Weight as of this encounter: 93.3 kg (205 lb 11 oz).   # Severe Malnutrition: based on nutrition assessment                Disposition Plan     Expected Discharge Date: 07/06/2023        Discharge Comments: palliative consult for assistance of C discharge in 3-4 days?     Discussed care plan with pt, rn, pt's cousin and pt's pOA (pt's niece)  >60 minutes on coordination of care,discussing goals of care and current tx plan at bedside    Mil Franco MD, MD  Text Page  (7am to 6pm)  Interval History   Na down to 146.   Denies sob or cp  No n/v/d/abd pain  Good appetite  States he would like to have feeding tube out.   Understands that it can be removed if he eats well today. Agrees with care plan.   No pain issues.   Agrees to get in chair today      -Data reviewed today: I reviewed all new labs and imaging results over the last 24 hours. I personally reviewed imaging and labs since admission. "     Physical Exam   Temp: 98.3  F (36.8  C) Temp src: Oral BP: 134/69 Pulse: 91   Resp: 18 SpO2: 97 % O2 Device: None (Room air)    Vitals:    07/01/23 0600 07/05/23 1221 07/06/23 0908   Weight: 78.9 kg (173 lb 15.1 oz) 93.3 kg (205 lb 11 oz) 93 kg (205 lb 0.4 oz)     Vital Signs with Ranges  Temp:  [97.4  F (36.3  C)-98.3  F (36.8  C)] 98.3  F (36.8  C)  Pulse:  [] 91  Resp:  [18] 18  BP: (108-138)/(36-78) 134/69  SpO2:  [96 %-98 %] 97 %  I/O last 3 completed shifts:  In: 1013 [P.O.:270; NG/GT:743]  Out: 1070 [Urine:1070]    Constitutional: In bed, occasional cough. Alert, conversant  HEENT: feeding tube in place. No sinus tenderness  Respiratory: Decreased BS bibasilar, breathing easily  Neck: jvp seems wnl  Cardiovascular: irreg irreg rhythm. No r/g/m  GI: soft, nt ,nd  Skin/Integumen: bilateral pretibial bandages, 3-4+ Bilateral foot edema. Bilateral thigh edema 2+.   Psych:nl affect  Neuro: weak bilateral hip flexors  Weak bilateral deltoids, nl strength bilateral biceps and triceps  Verbal. Appropriate answers to questions  Good attention. engaged    Medications     dextrose       heparin 750 Units/hr (07/06/23 1003)       acetaminophen  1,000 mg Oral or Feeding Tube QAM     amoxicillin-clavulanate  875 mg Oral or Feeding Tube BID     carboxymethylcellulose PF  1 drop Ophthalmic 4x Daily     diclofenac  2 g Topical BID     docusate  100 mg Oral or Feeding Tube BID     dutasteride  0.5 mg Oral Daily     fluconazole  100 mg Oral Daily     folic acid  1 mg Intravenous Daily     [Held by provider] furosemide  20 mg Oral Daily     insulin aspart  1-4 Units Subcutaneous Q4H     lisinopril  2.5 mg Oral Daily     miconazole   Topical BID     potassium & sodium phosphates  2 packet Oral or Feeding Tube Q4H     senna-docusate  1 tablet Oral or Feeding Tube BID     sodium chloride (PF)  10-40 mL Intracatheter Q7 Days     sodium chloride (PF)  3 mL Intracatheter Q8H     thiamine  50 mg Intravenous Daily       Data    Recent Labs   Lab 07/06/23  0917 07/06/23  0812 07/06/23  0655 07/05/23  1206 07/05/23  0835 07/04/23  0812 07/04/23  0719 07/02/23  0900 07/02/23  0636 06/30/23  1008 06/30/23  0551   WBC  --   --   --   --   --   --  9.1  --  8.2  --  7.1   HGB  --   --   --   --   --   --  11.5*  --  11.7*  --  11.6*   MCV  --   --   --   --   --   --  102*  --  102*  --  101*   PLT  --   --   --   --   --   --  124*  --  126*  --  135*   *  --   --   --  150*  --  151*   < > 150*   < > 148*   POTASSIUM 4.4  --   --   --  4.0  --  3.9   < > 3.6   < > 4.1   CHLORIDE 116*  --   --   --  120*  --  122*   < > 128*   < > 123*   CO2 25  --   --   --  21*  --  21*   < > 16*   < > 15*   BUN 12.9  --   --   --  12.5  --  12.2   < > 15.0   < > 30.5*   CR 0.63*  --   --   --  0.68  --  0.82   < > 0.97   < > 1.17   ANIONGAP 5*  --   --   --  9  --  8   < > 6*   < > 10   BERHANE 9.0  --   --   --  8.8  --  8.8   < > 8.8   < > 8.5   * 129* 130*   < > 137*   < > 103*   < > 120*   < > 84    < > = values in this interval not displayed.       Imaging:   No results found for this or any previous visit (from the past 24 hour(s)).

## 2023-07-06 NOTE — PLAN OF CARE
Goal Outcome Evaluation:  Summary: Sepsis d/t UTI. Out of ICU 7/1.  DATE & TIME: 7/5/2023-7/6/23 3181-0621  Cognitive Concerns/ Orientation: A&O x3. Disoriented to situation. [place] interpretor pad.  BEHAVIOR & AGGRESSION TOOL COLOR: Green- gets frustrated with turn and repo ( refuse T/R) and other cares at times  ABNL VS/O2: VSS on RA.  MOBILITY: Ax2, Lift, T/R q2h.   PAIN MANAGMENT: denies, only discomfort with turns and when touching legs, declines intervention.    DIET: Advanced to Soft bite sized and thin liquid (6,0), Poor oral intake. TF infusing at goal of 60 mL/hr. Free water flushes 180 ml q4h. Able to swallow pills whole.   BOWEL/BLADDER: Antunez patent. Incont of bowel, no BM this shift.   ABNL LAB/BG: Na 150, Phos 1.7 , Hep Xa 0.30 recheck in AM. , 119, .  DRAIN/DEVICES: Midline infusing heparin gtt at 600 Units/hr. Recheck AM. L PIV SL.   TELEMETRY RHYTHM: None   SKIN: BLE and anita/buttocks/breast area wounds. BLE dressings changed AM shift, CDI, Juan Ramon cream and triad paste applied to anita area with each incontinence episode. +1-2 edema on BUE and +2-3 BLE edema-elevating all extremities on pillows.  TESTS/PROCEDURES: None  D/C DAY/GOALS/PLACE: pending palliative consult, if able to tolerate diet and stop tube feeds. Will need TCU vs LTC?   OTHER IMPORTANT INFO: ID following,  Weight up 30lbs, with edema, no SOB or chest pain, Keep antunez for a couple more days per Dr. Glez for I&O's as pt's anatomy not ideal for external cath. Trial void in a couple days.

## 2023-07-06 NOTE — PROGRESS NOTES
Notified provider about indwelling antunez catheter discussed removal or continued need.    Did provider choose to remove indwelling antunez catheter? No    Provider's antunez indication for keeping indwelling antunez catheter: Retention.    Is there an order for indwelling antunez catheter? Yes    *If there is a plan to keep antunez catheter in place at discharge daily notification with provider is not necessary, but please add a notation in the treatment team sticky note that the patient will be discharging with the catheter.       TRIAL VOID 1-2 DAYS**

## 2023-07-06 NOTE — PROGRESS NOTES
CLINICAL NUTRITION SERVICES BRIEF NOTE  Refer to RD note dated 7/5 for full reassessment.     New Findings  - TF running at goal continuous.   - Labs:   Phos 1.7 (L), replaced yesterday morning but not after recheck last night (1.6 - L).   - Pt hoping to get tube out per RN.     Intervention  - D/w RN this morning. Plan to replace phos per protocol. Pt hoping to get tube out, if able to eat better today likely able to discontinue TF.     - For now will turn off TF during daytime & order cycle with provisions as outlined: 1080 kcal, 46 g protein, 547 ml free water, 155 g CHO, 15 g fiber.   - Flush 180 mL before and after cycle.    - OK to discontinue TF altogether per RN/MD discretion if patient eats well today.     Jeanette Torres, MARISSA, LD  Pager: [phone]  Weekend Pager: [phone]

## 2023-07-06 NOTE — CONSULTS
Palliative Care Consultation Note  [facility]      Patient: Joselin Sanchez  Date of Admission:  6/28/2023    Requesting Clinician / Team: medical team  Reason for consult: Goals of care     Recommendations & Counseling     GOALS OF CARE:     Life-prolonging without limits   At this time though his niece and HCA is planning to discuss with his family and Jewish scholars; she is also exploring what comfort care/hospice care might mean for him.    Open to future hospitalizations at this time;     ADVANCE CARE PLANNING:    Patient has an advance directive dated March 2023.  Primary Health Care Agent eliu Leslie .  Surrogates are brother in [place].     HCA wants to consult with family and Jewish scholars and then discuss Code Status and possible pivot to comfort cares.    Code status: Full Code    MEDICAL MANAGEMENT:   No symptom burden identified at this time    PSYCHOSOCIAL/SPIRITUAL SUPPORT:    Family   [place] community: Jewish     Palliative Care will continue to follow for family care conference TBD when niece and HCA, Mariama, has finished consulting with her family members and carisa community leaders.. Thank you for the consult and allowing us to aid in the care of Joselin Sanchez.    These recommendations have been discussed with the primary team via this note.    Mark Bill MD  Securely message with [employer] (more info)      Palliative Summary/HPI     Joselin Sanchez is a 89 year old male admitted on 6/28/2023. He has a past medical history of hypertension, diabetes mellitus type 2, chronic atrial fibrillation, not on anticoagulation, chronic systolic congestive heart failure, last EF 40 to 45% in July 2020, dyslipidemia, diabetic foot ulcer, lymphedema, dementia, BPH who was sent from his facility for evaluation of altered mental status.  He was diagnosed with urosepsis and has made an uneventful recovery to date.       Today, the patient was seen for:  Goals of Care    Palliative Care  "Summary:   Met with nephrosa isela Rueda in person with professional [employer] phone  and by phone later with HCA and niece, Mariama.   I introduced our role as an extra layer of support and how we help patients and families dealing with serious, potentially life-limiting illnesses. I explained the composition of the palliative care team.  Palliative care helps patients and families navigate their care while focusing on the whole person; providing emotional, social and spiritual support  Palliative care often assists with symptom management, information sharing about what to expect from the illness, available treatment options and what effect those options may have on the disease course, and provide effective communication and caring support.     I spent approximately 45 minutes with Joselin and Mohit this morning and reached Mariama by phone in the afternoon.  Joselin was articulate during our conversation in English and Venezuelan though I could not  his cognitive state.  After about 30 minutes as we were reviewing his code status he abruptly said, \"Thank you for coming, that is all.\"  Mohit indicated we should step out which we did and then he and I continued the conversation for an additional 15 minutes with the .      I spoke with Mariama just after she had arrived home from the hospital and she told me of the excellent conversation she had with Dr. Franco about her uncle's health status, his code status and whether a comfort care or hospice approach would be in accordance with his goals of care.  Mariama relayed to me that her uncle knows that 'everyone dies' and that is a tenet in the [employer] tye and that 'life is life' and we should do all we can to remain alive to do as much good in the world as possible.  She did ask Joselin his thoughts about CPR (as did I this morning) and he indicated that he would 'want things done' though she doesn't know if he understands the likelihood that CPR would be successful or that " he may suffer pain during it at the end of his life.  She has committed to speaking with the rest of her family and some Hindu scholars about these questions.  For now she wants her uncle to remain FULL code.  She says she will request a family care conference once she has spoken with her family and the scholars.  I also assured Mariama that even if she elects to make her uncle DNR that that would only affect what would happen if his heart stopped or he had difficulty breathing.  We spoke a little about comfort care and she also expressed a need to review that with others.  She felt Dr. Franco did a good job of educating her about that care approach.    Both Mariama and Mohit recognize that their uncle is old and could die 'at any time.'  Mohit brought in a copy of a HCD from the [facility] from March of 2023 naming Mariama and their uncle as HCAs.  I scanned a copy of this to the [facility] office this morning.  There were no further directions available on the form in terms of limits on care or other wishes.    Prognosis, Goals, & Planning:      Functional Status just prior to this current hospitalization:    Palliative Performance Scale (PPS): 40%  Extensive disease. Mainly in bed w/little ambulation. ADLs/activities mainly w/assistance. Normal or reduced intake. Full LOC or drowsy or confused.  Estimated Median Survival in Days:18 to 41 days.       Prognosis, Goals, and/or Advance Care Planning:    We discussed general treatment options (full/restorative, selective/conservatives, and comfort only/hospice). We then discussed how these specifically apply to Joselin. Based on this discussion, his family needs more time to consider their options and they also want to consult Hindu scholars.      Code Status was addressed today:     Yes, We discussed potential risks and rationale of attempting cardiac resuscitation, intubation, and mechanical ventilation.  We also discussed probability of survival as well  "as quality of life implications.  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     ROS:  Comprehensive ROS is reviewed and is negative except as here & per HPI:     Physical Exam   Vital Signs with Ranges  Temp:  [97.3  F (36.3  C)-98.3  F (36.8  C)] 97.3  F (36.3  C)  Pulse:  [] 91  Resp:  [18] 18  BP: (108-140)/(36-78) 140/68  SpO2:  [93 %-98 %] 93 %  205 lbs .44 oz    PHYSICAL EXAM:  GENERAL APPEARANCE: frail older man, alert and no distress; neatly groomed, wearing his glasses; no evidence of hearing loss  EYES: Eyes grossly normal to inspection, PERRLA, conjunctivae and sclerae without injection or discharge, EOM intact   RESP:  no increased work of breathing; speaks in complete Anguillan sentences;   MS: No musculoskeletal defects are noted  SKIN: No suspicious lesions or rashes, hydration status appears adequate with normal skin turgor   PSYCH: Alert and oriented x1; speech- coherent, normal rate and volume; due to his speaking Anguillan I couldn't really " assess his cognition; he did not appear to be depressed or anxious; he made good eye contact; he did briefly speak in English when he told me good bye and wished me well;     Data reviewed:  07/06/2023: Cr 0.63; Albumin 2.4 on 6/28/2023; Hgb 11.5 on 7/4/2023 07/01/202 CTA CHEST W/CONTRAST  IMPRESSION:     1.  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 No evidence of active hemorrhage.     2.  Small bilateral pleural effusions with adjacent atelectasis.     3.  Moderate upper lobe predominant centrilobular emphysema.     4.  Cirrhotic liver      95 MINUTES SPENT BY ME on the date of service doing chart review, history, exam, documentation & further activities per the note.   Advance Care Planning Discussion 7/6/2023. Mark BOLDEN MD met with Patient and their Health Care Agent and family in two separate meetings today at the hospital to discuss Advance Care Planning. Joselin Sanchez does not have decisional capacity  and was present for this discussion.  Those present were informed of the voluntary nature of this discussion and wished to proceed.  The discussion included: the information included in the palliative care summary section above. This discussion began at 1045 and ended at 1130 and from 1445 to 1505 for a total of 65 minutes.

## 2023-07-07 NOTE — PROGRESS NOTES
[facility]    Hospitalist Progress Note    Assessment & Plan   Date of Admission:  6/28/2023        Assessment & Plan  Joselin Sanchez is a 89 year old male admitted on 6/28/2023. He has a past medical history of hypertension, diabetes mellitus type 2, chronic atrial fibrillation, not on anticoagulation, chronic systolic congestive heart failure, last EF 40 to 45% in July 2020, dyslipidemia, diabetic foot ulcer, lymphedema, dementia, BPH who was sent from his facility for evaluation of altered mental status..     # Severe sepsis due to UTI  # Septic shock, resolved  # Bacteriemia  - Presented with altered mental status  - Was in A-fib with RVR upon arrival in ER, blood pressure also on lower side, lowest BP 85/62  - White blood cells on admission 11.5 lactic acid 1.3; Pro-Ryan 0.26  - Chest x-ray with no infiltrates  - Nails Catheter was placed in ER, UA was not able to be performed by the lab as of purulent urine  - CT abdomen/ pelvis- Sequela of chronic bladder outlet obstruction, likely secondary to prostatomegaly   - in ER - he was given IVF and 1 dose of Zosyn  - Admitted to ICU; continued on IV Zosyn  - 6/29 - Blood pressure improved initially but was intermittently hypotensive overnight  - he is Full Code as per his records;  - Echo- EF 40-45%.   - BP improved  - was hypothermic 2 nights ago, now temp- WNL  - WBc down to 7.1  - Blood cultures from 6/28, 1 of 2 bottles positive for Corynebacterium striatum, and 1/2 bottles positive for GPC in clusters, initially suspected contamination, growing anaerococcus species  - Urine culture positive >100,000 colony of yeast/Candida Dubliniensis  - Given his a severe sepsis; started on fluconazole 400 mg IV daily on 6/29, day 8  -ID consulted, zosyn changed to augmentin, day 9 abx  - repeat BC on 6/29- 1/2 bottles again positive for GPC in clusters--> staph pettenkferi  -Vancomycin stopped per ID  - MRSA swab  - repeat bld cx 7/1, 7/2 ngtd  - MRSA  cx nares negative  -pt has been afebrile. WBC 11.5 admission--> normalized to 8-9 range  -clinically improving. More verbal today. Eating better    Plan;   - Followed by ID  - Per ID started Augmentin for 4 more days and Fluconazole  For 7 days total (last day)     # Acute/ early subacute ischemic infarct  - he has a history of A-fib, not on anticoagulation prior to admission  -Presented with altered mental status thought to be related to severe sepsis  - CT head on admission negative for acute pathology  - 6/28- noted to have left-sided weakness  - Code stroke called  - CT head- findings concerning for acute/early subacute ischemic infarct  involving the left occipital lobe; No acute intracranial hemorrhage, extra axial fluid collection, or mass effect; Brain atrophy and presumed chronic ischemic changes   - CTA head and neck   1. No definite high-grade stenosis or large vessel occlusion involving the major intracranial arteries.  2. Mild nonflow-limiting atherosclerosis of the bilateral carotid siphons.  3. No intracranial aneurysm or high flow vascular malformation.  4. Mild bilateral carotid bifurcation atherosclerosis, left more than right, with less than 50% stenosis by NASCET criteria.  5. Patent cervical vertebral arteries without flow-limiting stenosis.  6. 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 CTA of the chest could be considered for further evaluation, as indicated.     - Stroke neuro consult appreciated  - PTA on aspirin 81 mg p.o. daily  - MRI of the brain-   Subacute ischemia left occipital lobe along with tiny focus posterior right frontal lobe near vertex  - Echo-  EF 40-45%. 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 No evidence of  active hemorrhage.  - 7/1- concern for left pupil irregular and larger that rt pupil (unclear if new)  - repeat CT head-  Subacute infarct left occipital lobe as seen on previous MRI, no hemorrhagic transformation     - Stroke Neuro- recommended anticoagulation for secondary stroke prevention in the setting of atrial fibrillation (see note from 6/30)  - 7/1- start heparin drip with no boluses; stop PTA ASA now; discontinue Lovenox  - called niece 7/1 and 7/2  to discuss anticoagulation with her, left a message, no call back   - pharm liaison consult for DOAC coverage  - PT/OT- rec LTC  - SLP-initially rec n.p.o  - NGT placed 7/1 and TF started as per nutritionist  - 7/1- SLP allowed full liquid diet but I was paged by RN because he had a coughing spell/chocking with water  - SLP reassessed- rec full liquid diet with mildly thick liquids  - depending on progress and goals of care- he may need PEG? I discussed the need for PEG tube  pending SLP tem with his POA on 7/4 and she is OK with proceeding with the PEG tube if he fails SLP.   -pt progressed to soft, and bite sized diet level 6 with thin liquids level 0.   -Na down to 146 today  -clinically improving. Pt is nonambulatory at baseline.   More alert and conversant today.  Using   Taking po steadily better  Pt requesting feeding tube removal. Discussed with patient and family that FT can be removed if taking po well.     Plan   - follow po intake, nutrition and speech following  - follow bmp, adjust free water flushes as needed,   -am bmp  -diet per speech,   - if taking po well today can remove feeding tube  -        # Acute encephalopathy, multifactorial, infectious, metabolic    Patient awake and alert and refusing care and medications. Complains of insomnia.   # Underlying dementia  Stable,   - Gentle redirectioning, avoid sedating or pain medications as able  - PT/OT rec LTC  - /care coordinator consult; reported the patient being a  vulnerable adult; he was admitted to [facility] the end of March/23 and discharged to [facility]       # KIMO in a setting of CKD stage III  # Hyperkalemia, resolved  - Creatinine on admission 2.6; Potassium on admission 6.7  - Recommended CKD stage III in Care Everywhere but recent BMP on June/9//23 showed a potassium of 4.1, creatinine 0.86  - This is likely related to severe sepsis, decreased intravascular volume, hypotension  - Noted that prior to admission he was on Lasix, lisinopril, potassium supplementation  -initially held lasix, Lisinopril but restarted 7/4   -BMP 7/6, Na down to 146. Cr and K wnl.        Plan;   - Telemetry  - Avoid nephrotoxic drugs  - will rehold lasix until Na back to wnl. Am bmp  -cont Lisinopril  - monitor BMP      # Diabetes mellitus type 2  # Hypoglycemia  - PTA on metformin 500 mg p.o. daily  - Hemoglobin A1c is low 5.8  - Hold prior to admission metformin and likely not need to resume it after discharge  - started on NGT feedings 7/1  - BS stable in 100 range.   - Hypoglycemia protocol  -cont with low resist ISS q4 hour protocol as on TFs--> change to qid with meals now. Changed to nocturnal feeding starting tonight.      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  - Treat UTI as above  -Telemetry-heart rate in 90- 100s  -sbp good control  - K and Mg have been wnl  -Hb has been stable. No bleeding     - follow lytes, Tele, heparin gtt for now, change to po doac if doing well.   - consider bber     # Systolic congestive heart failure  # h/o Hypertension  - A prior echocardiogram in July/2020 showed EF  of 40 to 45%, LV hypokinesis  - PTA on lisinopril 2.5 mg p.o. daily, Lasix 20 mg p.o. daily, along with potassium chloride 20 mg p.o. daily. Held on admission given sepsis, KIMO, hypotension  - Lisinopril resumed on 7/4 at 2.5 mgs daily  - 7/5 resumed PTA Lasix 320 mg PO, for increasing weight and LE edema,   -wts have been variable   -Na down to 146. Hold lasix for now until Na back to wnl then resume.   -daily wt  -restart diuretic once Na wnl      # Peripheral arterial disease  # Diabetic foot ulcer (right heel)  # History of lymphedema  # History of polymicrobial bacterial infection of the legs  - He had an admission to [facility] in [place] from 3/27 through 4/4/2023 for polymicrobial bacterial infection of his legs.  He was taken to the OR by surgery for debridement; he was treated with IV Zosyn for 14 days  - He is wounds does not seem to be infected at this time  - Wound care consult requested  - Podiatry consult appreciated  - Xray rt foot - 2 cm partial-thickness soft tissue ulceration overlying the posterior margin of the calcaneus. No osseous volume loss suggest osteomyelitis  - as per Podiatry- The wound base might need excisional debridement at some point and given his tenderness in the region, likely would be done in the OR.  No urgency.   - If wound cannot be converted to a healthier wound bed with local wound cares, please re-consult podiatry for possible excisional debridement in OR.     Plan-  - offloading of site all times   - Discussed with his niece, she does not want him to have surgery or amputation.  - Continue wound care  -follow for need of podiatry intervention in future. Family not want pt to have surgery     # BPH  - Nails placed in ER on admission   - PTA Flomax resumed on admission, then discontinued because of hypotension   - he has Nails cath currently  To be removed in a day or two  - resumed Flomax now given that BP improved  - consider TOV in the next few days     #  Dysphagia   - SLP-initially rec n.p.o  - NGT placed today and TF started as per nutritionist  - 7/1- SLP allowed full liquid diet but I was paged by RN because he had a coughing spell/chocking with water  - SLP reassessed - rec full liquid diet with mildly thick liquids  - depending on progress and goals of care- he may need PEG?  - SLP team following, if continues to fail the will proceed with PEG.  -diet advanced per speech to bite sized diet level 6 with thin liquids level 0.   -eating well with current diet per speech.   - speech and nutrition following       # Hypernatremia  - wnl on admission, peaked at 153 following treatment in icu  -in 150 range for 5 days.   - on free water replacement  - Na down to 146 on 7/6.   Plan-  Cont current free water flushes  - am bmp, PM Na to follow trend and parameters to call     # Right hip pain  - X ray rt hip- no fracture  - pain management      # Goals of care  - as per his papers and his niece who is POA  he is full code;   -7/6. Had long discussion with pt's POA, niece today. Discussed his guarded prognosis and high risk for future hospitalization. Discussed code status and medical details that entail full code status. I stated that DNR/DNI is medically appropriate and full resuscitation would not very likely worsen his quality of life and very likely futile care.  We also discussed the concept of comfort care/hospice care in detail.   She was very engaged in the conversation and is open to speaking with palliative care team. She wishes to speak with her family and community regarding his advanced directives and goals of care as well.          Diet: Adult Formula Drip Feeding: Continuous Jevity 1.5; Other - Specify in Comment; Goal Rate: 60; mL/hr; OK to adv TF to goal rate of 60 mL/hr today; Do not advance tube feeding rate unless K+ is = or > 3.0, Mg++ is = or > 1.5, and Phos is = or > 1.9  Combination Diet Soft and Bite Sized Diet (level 6); Thin Liquids (level 0)   "  DVT Prophylaxis: heparin drip  Nails Catheter: PRESENT, indication: Retention  Lines: PRESENT             Cardiac Monitoring: None  Code Status: Full Code          Clinically Significant Risk Factors []Expand by Default        # Hypernatremia: Highest Na = 151 mmol/L in last 2 days, will monitor as appropriate    # Hypomagnesemia: Lowest Mg = 1.5 mg/dL in last 2 days, will replace as needed   # Hypoalbuminemia: Lowest albumin = 2.4 g/dL at 6/28/2023 12:50 PM, will monitor as appropriate   # Thrombocytopenia: Lowest platelets = 124 in last 2 days, will monitor for bleeding          # Obesity: Estimated body mass index is 32.28 kg/m  as calculated from the following:    Height as of this encounter: 1.7 m (5' 6.93\").    Weight as of this encounter: 93.3 kg (205 lb 11 oz).   # Severe Malnutrition: based on nutrition assessment                Disposition Plan: TBD     Expected Discharge Date: 07/06/2023        Discharge Comments: palliative consult for assistance of GOC discharge in 3-4 days?     Discussed care plan with pt, rn,   >40  minutes on coordination of care,discussing goals of care and current tx plan at bedside    Chinedu Dubois MD, MD  Text Page  (7am to 6pm)  Interval History   Patient awake alert.  He told me that he cannot get restful sleep.  He begged me if I could help him to sleep for at least 2 hours.  Apparently he is not getting any medications for comfort of anxiety as needed or at bedtime.  He received Zyprexa last 6/28.  He was lying in his on feces and was refusing care as well as medications.  Tube feeding is running at 60 cc/h.  He also has heparin gtt.  He is getting pleasure feeding orally.  Vital signs stable.  Lorazepam order placed for comfort and to help any rest and get some sleep at bedtime.  Zyprexa 5 mg every 6 hours as needed for any agitation.  Discussed with RN.      -Data reviewed today: I reviewed all new labs and imaging results over the last 24 hours. I personally reviewed " imaging and labs since admission.     Physical Exam   Temp: 97.7  F (36.5  C) Temp src: Axillary BP: 124/61 Pulse: 93   Resp: 18 SpO2: 96 % O2 Device: None (Room air)    Vitals:    07/01/23 0600 07/05/23 1221 07/06/23 0908   Weight: 78.9 kg (173 lb 15.1 oz) 93.3 kg (205 lb 11 oz) 93 kg (205 lb 0.4 oz)     Vital Signs with Ranges  Temp:  [97.3  F (36.3  C)-97.7  F (36.5  C)] 97.7  F (36.5  C)  Pulse:  [90-93] 93  Resp:  [18] 18  BP: (124-140)/(61-73) 124/61  SpO2:  [93 %-96 %] 96 %  I/O last 3 completed shifts:  In: 2854 [P.O.:560; NG/GT:2294]  Out: 1600 [Urine:1600]    Constitutional: In bed, occasional cough. Alert, conversant  HEENT: feeding tube in place. No sinus tenderness  Respiratory: Decreased BS bibasilar, breathing easily  Neck: jvp seems wnl  Cardiovascular: irreg irreg rhythm. No r/g/m  GI: Nondistended, soft, nasogastric tube feeding in place.  Nails catheter in place.  Bowel sounds active.  Skin/Integumen: bilateral pretibial bandages, 3-4+ Bilateral foot edema. Bilateral thigh edema 2+.   Psych:nl affect  Neuro: weak bilateral hip flexors  Weak bilateral deltoids, nl strength bilateral biceps and triceps  Verbal. Appropriate answers to questions  Good attention. engaged    Medications     dextrose       heparin 750 Units/hr (07/07/23 1012)       acetaminophen  1,000 mg Oral or Feeding Tube QAM     amoxicillin-clavulanate  875 mg Oral or Feeding Tube BID     carboxymethylcellulose PF  1 drop Ophthalmic 4x Daily     diclofenac  2 g Topical BID     docusate  100 mg Oral or Feeding Tube BID     dutasteride  0.5 mg Oral Daily     fluconazole  100 mg Oral Daily     folic acid  1 mg Intravenous Daily     [Held by provider] furosemide  20 mg Oral Daily     insulin aspart  1-3 Units Subcutaneous TID AC     insulin aspart  1-3 Units Subcutaneous At Bedtime     lisinopril  2.5 mg Oral Daily     miconazole   Topical BID     potassium & sodium phosphates  1 packet Oral or Feeding Tube Q4H     senna-docusate  1  tablet Oral or Feeding Tube BID     sodium chloride (PF)  10-40 mL Intracatheter Q7 Days     sodium chloride (PF)  3 mL Intracatheter Q8H     thiamine  50 mg Intravenous Daily       Data   Recent Labs   Lab 07/07/23  0843 07/07/23  0210 07/06/23  2146 07/06/23 2019 07/06/23  1228 07/06/23  0917 07/05/23  1206 07/05/23  0835 07/04/23  0812 07/04/23  0719 07/02/23  0900 07/02/23  0636   WBC 8.5  --   --   --   --   --   --   --   --  9.1  --  8.2   HGB 9.3*  --   --   --   --   --   --   --   --  11.5*  --  11.7*   *  --   --   --   --   --   --   --   --  102*  --  102*   *  --   --   --   --   --   --   --   --  124*  --  126*     --   --  145  --  146*  --  150*  --  151*   < > 150*   POTASSIUM 4.7  --   --   --   --  4.4  --  4.0  --  3.9   < > 3.6   CHLORIDE 113*  --   --   --   --  116*  --  120*  --  122*   < > 128*   CO2 27  --   --   --   --  25  --  21*  --  21*   < > 16*   BUN 22.4  --   --   --   --  12.9  --  12.5  --  12.2   < > 15.0   CR 0.59*  --   --   --   --  0.63*  --  0.68  --  0.82   < > 0.97   ANIONGAP 5*  --   --   --   --  5*  --  9  --  8   < > 6*   BERHANE 8.7  --   --   --   --  9.0  --  8.8  --  8.8   < > 8.8   * 104* 85  --    < > 144*   < > 137*   < > 103*   < > 120*    < > = values in this interval not displayed.       Imaging:   No results found for this or any previous visit (from the past 24 hour(s)).

## 2023-07-07 NOTE — PLAN OF CARE
Goal Outcome Evaluation:    Summary: Sepsis d/t UTI. Out of ICU 7/1. Hospital course complicated by dysphagia, stroke and Afib  DATE & TIME: 7/6-7/7 3912-9107  Cognitive Concerns/ Orientation: A&O x3. Disoriented to situation. Seems to understand english better then he can speak it, but able to communicate some english. Otherwise using Dutch interpretor pad.  BEHAVIOR & AGGRESSION TOOL COLOR: Green- gets frustrated with turn and repo and other cares at times.   ABNL VS/O2: VSS on RA.  MOBILITY: Ax2, Lift, T/R q2h. Refuses turns at times.   PAIN MANAGMENT: denies, only discomfort with turns and when touching legs, PRN tylenol given x2.    DIET: Fair appetite on soft bite sized diet and thin liquids today. Encouraging PO-family allowed to bring soft foods from home, menu filled out with patient preferences on white board. TF to run 8pm-8am only. Flush before and after tube feeds. Med's whole one at a time.   BOWEL/BLADDER: Nails patent. Incont of bowel x2, soft/brown.   ABNL LAB/BG: Na 145, Phos 2.3- replaced and recheck in AM, Hep Xa 0.34 next check 7/8 . BG 85/104  DRAIN/DEVICES: Midline infusing heparin gtt at 750 Units/hr- does not draw back blood, lab collect. L PIV SL. Nails, trial void 1-2 days after diuresis.    TELEMETRY RHYTHM: None   SKIN: BLE and anita/buttocks/breast area wounds. BLE dressings CDI. Juan Ramon cream and triad paste applied to anita/buttocks area with each incontinence episode; wounds are improving here per last pictures. +1-2 edema on BUE and +2-4 BLE edema-elevating all extremities on pillows. Rooke boots ordered, refused to use during night shift.   TESTS/PROCEDURES: None  D/C DAY/GOALS/PLACE: pending able to tolerate diet and stop tube feeds. Will need TCU vs LTC? Baseline does not walk per family.   OTHER IMPORTANT INFO: ID following, continues on PO Fluconazole and Augmentin. Lasix on hold due to sodium, possibly restart this AM pending labs (call MD if NA >146 or <140). Possible transition  to PO anticoagulation soon per MD. Order to let patient sleep overnight. Palliative spoke with family, see note.

## 2023-07-07 NOTE — PROGRESS NOTES
"[facility] Nurse Inpatient Assessment     Consulted for: Wound Right heel, bilateral shins, under the breasts. Wound posterior left heel; no clincial signs of infection; XR negative;  fibro-necrotic wound base. Options to convert or stabilize w/o excisional debridement? This would require OR, due to pain.    Summary: patient with chronic wounds slowly improving, refused buttock follow up assessment 7/7     Patient History (according to provider note(s):      \"89 year old male admitted on 6/28/2023. He has a past medical history of hypertension, diabetes mellitus type 2, chronic atrial fibrillation, not on anticoagulation, chronic systolic congestive heart failure, last EF 40 to 45% in July 2020, dyslipidemia, diabetic foot ulcer, lymphedema, dementia, BPH who was sent from his facility for evaluation of altered mental status..\"    Assessment:      Areas visualized during today's visit: Focused:, Lower extremities  and Abdomen    Wound location: right heel     Last photo: 7/7  Wound due to: Pressure Injury unstagable capi   Wound history/plan of care: found with triad in use   Wound base: 100 % slough     Palpation of the wound bed: boggy      Drainage: scant     Description of drainage: serosanguinous     Measurements (length x width x depth, in cm): 2.5  x 2.5  x  0.3 cm      Tunneling: N/A     Undermining: N/A  Periwound skin: Intact      Color: normal and consistent with surrounding tissue      Temperature: normal   Odor: none  Pain: denies , none  Pain interventions prior to dressing change: N/A  Treatment goal: Remove necrotic tissue  STATUS: improving  Supplies ordered: supplies stored on unit and discussed with RN    Wound location: BLE    LLE    RLE    Last photo: 7/7  Wound due to: Unknown Etiology and Venous Ulcer  Wound history/plan of care: found with plurogel and xeroform in use   Wound base:  dermis, granulation tissue and non-granular tissue     Palpation of the wound " "bed: normal      Drainage: moderate     Description of drainage: serosanguinous     Measurements (length x width x depth, in cm): diffuse scattered wounds, L>R, many are rounded and punched out appearance, 0.5-2cm diameter average, 0.1-0.2cm depth average     Tunneling: N/A     Undermining: N/A  Periwound skin: Intact      Color: normal and consistent with surrounding tissue      Temperature: normal   Odor: none  Pain: moderate and severe, requests cares \"slowly slowly\"  Pain interventions prior to dressing change: slow and gentle cares   Treatment goal: Heal , Drainage control, Infection control/prevention and Pain control  STATUS: improving  Supplies ordered: supplies stored on unit and discussed with RN     Wound location: right breast     Last photo: 7/7  Wound due to: Moisture Associated Skin Damage (MASD)  Wound history/plan of care: found open to air   Wound base:  dermis     Palpation of the wound bed: normal      Drainage: scant     Description of drainage: serous     Measurements (length x width x depth, in cm): 0.4  x 2  x  0.1 cm      Tunneling: N/A     Undermining: N/A  Periwound skin: Intact      Color: normal and consistent with surrounding tissue      Temperature: normal   Odor: none  Pain: denies , none  Pain interventions prior to dressing change: patient tolerated well  Treatment goal: Heal   STATUS: improving  Supplies ordered: supplies stored on unit and discussed with RN    Not assessed 7/7/23 due to patient refused:  Wound location:  Bilateral buttocks, coccyx area, perineal area   Last photo: 6-29-23 - no new photo 7/3 but appears slightly improved     Wound due to: pressure vs friction/moisture/shear; pt incontinent of loose stools 7/3  Wound history/plan of care: present on admission; pt needs assist x 2 for cares and repo  Wound base: pink moist tissue on right side and coccyx, and lightly resurfaced pink tissue left buttock and perineal area, inner thighs     Palpation of the wound bed: " "normal      Drainage: scant     Description of drainage: serous     Measurements (length x width x depth, in cm): diffuse small superficial areas     Tunneling: N/A     Undermining: N/A  Periwound skin: Dry/scaly      Color: normal and consistent with surrounding tissue      Temperature: normal   Odor: none  Pain: mild, tender  Pain interventions prior to dressing change: slow and gentle cares   Treatment goal: Heal  and Protection  STATUS: improving  Supplies ordered: supplies stored on unit    Treatment Plan:      Skin care precautions  EFFECTIVE NOW        Comments: Pressure Injury Prevention (PIP) Plan:   If patient is declining pressure injury prevention interventions: Explore reason why and address patient's concerns, Educate on pressure injury risk and prevention intervention(s), If patient is still declining, document \"informed refusal\" , and Ensure Care team is aware ( provider, charge nurse, etc)   Mattress: Follow bed algorithm, reassess daily and order specialty mattress, if indicated.   HOB: Maintain at or below 30 degrees, unless contraindicated   Repositioning in bed: Every 1-2 hours , Left/right positioning; avoid supine, Raise foot of bed prior to raising head of bed, to reduce patient sliding down (shear), and Frequent microturns using TAPS wedges, as patient tolerates   Heels: Keep elevated off mattress, Pillows under calves, and Heel lift boots   Protective Dressing: Triad paste   Positioning Equipment: TAPS wedges (#111002) to help maintain 30 degree side lying position   Chair positioning: Chair cushion (#[id number]) , Assist patient to reposition hourly, and Do NOT use a donut for sitting (this increases pressure to smaller area and creates a higher potential for injury)     If patient has a buttock pressure injury, or high risk for PI use chair cushion or SPS.   Moisture Management: Perineal cleansing /protection: Follow Incontinence Protocol, Avoid brief in bed, Clean and dry skin folds with " bathing , and Moisturize dry skin   Under Devices: Inspect skin under all medical devices during skin inspection , Ensure tubes are stabilized without tension, and Ensure patient is not lying on medical devices or equipment when repositioned   Ask provider to discontinue device when no longer needed.         Wound care  EVERY SHIFT        Comments: Location: buttock, under breasts, BLE, Right heel   Care: provided qshift by primary RN   1. Cleanse sites with lyssa spray cleanser and soft dry wipes. cleanse buttock = incontinence episode and qshift. cleanse BLE == every Other day. No need to wipe off all Triad, gently wipe off loose debris   2. After cleansing apply Triad paste to all sites (#124237)   3. Ok to leave open to air breasts and buttock   4. Every Other day apply xeroform (#177208) over open areas to BLE, apply ABD pads as needed for drainage, wrap with kerlix. Apply generous amount of Triad to xeroform and then apply to right heel wound every other day.     5. Elevate heels off bed with heel boots or pillows at all times            Orders: Reviewed, Written and Updated    RECOMMEND PRIMARY TEAM ORDER: None, at this time  Education provided: plan of care  Discussed plan of care with: Patient and Nurse  WOC nurse follow-up plan: weekly  Notify WOC if wound(s) deteriorate.  Nursing to notify the Provider(s) and re-consult the WOC Nurse if new skin concern.    DATA:     Current support surface: Standard  Low air loss (JAYLEEN pump, Isolibrium, Pulsate, skin guard, etc)  Containment of urine/stool: Incontinence Protocol, Incontinent pad in bed and Indwelling catheter  BMI: Body mass index is 32.18 kg/m .   Active diet order: Orders Placed This Encounter      Combination Diet Soft and Bite Sized Diet (level 6); Thin Liquids (level 0)     Output: I/O last 3 completed shifts:  In: 700 [P.O.:340; NG/GT:360]  Out: 1200 [Urine:1200]     Labs: Recent Labs   Lab 07/07/23  0843   HGB 9.3*   WBC 8.5     Pressure injury risk  "assessment:   Sensory Perception: 2-->very limited  Moisture: 3-->occasionally moist  Activity: 2-->chairfast  Mobility: 2-->very limited  Nutrition: 3-->adequate  Friction and Shear: 2-->potential problem  Tushar Score: 14    Amie VELASCOOCMARIA T   1st: Vocera Connect, call \"Amie Eugene\" or call Group \"[facility] Nurse\"   (2nd option: leave a voicemail at Dept. Office Number: [phone]. Messages checked occasionally M-F)    "

## 2023-07-07 NOTE — PROGRESS NOTES
"Brief House NP Note    Following RRT I was able to have a long goals of care conversation with patient's medical decision-maker Lucho (niece). Lucho told me that she has had many goals of care conversations so far and she understands that chest compressions and a ventilator would be futile but the patient and her other family members do not understand that. Lucho stated that if it were up to only her then she would want her uncle to be comfortable with family around him when he . Lucho stated that because of her Nondenominational tye, she could not withdraw interventions that might keep the patient alive as death has to be \"natural\". We further discussed what her and her families perception of \"natural\" would be when it came to a ventilator and vasopressor medications. Ultimately there are more family involved in the decision making and Lucho has been put in a difficult position where she wants to help her uncle and she does not see the best way to do that at this point.     DEEPTI Thapa, CNP  Hospitalist - House [facility]  Text me on the GLO tommie for a textback  Text page with web based paging for a callback    "

## 2023-07-07 NOTE — CODE/RAPID RESPONSE
[facility]    RRT Note  7/7/2023   Time Called: 529pm    RRT called for: Multiple bloody stools    Assessment & Plan   IMPRESSION & PLAN:    Joselin Sanchez is a 89 year old male w/ PMH of hypertension, DM2, chronic A-fib without anticoagulation, HF with midrange EF 40-45%, hyperlipidemia, diabetic foot ulcers, dementia who was admitted on 6/28/2023 for encephalopathy, diagnosed with septic shock from a urinary source.  There is concurrent bacteremia.  He is been supported with antifungals and antibiotics.  His course has been complicated by subacute stroke, KIMO now resolved.  He was started on anticoagulation initially with LMWH and later changed to heparin infusion on 7/1/2023 for secondary stroke prevention.    RN noted dark stool in the a.m. on 7/7/2023.  Additionally it was reported that he had a dark stool overnight.  Between 430-5:30 PM patient had least 3 large bloody stools in quick succession.  For this reason RRT was activated    On my arrival I find an elderly man lying in bed without acute distress.  BP is 89/40, repeat is 90/48, 102/61, heart rate is 93, SPO2 95% on room air, respiratory rate is 20.  There is a puddle of  liquid melenic stool FDC down to his knees.  His niece is serving as  and she is his healthcare power of .  He initially said his abdomen hurts him but later said he just has diarrhea.  He denies any chest pain, dyspnea but does endorse fatigue.  No diaphoresis or syncope.  BPs had been running 130s-140s systolic followed by 102/42 prior to the 89/40.  Heparin infusion was stopped at the same time RRT was activated.    Impression  Acute hemorrhage likely GI source, melenic  Acute blood loss anemia noting a 2 g drop from 11.5 down to 9.3 g/dL between 7/4/2023 and 7/7/2023  Thrombocytopenia potentially related to his bacteremia, 193 at admission now down to 119  Differential diagnosis:  -Most likely source is upper GI etiology given near  doubling of his BUN in the last 24 hours    INTERVENTIONS:  -hold heparin infusion (done by RN at 1730)  -adequate IV access w/ midline 22g, RN staff follow-up with attempt to place an 18-gauge as well  -4mg IV protamine, will not fully neutralize heparin but opting not to do so because of recent subacute ischemic stroke  -add on heparin Xa level to recent sample and recheck 15min after protamine  -stat cbc, type and screen, LA, LFTs, BMP, PT, INR  -stat bld product infusion consent obtained from NATALY  -protonix 40mg IV bid for stat  -gi consult Mickie ok to see in am to allow for bld product resuscitation overnight  -recheck hgb and plt at 2200, suspect current levels from 1745 may not be representative of recent bld loss.  Thereafter q4h plt and hgb starting at 0200  -hold acei  -transfer to IMC  -since BPs low normal will await cross matched RBC avaibility    Working diagnosis: Suspected upper GI bleeding    At the end of the RRT protamine and blood products were dissipated to be administered imminently, BPs were unchanged in the 90s systolic range, heart rate also 90s    disposition answer to IMC    Discussed with and defer further cares to night house FRANKLIN Aleman  Code Status: Full Code    Allergies   Allergies   Allergen Reactions     Gabapentin        Physical Exam   Vital Signs with Ranges:  Temp:  [97.4  F (36.3  C)-97.9  F (36.6  C)] 97.9  F (36.6  C)  Pulse:  [] 90  Resp:  [18-22] 18  BP: ()/(40-73) 98/59  SpO2:  [95 %-96 %] 96 %  I/O last 3 completed shifts:  In: 700 [P.O.:340; NG/GT:360]  Out: 1200 [Urine:1200]      Constitutional: vs as above and/or per EMR  General:  adult pt lying in bed without acute distress   GCS:   Motor 6=Obeys commands   Verbal 5=Oriented   Eye Opening 4=Spontaneous   Total: 15       Neuro: +follows commands wiggle toes and show 2 fingers bilat, face symmetric, tongue midline, speech fluent  Eyes defer  Head, ENT & mouth: NC/AT,  mouth moist oral mucosa  Neck:  supple  CV irreg irreg  resp: CTAB upper lobes  gi:normoactive bowel sounds, soft, nontender, nondisteded, melena as above  Ext: no edema or mottling  Skin: no rashes on exposed skin, bilateral lower extremities with Kerlix dressings  Lymph: defer  Musculoskeletal no bony joint deformities      Data     CBC with Diff:  Recent Labs   Lab Test 07/07/23  1745   WBC 9.9   HGB 9.3*   MCV 99   *   INR 1.00        Lactic Acid:    Lab Results   Component Value Date    LACT 1.7 07/07/2023           Comprehensive Metabolic Panel:  Recent Labs   Lab 07/07/23  1745 07/07/23  1702 07/07/23  0843   NA  --   --  145   POTASSIUM  --   --  4.7   CHLORIDE  --   --  113*   CO2  --   --  27   ANIONGAP  --   --  5*   GLC  --  136* 127*   BUN  --   --  22.4   CR  --   --  0.59*   GFRESTIMATED  --   --  87   BERHANE  --   --  8.7   MAG  --   --  1.8   PHOS 2.1*  --  2.2*   PROTTOTAL 4.7*  --   --    ALBUMIN 1.6*  --   --    BILITOTAL 0.2  --   --    ALKPHOS 154*  --   --    AST 36  --   --    ALT 11  --   --        INR:    Recent Labs   Lab Test 07/07/23  1745   INR 1.00       Time Spent on this Encounter   I spent 60 minutes (0535 - 0635) of critical care time on the unit/floor managing the care of Joselin Sanchez. Upon evaluation, this patient had a high probability of imminent or life-threatening deterioration due to acute GI bleeding, symptomatic w/ transient hypotension, which required my direct attention, intervention, and personal management including partial reversal of anticoagulation, transfusion of RBCs 100% of my time was spent at the bedside counseling the patient and/or coordinating care regarding services listed in this note.    DEEPTI [facility]  Hospitalist Service  [facility]  Securely message with [employer] (more info)  Text page via [employer] Paging/Directory

## 2023-07-07 NOTE — PROGRESS NOTES
Notified provider about indwelling antunez catheter discussed removal or continued need.    Did provider choose to remove indwelling antunez catheter? No    Provider's antunez indication for keeping indwelling antunez catheter: Retention & wound healing.    Is there an order for indwelling antunez catheter? Yes    *If there is a plan to keep antunez catheter in place at discharge daily notification with provider is not necessary, but please add a notation in the treatment team sticky note that the patient will be discharging with the catheter.

## 2023-07-08 NOTE — CONSULTS
GASTROENTEROLOGY CONSULTATION      Joselin Sanchez  [address] [place] [address]  89 year old male     Admission Date/Time: 6/28/2023  Primary Care Provider: Alistair Ross     We were asked to see the patient in consultation by Dr. Dubois for evaluation of melena.    ASSESSMENT:    #1 Melena in the setting of heparin infusion  #2 Advanced age and multiple comorbidities  #3 Goals of care  #4 Tube feeding  #5 A. Fib    Upper gi bleed possible, likely exacerbated by anticoagulation. Hb stable now, vitals stable. PUD/stress ulcer/gastritis/esophagitis/AVMs/malignancy possible. Doubt variceal bleeding based on clinical status. No recent endoscopy evaluation. CT imaging from 7/1 also shows cirrhosis, no clear varices noted on CT.    Pt has NG used for night feedings, received overnight feeding last night (Not NPO). Given multiple comorbidities, palliative team was consulted, ongoing goals of care discussion, but seems at this time, the status is for restorative care.     RECOMMENDATIONS:    Suggest continuing supportive cares with IV PPI, avoidance of anticoagulation, IV fluids/transfusion as needed.    Risks vs benefits of EGD at this time may favor holding off. Pt did get tube feeds overnight.     Suggest clears for diet (NPO after midnight), and hold NG/tube feeds overnight today, as we monitor status. If Hb stable with above, would defer any endoscopy.    Continue goals of care discussion.       David Cronin MD   [facility]  [phone]      ________________________________________________________________________        HPI:  Joselin Sanchez is a 89 year old male who has been admitted with severe sepsis and altered mental status since 6/28. Known A Fib, initial concern for risk of stroke, was on heparin infusion. Yesterday PM, he had large melenic bm with some drop in Hb and RRT was called. Heparin was stopped, received protamine. Since then Hb stable, vitals stable, and continued to have small  volume liquid melenic stools (able to see in the room, no fresh blood), volume has reduced since yesterday.          ROS: A comprehensive ten point review of systems was negative aside from those in mentioned in the HPI.      PAST MEDICAL HISTORY:  Past Medical History:   Diagnosis Date     Anxiety      Candida UTI 07/02/2023     Chronic heart failure, unspecified heart failure type (H)      Chronic pain      CKD (chronic kidney disease) stage 3, GFR 30-59 ml/min (H)      Dementia (H)      Diabetes (H)      Dysphagia      Dysuria      Hypertension      Lymphedema      Mixed hyperlipidemia      Mood disturbance      Old age 07/02/2023    Year of birth 1924     SHEILA (obstructive sleep apnea)      Peripheral vascular disease (H)      Pressure ulcer of heel      SOCIAL HISTORY:  Able to understand english- able to answer appropriately.     FAMILY HISTORY:  No family history on file.  ALLERGIES:   Allergies   Allergen Reactions     Gabapentin      MEDICATIONS:   Prior to Admission medications    Medication Sig Start Date End Date Taking? Authorizing Provider   acetaminophen (TYLENOL) 500 MG tablet Take 1,000 mg by mouth every 8 hours as needed for pain   Yes Unknown, Entered By History   acetaminophen (TYLENOL) 500 MG tablet Take 1,000 mg by mouth every morning   Yes Unknown, Entered By History   albuterol (PROAIR HFA/PROVENTIL HFA/VENTOLIN HFA) 108 (90 Base) MCG/ACT inhaler Inhale 2 puffs into the lungs every 4 hours as needed for shortness of breath or wheezing   Yes Unknown, Entered By History   aspirin 81 MG EC tablet Take 81 mg by mouth daily   Yes Unknown, Entered By History   Carboxymethylcellulose Sodium (REFRESH LIQUIGEL) 1 % GEL Apply 1 drop to eye 4 times daily   Yes Unknown, Entered By History   diclofenac (VOLTAREN) 1 % topical gel Apply topically 2 times daily   Yes Unknown, Entered By History   docusate sodium (COLACE) 100 MG capsule Take 100 mg by mouth 2 times daily   Yes Unknown, Entered By History  "  folic acid (FOLVITE) 1 MG tablet Take 1 mg by mouth daily   Yes Unknown, Entered By History   furosemide (LASIX) 20 MG tablet Take 20 mg by mouth daily   Yes Unknown, Entered By History   lisinopril (ZESTRIL) 2.5 MG tablet Take 2.5 mg by mouth daily   Yes Unknown, Entered By History   metFORMIN (GLUCOPHAGE) 500 MG tablet Take 500 mg by mouth daily   Yes Unknown, Entered By History   oxyCODONE (ROXICODONE) 5 MG tablet Take 2.5 mg by mouth daily   Yes Unknown, Entered By History   potassium chloride ER (K-TAB/KLOR-CON) 10 MEQ CR tablet Take 20 mEq by mouth daily   Yes Unknown, Entered By History   senna-docusate (SENOKOT-S/PERICOLACE) 8.6-50 MG tablet Take 1 tablet by mouth 2 times daily   Yes Unknown, Entered By History   tamsulosin (FLOMAX) 0.4 MG capsule Take 0.4 mg by mouth daily   Yes Unknown, Entered By History     PHYSICAL EXAM:   /57   Pulse 96   Temp 97.4  F (36.3  C) (Axillary)   Resp 16   Ht 1.7 m (5' 6.93\")   Wt 93 kg (205 lb 0.4 oz)   SpO2 97%   BMI 32.18 kg/m     GEN: No distress, Alert, irritable.  PAT: No pallor, No icterus, oral mucosa moist.    NECK: No thyromegaly. No mass.    HEART: RRR, S1 S2 normal.   LUNGS: CTA BL  ABD: soft, non-tender, non-distended, no peritoneal signs.  NEURO: No gross motor deficits.  PSYCH: Able to answer orientation questions appropriately but quite annoyed by these questions.  EXTREMITIES: No pedal edema.      ADDITIONAL DATA:   I reviewed the patient's new clinical lab test results.   Recent Labs   Lab Test 07/08/23  0549 07/08/23  0209 07/07/23  2307 07/07/23  1947 07/07/23  1745 07/07/23  0843 07/04/23  0719   WBC  --   --   --   --  9.9 8.5 9.1   HGB 9.8* 9.9* 11.0* 9.8* 9.3* 9.3* 11.5*   MCV  --   --   --   --  99 101* 102*   * 108*  --  122* 125* 119* 124*   INR  --   --   --   --  1.00  --   --      Recent Labs   Lab Test 07/08/23  0907 07/08/23  0549 07/07/23  2113 07/07/23  1745 07/07/23  1702 07/07/23  0843   NA  --  147*  --  143  --  145 "   POTASSIUM  --  4.4  --  4.8  --  4.7   CHLORIDE  --  114*  --  112*  --  113*   CO2  --  26  --  22  --  27   BUN  --  26.4*  --  28.1*  --  22.4   CR  --  0.61*  --  0.61*  --  0.59*   ANIONGAP  --  7  --  9  --  5*   BERHANE  --  8.8  --  8.7  --  8.7   * 130* 95 139*   < > 127*    < > = values in this interval not displayed.     Recent Labs   Lab Test 07/07/23  1745 06/29/23  1759 06/28/23  1250   ALBUMIN 1.6*  --  2.4*   BILITOTAL 0.2  --  0.4   ALT 11  --  7   AST 36  --  21   PROTEIN  --  30*  --         I reviewed the patient's new imaging results.    Total time: 45 minutes.

## 2023-07-08 NOTE — PROGRESS NOTES
[facility]    Hospitalist Progress Note    Assessment & Plan   Date of Admission:  6/28/2023        Assessment & Plan  Joselin Sanchez is a 89 year old male admitted on 6/28/2023. He has a past medical history of hypertension, diabetes mellitus type 2, chronic atrial fibrillation, not on anticoagulation, chronic systolic congestive heart failure, last EF 40 to 45% in July 2020, dyslipidemia, diabetic foot ulcer, lymphedema, dementia, BPH who was sent from his facility for evaluation of altered mental status..       #Melena.  Patient was suspected to have an upper GI bleed while on heparin drip.  Differential diagnosis include peptic ulcer disease, stress ulcers, gastritis, esophagitis.  Variceal bleeding is felt to be unlikely given the CT imaging on 7/1 which showed cirrhosis but no clear signs of varices.  Patient is on PPI.  He is also on nasogastric tube feeding nightly which is discontinued 7/8 after goals of care discussion with the niece, benefits and risks explained.  Given the multiple comorbidities, risk of aspiration during procedure/anesthesia.  It was felt that is prudent to hold off on upper GI endoscopy at this moment.  Monitor H&H and transfuse as necessary, or if hemoglobin is below 7.0.  Discussed with Dr. VALDERRAMA, gastroenterologist on consult.    #Agitation, anxiety and refusal of care at times.  Zyprexa p.o. every 6 as needed, lorazepam as needed has been ordered.  Patient appears to to have the capacity to make his own decisions per my assessment.  He is very consistent with what he would like as to doing what he does not want us to do.  He is agreeable to personal care medications, does not like tube feeding.  I will ask psychiatry to see him to make an assessment regarding patient's decision-making capacity.  The patient's niece at this moment is not comfortable with goals of care discussions, questions about his CODE STATUS as well as need for palliative consult. I will  involve ethics team as well to assist     # Severe sepsis due to UTI    # Bacteriemia  - Presented with altered mental status  - Was in A-fib with RVR upon arrival in ER, blood pressure also on lower side, lowest BP 85/62  - White blood cells on admission 11.5 lactic acid 1.3; Pro-Ryan 0.26  - Chest x-ray with no infiltrates  - Nails Catheter was placed in ER, UA was not able to be performed by the lab as of purulent urine  - CT abdomen/ pelvis- Sequela of chronic bladder outlet obstruction, likely secondary to prostatomegaly   - in ER - he was given IVF and 1 dose of Zosyn  - Admitted to ICU; continued on IV Zosyn  - 6/29 - Blood pressure improved initially but was intermittently hypotensive overnight  - he is Full Code as per his records;  - Echo- EF 40-45%.   - BP improved  - was hypothermic 2 nights ago, now temp- WNL  - WBc down to 7.1  - Blood cultures from 6/28, 1 of 2 bottles positive for Corynebacterium striatum, and 1/2 bottles positive for GPC in clusters, initially suspected contamination, growing anaerococcus species  - Urine culture positive >100,000 colony of yeast/Candida Dubliniensis  - Given his a severe sepsis; started on fluconazole 400 mg IV daily on 6/29, day 8  -ID consulted, zosyn changed to augmentin, day 9 abx  - repeat BC on 6/29- 1/2 bottles again positive for GPC in clusters--> staph pettenkferi  -Vancomycin stopped per ID  - MRSA swab  - repeat bld cx 7/1, 7/2 ngtd  - MRSA cx nares negative  -pt has been afebrile. WBC 11.5 admission--> normalized to 8-9 range  -clinically improving. More verbal today. Eating better    Plan;   - Followed by ID  - Per ID started Augmentin for 4 more days and Fluconazole  For 7 days total (last day)   # Septic shock, resolved  # Acute/ early subacute ischemic infarct  - he has a history of A-fib, not on anticoagulation prior to admission  -Presented with altered mental status thought to be related to severe sepsis  - CT head on admission negative for acute  pathology  - 6/28- noted to have left-sided weakness  - Code stroke called  - CT head- findings concerning for acute/early subacute ischemic infarct  involving the left occipital lobe; No acute intracranial hemorrhage, extra axial fluid collection, or mass effect; Brain atrophy and presumed chronic ischemic changes   - CTA head and neck   1. No definite high-grade stenosis or large vessel occlusion involving the major intracranial arteries.  2. Mild nonflow-limiting atherosclerosis of the bilateral carotid siphons.  3. No intracranial aneurysm or high flow vascular malformation.  4. Mild bilateral carotid bifurcation atherosclerosis, left more than right, with less than 50% stenosis by NASCET criteria.  5. Patent cervical vertebral arteries without flow-limiting stenosis.  6. 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 CTA of the chest could be considered for further evaluation, as indicated.     - Stroke neuro consult appreciated  - PTA on aspirin 81 mg p.o. daily  - MRI of the brain-   Subacute ischemia left occipital lobe along with tiny focus posterior right frontal lobe near vertex  - Echo-  EF 40-45%. 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 No evidence of active hemorrhage.  - 7/1- concern for left pupil irregular and larger that rt pupil (unclear if new)  - repeat CT head-  Subacute infarct left occipital lobe as seen on previous MRI, no hemorrhagic transformation     - Stroke Neuro- recommended anticoagulation for secondary stroke prevention in the setting of atrial fibrillation (see note from 6/30)  - 7/1- start heparin drip with no boluses; stop PTA ASA now; discontinue Lovenox  - called niece 7/1 and 7/2  to discuss anticoagulation with  her, left a message, no call back   - pharm liaison consult for DOAC coverage  - PT/OT- rec LTC  - SLP-initially rec n.p.o  - NGT placed 7/1 and TF started as per nutritionist  - 7/1- SLP allowed full liquid diet but I was paged by RN because he had a coughing spell/chocking with water  - SLP reassessed- rec full liquid diet with mildly thick liquids  - depending on progress and goals of care- he may need PEG? I discussed the need for PEG tube  pending SLP tem with his POA on 7/4 and she is OK with proceeding with the PEG tube if he fails SLP.   -pt progressed to soft, and bite sized diet level 6 with thin liquids level 0.   -Na down to 146 today  -clinically improving. Pt is nonambulatory at baseline.   More alert and conversant today.  Using   Taking po steadily better  Pt requesting feeding tube removal. Discussed with patient and family that FT can be removed if taking po well.     Plan   - follow po intake, nutrition and speech following  - follow bmp, adjust free water flushes as needed,   -am bmp  -diet per speech,   - if taking po well today can remove feeding tube  -        # Acute encephalopathy, multifactorial, infectious, metabolic    Patient awake and alert and refusing care and medications. Complains of insomnia.   # Underlying dementia  Stable,   - Gentle redirectioning, avoid sedating or pain medications as able  - PT/OT rec LTC  - /care coordinator consult; reported the patient being a vulnerable adult; he was admitted to [facility] the end of March/23 and discharged to [facility]       # KIMO in a setting of CKD stage III  # Hyperkalemia, resolved  - Creatinine on admission 2.6; Potassium on admission 6.7  - Recommended CKD stage III in Care Everywhere but recent BMP on June/9//23 showed a potassium of 4.1, creatinine 0.86  - This is likely related to severe sepsis, decreased intravascular volume, hypotension  - Noted that prior to admission he was on  Lasix, lisinopril, potassium supplementation  -initially held lasix, Lisinopril but restarted 7/4   -BMP 7/6, Na down to 146. Cr and K wnl.        Plan;   - Telemetry  - Avoid nephrotoxic drugs  - will rehold lasix until Na back to wnl. Am bmp  -cont Lisinopril  - monitor BMP      # Diabetes mellitus type 2  # Hypoglycemia  - PTA on metformin 500 mg p.o. daily  - Hemoglobin A1c is low 5.8  - Hold prior to admission metformin and likely not need to resume it after discharge  - started on NGT feedings 7/1  - BS stable in 100 range.   - Hypoglycemia protocol  -cont with low resist ISS q4 hour protocol as on TFs--> change to qid with meals now. Changed to nocturnal feeding starting tonight.      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  - Treat UTI as above  -Telemetry-heart rate in 90- 100s  -sbp good control  - K and Mg have been wnl  -Hb has been stable. No bleeding     - follow lytes, Tele, heparin gtt for now, change to po doac if doing well.   - consider bber     # Systolic congestive heart failure  # h/o Hypertension  - A prior echocardiogram in July/2020 showed EF of 40 to 45%, LV hypokinesis  - PTA on lisinopril 2.5 mg p.o. daily, Lasix 20 mg p.o. daily, along with potassium chloride 20 mg p.o. daily. Held on admission given sepsis, KIMO, hypotension  - Lisinopril resumed on 7/4 at 2.5 mgs daily  - 7/5 resumed PTA Lasix 320 mg PO, for increasing weight and LE edema,   -wts have been variable   -Na down to 146. Hold lasix for now until Na back to wnl then resume.   -daily wt  -restart diuretic once Na wnl      # Peripheral arterial disease  # Diabetic foot  ulcer (right heel)  # History of lymphedema  # History of polymicrobial bacterial infection of the legs  - He had an admission to [facility] in [place] from 3/27 through 4/4/2023 for polymicrobial bacterial infection of his legs.  He was taken to the OR by surgery for debridement; he was treated with IV Zosyn for 14 days  - He is wounds does not seem to be infected at this time  - Wound care consult requested  - Podiatry consult appreciated  - Xray rt foot - 2 cm partial-thickness soft tissue ulceration overlying the posterior margin of the calcaneus. No osseous volume loss suggest osteomyelitis  - as per Podiatry- The wound base might need excisional debridement at some point and given his tenderness in the region, likely would be done in the OR.  No urgency.   - If wound cannot be converted to a healthier wound bed with local wound cares, please re-consult podiatry for possible excisional debridement in OR.     Plan-  - offloading of site all times   - Discussed with his niece, she does not want him to have surgery or amputation.  - Continue wound care  -follow for need of podiatry intervention in future. Family not want pt to have surgery     # BPH  - Nails placed in ER on admission   - PTA Flomax resumed on admission, then discontinued because of hypotension   - he has Nails cath currently  To be removed in a day or two  - resumed Flomax now given that BP improved  - consider TOV in the next few days     # Dysphagia   -Patient had a nasogastric tube feeding nightly.  He also was on clear liquid diet which he seems to be tolerating at this moment.  Nasogastric tube feeding to be discontinued along with removal of the nasogastric tube per patient's wishes.  We will be on clear liquid diet.  We will keep him n.p.o. after midnight until follow-up eval tomorrow by GI.  If his hemoglobin remains stable I do anticipate the patient to need EGD.       # Right hip pain  - X ray rt hip- no fracture  - pain  "management      # Goals of care  - as per his papers and his niece who is POA  he is full code;   -7/6. Had long discussion with pt's POA, niece today. Discussed his guarded prognosis and high risk for future hospitalization. Discussed code status and medical details that entail full code status. I stated that DNR/DNI is medically appropriate and full resuscitation would not very likely worsen his quality of life and very likely futile care.  We also discussed the concept of comfort care/hospice care in detail.   She was very engaged in the conversation and is open to speaking with palliative care team. She wishes to speak with her family and community regarding his advanced directives and goals of care as well.          Diet: Adult Formula Drip Feeding: Continuous Jevity 1.5; Other - Specify in Comment; Goal Rate: 60; mL/hr; OK to adv TF to goal rate of 60 mL/hr today; Do not advance tube feeding rate unless K+ is = or > 3.0, Mg++ is = or > 1.5, and Phos is = or > 1.9  Combination Diet Soft and Bite Sized Diet (level 6); Thin Liquids (level 0)    DVT Prophylaxis: heparin drip  Nails Catheter: PRESENT, indication: Retention  Lines: PRESENT             Cardiac Monitoring: None  Code Status: Full Code          Clinically Significant Risk Factors []Expand by Default        # Hypernatremia: Highest Na = 151 mmol/L in last 2 days, will monitor as appropriate    # Hypomagnesemia: Lowest Mg = 1.5 mg/dL in last 2 days, will replace as needed   # Hypoalbuminemia: Lowest albumin = 2.4 g/dL at 6/28/2023 12:50 PM, will monitor as appropriate   # Thrombocytopenia: Lowest platelets = 124 in last 2 days, will monitor for bleeding          # Obesity: Estimated body mass index is 32.28 kg/m  as calculated from the following:    Height as of this encounter: 1.7 m (5' 6.93\").    Weight as of this encounter: 93.3 kg (205 lb 11 oz).   # Severe Malnutrition: based on nutrition assessment                Disposition Plan: TBD     Expected " Discharge Date: 07/06/2023        Discharge Comments: palliative consult for assistance of GOC discharge in 3-4 days?     Discussed care plan with pt, rn,   >40  minutes on coordination of care,discussing goals of care and current tx plan at bedside    Chinedu Dubois MD, MD  Text Page  (7am to 6pm)  Interval History   Patient awake alert.  He told me that he cannot get restful sleep.  He begged me if I could help him to sleep for at least 2 hours.  Apparently he is not getting any medications for comfort of anxiety as needed or at bedtime.  He received Zyprexa last 6/28.  He was lying in his on feces and was refusing care as well as medications.  Tube feeding is running at 60 cc/h.  He also has heparin gtt.  He is getting pleasure feeding orally.  Vital signs stable.  Lorazepam order placed for comfort and to help any rest and get some sleep at bedtime.  Zyprexa 5 mg every 6 hours as needed for any agitation.  Discussed with RN.      -Data reviewed today: I reviewed all new labs and imaging results over the last 24 hours. I personally reviewed imaging and labs since admission.     Physical Exam   Temp: 98  F (36.7  C) Temp src: Axillary BP: 122/57 Pulse: 96   Resp: 16 SpO2: 97 % O2 Device: None (Room air) Oxygen Delivery: 2 LPM  Vitals:    07/01/23 0600 07/05/23 1221 07/06/23 0908   Weight: 78.9 kg (173 lb 15.1 oz) 93.3 kg (205 lb 11 oz) 93 kg (205 lb 0.4 oz)     Vital Signs with Ranges  Temp:  [97.3  F (36.3  C)-98  F (36.7  C)] 98  F (36.7  C)  Pulse:  [] 96  Resp:  [10-22] 16  BP: ()/(40-88) 122/57  SpO2:  [91 %-100 %] 97 %  I/O last 3 completed shifts:  In: 1423 [P.O.:100; NG/GT:540]  Out: 975 [Urine:975]    Constitutional: In bed, occasional cough. Alert, conversant  HEENT: feeding tube in place. No sinus tenderness  Respiratory: Decreased BS bibasilar, breathing easily  Neck: jvp seems wnl  Cardiovascular: irreg irreg rhythm. No r/g/m  GI: Nondistended, soft, nasogastric tube feeding in place.   Nails catheter in place.  Bowel sounds active.  Skin/Integumen: bilateral pretibial bandages, 3-4+ Bilateral foot edema. Bilateral thigh edema 2+.   Psych:nl affect  Neuro: weak bilateral hip flexors  Weak bilateral deltoids, nl strength bilateral biceps and triceps  Verbal. Appropriate answers to questions  Good attention. engaged    Medications     dextrose         acetaminophen  1,000 mg Oral or Feeding Tube QAM     amoxicillin-clavulanate  875 mg Oral or Feeding Tube BID     carboxymethylcellulose PF  1 drop Ophthalmic 4x Daily     diclofenac  2 g Topical BID     docusate  100 mg Oral or Feeding Tube BID     dutasteride  0.5 mg Oral Daily     fluconazole  100 mg Oral Daily     folic acid  1 mg Intravenous Daily     [Held by provider] furosemide  20 mg Oral Daily     insulin aspart  1-3 Units Subcutaneous TID AC     insulin aspart  1-3 Units Subcutaneous At Bedtime     [Held by provider] lisinopril  2.5 mg Oral Daily     pantoprazole  40 mg Intravenous Q12H     potassium & sodium phosphates  1 packet Oral or Feeding Tube Q4H     senna-docusate  1 tablet Oral or Feeding Tube BID     sodium chloride (PF)  10 mL Intracatheter Q8H     sodium chloride (PF)  3 mL Intracatheter Q8H     thiamine  50 mg Intravenous Daily       Data   Recent Labs   Lab 07/08/23  0907 07/08/23  0549 07/08/23  0209 07/07/23  2307 07/07/23  2113 07/07/23  1947 07/07/23  1745 07/07/23  1702 07/07/23  0843 07/04/23  0812 07/04/23  0719   WBC  --   --   --   --   --   --  9.9  --  8.5  --  9.1   HGB  --  9.8* 9.9* 11.0*  --  9.8* 9.3*  --  9.3*  --  11.5*   MCV  --   --   --   --   --   --  99  --  101*  --  102*   PLT  --  119* 108*  --   --  122* 125*  --  119*  --  124*   INR  --   --   --   --   --   --  1.00  --   --   --   --    NA  --  147*  --   --   --   --  143  --  145   < > 151*   POTASSIUM  --  4.4  --   --   --   --  4.8  --  4.7   < > 3.9   CHLORIDE  --  114*  --   --   --   --  112*  --  113*   < > 122*   CO2  --  26  --   --    --   --  22  --  27   < > 21*   BUN  --  26.4*  --   --   --   --  28.1*  --  22.4   < > 12.2   CR  --  0.61*  --   --   --   --  0.61*  --  0.59*   < > 0.82   ANIONGAP  --  7  --   --   --   --  9  --  5*   < > 8   BERHANE  --  8.8  --   --   --   --  8.7  --  8.7   < > 8.8   * 130*  --   --  95  --  139*   < > 127*   < > 103*   ALBUMIN  --   --   --   --   --   --  1.6*  --   --   --   --    PROTTOTAL  --   --   --   --   --   --  4.7*  --   --   --   --    BILITOTAL  --   --   --   --   --   --  0.2  --   --   --   --    ALKPHOS  --   --   --   --   --   --  154*  --   --   --   --    ALT  --   --   --   --   --   --  11  --   --   --   --    AST  --   --   --   --   --   --  36  --   --   --   --     < > = values in this interval not displayed.       Imaging:   No results found for this or any previous visit (from the past 24 hour(s)).

## 2023-07-08 NOTE — PROGRESS NOTES
The purpose of this note, including any accompanying recommendation, is advisory only concerning ethical principles and considerations related to this case. Determination of appropriate patient care decisions is the responsibility of the clinical team in consultation with patients and their decision makers and relevant institutional policy. Legal and policy questions should be addressed with Risk Management.    This progress note is to document initial impressions following discussion with Dr. Chinedu Dubois.    In following advice from Dr. Dubois, Mr. Sanchez has decision making capacity.     The patient has a surrogate decision maker, his niece. I am unable to locate advance care planning documents in the chart that formally designate the niece as an agent; she appears to be a surrogate based on being next of kin. Several notes suggest that there is an advance directive, but it does not appear to have been uploaded or validated by honoring choices.     There is potential conflict between the patient's choices and goals, medical recommendations of the care team, and the choices of the patient's niece as surrogate. A few general points are worth emphasizing here:     - First, a surrogate only becomes relevant as as decision maker after a patient has lost decision making capacity. If a patient has decision making capacity, they exclusively hold the power to make choices regarding their treatment goals. Here, as long as the patient has capacity, his goals, values, and choices should be honored regardless of the nieces views or choices.     - Second, a surrogate's authority is based on an assumption that they can effectively represent what the patient would have wanted, not what they themselves want or are compelled to choose. Surrogates may not make choices that contradict or contravene known patient goals, values, and choices. Surrogates are held to a substituted judgment standard; if they cannot meet the standard, then  they are not effective surrogates. Surrogates may be ineffective if other conditions including conflicts of interest or even tye commitments preclude them from being able to effectively exercise substituted judgment.     - Third, clinicians cannot be compelled to provide treatment that is not medically indicated (meaning harmful or ineffective,) and inconsistent with their medical judgment.     In this situation, there is some concern regarding futile or non-beneficial care at the end of life, wherein surrogates are constrained by tye commitments from participating in any choices that result in death, but may also then feel an obligation to either tolerate or continue to pursue treatments where the risks and burdens greatly exceed any opportunities for benefit.       Regarding the feeding tube; currently the patient has capacity and his choices and goals should be elicited and honored.       Regarding further escalations and life sustaining treatment, goals and values from the patient should be elicited if possible and clearly documented. An informed non-dissent approach regarding code status and a change to DNR may be advanced. (Informed non-dissent is consistent with [facility] policy; unilateral physician directed DNR's are currently not consistent with system policy.) Even with a full code status, the initiation and duration of an attempt at resuscitation remain squarely a medical determination of physicians; (show codes and slow codes remain ethically problematic.)     Ethics will continue to follow and remain available to support.     Jay Wren JD, MPH, MA, CALEB-C                                   Clinical Ethics Lead, [facility]     Please contact the service if we can be of any further assistance, service pager [phone].

## 2023-07-08 NOTE — PLAN OF CARE
Goal Outcome Evaluation:No change  Has had several small/smears loose black stools overnight. Pt resistant to allow staff to clean him up at times. Leg dressings with yellow serous drainage. Hbg 11.0 after 1 unit blood given PTA to this unit. 0200, Hgb down to 9.9. Awaiting recheck this am. Adequate UOP per antunez. TF running at 60 ml/hr though was started at 2130 due to pt transferred here at 2100 and feeding hadn't been started. Cries out when legs are touched when repositioning, otherwise no pain. Staff uses ceiling lift when appropriate.Weaned to RA about 0500

## 2023-07-08 NOTE — PLAN OF CARE
Goal Outcome Evaluation:        Summary: Sepsis d/t UTI. Out of ICU 7/1. Hospital course complicated by dysphagia, stroke and Afib  DATE & TIME: 7/7 0539-0788(continued care until 1900, see RRT notes for details)  Cognitive Concerns/ Orientation: A&O x3. Disoriented to situation. Seems to understand english better then he can speak it, but able to communicate some english(per previous shift report). Otherwise using Chadian interpretor pad and family.  Declines cares/meds at times  BEHAVIOR & AGGRESSION TOOL COLOR: Green- gets frustrated with turn and repo and other cares at times.   ABNL VS/O2: VSS on RA.  MOBILITY: Ax2, Lift, T/R q2h. Refuses turns at times.   PAIN MANAGMENT: denies, only discomfort with turns and when touching legs, scheduled tylenol given x1, voltaren applied as directed     DIET: poor appetite on soft bite sized diet and thin liquids today. Declined meals.Encouraging PO-family allowed to bring soft foods from home, menu filled out with patient preferences on white board. TF to run 8pm-8am only. Flush before and after tube feeds. Med's whole one at a time/through tube.   BOWEL/BLADDER: Nails patent. Incont of bowel x3, soft/brown&black flecks, until RRT called with multiple black./maroon stools noted (see RRT note).   ABNL LAB/BG: Na 145, Phos 2.2- replacement completed @ 1400 and recheck in AM  7/8 ,  . BGM completed did not need sliding scale insulin per parameters  DRAIN/DEVICES: Midline infusing heparin gtt at 750 Units/hr until stopped with GI bleeding 1730 and RRT. Blood started and infusing at time of transfer of care Midline does not draw back blood, lab collect. L PIV SL. Nails,for retention and wound healing.    TELEMETRY RHYTHM: NA   SKIN: BLE and anita/buttocks/breast area wounds. BLE dressings CDI- changed by WOC. Juan Ramon cream and triad paste applied to anita/buttocks area with each incontinence episode; wounds are improving here per last pictures. +1-2 edema on BUE and +2 BLE  edema-elevating all extremities on pillows. Rooke boots ordered, pt declined.  TESTS/PROCEDURES: None  D/C DAY/GOALS/PLACE: pending able to tolerate diet and stop tube feeds. Will need TCU vs LTC? Baseline does not walk per family.   OTHER IMPORTANT INFO:     *RRT called at 1730, please see notes/ flowsheets for actions completed including Blood administration.  Orders to transfer to [facility]- will have bed available at next shift change 1900.     ID following, continues on PO Fluconazole and Augmentin. Lasix on hold due to sodium (call MD if NA >146 or <140). Palliative spoke with family yesterday, see note.

## 2023-07-08 NOTE — PLAN OF CARE
"Orientations: 4  Vitals/Pain: VSS on RA  Tele: A fib CVR  Lines/Drains: Midline to RUE  Skin/Wounds: BLE wounds, R heel, wounds to sacrum/coccyx, red on scrotum,   GI/: incontinent of bowel and bladder  Nails in place for wound healing  Labs: Abnormal/Trends, Electrolyte Replacement- Mg and Ph recheck in AM  Ambulation/Assist: x2 lift - turn/repo  Sleep Quality: very poor  Plan: continue to encourage turns, manage leg pain    Patient refusing turns and incontinence care frequently. Multiple attempts made, education provided with  used. Hitting staff when cleaning after incontinence and calling staff \"stupid girls\".       "

## 2023-07-08 NOTE — PROGRESS NOTES
Notified provider about indwelling antunez catheter discussed removal or continued need.    Did provider choose to remove indwelling antunez catheter? No    Provider's antunez indication for keeping indwelling antunez catheter: Wound Healing.    Is there an order for indwelling antunez catheter? Yes    *If there is a plan to keep antunez catheter in place at discharge daily notification with provider is not necessary, but please add a notation in the treatment team sticky note that the patient will be discharging with the catheter.

## 2023-07-09 NOTE — PLAN OF CARE
Goal Outcome Evaluation: Declining  Required IVP Dilaudid 0.4 mg x2 plus Ativan 1 mg x2 this shift for leg pain and anxiety, with good effect. Repo'd as pt allowed with 2-3 and lift at times. Smear of black stool x2 . NPO all shift for possible scope today.

## 2023-07-09 NOTE — PROGRESS NOTES
Care Management Follow Up    Length of Stay (days): 11    Expected Discharge Date: 07/11/2023     Concerns to be Addressed:       Patient plan of care discussed at interdisciplinary rounds: Yes    Anticipated Discharge Disposition:       Anticipated Discharge Services:    Anticipated Discharge DME:      Patient/family educated on Medicare website which has current facility and service quality ratings:    Education Provided on the Discharge Plan:    Patient/Family in Agreement with the Plan:      Referrals Placed by CM/ERI:  [facility], . [facility]  Private pay costs discussed: Not applicable    Additional Information:  SW spoke with patient's niece Mariama and she informs that she would like to pursue other placement. [facility].       NAOMI Schaeffer

## 2023-07-09 NOTE — PROGRESS NOTES
[facility]    Hospitalist Progress Note    Assessment & Plan   Date of Admission:  6/28/2023        Assessment & Plan  Joselin Sanchez is a 89 year old male admitted on 6/28/2023. He has a past medical history of hypertension, diabetes mellitus type 2, chronic atrial fibrillation, not on anticoagulation, chronic systolic congestive heart failure, last EF 40 to 45% in July 2020, dyslipidemia, diabetic foot ulcer, lymphedema, dementia, BPH who was sent from his facility for evaluation of altered mental status..       #Melena.  Patient was suspected to have an upper GI bleed while on heparin drip.  Differential diagnosis include peptic ulcer disease, stress ulcers, gastritis, esophagitis.  Variceal bleeding is felt to be unlikely given the CT imaging on 7/1 which showed cirrhosis but no clear signs of varices.  Patient is on PPI.  He is also on nasogastric tube feeding nightly which is discontinued 7/8 after goals of care discussion with the niece, benefits and risks explained.  Given the multiple comorbidities, risk of aspiration during procedure/anesthesia.  It was felt that is prudent to hold off on upper GI endoscopy at this moment.  Monitor H&H and transfuse as necessary, or if hemoglobin is below 7.0.  Discussed with Dr. VALDERRAMA, gastroenterologist on consult.  Hemoglobin remains stable, 8.7 down from 9.6 2 days ago. HDS. GI recommends continuing supportive care, resuming diet as tolerated. No plans for EGD,GI signed off.     #Agitation, anxiety and refusal of care at times.  Zyprexa p.o. every 6 as needed, lorazepam as needed has been ordered.  Patient appears to to have the capacity to make his own decisions per my assessment.  He is very consistent with what he would like as to doing what he does not want us to do.  He is agreeable to personal care medications, does not like tube feeding.  I will ask psychiatry to see him to make an assessment regarding patient's decision-making capacity.   The patient's niece at this moment is not comfortable with goals of care discussions, questions about his CODE STATUS as well as need for palliative consult. I will involve ethics team as well to assist     # Severe sepsis due to UTI    # Bacteriemia  - Presented with altered mental status  - Was in A-fib with RVR upon arrival in ER, blood pressure also on lower side, lowest BP 85/62  - White blood cells on admission 11.5 lactic acid 1.3; Pro-Ryan 0.26  - Chest x-ray with no infiltrates  - Nails Catheter was placed in ER, UA was not able to be performed by the lab as of purulent urine  - CT abdomen/ pelvis- Sequela of chronic bladder outlet obstruction, likely secondary to prostatomegaly   - in ER - he was given IVF and 1 dose of Zosyn  - Admitted to ICU; continued on IV Zosyn  - 6/29 - Blood pressure improved initially but was intermittently hypotensive overnight  - he is Full Code as per his records;  - Echo- EF 40-45%.   - BP improved  - was hypothermic 2 nights ago, now temp- WNL  - WBc down to 7.1  - Blood cultures from 6/28, 1 of 2 bottles positive for Corynebacterium striatum, and 1/2 bottles positive for GPC in clusters, initially suspected contamination, growing anaerococcus species  - Urine culture positive >100,000 colony of yeast/Candida Dubliniensis  - Given his a severe sepsis; started on fluconazole 400 mg IV daily on 6/29, day 8  -ID consulted, zosyn changed to augmentin, day 9 abx  - repeat BC on 6/29- 1/2 bottles again positive for GPC in clusters--> staph pettenkferi  -Vancomycin stopped per ID  - MRSA swab  - repeat bld cx 7/1, 7/2 ngtd  - MRSA cx nares negative  -pt has been afebrile. WBC 11.5 admission--> normalized to 8-9 range  -clinically improving. More verbal today. Eating better    Plan;   - Followed by ID  - Per ID started Augmentin for 4 more days and Fluconazole  For 7 days total (last day)   # Septic shock, resolved  # Acute/ early subacute ischemic infarct  - he has a history of  A-fib, not on anticoagulation prior to admission  -Presented with altered mental status thought to be related to severe sepsis  - CT head on admission negative for acute pathology  - 6/28- noted to have left-sided weakness  - Code stroke called  - CT head- findings concerning for acute/early subacute ischemic infarct  involving the left occipital lobe; No acute intracranial hemorrhage, extra axial fluid collection, or mass effect; Brain atrophy and presumed chronic ischemic changes   - CTA head and neck   1. No definite high-grade stenosis or large vessel occlusion involving the major intracranial arteries.  2. Mild nonflow-limiting atherosclerosis of the bilateral carotid siphons.  3. No intracranial aneurysm or high flow vascular malformation.  4. Mild bilateral carotid bifurcation atherosclerosis, left more than right, with less than 50% stenosis by NASCET criteria.  5. Patent cervical vertebral arteries without flow-limiting stenosis.  6. 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 CTA of the chest could be considered for further evaluation, as indicated.     - Stroke neuro consult appreciated  - PTA on aspirin 81 mg p.o. daily  - MRI of the brain-   Subacute ischemia left occipital lobe along with tiny focus posterior right frontal lobe near vertex  - Echo-  EF 40-45%. 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 No evidence of active hemorrhage.  - 7/1- concern for left pupil irregular and larger that rt pupil (unclear if new)  - repeat CT head-  Subacute infarct left occipital lobe as seen on previous MRI, no hemorrhagic transformation     - Stroke Neuro- recommended anticoagulation for secondary stroke prevention in the setting of atrial  fibrillation (see note from 6/30)  - 7/1- start heparin drip with no boluses; stop PTA ASA now; discontinue Lovenox  - called niece 7/1 and 7/2  to discuss anticoagulation with her, left a message, no call back   - pharm liaison consult for DOAC coverage  - PT/OT- rec LTC  - SLP-initially rec n.p.o  - NGT placed 7/1 and TF started as per nutritionist  - 7/1- SLP allowed full liquid diet but I was paged by RN because he had a coughing spell/chocking with water  - SLP reassessed- rec full liquid diet with mildly thick liquids  - depending on progress and goals of care- he may need PEG? I discussed the need for PEG tube  pending SLP tem with his POA on 7/4 and she is OK with proceeding with the PEG tube if he fails SLP.   -pt progressed to soft, and bite sized diet level 6 with thin liquids level 0.   -Na down to 146 today  -clinically improving. Pt is nonambulatory at baseline.   More alert and conversant today.  Using   Taking po steadily better  Pt requesting feeding tube removal. Discussed with patient and family that FT can be removed if taking po well.     Plan   - follow po intake, nutrition and speech following  - follow bmp, adjust free water flushes as needed,   -am bmp  -diet per speech,   - if taking po well today can remove feeding tube  -        # Acute encephalopathy, multifactorial, infectious, metabolic    Patient awake and alert and refusing care and medications. Complains of insomnia.   # Underlying dementia  Stable,   - Gentle redirectioning, avoid sedating or pain medications as able  - PT/OT rec LTC  - /care coordinator consult; reported the patient being a vulnerable adult; he was admitted to [facility] the end of March/23 and discharged to [facility]       # KIMO in a setting of CKD stage III  # Hyperkalemia, resolved  - Creatinine on admission 2.6; Potassium on admission 6.7  - Recommended CKD stage III in Care Everywhere but recent BMP on  June/9//23 showed a potassium of 4.1, creatinine 0.86  - This is likely related to severe sepsis, decreased intravascular volume, hypotension  - Noted that prior to admission he was on Lasix, lisinopril, potassium supplementation  -initially held lasix, Lisinopril but restarted 7/4   -BMP 7/6, Na down to 146. Cr and K wnl.     Plan;   - Telemetry  - Avoid nephrotoxic drugs  - will rehold lasix until Na back to wnl. Am bmp  -cont Lisinopril  - monitor BMP      # Diabetes mellitus type 2  # Hypoglycemia  - PTA on metformin 500 mg p.o. daily  - Hemoglobin A1c is low 5.8  - Hold prior to admission metformin and likely not need to resume it after discharge  - started on NGT feedings 7/1  - BS stable in 100 range.   - Hypoglycemia protocol  -cont with low resist ISS q4 hour protocol as on TFs--> change to qid with meals now. Changed to nocturnal feeding starting tonight.      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  - Treat UTI as above  -Telemetry-heart rate in 90- 100s  -sbp good control  - K and Mg have been wnl  -Hb has been stable. No bleeding     - follow lytes, Tele, heparin gtt discontinued 7/7 due to GI bleed. No anticoagulation due to increased risk of bleed, risk of anticoagulation outweighs benefit. Appreciate GI consult assistance   - consider bber     # Systolic congestive heart failure  # h/o Hypertension  - A prior echocardiogram in July/2020 showed EF of 40 to 45%, LV hypokinesis  - PTA on lisinopril 2.5 mg p.o. daily, Lasix 20 mg p.o. daily, along with potassium chloride 20 mg p.o. daily. Held on admission given sepsis, KIMO, hypotension  -  Lisinopril resumed on 7/4 at 2.5 mgs daily  - 7/5 resumed PTA Lasix 320 mg PO, for increasing weight and LE edema,   -wts have been variable   -Na down to 146. Hold lasix for now until Na back to wnl then resume.   -daily wt  -restart diuretic once Na wnl      # Peripheral arterial disease  # Diabetic foot ulcer (right heel)  # History of lymphedema  # History of polymicrobial bacterial infection of the legs  - He had an admission to [facility] in [place] from 3/27 through 4/4/2023 for polymicrobial bacterial infection of his legs.  He was taken to the OR by surgery for debridement; he was treated with IV Zosyn for 14 days  - He is wounds does not seem to be infected at this time  - Wound care consult requested  - Podiatry consult appreciated  - Xray rt foot - 2 cm partial-thickness soft tissue ulceration overlying the posterior margin of the calcaneus. No osseous volume loss suggest osteomyelitis  - as per Podiatry- The wound base might need excisional debridement at some point and given his tenderness in the region, likely would be done in the OR.  No urgency.   - If wound cannot be converted to a healthier wound bed with local wound cares, please re-consult podiatry for possible excisional debridement in OR.     Plan-  - offloading of site all times   - Discussed with his niece, she does not want him to have surgery or amputation.  - Continue wound care  -follow for need of podiatry intervention in future. Family not want pt to have surgery     # BPH  - Nails placed in ER on admission   - PTA Flomax resumed on admission, then discontinued because of hypotension   - he has Nails cath currently  To be removed in a day or two  - resumed Flomax now given that BP improved  - consider TOV in the next few days     # Dysphagia   -Patient had a nasogastric tube feeding nightly.  He also was on clear liquid diet which he seems to be tolerating at this moment.  Nasogastric tube feeding to be discontinued  along with removal of the nasogastric tube per patient's wishes.  We will be on clear liquid diet.  We will keep him n.p.o. after midnight until follow-up eval tomorrow by GI.  If his hemoglobin remains stable I do anticipate the patient to need EGD.       # Right hip pain  - X ray rt hip- no fracture  - pain management      # Goals of care  - as per his papers and his niece who is POA  he is full code;   -7/6. Had long discussion with pt's POA, niece today. Discussed his guarded prognosis and high risk for future hospitalization. Discussed code status and medical details that entail full code status. I stated that DNR/DNI is medically appropriate and full resuscitation would not very likely worsen his quality of life and very likely futile care.  We also discussed the concept of comfort care/hospice care in detail.   She was very engaged in the conversation and is open to speaking with palliative care team. She wishes to speak with her family and community regarding his advanced directives and goals of care as well.          Diet: Adult Formula Drip Feeding: Continuous Jevity 1.5; Other - Specify in Comment; Goal Rate: 60; mL/hr; OK to adv TF to goal rate of 60 mL/hr today; Do not advance tube feeding rate unless K+ is = or > 3.0, Mg++ is = or > 1.5, and Phos is = or > 1.9  Combination Diet Soft and Bite Sized Diet (level 6); Thin Liquids (level 0)    DVT Prophylaxis: heparin drip  Nails Catheter: PRESENT, indication: Retention  Lines: PRESENT             Cardiac Monitoring: None  Code Status: Full Code          Clinically Significant Risk Factors []Expand by Default        # Hypernatremia: Highest Na = 151 mmol/L in last 2 days, will monitor as appropriate    # Hypomagnesemia: Lowest Mg = 1.5 mg/dL in last 2 days, will replace as needed   # Hypoalbuminemia: Lowest albumin = 2.4 g/dL at 6/28/2023 12:50 PM, will monitor as appropriate   # Thrombocytopenia: Lowest platelets = 124 in last 2 days, will monitor for  "bleeding          # Obesity: Estimated body mass index is 32.28 kg/m  as calculated from the following:    Height as of this encounter: 1.7 m (5' 6.93\").    Weight as of this encounter: 93.3 kg (205 lb 11 oz).   # Severe Malnutrition: based on nutrition assessment                Disposition Plan: TBD     Expected Discharge Date: 07/06/2023        Discharge Comments: palliative consult for assistance of GOC discharge in 3-4 days?     Discussed care plan with pt, rn,   >40  minutes on coordination of care,discussing goals of care and current tx plan at bedside    Chinedu Dubois MD, MD  Text Page  (7am to 6pm)  Interval History   Patient awake alert.  He told me that he cannot get restful sleep.  He begged me if I could help him to sleep for at least 2 hours.  Apparently he is not getting any medications for comfort of anxiety as needed or at bedtime.  He received Zyprexa last 6/28.  He was lying in his on feces and was refusing care as well as medications.  Tube feeding is running at 60 cc/h.  He also has heparin gtt.  He is getting pleasure feeding orally.  Vital signs stable.  Lorazepam order placed for comfort and to help any rest and get some sleep at bedtime.  Zyprexa 5 mg every 6 hours as needed for any agitation.  Discussed with RN.      -Data reviewed today: I reviewed all new labs and imaging results over the last 24 hours. I personally reviewed imaging and labs since admission.     Physical Exam   Temp: 98.4  F (36.9  C) Temp src: Axillary BP: 109/59 Pulse: 96   Resp: 18 SpO2: 94 % O2 Device: None (Room air)    Vitals:    07/01/23 0600 07/05/23 1221 07/06/23 0908   Weight: 78.9 kg (173 lb 15.1 oz) 93.3 kg (205 lb 11 oz) 93 kg (205 lb 0.4 oz)     Vital Signs with Ranges  Temp:  [98.3  F (36.8  C)-99  F (37.2  C)] 98.4  F (36.9  C)  Pulse:  [] 96  Resp:  [10-25] 18  BP: ()/(44-90) 109/59  SpO2:  [91 %-97 %] 94 %  I/O last 3 completed shifts:  In: 2120 [P.O.:600; I.V.:20; NG/GT:840]  Out: 950 " [Urine:950]    Constitutional: In bed, occasional cough. Alert, conversant  HEENT: feeding tube in place. No sinus tenderness  Respiratory: Decreased BS bibasilar, breathing easily  Neck: jvp seems wnl  Cardiovascular: irreg irreg rhythm. No r/g/m  GI: Nondistended, soft, nasogastric tube feeding in place.  Nails catheter in place.  Bowel sounds active.  Skin/Integumen: bilateral pretibial bandages, 3-4+ Bilateral foot edema. Bilateral thigh edema 2+.   Psych:nl affect  Neuro: weak bilateral hip flexors  Weak bilateral deltoids, nl strength bilateral biceps and triceps  Verbal. Appropriate answers to questions  Good attention. engaged    Medications     dextrose         acetaminophen  1,000 mg Oral or Feeding Tube QAM     amoxicillin-clavulanate  875 mg Oral or Feeding Tube BID     carboxymethylcellulose PF  1 drop Ophthalmic 4x Daily     diclofenac  2 g Topical BID     docusate  100 mg Oral or Feeding Tube BID     dutasteride  0.5 mg Oral Daily     fluconazole  100 mg Oral Daily     folic acid  1 mg Intravenous Daily     [Held by provider] furosemide  20 mg Oral Daily     insulin aspart  1-3 Units Subcutaneous TID AC     insulin aspart  1-3 Units Subcutaneous At Bedtime     [Held by provider] lisinopril  2.5 mg Oral Daily     pantoprazole  40 mg Intravenous Q12H     senna-docusate  1 tablet Oral or Feeding Tube BID     sodium chloride (PF)  10 mL Intracatheter Q8H     sodium chloride (PF)  3 mL Intracatheter Q8H     thiamine  50 mg Intravenous Daily       Data   Recent Labs   Lab 07/09/23  1244 07/09/23  1140 07/09/23  0834 07/08/23  2248 07/08/23  2145 07/08/23  1829 07/08/23  0907 07/08/23  0549 07/07/23  1947 07/07/23  1745 07/07/23  1702 07/07/23  0843   WBC 7.3  --   --   --   --   --   --   --   --  9.9  --  8.5   HGB 8.6*  --   --  8.7*  --  9.3*   < > 9.8*   < > 9.3*  --  9.3*     --   --   --   --   --   --   --   --  99  --  101*   *  --   --  111*  --  121*   < > 119*   < > 125*  --  119*    INR  --   --   --   --   --   --   --   --   --  1.00  --   --    NA  --   --   --   --   --   --   --  147*  --  143  --  145   POTASSIUM  --   --   --   --   --   --   --  4.4  --  4.8  --  4.7   CHLORIDE  --   --   --   --   --   --   --  114*  --  112*  --  113*   CO2  --   --   --   --   --   --   --  26  --  22  --  27   BUN  --   --   --   --   --   --   --  26.4*  --  28.1*  --  22.4   CR  --   --   --   --   --   --   --  0.61*  --  0.61*  --  0.59*   ANIONGAP  --   --   --   --   --   --   --  7  --  9  --  5*   BERHANE  --   --   --   --   --   --   --  8.8  --  8.7  --  8.7   GLC  --  81 93  --  95  --    < > 130*   < > 139*   < > 127*   ALBUMIN  --   --   --   --   --   --   --   --   --  1.6*  --   --    PROTTOTAL  --   --   --   --   --   --   --   --   --  4.7*  --   --    BILITOTAL  --   --   --   --   --   --   --   --   --  0.2  --   --    ALKPHOS  --   --   --   --   --   --   --   --   --  154*  --   --    ALT  --   --   --   --   --   --   --   --   --  11  --   --    AST  --   --   --   --   --   --   --   --   --  36  --   --     < > = values in this interval not displayed.       Imaging:   No results found for this or any previous visit (from the past 24 hour(s)).

## 2023-07-09 NOTE — PROGRESS NOTES
"  GASTROENTEROLOGY PROGRESS NOTE     IMPRESSION:    #1 Melena in the setting of heparin infusion  #2 Advanced age and multiple comorbidities  #3 Goals of care  #4 A. Fib    Hb stable, melena has slowed down off anticoagulation and on PPI therapy, only few streaks noted overnight.     RECOMMENDATIONS:    - Suggest continuing supportive only cares and review goals of care. No plans for EGD based on status of pt (stable) and wishes.  - IV protinix BID, can switch to PO once able to, 8 weeks BID, then once daily.  - Start diet as tolerated.   - Avoid anticoagulation.    No additional active GI input. GI team will sign off, please call back if any ? Or concerns or change in status.      David Cronin MD  [facility]  [phone]      ________________________________________________________________________      SUBJECTIVE:  Pt has been intermittently awake and verbalize goals; wanted NG out. Also verbalized yesterday he did not want any surgery/procedure.       OBJECTIVE:  /58   Pulse 93   Temp 99  F (37.2  C) (Axillary)   Resp 18   Ht 1.7 m (5' 6.93\")   Wt 93 kg (205 lb 0.4 oz)   SpO2 94%   BMI 32.18 kg/m    Temp (24hrs), Av.5  F (36.9  C), Min:98  F (36.7  C), Max:99  F (37.2  C)    No data found.     PHYSICAL EXAM  GEN: Lying comfortably, sleeping, unable to wake up, NG tube now removed.    Additional Data:  I have reviewed the patient's new clinical lab results:     Recent Labs   Lab Test 23  2248 23  1829 23  1504 23  1947 23  1745 23  0843 23  0719   WBC  --   --   --   --  9.9 8.5 9.1   HGB 8.7* 9.3* 9.6*   < > 9.3* 9.3* 11.5*   MCV  --   --   --   --  99 101* 102*   * 121* 124*   < > 125* 119* 124*   INR  --   --   --   --  1.00  --   --     < > = values in this interval not displayed.     Recent Labs   Lab Test 23  0834 23  2145 23  1752 23  0907 23  0549 23  2113 23  1745 23  1702 " 07/07/23  0843   NA  --   --   --   --  147*  --  143  --  145   POTASSIUM  --   --   --   --  4.4  --  4.8  --  4.7   CHLORIDE  --   --   --   --  114*  --  112*  --  113*   CO2  --   --   --   --  26  --  22  --  27   BUN  --   --   --   --  26.4*  --  28.1*  --  22.4   CR  --   --   --   --  0.61*  --  0.61*  --  0.59*   ANIONGAP  --   --   --   --  7  --  9  --  5*   BREHANE  --   --   --   --  8.8  --  8.7  --  8.7   GLC 93 95 99   < > 130*   < > 139*   < > 127*    < > = values in this interval not displayed.     Recent Labs   Lab Test 07/07/23  1745 06/29/23  1759 06/28/23  1250   ALBUMIN 1.6*  --  2.4*   BILITOTAL 0.2  --  0.4   ALT 11  --  7   AST 36  --  21   PROTEIN  --  30*  --        Total time: 35 minutes,  at least 50% time spent in coordination of care, counseling, and discussions with pt/family/team members.

## 2023-07-10 NOTE — PROGRESS NOTES
SLP Swallow Tx Note Update:     07/10/23 0922   SLP Discharge Planning   SLP Rationale for DC Rec Overall POC to be determined; pt may be close to his baseline level of swallow function; MD/pt discussed initiating a regular diet/po per pt request and no PEG/G tube placement   SLP Brief overview of current status  Pt only accepted minimal water trials during the session.  No overt aspiration signs were noted.  Pt declined all other liquid/solid food trials.  MD discussed plan with pt to initiate a regular diet/po per pt request and discuss overall POC further with family.  Recommend cues to sit at 90 degrees, stay up for 30-60 minutes after eating, small bites/sips, alternate textures in attempt to increase safety as able/if pt will follow cues.  Will defer to MD/family on diet changes if requested.  SLP to follow up x 1 to provide education as indicated.

## 2023-07-10 NOTE — PROGRESS NOTES
CLINICAL NUTRITION SERVICES - REASSESSMENT NOTE      RECOMMENDATIONS FOR MD/PROVIDER TO ORDER:   Patient not expected to meet nutrition needs orally - has declined NGT and PEG per chart review    Recommendations Ordered by Registered Dietitian (RD):   None   Malnutrition:   6/30  % Weight Loss:  > 7.5% in 3 months (severe malnutrition)  % Intake:  Unable to determine   Subcutaneous Fat Loss:  Orbital region moderate depletion  Muscle Loss:  Temporal region severe depletion and Clavicle bone region severe depletion  Fluid Retention:  None noted     Malnutrition Diagnosis: Severe malnutrition  In Context of:  Acute illness or injury       EVALUATION OF PROGRESS TOWARD GOALS   Diet:  CLD --> Regular this morning     Nutrition Support:  TF changed to cycle overnight on 7/6. FT pulled and nutrition stopped on 7/8 after MD discussion with patient/family (see below)    Intake/Tolerance:    Oral intake very minimal     Labs reviewed: K/Mg/Phos WNL  Medications reviewed: D5 IVF at 50 mL/hr started this morning = 60 g CHO, 204 kcal  Stooling: on colace/senna (last given yesterday)  - 7/6: BM x3  - 7/7: BM x4  - 7/8: BM x4  - 7/9: no BM per I/O  Wt: 205 lbs .44 oz - trending up   Vitals:    06/29/23 0615 06/30/23 0600 07/01/23 0600 07/05/23 1221   Weight: 76.1 kg (167 lb 12.3 oz) 76.9 kg (169 lb 8.5 oz) 78.9 kg (173 lb 15.1 oz) 93.3 kg (205 lb 11 oz)    07/06/23 0908   Weight: 93 kg (205 lb 0.4 oz)       ASSESSED NUTRITION NEEDS:  Dosing Weight 76.1 kg   Estimated Energy Needs: 2479-4079 kcals (25-30 Kcal/Kg)  Justification: maintenance  Estimated Protein Needs:  grams protein (1.2-1.5 g pro/Kg)  Justification: hypercatabolism with acute illness  Estimated Fluid Needs: 3660-5819 mL (1 mL/Kcal)  Justification: maintenance      NEW FINDINGS:   7/10: Per SLP note, family/pt request regular diet despite any potential risks while POC determined   7/9: Ethics consulted and determined pt has capacity to request discontinuation  "of FT  7/8: Per MD note - \"Nasogastric tube feeding to be discontinued along with removal of the nasogastric tube per patient's wishes\"  7/7: RRT for bloody stools in quick succession     Previous Goals:   TF @ goal to provide % estimated needs.   Evaluation: Not met    Previous Nutrition Diagnosis:   Malnutrition related to poor PO intakes at baseline as evidenced by wounds, weight loss, fat and muscle wasting, need for TF.   Evaluation: No change      CURRENT NUTRITION DIAGNOSIS  Inadequate protein-energy intake related to previous TF reliance on CLD, now removed as evidenced by minimal oral nutrition intake    INTERVENTIONS  Recommendations / Nutrition Prescription  Diet per patient wishes   Do not suspect pt/family agreeable to alternate means of nutrition in the future  Do not suspect pt will meet nutrition needs orally     Implementation  None new     Goals  Patient will consume 50% meals BID to show improvement vs change in GOC vs nutrition support       MONITORING AND EVALUATION:  Progress towards goals will be monitored and evaluated per protocol and Practice Guidelines      Myriam Peoples RD, LD  Clinical Dietitian   [facility]  Pager [phone]  "

## 2023-07-10 NOTE — PROGRESS NOTES
"[facility] Nurse Inpatient Assessment     Consulted for: Wound Right heel, bilateral shins, under the breasts. Wound posterior left heel; no clincial signs of infection; XR negative;  fibro-necrotic wound base. Options to convert or stabilize w/o excisional debridement? This would require OR, due to pain.    Summary: patient with chronic wounds slowly improving, follow up buttock assessment completed 7/10 and found buttock skin damage stable. Will resume weekly follow up visits all wound areas on Fridays now.     Patient History (according to provider note(s):      \"89 year old male admitted on 6/28/2023. He has a past medical history of hypertension, diabetes mellitus type 2, chronic atrial fibrillation, not on anticoagulation, chronic systolic congestive heart failure, last EF 40 to 45% in July 2020, dyslipidemia, diabetic foot ulcer, lymphedema, dementia, BPH who was sent from his facility for evaluation of altered mental status..\"    Assessment:      Areas visualized during today's visit: Focused: and Sacrum/coccyx    Not assessed 7/10/23:    Wound location: right heel     Last photo: 7/7  Wound due to: Pressure Injury unstagable capi   Wound history/plan of care: found with triad in use   Wound base: 100 % slough     Palpation of the wound bed: boggy      Drainage: scant     Description of drainage: serosanguinous     Measurements (length x width x depth, in cm): 2.5  x 2.5  x  0.3 cm      Tunneling: N/A     Undermining: N/A  Periwound skin: Intact      Color: normal and consistent with surrounding tissue      Temperature: normal   Odor: none  Pain: denies , none  Pain interventions prior to dressing change: N/A  Treatment goal: Remove necrotic tissue  STATUS: improving  Supplies ordered: supplies stored on unit and discussed with RN    Wound location: BLE    LLE    RLE    Last photo: 7/7  Wound due to: Unknown Etiology and Venous Ulcer  Wound history/plan of care: found with " "plurogel and xeroform in use   Wound base:  dermis, granulation tissue and non-granular tissue     Palpation of the wound bed: normal      Drainage: moderate     Description of drainage: serosanguinous     Measurements (length x width x depth, in cm): diffuse scattered wounds, L>R, many are rounded and punched out appearance, 0.5-2cm diameter average, 0.1-0.2cm depth average     Tunneling: N/A     Undermining: N/A  Periwound skin: Intact      Color: normal and consistent with surrounding tissue      Temperature: normal   Odor: none  Pain: moderate and severe, requests cares \"slowly slowly\"  Pain interventions prior to dressing change: slow and gentle cares   Treatment goal: Heal , Drainage control, Infection control/prevention and Pain control  STATUS: improving  Supplies ordered: supplies stored on unit and discussed with RN     Wound location: right breast     Last photo: 7/7  Wound due to: Moisture Associated Skin Damage (MASD)  Wound history/plan of care: found open to air   Wound base:  dermis     Palpation of the wound bed: normal      Drainage: scant     Description of drainage: serous     Measurements (length x width x depth, in cm): 0.4  x 2  x  0.1 cm      Tunneling: N/A     Undermining: N/A  Periwound skin: Intact      Color: normal and consistent with surrounding tissue      Temperature: normal   Odor: none  Pain: denies , none  Pain interventions prior to dressing change: patient tolerated well  Treatment goal: Heal   STATUS: improving  Supplies ordered: supplies stored on unit and discussed with RN    Assessed 7/10:   Wound location:  Bilateral buttocks, coccyx area, perineal area   Last photo: 7/10             Wound due to: MASD  Wound history/plan of care: found with traid paste in use per orders   Wound base: dermis      Palpation of the wound bed: normal      Drainage: scant     Description of drainage: seroussang      Measurements (length x width x depth, in cm): diffuse small superficial areas, " "largest appears 1cm x 1cm x 0.1cm      Tunneling: N/A     Undermining: N/A  Periwound skin: Dry/scaly      Color: normal and consistent with surrounding tissue      Temperature: normal   Odor: none  Pain: no complaint of pain to skin, complaint of discomfort to BLE   Pain interventions prior to dressing change: slow and gentle cares   Treatment goal: Heal  and Protection  STATUS: stable   Supplies ordered: at bedside     Treatment Plan:      Skin care precautions  EFFECTIVE NOW        Comments: Pressure Injury Prevention (PIP) Plan:   If patient is declining pressure injury prevention interventions: Explore reason why and address patient's concerns, Educate on pressure injury risk and prevention intervention(s), If patient is still declining, document \"informed refusal\" , and Ensure Care team is aware ( provider, charge nurse, etc)   Mattress: Follow bed algorithm, reassess daily and order specialty mattress, if indicated.   HOB: Maintain at or below 30 degrees, unless contraindicated   Repositioning in bed: Every 1-2 hours , Left/right positioning; avoid supine, Raise foot of bed prior to raising head of bed, to reduce patient sliding down (shear), and Frequent microturns using TAPS wedges, as patient tolerates   Heels: Keep elevated off mattress, Pillows under calves, and Heel lift boots   Protective Dressing: Triad paste   Positioning Equipment: TAPS wedges (#547775) to help maintain 30 degree side lying position   Chair positioning: Chair cushion (#[id number]) , Assist patient to reposition hourly, and Do NOT use a donut for sitting (this increases pressure to smaller area and creates a higher potential for injury)     If patient has a buttock pressure injury, or high risk for PI use chair cushion or SPS.   Moisture Management: Perineal cleansing /protection: Follow Incontinence Protocol, Avoid brief in bed, Clean and dry skin folds with bathing , and Moisturize dry skin   Under Devices: Inspect skin under all " medical devices during skin inspection , Ensure tubes are stabilized without tension, and Ensure patient is not lying on medical devices or equipment when repositioned   Ask provider to discontinue device when no longer needed.         Wound care  EVERY SHIFT        Comments: Location: buttock, under breasts, BLE, Right heel   Care: provided qshift by primary RN   1. Cleanse sites with lyssa spray cleanser and soft dry wipes. cleanse buttock = incontinence episode and qshift. cleanse BLE == every Other day. No need to wipe off all Triad, gently wipe off loose debris   2. After cleansing apply Triad paste to all sites (#[id number])   3. Ok to leave open to air breasts and buttock   4. Every Other day apply xeroform (#110258) over open areas to BLE, apply ABD pads as needed for drainage, wrap with kerlix. Apply generous amount of Triad to xeroform and then apply to right heel wound every other day.     5. Elevate heels off bed with heel boots or pillows at all times            Orders: Reviewed    RECOMMEND PRIMARY TEAM ORDER: None, at this time  Education provided: plan of care  Discussed plan of care with: Patient and Nurse  WOC nurse follow-up plan: weekly  Notify WOC if wound(s) deteriorate.  Nursing to notify the Provider(s) and re-consult the WOC Nurse if new skin concern.    DATA:     Current support surface: Standard  Low air loss (JAYLEEN pump, Isolibrium, Pulsate, skin guard, etc)  Containment of urine/stool: Incontinence Protocol, Incontinent pad in bed and Indwelling catheter  BMI: Body mass index is 32.18 kg/m .   Active diet order: Orders Placed This Encounter      Regular Diet Adult Thin Liquids (level 0)     Output: No intake/output data recorded.     Labs:   Recent Labs   Lab 07/09/23  1244 07/07/23  1947 07/07/23  1745   ALBUMIN  --   --  1.6*   HGB 8.6*   < > 9.3*   INR  --   --  1.00   WBC 7.3  --  9.9    < > = values in this interval not displayed.     Pressure injury risk assessment:   Sensory Perception:  "(P) 3-->slightly limited  Moisture: (P) 2-->very moist  Activity: (P) 1-->bedfast  Mobility: (P) 2-->very limited  Nutrition: (P) 1-->very poor  Friction and Shear: (P) 1-->problem  Tushar Score: (P) 10    Amie MENDOZA   1st: Vocera Connect, call \"Amie Eugene\" or call Group \"[facility]\"   (2nd option: leave a voicemail at Dept. Office Number: [phone]. Messages checked occasionally M-F)    "

## 2023-07-10 NOTE — PROVIDER NOTIFICATION
307AG BG in 70s around 1700, NA tried giving popsicle but PO intake by pt is very poor. BG up to 106, then 98 now 83. can we add a cont. fluid to keep BG from dropping? [place] PENG [phone]     Dr. Von Doshi notified with above message.  Orders received: D5 0.45% NS running at 50mL/hr continuous infusion.

## 2023-07-10 NOTE — PLAN OF CARE
Orientations: A&OX3- Disoriented to time. Frequently refusing repositioning, meds, & wound cares.  Vitals/Pain: VSS on RA. Pain controlled with PRN IV Dilaudid.  Tele: A-fib CVR  Lines/Drains: R midline infusing D5 1/2 NS at 50 ml/hr. L PIV SL. Antunez catheter.   Skin/Wounds: BLE wounds, R heel wound, sacral wound, breast fold wound- Cares per WOC orders.   GI/: Adequate UOP via antunez. +BS, +flatus, -BM. Tolerating regular diet, but not eating much. Denies nausea.   Labs: Abnormal/Trends, Electrolyte Replacement- Mg & Phos wdl w/ rechecks tomorrow a.m.  Ambulation/Assist: Q2 T/R & lift assist.   Plan: Ctm & implementing POC.

## 2023-07-10 NOTE — PROGRESS NOTES
Pt currently lethargic and calm, oriented to self and place.  had difficulty understanding patient, stating he is mumbling. Periods of pain and agitation. Ativan 1mg calms/relaxes patient and Dilaudid 0.4mg eases pain but both medications together makes patient to lethargic to participate. Goal is to have patient Alert and comfortable in order to communicate with Psychology.     Niece/POA would like a family meeting to talk through POC options with patient, an , , hospitalist, palliative and potentially surgery if a PEG tube is an option. Charge notified Social work of request and a message was left on social work answering machine.     VSS, q2 turns. Afib with CVR. Poor oral intake of fluid. no BM on shift. Otherwise no significant changes.

## 2023-07-10 NOTE — PROGRESS NOTES
"[facility]    Hospitalist Progress Note    Assessment & Plan   Date of Admission:  6/28/2023        Assessment & Plan  Joselin Sanchez is a 89 year old male admitted on 6/28/2023. He has a past medical history of hypertension, diabetes mellitus type 2, chronic atrial fibrillation, not on anticoagulation, chronic systolic congestive heart failure, last EF 40 to 45% in July 2020, dyslipidemia, diabetic foot ulcer, lymphedema, dementia, BPH who was sent from his facility for evaluation of altered mental status..     Today's change: 7/10/23    Patient was awake alert and oriented, calm and composed in the morning.  His speech therapist and myself used this somewhat  to communicate with him and have discussions with him about his care.  He was able to listen to the  and ask me questions and also make his wishes known to me.  He told me that he wants to eat \"naturally\" /orally and that he does not want tube feeding.  He also asked to be given tea.  He also asked me to tell the nurses to attend to him whenever he asks for help.  Yesterday and over the weekend he was asking the nursing staff and the provider to leave whenever he is asked questions about his care.  He was irritable and anxious.  At today he is more calm and rested.  I think the Dilaudid IV for pain as well as the lorazepam as needed that he was getting help with him be more comfortable and get some restful sleep.  However, when the psychologist came to evaluate him he was sleeping and had garbled speech and  could not understand.  I did call patient's niece Mariama and had very good conversation with her regarding patient's condition and what his wishes are.  I told him that patient does not want to be fed through the nasogastric tube and he is also not receptive to the idea of PEG tube at this moment.  She states that if he does not want tube feeding or PEG tube feeding, then she is okay with that.  She " also told me that she agrees with consultation with psychologist to determine his decision-making capacity.  I told her that according to my assessment patient has decision-making capacity and was able to tell me what he does not does not want.  I will continue with diet as tolerated, pain medications and antianxiety medications intravenously as needed.            #Melena.  Patient was suspected to have an upper GI bleed while on heparin drip.  Differential diagnosis include peptic ulcer disease, stress ulcers, gastritis, esophagitis.  Variceal bleeding is felt to be unlikely given the CT imaging on 7/1 which showed cirrhosis but no clear signs of varices.  Patient is on PPI.  He is also on nasogastric tube feeding nightly which is discontinued 7/8 after goals of care discussion with the niece, benefits and risks explained.  Given the multiple comorbidities, risk of aspiration during procedure/anesthesia.  It was felt that is prudent to hold off on upper GI endoscopy at this moment.  Monitor H&H and transfuse as necessary, or if hemoglobin is below 7.0.  Discussed with Dr. VALDERRAMA, gastroenterologist on consult.  Hemoglobin remains stable, 8.7 down from 9.6 2 days ago. HDS. GI recommends continuing supportive care, resuming diet as tolerated. No plans for EGD,GI signed off.     #Agitation, anxiety and refusal of care at times.  Zyprexa p.o. every 6 as needed, lorazepam as needed has been ordered.  Patient appears to to have the capacity to make his own decisions per my assessment.  He is very consistent with what he would like as to doing what he does not want us to do.  He is agreeable to personal care medications, does not like tube feeding.  I will ask psychiatry to see him to make an assessment regarding patient's decision-making capacity.  The patient's niece at this moment is not comfortable with goals of care discussions, questions about his CODE STATUS as well as need for palliative consult. I will involve  ethics team as well to assist     # Severe sepsis due to UTI    # Bacteriemia  - Presented with altered mental status  - Was in A-fib with RVR upon arrival in ER, blood pressure also on lower side, lowest BP 85/62  - White blood cells on admission 11.5 lactic acid 1.3; Pro-Ryan 0.26  - Chest x-ray with no infiltrates  - Nails Catheter was placed in ER, UA was not able to be performed by the lab as of purulent urine  - CT abdomen/ pelvis- Sequela of chronic bladder outlet obstruction, likely secondary to prostatomegaly   - in ER - he was given IVF and 1 dose of Zosyn  - Admitted to ICU; continued on IV Zosyn  - 6/29 - Blood pressure improved initially but was intermittently hypotensive overnight  - he is Full Code as per his records;  - Echo- EF 40-45%.   - BP improved  - was hypothermic 2 nights ago, now temp- WNL  - WBc down to 7.1  - Blood cultures from 6/28, 1 of 2 bottles positive for Corynebacterium striatum, and 1/2 bottles positive for GPC in clusters, initially suspected contamination, growing anaerococcus species  - Urine culture positive >100,000 colony of yeast/Candida Dubliniensis  - Given his a severe sepsis; started on fluconazole 400 mg IV daily on 6/29, day 8  -ID consulted, zosyn changed to augmentin, day 9 abx  - repeat BC on 6/29- 1/2 bottles again positive for GPC in clusters--> staph pettenkferi  -Vancomycin stopped per ID  - MRSA swab  - repeat bld cx 7/1, 7/2 ngtd  - MRSA cx nares negative  -pt has been afebrile. WBC 11.5 admission--> normalized to 8-9 range  -clinically improving. More verbal today. Eating better    Plan;   - Followed by ID  - Per ID started Augmentin for 4 more days and Fluconazole  For 7 days total (last day)   # Septic shock, resolved  # Acute/ early subacute ischemic infarct  - he has a history of A-fib, not on anticoagulation prior to admission  -Presented with altered mental status thought to be related to severe sepsis  - CT head on admission negative for acute  pathology  - 6/28- noted to have left-sided weakness  - Code stroke called  - CT head- findings concerning for acute/early subacute ischemic infarct  involving the left occipital lobe; No acute intracranial hemorrhage, extra axial fluid collection, or mass effect; Brain atrophy and presumed chronic ischemic changes   - CTA head and neck   1. No definite high-grade stenosis or large vessel occlusion involving the major intracranial arteries.  2. Mild nonflow-limiting atherosclerosis of the bilateral carotid siphons.  3. No intracranial aneurysm or high flow vascular malformation.  4. Mild bilateral carotid bifurcation atherosclerosis, left more than right, with less than 50% stenosis by NASCET criteria.  5. Patent cervical vertebral arteries without flow-limiting stenosis.  6. 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 CTA of the chest could be considered for further evaluation, as indicated.     - Stroke neuro consult appreciated  - PTA on aspirin 81 mg p.o. daily  - MRI of the brain-   Subacute ischemia left occipital lobe along with tiny focus posterior right frontal lobe near vertex  - Echo-  EF 40-45%. 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 No evidence of active hemorrhage.  - 7/1- concern for left pupil irregular and larger that rt pupil (unclear if new)  - repeat CT head-  Subacute infarct left occipital lobe as seen on previous MRI, no hemorrhagic transformation     - Stroke Neuro- recommended anticoagulation for secondary stroke prevention in the setting of atrial fibrillation (see note from 6/30)  - 7/1- start heparin drip with no boluses; stop PTA ASA now; discontinue Lovenox  - called niece 7/1 and 7/2  to discuss anticoagulation with  her, left a message, no call back   - pharm liaison consult for DOAC coverage  - PT/OT- rec LTC  - SLP-initially rec n.p.o  - NGT placed 7/1 and TF started as per nutritionist  - 7/1- SLP allowed full liquid diet but I was paged by RN because he had a coughing spell/chocking with water  - SLP reassessed- rec full liquid diet with mildly thick liquids  - depending on progress and goals of care- he may need PEG? I discussed the need for PEG tube  pending SLP tem with his POA on 7/4 and she is OK with proceeding with the PEG tube if he fails SLP.   -pt progressed to soft, and bite sized diet level 6 with thin liquids level 0.   -Na down to 146 today  -clinically improving. Pt is nonambulatory at baseline.   More alert and conversant today.  Using   Taking po steadily better  Pt requesting feeding tube removal. Discussed with patient and family that FT can be removed if taking po well.     Plan   - follow po intake, nutrition and speech following  - follow bmp, adjust free water flushes as needed,   -am bmp  -diet per speech,   - if taking po well today can remove feeding tube  -        # Acute encephalopathy, multifactorial, infectious, metabolic    Patient awake and alert and refusing care and medications. Complains of insomnia.   # Underlying dementia  Stable,   - Gentle redirectioning, avoid sedating or pain medications as able  - PT/OT rec LTC  - /care coordinator consult; reported the patient being a vulnerable adult; he was admitted to [facility] the end of March/23 and discharged to [facility]       # KIMO in a setting of CKD stage III  # Hyperkalemia, resolved  - Creatinine on admission 2.6; Potassium on admission 6.7  - Recommended CKD stage III in Care Everywhere but recent BMP on June/9//23 showed a potassium of 4.1, creatinine 0.86  - This is likely related to severe sepsis, decreased intravascular volume, hypotension  - Noted that prior to admission he was on  Lasix, lisinopril, potassium supplementation  -initially held lasix, Lisinopril but restarted 7/4   -BMP 7/6, Na down to 146. Cr and K wnl.     Plan;   - Telemetry  - Avoid nephrotoxic drugs  - will rehold lasix until Na back to wnl. Am bmp  -cont Lisinopril  - monitor BMP      # Diabetes mellitus type 2  # Hypoglycemia  - PTA on metformin 500 mg p.o. daily  - Hemoglobin A1c is low 5.8  - Hold prior to admission metformin and likely not need to resume it after discharge  - started on NGT feedings 7/1  - BS stable in 100 range.   - Hypoglycemia protocol  -cont with low resist ISS q4 hour protocol as on TFs--> change to qid with meals now. Changed to nocturnal feeding starting tonight.      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  - Treat UTI as above  -Telemetry-heart rate in 90- 100s  -sbp good control  - K and Mg have been wnl  -Hb has been stable. No bleeding     - follow lytes, Tele, heparin gtt discontinued 7/7 due to GI bleed. No anticoagulation due to increased risk of bleed, risk of anticoagulation outweighs benefit. Appreciate GI consult assistance   - consider bber     # Systolic congestive heart failure  # h/o Hypertension  - A prior echocardiogram in July/2020 showed EF of 40 to 45%, LV hypokinesis  - PTA on lisinopril 2.5 mg p.o. daily, Lasix 20 mg p.o. daily, along with potassium chloride 20 mg p.o. daily. Held on admission given sepsis, KIMO, hypotension  - Lisinopril resumed on 7/4 at 2.5 mgs daily  - 7/5 resumed PTA Lasix 320 mg PO, for increasing weight and LE edema,   -wts have been variable   -Na down to 146. Hold lasix for now until Na  back to wnl then resume.   -daily wt  -restart diuretic once Na wnl      # Peripheral arterial disease  # Diabetic foot ulcer (right heel)  # History of lymphedema  # History of polymicrobial bacterial infection of the legs  - He had an admission to [facility] in [place] from 3/27 through 4/4/2023 for polymicrobial bacterial infection of his legs.  He was taken to the OR by surgery for debridement; he was treated with IV Zosyn for 14 days  - He is wounds does not seem to be infected at this time  - Wound care consult requested  - Podiatry consult appreciated  - Xray rt foot - 2 cm partial-thickness soft tissue ulceration overlying the posterior margin of the calcaneus. No osseous volume loss suggest osteomyelitis  - as per Podiatry- The wound base might need excisional debridement at some point and given his tenderness in the region, likely would be done in the OR.  No urgency.   - If wound cannot be converted to a healthier wound bed with local wound cares, please re-consult podiatry for possible excisional debridement in OR.     Plan-  - offloading of site all times   - Discussed with his niece, she does not want him to have surgery or amputation.  - Continue wound care  -follow for need of podiatry intervention in future. Family not want pt to have surgery     # BPH  - Nails placed in ER on admission   - PTA Flomax resumed on admission, then discontinued because of hypotension   - he has Nails cath currently  To be removed in a day or two  - resumed Flomax now given that BP improved  - consider TOV in the next few days     # Dysphagia   -Patient had a nasogastric tube feeding nightly.  He also was on clear liquid diet which he seems to be tolerating at this moment.  Nasogastric tube feeding to be discontinued along with removal of the nasogastric tube per patient's wishes.  We will be on clear liquid diet.  We will keep him n.p.o. after midnight until follow-up eval tomorrow by GI.  If his  "hemoglobin remains stable I do anticipate the patient to need EGD.       # Right hip pain  - X ray rt hip- no fracture  - pain management      # Goals of care  - as per his papers and his niece who is POA  he is full code;   -7/6. Had long discussion with pt's POA, niece today. Discussed his guarded prognosis and high risk for future hospitalization. Discussed code status and medical details that entail full code status. I stated that DNR/DNI is medically appropriate and full resuscitation would not very likely worsen his quality of life and very likely futile care.  We also discussed the concept of comfort care/hospice care in detail.   She was very engaged in the conversation and is open to speaking with palliative care team. She wishes to speak with her family and community regarding his advanced directives and goals of care as well.          Diet: Adult Formula Drip Feeding: Continuous Jevity 1.5; Other - Specify in Comment; Goal Rate: 60; mL/hr; OK to adv TF to goal rate of 60 mL/hr today; Do not advance tube feeding rate unless K+ is = or > 3.0, Mg++ is = or > 1.5, and Phos is = or > 1.9  Combination Diet Soft and Bite Sized Diet (level 6); Thin Liquids (level 0)    DVT Prophylaxis: heparin drip  Nails Catheter: PRESENT, indication: Retention  Lines: PRESENT             Cardiac Monitoring: None  Code Status: Full Code          Clinically Significant Risk Factors []Expand by Default        # Hypernatremia: Highest Na = 151 mmol/L in last 2 days, will monitor as appropriate    # Hypomagnesemia: Lowest Mg = 1.5 mg/dL in last 2 days, will replace as needed   # Hypoalbuminemia: Lowest albumin = 2.4 g/dL at 6/28/2023 12:50 PM, will monitor as appropriate   # Thrombocytopenia: Lowest platelets = 124 in last 2 days, will monitor for bleeding          # Obesity: Estimated body mass index is 32.28 kg/m  as calculated from the following:    Height as of this encounter: 1.7 m (5' 6.93\").    Weight as of this encounter: " 93.3 kg (205 lb 11 oz).   # Severe Malnutrition: based on nutrition assessment                Disposition Plan: TBD     Expected Discharge Date: 07/06/2023        Discharge Comments: palliative consult for assistance of GOC discharge in 3-4 days?     Discussed care plan with pt, rn,   >40  minutes on coordination of care,discussing goals of care and current tx plan at bedside    Chinedu Dubois MD, MD  Text Page  (7am to 6pm)  Interval History   Patient awake alert.  He told me that he cannot get restful sleep.  He begged me if I could help him to sleep for at least 2 hours.  Apparently he is not getting any medications for comfort of anxiety as needed or at bedtime.  He received Zyprexa last 6/28.  He was lying in his on feces and was refusing care as well as medications.  Tube feeding is running at 60 cc/h.  He also has heparin gtt.  He is getting pleasure feeding orally.  Vital signs stable.  Lorazepam order placed for comfort and to help any rest and get some sleep at bedtime.  Zyprexa 5 mg every 6 hours as needed for any agitation.  Discussed with RN.      -Data reviewed today: I reviewed all new labs and imaging results over the last 24 hours. I personally reviewed imaging and labs since admission.     Physical Exam   Temp: 97.8  F (36.6  C) Temp src: Axillary BP: 114/71 Pulse: 82   Resp: 14 SpO2: 98 % O2 Device: None (Room air)    Vitals:    07/01/23 0600 07/05/23 1221 07/06/23 0908   Weight: 78.9 kg (173 lb 15.1 oz) 93.3 kg (205 lb 11 oz) 93 kg (205 lb 0.4 oz)     Vital Signs with Ranges  Temp:  [97.8  F (36.6  C)-98.4  F (36.9  C)] 97.8  F (36.6  C)  Pulse:  [] 82  Resp:  [7-18] 14  BP: (102-129)/(54-87) 114/71  SpO2:  [93 %-98 %] 98 %  I/O last 3 completed shifts:  In: -   Out: 625 [Urine:625]    Constitutional: In bed, occasional cough. Alert, conversant  HEENT: feeding tube in place. No sinus tenderness  Respiratory: Decreased BS bibasilar, breathing easily  Neck: jvp seems wnl  Cardiovascular:  irreg irreg rhythm. No r/g/m  GI: Nondistended, soft, nasogastric tube feeding in place.  Nails catheter in place.  Bowel sounds active.  Skin/Integumen: bilateral pretibial bandages, 3-4+ Bilateral foot edema. Bilateral thigh edema 2+.   Psych:nl affect  Neuro: weak bilateral hip flexors  Weak bilateral deltoids, nl strength bilateral biceps and triceps  Verbal. Appropriate answers to questions  Good attention. engaged    Medications     dextrose       dextrose 5% and 0.45% NaCl 50 mL/hr at 07/10/23 0149       acetaminophen  1,000 mg Oral or Feeding Tube QAM     amoxicillin-clavulanate  875 mg Oral or Feeding Tube BID     carboxymethylcellulose PF  1 drop Ophthalmic 4x Daily     diclofenac  2 g Topical BID     docusate  100 mg Oral or Feeding Tube BID     dutasteride  0.5 mg Oral Daily     folic acid  1 mg Intravenous Daily     [Held by provider] furosemide  20 mg Oral Daily     insulin aspart  1-3 Units Subcutaneous TID AC     insulin aspart  1-3 Units Subcutaneous At Bedtime     [Held by provider] lisinopril  2.5 mg Oral Daily     pantoprazole  40 mg Oral BID AC     senna-docusate  1 tablet Oral or Feeding Tube BID     sodium chloride (PF)  10 mL Intracatheter Q8H     sodium chloride (PF)  3 mL Intracatheter Q8H     thiamine  50 mg Intravenous Daily       Data   Recent Labs   Lab 07/10/23  1254 07/10/23  0836 07/10/23  0541 07/09/23  1355 07/09/23  1244 07/09/23  0834 07/08/23  2248 07/08/23  2145 07/08/23  1829 07/08/23  0907 07/08/23  0549 07/07/23  1947 07/07/23  1745 07/07/23  1702 07/07/23  0843   WBC  --   --   --   --  7.3  --   --   --   --   --   --   --  9.9  --  8.5   HGB  --   --   --   --  8.6*  --  8.7*  --  9.3*   < > 9.8*   < > 9.3*  --  9.3*   MCV  --   --   --   --  100  --   --   --   --   --   --   --  99  --  101*   PLT  --   --   --   --  114*  --  111*  --  121*   < > 119*   < > 125*  --  119*   INR  --   --   --   --   --   --   --   --   --   --   --   --  1.00  --   --    NA  --   --   145  --  143  --   --   --   --   --  147*  --  143  --  145   POTASSIUM  --   --  4.0  --  4.2  --   --   --   --   --  4.4  --  4.8  --  4.7   CHLORIDE  --   --  110*  --  112*  --   --   --   --   --  114*  --  112*  --  113*   CO2  --   --  27  --  24  --   --   --   --   --  26  --  22  --  27   BUN  --   --  12.4  --  15.9  --   --   --   --   --  26.4*  --  28.1*  --  22.4   CR  --   --  0.65*  --  0.64*  --   --   --   --   --  0.61*  --  0.61*  --  0.59*   ANIONGAP  --   --  8  --  7  --   --   --   --   --  7  --  9  --  5*   BERHANE  --   --  9.3  --  8.9  --   --   --   --   --  8.8  --  8.7  --  8.7   * 99 92   < > 83   < >  --    < >  --    < > 130*   < > 139*   < > 127*   ALBUMIN  --   --   --   --   --   --   --   --   --   --   --   --  1.6*  --   --    PROTTOTAL  --   --   --   --   --   --   --   --   --   --   --   --  4.7*  --   --    BILITOTAL  --   --   --   --   --   --   --   --   --   --   --   --  0.2  --   --    ALKPHOS  --   --   --   --   --   --   --   --   --   --   --   --  154*  --   --    ALT  --   --   --   --   --   --   --   --   --   --   --   --  11  --   --    AST  --   --   --   --   --   --   --   --   --   --   --   --  36  --   --     < > = values in this interval not displayed.       Imaging:   No results found for this or any previous visit (from the past 24 hour(s)).

## 2023-07-10 NOTE — CONSULTS
"      Initial Psychiatric Consult   Consult date: July 10, 2023         Reason for Consult, requesting source:    Decisional capacity  Requesting source: Mil Franco    Labs and imaging reviewed. Discussed with nursing.        HPI:   Joselin Sanchez is a 89 year old male admitted on 6/28/2023. He has a past medical history of hypertension, diabetes mellitus type 2, chronic atrial fibrillation, not on anticoagulation, chronic systolic congestive heart failure, last EF 40 to 45% in July 2020, dyslipidemia, diabetic foot ulcer, lymphedema, dementia, BPH who was sent from his facility for evaluation of altered mental status.    Psychiatry consult for decisional capacity re: code status noted. I attempted to meet with Joselin using a [facility]  this morning, however the  was unable to understand his speech- that it was garbled and largely unintelligible despite repeated attempts. I also attempted to speak English with him, but he was not able to respond in English, unclear if he was able to understand. He did receive a dose of lorazepam earlier this morning around 0630, so this may be contributing. I will re-attempt assessment later today.     Addendum 1300: I again attempted to meet with Joselin. I entered the room as nursing assistant was attempting to communicate with him via a phone , but he was not responding to the . I used a different  on the iPad in the room, who again had great difficulty understanding Joselin's speech- stated he was mumbling and that he could not make out the words. He was able to tell me that he is here because \"I have an illness\" but when asked what kind of illness, the  could not understand his response. When I asked where we were, he responded \"[facility]\" and again, further clarification was unintelligible to . These were the only responses the  was able to understand.        Past Psychiatric History:   FRANCHESKA    Chart " review shows possible history of dementia, though unclear when this was diagnosed or by whom.        Substance Use and History:   FRANCHESKA        Past Medical History:   PAST MEDICAL HISTORY:   Past Medical History:   Diagnosis Date     Anxiety      Candida UTI 07/02/2023     Chronic heart failure, unspecified heart failure type (H)      Chronic pain      CKD (chronic kidney disease) stage 3, GFR 30-59 ml/min (H)      Dementia (H)      Diabetes (H)      Dysphagia      Dysuria      Hypertension      Lymphedema      Mixed hyperlipidemia      Mood disturbance      Old age 07/02/2023    Year of birth 1924     SHEILA (obstructive sleep apnea)      Peripheral vascular disease (H)      Pressure ulcer of heel        PAST SURGICAL HISTORY: No past surgical history on file.          Family History:   FAMILY HISTORY: No family history on file.        Social History:   SOCIAL HISTORY:   Social History     Tobacco Use     Smoking status: Not on file     Smokeless tobacco: Not on file   Substance Use Topics     Alcohol use: Not on file     FRANCHESKA         Physical ROS:   The 10 point Review of Systems is negative other than noted in the HPI or here.    Review Of Systems  FRANCHESKA         Medications:       acetaminophen  1,000 mg Oral or Feeding Tube QAM     amoxicillin-clavulanate  875 mg Oral or Feeding Tube BID     carboxymethylcellulose PF  1 drop Ophthalmic 4x Daily     diclofenac  2 g Topical BID     docusate  100 mg Oral or Feeding Tube BID     dutasteride  0.5 mg Oral Daily     folic acid  1 mg Intravenous Daily     [Held by provider] furosemide  20 mg Oral Daily     insulin aspart  1-3 Units Subcutaneous TID AC     insulin aspart  1-3 Units Subcutaneous At Bedtime     [Held by provider] lisinopril  2.5 mg Oral Daily     pantoprazole  40 mg Oral BID AC     senna-docusate  1 tablet Oral or Feeding Tube BID     sodium chloride (PF)  10 mL Intracatheter Q8H     sodium chloride (PF)  3 mL Intracatheter Q8H     thiamine  50 mg Intravenous  Daily              Allergies:     Allergies   Allergen Reactions     Gabapentin           Labs:     Recent Results (from the past 48 hour(s))   Hemoglobin    Collection Time: 07/08/23  6:29 PM   Result Value Ref Range    Hemoglobin 9.3 (L) 13.3 - 17.7 g/dL   Platelet count    Collection Time: 07/08/23  6:29 PM   Result Value Ref Range    Platelet Count 121 (L) 150 - 450 10e3/uL   Glucose by meter    Collection Time: 07/08/23  9:45 PM   Result Value Ref Range    GLUCOSE BY METER POCT 95 70 - 99 mg/dL   Hemoglobin    Collection Time: 07/08/23 10:48 PM   Result Value Ref Range    Hemoglobin 8.7 (L) 13.3 - 17.7 g/dL   Platelet count    Collection Time: 07/08/23 10:48 PM   Result Value Ref Range    Platelet Count 111 (L) 150 - 450 10e3/uL   Glucose by meter    Collection Time: 07/09/23  8:34 AM   Result Value Ref Range    GLUCOSE BY METER POCT 93 70 - 99 mg/dL   Glucose by meter    Collection Time: 07/09/23 11:40 AM   Result Value Ref Range    GLUCOSE BY METER POCT 81 70 - 99 mg/dL   Basic metabolic panel    Collection Time: 07/09/23 12:44 PM   Result Value Ref Range    Sodium 143 136 - 145 mmol/L    Potassium 4.2 3.4 - 5.3 mmol/L    Chloride 112 (H) 98 - 107 mmol/L    Carbon Dioxide (CO2) 24 22 - 29 mmol/L    Anion Gap 7 7 - 15 mmol/L    Urea Nitrogen 15.9 8.0 - 23.0 mg/dL    Creatinine 0.64 (L) 0.67 - 1.17 mg/dL    Calcium 8.9 8.2 - 9.6 mg/dL    Glucose 83 70 - 99 mg/dL    GFR Estimate 85 >60 mL/min/1.73m2   Phosphorus    Collection Time: 07/09/23 12:44 PM   Result Value Ref Range    Phosphorus 3.7 2.5 - 4.5 mg/dL   Magnesium    Collection Time: 07/09/23 12:44 PM   Result Value Ref Range    Magnesium 1.8 1.7 - 2.3 mg/dL   CBC with platelets    Collection Time: 07/09/23 12:44 PM   Result Value Ref Range    WBC Count 7.3 4.0 - 11.0 10e3/uL    RBC Count 2.75 (L) 4.40 - 5.90 10e6/uL    Hemoglobin 8.6 (L) 13.3 - 17.7 g/dL    Hematocrit 27.5 (L) 40.0 - 53.0 %     78 - 100 fL    MCH 31.3 26.5 - 33.0 pg    MCHC 31.3 (L)  31.5 - 36.5 g/dL    RDW 18.8 (H) 10.0 - 15.0 %    Platelet Count 114 (L) 150 - 450 10e3/uL   Glucose by meter    Collection Time: 07/09/23  1:55 PM   Result Value Ref Range    GLUCOSE BY METER POCT 81 70 - 99 mg/dL   Glucose by meter    Collection Time: 07/09/23  5:24 PM   Result Value Ref Range    GLUCOSE BY METER POCT 81 70 - 99 mg/dL   Glucose by meter    Collection Time: 07/09/23  5:58 PM   Result Value Ref Range    GLUCOSE BY METER POCT 77 70 - 99 mg/dL   Glucose by meter    Collection Time: 07/09/23  6:58 PM   Result Value Ref Range    GLUCOSE BY METER POCT 78 70 - 99 mg/dL   Glucose by meter    Collection Time: 07/09/23  8:34 PM   Result Value Ref Range    GLUCOSE BY METER POCT 106 (H) 70 - 99 mg/dL   Glucose by meter    Collection Time: 07/09/23 10:37 PM   Result Value Ref Range    GLUCOSE BY METER POCT 98 70 - 99 mg/dL   Glucose by meter    Collection Time: 07/10/23  1:10 AM   Result Value Ref Range    GLUCOSE BY METER POCT 83 70 - 99 mg/dL   Basic metabolic panel    Collection Time: 07/10/23  5:41 AM   Result Value Ref Range    Sodium 145 136 - 145 mmol/L    Potassium 4.0 3.4 - 5.3 mmol/L    Chloride 110 (H) 98 - 107 mmol/L    Carbon Dioxide (CO2) 27 22 - 29 mmol/L    Anion Gap 8 7 - 15 mmol/L    Urea Nitrogen 12.4 8.0 - 23.0 mg/dL    Creatinine 0.65 (L) 0.67 - 1.17 mg/dL    Calcium 9.3 8.2 - 9.6 mg/dL    Glucose 92 70 - 99 mg/dL    GFR Estimate 85 >60 mL/min/1.73m2   Magnesium    Collection Time: 07/10/23  5:41 AM   Result Value Ref Range    Magnesium 2.0 1.7 - 2.3 mg/dL   Phosphorus    Collection Time: 07/10/23  5:41 AM   Result Value Ref Range    Phosphorus 3.8 2.5 - 4.5 mg/dL   Glucose by meter    Collection Time: 07/10/23  8:36 AM   Result Value Ref Range    GLUCOSE BY METER POCT 99 70 - 99 mg/dL   Glucose by meter    Collection Time: 07/10/23 12:54 PM   Result Value Ref Range    GLUCOSE BY METER POCT 116 (H) 70 - 99 mg/dL          Physical and Psychiatric Examination:     /70   Pulse 84    "Temp 97.8  F (36.6  C) (Axillary)   Resp 14   Ht 1.7 m (5' 6.93\")   Wt 93 kg (205 lb 0.4 oz)   SpO2 98%   BMI 32.18 kg/m    Weight is 205 lbs .44 oz  Body mass index is 32.18 kg/m .    Physical Exam:  I have reviewed the physical exam as documented by by the medical team and agree with findings and assessment and have no additional findings to add at this time.    Mental Status Exam:    Appearance: awake, alert and sitting up in bed, with blanket as makeshift head covering  Attitude:  cooperative  Eye Contact:  poor   Mood:  FRANCHESKA  Affect:  appears reactive  Speech:  mumbling, garbled speech that cannot be understood  Psychomotor Behavior:  no evidence of tardive dyskinesia, dystonia, or tics  Thought Process:  FRANCHESKA  Associations:  FRANCHESKA  Thought Content:  FRANCHESKA  Insight:  FRANCHESKA  Judgement:  FRANCHESKA  Oriented to:  FRANCHESKA  Attention Span and Concentration:  FRANCHESKA  Recent and Remote Memory:  FRANCHESKA               DSM-5 Diagnosis:   History of dementia per chart review          Assessment:   Joselin Sanchez is a 89 year old male seen today for decisional capacity assessment, however the [facility]  was unable to understand his mumbled, garbled speech on two separate attempts. I discussed with Dr. Dubois that I was unable to communicate with Joselin for assessment; Dr. Dubois was able to have lengthy discussion with him earlier in the day regarding goals of care, and reported that he did seem to have understanding of his condition and clearly expressed his wishes. Dr. Dubois was able to assess decisional capacity, and feels that he does have capacity.     For future reference, the following questions can be used to guide decisional capacity assessment.     Aid to Capacity  1. Able to understand medical problem?   2. Able to understand proposed treatment?   3. Able to understand alternative to proposed treatment (if any)?            4. Able to understand option of refusing proposed treatment?     5. Able to appreciate reasonably foreseeable " consequences of accepting proposed treatment?    6. Able to appreciate reasonably foreseeable consequences of refusing proposed treatment?     7. The person s decision is affected by depression?   8. The person s decision is affected by delusion/psychosis?           Summary of Recommendations:   1.  Defer to Dr. Dubois's assessment of decisional capacity 7/10/23.    2.  Feel free to reconsult psychiatry if further assistance needed.      DEEPTI [facility]    Consult/Liaison Psychiatry   [facility]    Contact information available via [facility] Paging/Directory  If I am not available, then [facility] CL line ([phone]) should know who is covering our consult service.

## 2023-07-10 NOTE — PLAN OF CARE
Problem: Oral Intake Inadequate  Goal: Improved Oral Intake  Outcome: Not Progressing   Goal Outcome Evaluation:     Patient not expected to meet nutrition needs orally at this time - has declined NGT and PEG per chart review   No nutrition recommendations or changes today

## 2023-07-11 NOTE — PLAN OF CARE
Goal Outcome Evaluation:    Pt. Alert and oriented x3. Intermittent confusion to time and place. Continues to frequently refuse cares, meds, and repositioning. Attempted to provide education in english and with Bulgarian  multiple times. MD notified about pts refusal of cares.  Vital signs stable on RA. Up with lift, turn/repo Q2H as pt allows. Regular diet, poor appetite. Lung sounds dim. Bowel sounds +, BM -, antunez with aequate urine output. Wounds to BLE, heels, coccyx/sacrum, buttocks, and under breast folds. Refused wound cares. Pain managed with tylenol x1 and dilauded x2. Denies nausea. Tele Afib CVR.

## 2023-07-11 NOTE — PLAN OF CARE
"[id number]    Orientations: A/o x2 - disorientated to situation, was able to say he was in the hospital after options were given to him.   Vitals/Pain: VSS on RA - Tylenol given for \"all over pain\"  Tele: Afib CVR with inverted T waves and PVC's  Lines/Drains: Midline RUE, PIV R. FA  Skin/Wounds: BLE, scrum/coccyx, under breast - see WOC. Unable to visualize wounds or changed sacrum wound due to patient refusal. Pt was educated on the importance of repositioning and kept repeating \"I am quite all right\".  GI/: Nails in place - catheter cares were unable to be completed.   Labs: Abnormal/Trends, Electrolyte Replacement- MG/P protocol  Ambulation/Assist: x2 turn and repo - refuses  Sleep Quality: Naps.   Plan: TBD    Refused super, pt. Stated \"Take it out, not feel good, I don't want it\"  "

## 2023-07-11 NOTE — PROGRESS NOTES
SPIRITUAL HEALTH SERVICES Progress Note  [facility]    Met with Joselin and introduced myself and oriented him to [facility]. Joselin shared that he is delighted to be visited and shared details about his health admission. I offered prayers for him and his health which he appreciated. I plan to follow up per LOS or as needed/requested by Joselin or his family.     Maureen Moore  Chaplain Resident  Pager [phone]    * [facility] remains available 24/7 for emergent requests/referrals, either by having the switchboard page the on-call  or by entering an ASAP/STAT consult in Epic (this will also page the on-call ). Routine Epic consults receive an initial response within 24 hours.*

## 2023-07-11 NOTE — PROGRESS NOTES
Speech Language Therapy Discharge Summary    Reason for therapy discharge:    No further expectations of functional progress.    Progress towards therapy goal(s). See goals on Care Plan in [facility] electronic health record for goal details.  Goals not met.  Barriers to achieving goals:   pt has only accepted minimal po despite encouragement and liberalization of diet per pt wishes.  Pt does not attend to recommendations despite use of interpreters and repetition  Room service with assist orders were entered 7/11 in attempt to increase pt's acceptance of po intake.     Therapy recommendation(s):    No further therapy is recommended.         07/11/23 1342   SLP Discharge Planning   SLP Plan Discontinue SLP Tx   SLP Discharge Recommendation home with assist   SLP Rationale for DC Rec Pt appears to be close to his baseline level of swallow function and has not been receptive to swallow Tx recommendations.  Discontinue SLP Tx.  Assist with po as indicated after discharge.   SLP Brief overview of current status  Pt has only accepted minimal po over multiple attempts in Tx and does not attend to education.  Regular diet and thin liquids have been ordered per pt wishes.  Will defer to MD/family for further po intake follow up.

## 2023-07-11 NOTE — PROGRESS NOTES
[facility]    Medicine Progress Note - Hospitalist Service    Date of Admission:  6/28/2023    Assessment & Plan   oJselin Sanchez is a 89 year old male admitted on 6/28/2023. He has a past medical history of hypertension, diabetes mellitus type 2, chronic atrial fibrillation, not on anticoagulation, chronic systolic congestive heart failure, last EF 40 to 45% in July 2020, dyslipidemia, diabetic foot ulcer, lymphedema, dementia, BPH who was sent from his facility for evaluation of altered mental status.        #Concern for melena.    -There was patient was suspected to have an upper GI bleed while on heparin drip.  - Differential diagnosis include peptic ulcer disease, stress ulcers, gastritis, esophagitis.  - Variceal bleeding is felt to be unlikely given the CT imaging on 7/1 which showed cirrhosis but no clear signs of varices.   - Melena seem to have slowed up since discontinue anticoagulation and keeping patient on PPI therapy only.  - Given advanced age, GI deferring EGD.    Continue Protonix twice daily x8 weeks then once daily afterwards    Patient now on diet    Avoid anticoagulation.     #Agitation, anxiety and refusal of care at times.  - Zyprexa p.o. every 6 as needed, lorazepam as needed has been ordered.  - Patient appears to to have the capacity to make his own decisions based on assessment by Dr. Dubois on 7/10/23.  -  He is very consistent with what he would like as to doing what he does not want us to do.  -He is agreeable to personal care medications, does not like tube feeding.    -Also seen by psychiatry who had difficulty assessing patient.     # Severe sepsis due to UTI  # Bacteriemia  - Presented with altered mental status  - Was in A-fib with RVR upon arrival in ER, blood pressure also on lower side, lowest BP 85/62  - White blood cells on admission 11.5 lactic acid 1.3; Pro-Ryan 0.26  - Chest x-ray with no infiltrates  - Nails Catheter was placed in ER, UA was not able  to be performed by the lab as of purulent urine  - CT abdomen/ pelvis- Sequela of chronic bladder outlet obstruction, likely secondary to prostatomegaly   - in ER - he was given IVF and 1 dose of Zosyn  - Admitted to ICU; continued on IV Zosyn  - 6/29 - Blood pressure improved initially but was intermittently hypotensive overnight  - he is Full Code as per his records;  - Echo- EF 40-45%.   - BP improved  - was hypothermic 2 nights ago, now temp- WNL  - WBc down to 7.1  - Blood cultures from 6/28, 1 of 2 bottles positive for Corynebacterium striatum, and 1/2 bottles positive for GPC in clusters, initially suspected contamination, growing anaerococcus species  - Urine culture positive >100,000 colony of yeast/Candida Dubliniensis  - Given his a severe sepsis; started on fluconazole 400 mg IV daily on 6/29.  -ID consulted, zosyn changed to augmentin, day 9 abx  - repeat BC on 6/29- 1/2 bottles again positive for GPC in clusters--> staph pettenkferi  -Vancomycin stopped per ID  - MRSA swab returned negative  - repeat bld cx 7/1, 7/2 ngtd  - MRSA cx nares negative  -pt has been afebrile. WBC 11.5 admission--> normalized to 8-9 range  -clinically improving. More verbal today. Eating better     Plan;  - Followed by ID  -Completed course of fluconazole  -Patient was to complete additional 4 days of Augmentin from 7/5/2023.    Discontinue Augmentin as course is already completed.        # Acute/ early subacute ischemic infarct  - he has a history of A-fib, not on anticoagulation prior to admission  -Presented with altered mental status thought to be related to severe sepsis  - CT head on admission negative for acute pathology  - 6/28- noted to have left-sided weakness  - Code stroke called  - CT head- findings concerning for acute/early subacute ischemic infarct  involving the left occipital lobe; No acute intracranial hemorrhage, extra axial fluid collection, or mass effect; Brain atrophy and presumed chronic ischemic  changes   - CTA head and neck   1. No definite high-grade stenosis or large vessel occlusion involving the major intracranial arteries.  2. Mild nonflow-limiting atherosclerosis of the bilateral carotid siphons.  3. No intracranial aneurysm or high flow vascular malformation.  4. Mild bilateral carotid bifurcation atherosclerosis, left more than right, with less than 50% stenosis by NASCET criteria.  5. Patent cervical vertebral arteries without flow-limiting stenosis.  6. 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 CTA of the chest could be considered for further evaluation, as indicated.     - Stroke neuro consult appreciated  - PTA on aspirin 81 mg p.o. daily  - MRI of the brain-   Subacute ischemia left occipital lobe along with tiny focus posterior right frontal lobe near vertex  - Echo-  EF 40-45%. 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 No evidence of active hemorrhage.  - 7/1- concern for left pupil irregular and larger that rt pupil (unclear if new)  - repeat CT head-  Subacute infarct left occipital lobe as seen on previous MRI, no hemorrhagic transformation     - Stroke Neuro- recommended anticoagulation for secondary stroke prevention in the setting of atrial fibrillation (see note from 6/30)  - 7/1- start heparin drip with no boluses; stop PTA ASA now; discontinue Lovenox  - called niece 7/1 and 7/2  to discuss anticoagulation with her, left a message, no call back   - pharm liaison consult for DOAC coverage  - PT/OT- rec LTC  - SLP-initially rec n.p.o  - NGT placed 7/1 and TF started as per nutritionist  - 7/1- SLP allowed full liquid diet but I was paged by RN because he had a coughing spell/chocking with water  - SLP reassessed- rec  full liquid diet with mildly thick liquids  - depending on progress and goals of care- he may need PEG? I discussed the need for PEG tube  pending SLP tem with his POA on 7/4 and she is OK with proceeding with the PEG tube if he fails SLP.   -pt progressed to soft, and bite sized diet level 6 with thin liquids level 0.   -Na down to 146 today  -clinically improving. Pt is nonambulatory at baseline.   More alert and conversant today.  Using   Taking po steadily better  Pt requesting feeding tube removal. Discussed with patient and family that FT can be removed if taking po well.      Plan   - follow po intake, nutrition and speech following  - follow bmp, adjust free water flushes as needed,   -am bmp  -diet per speech,         # Acute encephalopathy, multifactorial, infectious, metabolic    Patient awake and alert and refusing care and medications. Complains of insomnia.   # Underlying dementia  Stable,   - Gentle redirectioning, avoid sedating or pain medications as able  - PT/OT rec LTC  - /care coordinator consult; reported the patient being a vulnerable adult; he was admitted to [facility] the end of March/23 and discharged to [facility]        # KIMO in a setting of CKD stage III  # Hyperkalemia, resolved  - Creatinine on admission 2.6; Potassium on admission 6.7  - Recommended CKD stage III in Care Everywhere but recent BMP on June/9//23 showed a potassium of 4.1, creatinine 0.86  - This is likely related to severe sepsis, decreased intravascular volume, hypotension  - Noted that prior to admission he was on Lasix, lisinopril, potassium supplementation  -initially held lasix, Lisinopril but restarted 7/4   -BMP 7/6, Na down to 146. Cr and K wnl.      Plan;   - Avoid nephrotoxic drugs  -Okay to resume Lasix as sodium is back to normal  -cont Lisinopril  - monitor BMP      # Diabetes mellitus type 2  # Hypoglycemia  - PTA on metformin 500 mg p.o.  daily  - Hemoglobin A1c is low 5.8  - Hold prior to admission metformin and likely not need to resume it after discharge  - started on NGT feedings 7/1  - BS stable in 100 range.   - Hypoglycemia protocol  -cont with low resist ISS q4 hour protocol as on TFs--> change to qid with meals now. Changed to nocturnal feeding starting tonight.      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  - Treat UTI as above  -Telemetry-heart rate in 90- 100s  -sbp good control  - K and Mg have been wnl  -Hb has been stable. No bleeding     # Systolic congestive heart failure  # h/o Hypertension  - A prior echocardiogram in July/2020 showed EF of 40 to 45%, LV hypokinesis  - PTA on lisinopril 2.5 mg p.o. daily, Lasix 20 mg p.o. daily, along with potassium chloride 20 mg p.o. daily. Held on admission given sepsis, KIMO, hypotension  - Lisinopril resumed on 7/4 at 2.5 mgs daily  - 7/5 resumed PTA Lasix 320 mg PO, for increasing weight and LE edema,   -wts have been variable   -Na down to 146. Hold lasix for now until Na back to wnl then resume.   -daily wt  -restart diuretic once Na wnl      # Peripheral arterial disease  # Diabetic foot ulcer (right heel)  # History of lymphedema  # History of polymicrobial bacterial infection of the legs  - He had an admission to [facility] in [place] from 3/27 through 4/4/2023 for polymicrobial bacterial infection of his legs.  He was taken to the OR by surgery for debridement; he was treated with IV Zosyn for 14 days  - He is wounds does not seem to be infected at this time  - Wound care consult requested  - Podiatry consult  "appreciated  - Xray rt foot - 2 cm partial-thickness soft tissue ulceration overlying the posterior margin of the calcaneus. No osseous volume loss suggest osteomyelitis  - as per Podiatry- The wound base might need excisional debridement at some point and given his tenderness in the region, likely would be done in the OR.  No urgency.   - If wound cannot be converted to a healthier wound bed with local wound cares, please re-consult podiatry for possible excisional debridement in OR.     Plan-  - offloading of site all times   - Discussed with his niece, she does not want him to have surgery or amputation.  - Continue wound care  -follow for need of podiatry intervention in future. Family not want pt to have surgery     # BPH  - Nails placed in ER on admission   - PTA Flomax resumed on admission, then discontinued because of hypotension   - he has Nails cath currently  To be removed in a day or two  - resumed Flomax now given that BP improved  - consider TOV in the next few days        # Right hip pain  - X ray rt hip- no fracture  - pain management      # Goals of care  -Per discussion, patient remains full code       Diet: Regular Diet Adult Thin Liquids (level 0)  Room Service    DVT Prophylaxis: Pneumatic Compression Devices  Nails Catheter: PRESENT, indication: Wound Healing;Strict 1-2 Hour I&O  Lines: PRESENT             Cardiac Monitoring: ACTIVE order. Indication: GIB, acute hemorrhage  Code Status: Full Code      Clinically Significant Risk Factors              # Hypoalbuminemia: Lowest albumin = 1.6 g/dL at 7/7/2023  5:45 PM, will monitor as appropriate            # Obesity: Estimated body mass index is 32.18 kg/m  as calculated from the following:    Height as of this encounter: 1.7 m (5' 6.93\").    Weight as of this encounter: 93 kg (205 lb 0.4 oz).   # Severe Malnutrition: based on nutrition assessment         Disposition Plan      Expected Discharge Date: 07/13/2023        Discharge Comments: " palliative consult for assistance of [facility]          SHAYY JOINER MD  Hospitalist Service  [facility]  Securely message with [employer] (more info)  Text page via [employer] Paging/Directory   ______________________________________________________________________    Interval History   - No acute events overnight  - Has remained afebrile and hemodynamically stable  - Patient declines questioning today  - Continues to decline care per care team    Physical Exam   Vital Signs: Temp: 97.6  F (36.4  C) Temp src: Oral BP: (!) 140/97 Pulse: 98   Resp: 21 SpO2: 98 % O2 Device: None (Room air)    Weight: 205 lbs .44 oz    General Appearance: Obese adult male, lying in bed, on room air in no acute distress or discomfort.  HEENT: PERRL: EOMI; moist mucous membrane w/o lesions  Neck: No JVD  Pulmonary: Clear to auscultation bilaterally, no wheezes or crackles in anterior lung fields  CVS: Regular rhythm, no murmurs, rubs or gallops  GI: BS (+), soft nontender, no rebound or guarding   Extremities: Extensive bilateral lower extremity wound, in dressing.  Skin: No rashes or lesions  Neurologic: A&O x3      Medical Decision Making       55 MINUTES SPENT BY ME on the date of service doing chart review, history, exam, documentation & further activities per the note.      Data   ------------------------- PAST 24 HR DATA REVIEWED -----------------------------------------------    I have personally reviewed the following data over the past 24 hrs:    N/A  \   N/A   / N/A     142 110 (H) 7.2 (L) /  111 (H)   3.8 26 0.64 (L) \       Imaging results reviewed over the past 24 hrs:   No results found for this or any previous visit (from the past 24 hour(s)).

## 2023-07-12 NOTE — PROGRESS NOTES
[facility]    Medicine Progress Note - Hospitalist Service    Date of Admission:  6/28/2023    Assessment & Plan   Joselin Sanchez is a 89 year old male admitted on 6/28/2023. He has a past medical history of hypertension, diabetes mellitus type 2, chronic atrial fibrillation, not on anticoagulation, chronic systolic congestive heart failure, last EF 40 to 45% in July 2020, dyslipidemia, diabetic foot ulcer, lymphedema, dementia, BPH who was sent from his facility for evaluation of altered mental status.        #Concern for melena.    -There was patient was suspected to have an upper GI bleed while on heparin drip.  - Differential diagnosis include peptic ulcer disease, stress ulcers, gastritis, esophagitis.  - Variceal bleeding is felt to be unlikely given the CT imaging on 7/1 which showed cirrhosis but no clear signs of varices.   - Melena seem to have slowed up since discontinue anticoagulation and keeping patient on PPI therapy only.  - Given advanced age, GI deferring EGD.    Continue Protonix twice daily x8 weeks then once daily afterwards    Patient now on diet    Avoid anticoagulation.     #Agitation, anxiety and refusal of care at times.  - Zyprexa p.o. every 6 as needed, lorazepam as needed has been ordered.  - Patient appears to to have the capacity to make his own decisions based on assessment by Dr. Dubois on 7/10/23.  -  He is very consistent with what he would like as to doing what he does not want us to do.  -He is agreeable to personal care medications, does not like tube feeding.    -Also seen by psychiatry who had difficulty assessing patient.     # Severe sepsis due to UTI  # Bacteriemia  - Presented with altered mental status  - Was in A-fib with RVR upon arrival in ER, blood pressure also on lower side, lowest BP 85/62  - White blood cells on admission 11.5 lactic acid 1.3; Pro-Ryan 0.26  - Chest x-ray with no infiltrates  - Nails Catheter was placed in ER, UA was not able  to be performed by the lab as of purulent urine  - CT abdomen/ pelvis- Sequela of chronic bladder outlet obstruction, likely secondary to prostatomegaly   - in ER - he was given IVF and 1 dose of Zosyn  - Admitted to ICU; continued on IV Zosyn  - 6/29 - Blood pressure improved initially but was intermittently hypotensive overnight  - he is Full Code as per his records;  - Echo- EF 40-45%.   - BP improved  - was hypothermic 2 nights ago, now temp- WNL  - WBc down to 7.1  - Blood cultures from 6/28, 1 of 2 bottles positive for Corynebacterium striatum, and 1/2 bottles positive for GPC in clusters, initially suspected contamination, growing anaerococcus species  - Urine culture positive >100,000 colony of yeast/Candida Dubliniensis  - Given his a severe sepsis; started on fluconazole 400 mg IV daily on 6/29.  -ID consulted, zosyn changed to augmentin, day 9 abx  - repeat BC on 6/29- 1/2 bottles again positive for GPC in clusters--> staph pettenkferi  -Vancomycin stopped per ID  - MRSA swab returned negative  - repeat bld cx 7/1, 7/2 ngtd  - MRSA cx nares negative  -pt has been afebrile. WBC 11.5 admission--> normalized to 8-9 range  -clinically improving. More verbal today. Eating better     Plan;  - Followed by ID  -Completed course of fluconazole  -Patient was to complete additional 4 days of Augmentin from 7/5/2023.    Discontinue Augmentin as course is already completed.        # Acute/ early subacute ischemic infarct  - he has a history of A-fib, not on anticoagulation prior to admission  -Presented with altered mental status thought to be related to severe sepsis  - CT head on admission negative for acute pathology  - 6/28- noted to have left-sided weakness  - Code stroke called  - CT head- findings concerning for acute/early subacute ischemic infarct  involving the left occipital lobe; No acute intracranial hemorrhage, extra axial fluid collection, or mass effect; Brain atrophy and presumed chronic ischemic  changes   - CTA head and neck   1. No definite high-grade stenosis or large vessel occlusion involving the major intracranial arteries.  2. Mild nonflow-limiting atherosclerosis of the bilateral carotid siphons.  3. No intracranial aneurysm or high flow vascular malformation.  4. Mild bilateral carotid bifurcation atherosclerosis, left more than right, with less than 50% stenosis by NASCET criteria.  5. Patent cervical vertebral arteries without flow-limiting stenosis.  6. 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 CTA of the chest could be considered for further evaluation, as indicated.     - Stroke neuro consult appreciated  - PTA on aspirin 81 mg p.o. daily  - MRI of the brain-   Subacute ischemia left occipital lobe along with tiny focus posterior right frontal lobe near vertex  - Echo-  EF 40-45%. 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 No evidence of active hemorrhage.  - 7/1- concern for left pupil irregular and larger that rt pupil (unclear if new)  - repeat CT head-  Subacute infarct left occipital lobe as seen on previous MRI, no hemorrhagic transformation     - Stroke Neuro- recommended anticoagulation for secondary stroke prevention in the setting of atrial fibrillation (see note from 6/30)  -On 7/1/2023, patient was initiated on heparin gtt, while PTA aspirin and Lovenox DVT PPx was discontinued  -However given concerns of melena, anticoagulation has since been discontinued.  - PT/OT- rec LTC  - NGT placed 7/1 and TF started as per nutritionist; of note, tube feeds has since been discontinued as this is not in line with patient's wish.     Plan   - follow po intake, nutrition and speech following  - follow bmp, adjust free water flushes  as needed,   -Patient has not been cooperative with speech therapy  -Since patient is not a candidate for anticoagulation, will opt to initiate aspirin once daily.        # Acute encephalopathy, multifactorial, infectious, metabolic    Patient awake and alert and refusing care and medications. Complains of insomnia.   # Underlying dementia  Stable,   - Gentle redirectioning, avoid sedating or pain medications as able  - PT/OT rec LTC  - /care coordinator consult; reported the patient being a vulnerable adult; he was admitted to [facility] the end of March/23 and discharged to [facility]        # KIMO in a setting of CKD stage III  # Hyperkalemia, resolved  - Creatinine on admission 2.6; Potassium on admission 6.7  - Recommended CKD stage III in Care Everywhere but recent BMP on June/9//23 showed a potassium of 4.1, creatinine 0.86  - This is likely related to severe sepsis, decreased intravascular volume, hypotension  - Noted that prior to admission he was on Lasix, lisinopril, potassium supplementation  -initially held lasix, Lisinopril but restarted 7/4   -BMP 7/6, Na down to 146. Cr and K wnl.      Plan;   - Avoid nephrotoxic drugs  - Okay to resume Lasix as sodium is back to normal  - Cont Lisinopril  - monitor BMP      # Diabetes mellitus type 2  # Hypoglycemia-resolved  - PTA on metformin 500 mg p.o. daily  - Hemoglobin A1c is low 5.8    Holding PTA metformin, given patient's age and A1c status, might require this medication on discharge.    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  - Treated UTI as above  -Telemetry-heart rate in 90- 100s  -sbp good control  - K and Mg have been wnl  -Hb has been stable. No bleeding     # Systolic congestive heart failure  # h/o Hypertension  - A prior echocardiogram in July/2020 showed EF of 40 to 45%, LV hypokinesis  -TTE obtained this admission showed EF 45-45%, with moderate global hypokinesis left ventricle.    Furosemide 20 mg daily resumed    Blood pressure largely due to mild limits; continue lisinopril 2.5 mg daily for cardioprotection.     # Peripheral arterial disease  # Diabetic foot ulcer (right heel)  # History of lymphedema  # History of polymicrobial bacterial infection of the legs  - He had an admission to [facility] in [place] from 3/27 through 4/4/2023 for polymicrobial bacterial infection of his legs.  He was taken to the OR by surgery for debridement; he was treated with IV Zosyn for 14 days  - He is wounds does not seem to be infected at this time  - Wound care consult requested  - Podiatry consult appreciated  - Xray rt foot - 2 cm partial-thickness soft tissue ulceration overlying the posterior margin of the calcaneus. No osseous volume loss suggest osteomyelitis  - as per Podiatry- The wound base might need excisional debridement at some point and given his tenderness in the region, likely would be done in the OR.  No urgency.   - If wound cannot be converted to a healthier wound bed with local wound cares, please re-consult podiatry for possible excisional debridement in OR.     Plan-  - offloading of site all times   - Discussed with his niece, she does not want him to have surgery or amputation.  - Continue wound care  -Family does not want to pursue surgery at this time.     # BPH  - Nails placed in ER on admission; attempting trial void on 7/12/2023.    PTA Flomax resumed again on 7/12/2023.     # Right hip pain  - X ray rt hip- no fracture  - pain management      # Goals of care  -Per discussion,  "patient remains full code  - See ethics consult from 7/8/2023.       Diet: Regular Diet Adult Thin Liquids (level 0)  Room Service    DVT Prophylaxis: Pneumatic Compression Devices  Nails Catheter: PRESENT, indication: Retention  Lines: PRESENT             Cardiac Monitoring: ACTIVE order. Indication: GIB, acute hemorrhage  Code Status: Full Code      Clinically Significant Risk Factors              # Hypoalbuminemia: Lowest albumin = 1.6 g/dL at 7/7/2023  5:45 PM, will monitor as appropriate            # Obesity: Estimated body mass index is 34.64 kg/m  as calculated from the following:    Height as of this encounter: 1.7 m (5' 6.93\").    Weight as of this encounter: 100.1 kg (220 lb 10.9 oz).   # Severe Malnutrition: based on nutrition assessment         Disposition Plan  to be determined; but likely back to his LTC     Expected Discharge Date: 07/13/2023        Discharge Comments: palliative consult for assistance of [facility]          SHAYY JOINER MD  Hospitalist Service  [employer]  Securely message with [employer] (more info)  Text page via [employer] Paging/Directory   ______________________________________________________________________    Interval History   - No acute events overnight  - Has remained afebrile and hemodynamically stable    Physical Exam   Vital Signs: Temp: 97.4  F (36.3  C) Temp src: Oral BP: (!) 144/72 Pulse: 81   Resp: 13 SpO2: 100 % O2 Device: None (Room air)    Weight: 220 lbs 10.89 oz    General Appearance: Obese adult male, lying in bed, on room air in no acute distress or discomfort.  HEENT: PERRL: EOMI; moist mucous membrane w/o lesions  Neck: No JVD  Pulmonary: Clear to auscultation bilaterally, no wheezes or crackles in anterior lung fields  CVS: Regular rhythm, no murmurs, rubs or gallops  GI: BS (+), soft nontender, no rebound or guarding   Extremities: Extensive bilateral lower extremity wound, in dressing.  Skin: No rashes or lesions  Neurologic: A&O " x3      Medical Decision Making       55 MINUTES SPENT BY ME on the date of service doing chart review, history, exam, documentation & further activities per the note.      Data   ------------------------- PAST 24 HR DATA REVIEWED -----------------------------------------------    I have personally reviewed the following data over the past 24 hrs:    N/A  \   N/A   / N/A     138 109 (H) 7.1 (L) /  98   3.8 23 0.62 (L) \       Imaging results reviewed over the past 24 hrs:   No results found for this or any previous visit (from the past 24 hour(s)).

## 2023-07-12 NOTE — PROGRESS NOTES
"Care Management Follow Up    Length of Stay (days): 14    Expected Discharge Date: 07/13/2023     Concerns to be Addressed:       Patient plan of care discussed at interdisciplinary rounds: Yes    Anticipated Discharge Disposition: Skilled Nursing Facility     Anticipated Discharge Services:    Anticipated Discharge DME:      Patient/family educated on Medicare website which has current facility and service quality ratings: no  Education Provided on the Discharge Plan:    Patient/Family in Agreement with the Plan:      Referrals Placed by CM/SW: Post Acute Facilities  Private pay costs discussed: Not applicable    Additional Information:  Per MD, pt ready for discharge on Thursday. Noted niece has requested new placement. No accepting facility yet. Additional referral sent to [facility] to inquire about possible LTC placement. ERI spoke with liaison, Chloe, she has declined pt for all [place] facilities due to pt's behaviors. ERI called and confirmed with Josiane at [facility] that pt remains on a bed hold for another 4 days. He can return. ERI called and updated pt's niece that a new placement can not be found and pt will need to return to [facility]. She repeated \"no\" several times. She states pt is not treated well there. ERI informed her since new placement can not be found, this is the only option. ERI informed her she needs to work with [facility] staff on new placement or [facility]  [phone]. PCS faxed and on chart.     JENN Hazel, LICSW  [phone] Desk phone  [phone] Cell/text (Preferred)  [facility]          "

## 2023-07-12 NOTE — PROGRESS NOTES
Orientations: A/o x2-3 disorientated to situation/place at times. Difficult to assess as pt does not always understand ipad  and can be uncooperative.  Vitals/Pain: VSS on RA - Tylenol x1 and dilauded x2 for pain  Tele: Afib CVR with inverted T waves and PVC's  Lines/Drains: Midline RUE infusing D5 1/2 NS  Skin/Wounds: BLE, heels, sacrum/coccyx, under breast. Unable to visualize wounds or complete wound cares due to patient refusal. Pt was educated on the importance of repositioning, wound cares, and hygiene cares, but continued to refuse.  GI/: Nails in place, refused catheter cares.  Labs: Electrolyte Replacement- Mag/phos protocol  Ambulation/Assist: Up with lift, turn and repo- frequently refuses, microturns/weight shifting completed as often as pt allowed.  Sleep Quality: Fair  Plan: TBD

## 2023-07-12 NOTE — CONSULTS
Please see n the ethics consult note dated 7-8-23 at 13:38.      JENN Eller, [facility]    [phone]  [facility]

## 2023-07-12 NOTE — PROGRESS NOTES
Notified provider about indwelling antunez catheter discussed removal or continued need.    Did provider choose to remove indwelling antunez catheter? No    Provider's antunez indication for keeping indwelling antunez catheter: Wound Healing.    Is there an order for indwelling antunez catheter? Yes    *If there is a plan to keep antunez catheter in place at discharge daily notification with provider is not necessary.

## 2023-07-12 NOTE — PROGRESS NOTES
Patient refused nursing cares this morning: he refused some of his medications, refused to be turned, cleaned, have dressing changed.   He refused to co-operate with the writer, mainly because I was unable to find an  to come to this room and aid me communicating with him.  Unfortunately, an  was not available at that time.   He requested a [place] speaking staff member; Unfortunately, I was unable to comply with his request.   I used [employer] the mobile interpreting services, but communication was poor, which further frustrated the patient.

## 2023-07-13 NOTE — PROGRESS NOTES
[facility]    Medicine Progress Note - Hospitalist Service    Date of Admission:  6/28/2023    Assessment & Plan   Joselin Sanchez is a 89 year old male admitted on 6/28/2023. He has a past medical history of hypertension, diabetes mellitus type 2, chronic atrial fibrillation, not on anticoagulation, chronic systolic congestive heart failure, last EF 40 to 45% in July 2020, dyslipidemia, diabetic foot ulcer, lymphedema, dementia, BPH who was sent from his facility for evaluation of altered mental status.        #Concern for melena.    -There was patient was suspected to have an upper GI bleed while on heparin drip.  - Differential diagnosis include peptic ulcer disease, stress ulcers, gastritis, esophagitis.  - Variceal bleeding is felt to be unlikely given the CT imaging on 7/1 which showed cirrhosis but no clear signs of varices.   - Melena seem to have slowed up since discontinue anticoagulation and keeping patient on PPI therapy only.  - Given advanced age, GI deferring EGD.    Continue Protonix twice daily x8 weeks then once daily afterwards    Patient now on diet    Avoid anticoagulation.     #Agitation, anxiety and refusal of care at times.  - Zyprexa p.o. every 6 as needed, lorazepam as needed has been ordered.  - Patient appears to have the capacity to make his own decisions based on assessment by Dr. Dubois on 7/10/23.  -  He is very consistent with what he would like as to doing what he does not want us to do.  -He is agreeable to personal care medications, does not like tube feeding.    -Also seen by psychiatry who had difficulty assessing patient.     #Severe sepsis due to UTI  #Bacteriemia  - Presented with altered mental status  - Was in A-fib with RVR upon arrival in ER, blood pressure also on lower side, lowest BP 85/62  - White blood cells on admission 11.5 lactic acid 1.3; Pro-Ryan 0.26  - Chest x-ray with no infiltrates  - Nails Catheter was placed in ER, UA was not able to be  performed by the lab as of purulent urine  - CT abdomen/ pelvis- Sequela of chronic bladder outlet obstruction, likely secondary to prostatomegaly   - in ER - he was given IVF and 1 dose of Zosyn  - Admitted to ICU; continued on IV Zosyn  - 6/29 - Blood pressure improved initially but was intermittently hypotensive overnight  - he is Full Code as per his records;  - Echo- EF 40-45%.   - BP improved  - was hypothermic 2 nights ago, now temp- WNL  - WBc down to 7.1  - Blood cultures from 6/28, 1 of 2 bottles positive for Corynebacterium striatum, and 1/2 bottles positive for GPC in clusters, initially suspected contamination, growing anaerococcus species  - Urine culture positive >100,000 colony of yeast/Candida Dubliniensis  - Given his a severe sepsis; started on fluconazole 400 mg IV daily on 6/29.  -ID consulted, zosyn changed to augmentin, day 9 abx  - repeat BC on 6/29- 1/2 bottles again positive for GPC in clusters--> staph pettenkferi  -Vancomycin stopped per ID  - MRSA swab returned negative  - repeat bld cx 7/1, 7/2 ngtd  - MRSA cx nares negative  -pt has been afebrile. WBC 11.5 admission--> normalized to 8-9 range  -clinically improving. More verbal today. Eating better     Plan;  - Followed by ID  -Completed course of fluconazole  -Patient was to complete additional 4 days of Augmentin from 7/5/2023.    Discontinue Augmentin on 7/11/23 as course is already completed.     # Acute/ early subacute ischemic infarct  - he has a history of A-fib, not on anticoagulation prior to admission  -Presented with altered mental status thought to be related to severe sepsis  - CT head on admission negative for acute pathology  - 6/28- noted to have left-sided weakness  - Code stroke called  - CT head- findings concerning for acute/early subacute ischemic infarct  involving the left occipital lobe; No acute intracranial hemorrhage, extra axial fluid collection, or mass effect; Brain atrophy and presumed chronic ischemic  changes   - CTA head and neck   1. No definite high-grade stenosis or large vessel occlusion involving the major intracranial arteries.  2. Mild nonflow-limiting atherosclerosis of the bilateral carotid siphons.  3. No intracranial aneurysm or high flow vascular malformation.  4. Mild bilateral carotid bifurcation atherosclerosis, left more than right, with less than 50% stenosis by NASCET criteria.  5. Patent cervical vertebral arteries without flow-limiting stenosis.  6. 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 CTA of the chest could be considered for further evaluation, as indicated.     - Stroke neuro consult appreciated  - PTA on aspirin 81 mg p.o. daily  - MRI of the brain-   Subacute ischemia left occipital lobe along with tiny focus posterior right frontal lobe near vertex  - Echo-  EF 40-45%. 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 No evidence of active hemorrhage.  - 7/1- concern for left pupil irregular and larger that rt pupil (unclear if new)  - repeat CT head-  Subacute infarct left occipital lobe as seen on previous MRI, no hemorrhagic transformation     - Stroke Neuro- recommended anticoagulation for secondary stroke prevention in the setting of atrial fibrillation (see note from 6/30)  -On 7/1/2023, patient was initiated on heparin gtt, while PTA aspirin and Lovenox DVT PPx was discontinued  -However given concerns of melena, anticoagulation has since been discontinued.  - PT/OT- rec LTC  - NGT placed 7/1 and TF started as per nutritionist; of note, tube feeds has since been discontinued as this is not in line with patient's wish.     Plan     follow po intake, nutrition and speech following    Patient has not been cooperative with  speech therapy    Since patient is not a candidate for anticoagulation, will opt to re-initiate aspirin once daily.        #Acute encephalopathy, multifactorial, infectious, metabolic  - Patient awake and alert and refusing care and medications. Complains of insomnia.     # Underlying dementia, stable   - Gentle redirectioning, avoid sedating or pain medications as able  - PT/OT rec LTC  - /care coordinator consult; reported the patient being a vulnerable adult; he was admitted to [facility] the end of March/23 and discharged to [facility]        #KIMO in a setting of CKD stage III  #Hyperkalemia, resolved  - Creatinine on admission 2.6; Potassium on admission 6.7  - Recommended CKD stage III in Care Everywhere but recent BMP on June/9//23 showed a potassium of 4.1, creatinine 0.86  - This is likely related to severe sepsis, decreased intravascular volume, hypotension  - Noted that prior to admission he was on Lasix, lisinopril, potassium supplementation  -initially held lasix, Lisinopril but restarted 7/4   -BMP 7/6, Na down to 146. Cr and K wnl.      Plan;   - Avoid nephrotoxic drugs  - Okay to resume Lasix as sodium is back to normal  - Cont Lisinopril  - monitor BMP      # Diabetes mellitus type 2  # Hypoglycemia-resolved  - PTA on metformin 500 mg p.o. daily  - Hemoglobin A1c is low 5.8    Holding PTA metformin, given patient's age and A1c status, might require this medication on discharge.    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  - Treated UTI as  above  -Telemetry-heart rate in 90- 100s  -sbp good control  - K and Mg have been wnl  -Hb has been stable. No bleeding     # Systolic congestive heart failure  # h/o Hypertension  - A prior echocardiogram in July/2020 showed EF of 40 to 45%, LV hypokinesis  -TTE obtained this admission showed EF 45-45%, with moderate global hypokinesis left ventricle.    Furosemide 20 mg daily resumed    Continue lisinopril 2.5 mg daily for cardioprotection.    Started Metoprolol 25 mg XR with holding parameters     # Peripheral arterial disease  # Diabetic foot ulcer (right heel)  # History of lymphedema  # History of polymicrobial bacterial infection of the legs  - He had an admission to [facility] in [place] from 3/27 through 4/4/2023 for polymicrobial bacterial infection of his legs.  He was taken to the OR by surgery for debridement; he was treated with IV Zosyn for 14 days  - He is wounds does not seem to be infected at this time  - Wound care consult requested  - Podiatry consult appreciated  - Xray rt foot - 2 cm partial-thickness soft tissue ulceration overlying the posterior margin of the calcaneus. No osseous volume loss suggest osteomyelitis  - as per Podiatry- The wound base might need excisional debridement at some point and given his tenderness in the region, likely would be done in the OR.  No urgency.   - If wound cannot be converted to a healthier wound bed with local wound cares, please re-consult podiatry for possible excisional debridement in OR.     Plan-  - offloading of site all times   - Discussed with his niece, she does not want him to have surgery or amputation.  - Continue wound care  -Family does not want to pursue surgery at this time.     # BPH  - Nails placed in ER on admission; attempting trial void on 7/12/2023.    PTA Flomax resumed again on 7/12/2023.     # Right hip pain  - X ray rt hip- no fracture  - pain management      # Goals of care  -Per discussion, patient remains full  "code  - See ethics consult from 7/8/2023.       Diet: Regular Diet Adult Thin Liquids (level 0)  Room Service    DVT Prophylaxis: Pneumatic Compression Devices  Nails Catheter: Not present  Lines: PRESENT             Cardiac Monitoring: None  Code Status: Full Code      Clinically Significant Risk Factors              # Hypoalbuminemia: Lowest albumin = 1.6 g/dL at 7/7/2023  5:45 PM, will monitor as appropriate            # Obesity: Estimated body mass index is 34.64 kg/m  as calculated from the following:    Height as of this encounter: 1.7 m (5' 6.93\").    Weight as of this encounter: 100.1 kg (220 lb 10.9 oz).   # Severe Malnutrition: based on nutrition assessment         Disposition Plan  Patient is medically cleared for discharge.  Following multiple assessment per providers, patient does have disuse and capacity.  Anticipate discharge tomorrow 7.14.23 after patient received IV dilaudid this morning for pain control.      Expected Discharge Date: 07/13/2023, 11:00 AM  Discharge Delays: *Early Discharge Anticipated    Discharge Comments: palliative consult for assistance of [facility]          SHAYY JOINER MD  Hospitalist Service  [facility]  Securely message with [employer] (more info)  Text page via [employer] Paging/Directory   ______________________________________________________________________    Interval History   - No acute events overnight  - Has remained afebrile and hemodynamically stable  -Trial void initiated yesterday as Nails catheter discontinued.  --Received IV dilaudid for pain control today  --He offers no complaints this morning.    Physical Exam   Vital Signs: Temp: 98  F (36.7  C) Temp src: Oral BP: 128/62 Pulse: 100   Resp: 16 SpO2: 94 % O2 Device: None (Room air)    Weight: 220 lbs 10.89 oz    General Appearance: Obese adult male, lying in bed, on room air in no acute distress or discomfort.  HEENT: PERRL: EOMI; moist mucous membrane w/o lesions  Neck: No JVD  Pulmonary: " Clear to auscultation bilaterally, no wheezes or crackles in anterior lung fields  CVS: Regular rhythm, no murmurs, rubs or gallops  GI: BS (+), soft nontender, no rebound or guarding   Extremities: Extensive bilateral lower extremity wound, in dressing.      Medical Decision Making       55 MINUTES SPENT BY ME on the date of service doing chart review, history, exam, documentation & further activities per the note.      Data   ------------------------- PAST 24 HR DATA REVIEWED -----------------------------------------------        Imaging results reviewed over the past 24 hrs:   No results found for this or any previous visit (from the past 24 hour(s)).

## 2023-07-13 NOTE — PROGRESS NOTES
Care Management Follow Up    Length of Stay (days): 15    Expected Discharge Date: 07/13/2023     Concerns to be Addressed:       Patient plan of care discussed at interdisciplinary rounds: Yes    Anticipated Discharge Disposition: Skilled Nursing Facility     Anticipated Discharge Services:    Anticipated Discharge DME:      Patient/family educated on Medicare website which has current facility and service quality ratings: no  Education Provided on the Discharge Plan:    Patient/Family in Agreement with the Plan:      Referrals Placed by CM/SW: Post Acute Facilities  Private pay costs discussed: Not applicable    Additional Information:  Patient is not discharging due to administration of IV medication.  MD notified the SNF does not administer IV's pain medication and wants to know to his pain is controlled over the next 24 hours with oral medication.  Transport cancelled.       ZEUS VegaSW

## 2023-07-13 NOTE — PLAN OF CARE
"Goal Outcome Evaluation:    A&Ox4; intermittent confusion. VSS on Rm Air. Denies pain. Somalian speaking, understands some English. Asstx2 w/ lift. Total cares. Turn & repo q2 hrs; patient refuses repo, education provided, continues to refuse. Patient incontinent. Loose dark brown/black BM's; requested order for Imodium, given x1. Skin abrasions noted to scrotum, B/L groin folds, buttocks/IT, & wounds to BLE; drsgs CDI. Patient refused assessment of wounds. Skin assessment w/ admission to unit verified by second nurse. Nails catheter removed per orders; patient voided scant amount of urine into bedside urinal without staff assistance. Bladder scanned for 261 ml. Rooke boots applied. PIV infusing dextrose & NS @ 50 ml/hr. Patient refused dinner, states \"let me in peace\". Meds whole. BG AC & HS. Plan for discharge to LTC tomorrow.     "

## 2023-07-13 NOTE — DISCHARGE INSTRUCTIONS
Wound care instructions:  Location: buttock, under breasts, BLE, Right heel   Care: provided qshift by primary RN  1. Cleanse sites with lyssa spray cleanser and soft dry wipes. cleanse buttock = incontinence episode and qshift. cleanse BLE == every Other day. No need to wipe off all Triad, gently wipe off loose debris   2. After cleansing apply Triad paste to all sites (#682647)   3. Ok to leave open to air breasts and buttock  4. Every Other day apply xeroform (#521148) over open areas to BLE, apply ABD pads as needed for drainage, wrap with kerlix. Apply generous amount of Triad to xeroform and then apply to right heel wound every other day.    5. Elevate heels off bed with heel boots or pillows at all times

## 2023-07-13 NOTE — PLAN OF CARE
07/12/23 [id number]    Orientations: A&OX3- Disoriented to time. Intermittent. Frequently refusing repositioning, meds, & wound cares.  Vitals/Pain: VSS on RA. Pain BLE, patient refused intervention  Tele: A-fib CVR  Lines/Drains: R midline infusing D5 1/2 NS at 50 ml/hr. L PIV SL. No antunez catheter in place.  Skin/Wounds: BLE wounds, sacral wound, breast fold wound- Cares per WOC orders.   Tolerating regular diet, but has eaten in this shift. Denies nausea.   Labs: Abnormal/Trends, Electrolyte Replacement- Mg & Phos wdl w/ rechecks this morning (7/13th) at 0600  Ambulation/Assist: Q2 T/R & lift assist.   Plan: Ctm & implementing POC.  Given Prn Dilaudid 0.4 mg x1 this shift.

## 2023-07-13 NOTE — PLAN OF CARE
Goal Outcome Evaluation:      Plan of Care Reviewed With: patient    DATE & TIME: 07/13/23 [id number]   Cognitive Concerns/ Orientation : AOx3- situation   BEHAVIOR & AGGRESSION TOOL COLOR: Green- was a little in appropriate with female staff trying to kiss hand; agitation when trying to provide cares- told RN he was going to kill them while they tried to do incont. care and wound care.   ABNL VS/O2: VSS on RA  MOBILITY: Q2 repo and lift- Refuses repositioning and cares  PAIN MANAGMENT: Denied but yells out during cares  DIET: Reg  BOWEL/BLADDER: Incont of urine; no BM this shift  ABNL LAB/BG: BG 89  DRAIN/DEVICES: R midline SL  TELEMETRY RHYTHM: NA  SKIN: BLE wounds- CDI, sacral wound vashe rinse and triad paste applied, breast fold wound open to air  TESTS/PROCEDURES: NA  D/C DATE: Most likely tomorrow 7/14  OTHER IMPORTANT INFO:

## 2023-07-13 NOTE — PROGRESS NOTES
Orientations: A&OX3- Disoriented to time. Intermittent. Frequently refusing repositioning, meds, & wound cares.  Vitals/Pain: VSS on RA. Pain BLE, patient given 5mg oxycodin  Tele:  Lines/Drains: R midline infusing D5 1/2 NS at 50 ml/hr. L PIV SL. No antunez catheter in place.  Skin/Wounds: BLE wounds, sacral wound, breast fold wound- Cares per WOC orders.   Refusing to eat Denies nausea.   Labs: Abnormal/Trends, Electrolyte Replacement- good  Ambulation/Assist: Q2 T/R & lift assist.   Plan: Ctm & implementing POC..

## 2023-07-14 NOTE — PLAN OF CARE
DATE & TIME: 07/13/23 NOC  Cognitive Concerns/ Orientation : AOx3- situation   BEHAVIOR & AGGRESSION TOOL COLOR: Green-  ABNL VS/O2: VSS on RA  MOBILITY: Q2 repo and lift- Refuses repositioning and cares  PAIN MANAGMENT: Denied  DIET: Reg  BOWEL/BLADDER: Incont of urine; no BM this shift  ABNL LAB/BG: BG 87,77  DRAIN/DEVICES: R midline SL  TELEMETRY RHYTHM: NA  SKIN: BLE wounds- CDI, sacral wound vashe rinse and triad paste applied, breast fold wound open to air  TESTS/PROCEDURES: NA  D/C DATE: Most likely tomorrow 7/14  OTHER IMPORTANT INFO:

## 2023-07-14 NOTE — PROGRESS NOTES
CLINICAL NUTRITION SERVICES - REASSESSMENT NOTE    RECOMMENDATIONS FOR MD/PROVIDER TO ORDER:   Ongoing 0% intakes documented  Has declined NGT and PEG per chart review   Nutrition POC per MD   Malnutrition:   % Weight Loss:  > 7.5% in 3 months (severe malnutrition)  % Intake:  Unable to determine   Subcutaneous Fat Loss:  Orbital region moderate depletion  Muscle Loss:  Temporal region severe depletion and Clavicle bone region severe depletion  Fluid Retention:  None noted     Malnutrition Diagnosis: Severe malnutrition  In Context of:  Acute illness or injury     EVALUATION OF PROGRESS TOWARD GOALS   Diet:  Regular    Nutrition Support:  FT pulled and nutrition stopped on 7/8 after MD discussion with patient/family    Intake/Tolerance:    - Ongoing 0% intakes > 1 week per flowsheets  - Ordering 0-3 meals per [facility]  - Per RN note yesterday, pt refuses to eat  - D5 @ 50 mL/hr    ASSESSED NUTRITION NEEDS:  Dosing Weight 76.1 kg   Estimated Energy Needs: 7549-7581 kcals (25-30 Kcal/Kg)  Justification: maintenance  Estimated Protein Needs:  grams protein (1.2-1.5 g pro/Kg)  Justification: hypercatabolism with acute illness  Estimated Fluid Needs: 4119-0295 mL (1 mL/Kcal)  Justification: maintenance    NEW FINDINGS:   Patient was to be discharged today but POA appealed  Meds: Folic acid, lasix protonix, senokot, thiamine    Previous Goals:   Patient will consume 50% meals BID to show improvement vs change in GOC vs nutrition support   Evaluation: Not met    Previous Nutrition Diagnosis:   Inadequate protein-energy intake related to previous TF reliance on CLD, now removed as evidenced by minimal oral nutrition intake  Evaluation: No change, updated below    CURRENT NUTRITION DIAGNOSIS  Inadequate oral intake related to baseline poor po intakes as evidenced by 0% intakes documented for > 1 week, note of pt refusing to eat    INTERVENTIONS  Recommendations / Nutrition Prescription  Diet per patient wishes   Do  not suspect pt/family agreeable to alternate means of nutrition in the future  Do not suspect pt will meet nutrition needs orally     Implementation  None at this time    Goals  Patient will consume >50% meals BID vs change in GOC vs nutrition support     MONITORING AND EVALUATION:  Progress towards goals will be monitored and evaluated per protocol and Practice Guidelines    Yana Perez  Clinical Dietitian - [facility]

## 2023-07-14 NOTE — PROGRESS NOTES
"Case number: [id number]     Note: Case Status can be looked up at the [employer] website, [email or web address]     Hospital  staff received notification via fax from [employer] ([place] Medicare [employer]) that patient has appealed discharge.     Per Discharge Appeals for Medicare Patient standard process,   _x_ Reviewed orders: pt indeed has discharge order placed (date) 07/14/23 at (time) 07/14/23 08:50 AM  _x_ Called HIM - Release of Information priority line ([phone]) and left voicemail specifying appeal needs to be processed.    _x_  Emailed \"Expedited Appeal Documentation Request to [email or web address] (marked the email \"high priority\")  _x_ Obtained and completed \"Detailed Notice of Discharge.\"  Added date, name, MR#, discharge order date (as above), and rationale based on MD's notes, then emailed to pt niece & provided copy at pt bedside (date) 7/14/23 at (time) 4:45pm  _x_ Reviewed CMS Discharge IM for Patient Signatures, which were given (date/s) 07/14/23 as noted in Epic   _x_ Emailed qir-co-soejc copies of Detailed Notice of Discharge and CMS IM for Patient Signature to [email or web address] (marked the email \"high priority\").  Notified HIM at [phone] that these can also be found on patient's paper chart.    _x_ Text-paged Dr. Glez,\"FYI patient appealed discharge. DO NOT remove discharge order, unless medically necessary. OK to add things to your note/orders if needed.\" MD confirmed receipt of text page.  _x_ Alerted charge RN of the appeal, who alerted nursing supervisor via text page  _x_ Alerted case management manager via email (she is aware)  _x_ Added sticky note, \"discharge appeal in process\" on Expected Discharge Date      Commonly Asked Questions    If a patient files for a Medicare appeal are those nights we are waiting for a response  covered by Medicare?  o Yes, Medicare covers the nights of inpatient hospital stay in the appeal process.    If a " patient stays overnight in the appeal process and is denied, do those nights count  towards a three night inpatient required stay for a TCU?  o No, the nights in appeal will not count towards a TCU stay.    Can a patient appeal more than once?  o Yes, but the additional appeal and hospital night stay if denied will NOT be covered  by Medicare and will be out of pocket expense to the patient.    What time of day does the patient need to be discharged by if the appeal is denied?  o The patient has until 12:00 noon the day after the [facility] decision has been made. If  the patient receives any inpatient services after noon of that day they will have to  pay for them.    Can patients with Medicare Advantage Plans appeal their discharge?  o Yes, all Medicare Advantage Plans have the right to the Medicare Appeal Process.    Can a family member appeal the discharge for a cognitively impaired patient?  o Yes    Does the patient/patient representative have to let the staff know they have appealed?  o No, at times it is possible for your first notice to come from [facility].    Should I document the appeal in the Epic Medical record?  o Yes, document that the patient has filed a Medicare appeal for discharge,  information was provided to [facility] and results of the appeal are pending. When  results are received please document if the appeal was denied or accepted.    Note: Case Status can be looked up at the [employer] website, [email or web address]

## 2023-07-14 NOTE — PROGRESS NOTES
DATE & TIME: 07/14/23 Day  Cognitive Concerns/ Orientation : Aox2-3  BEHAVIOR & AGGRESSION TOOL COLOR: Yellow - agitated at staff today, threatening to kill, combative when removing midline.  ABNL VS/O2: VSS on RA  MOBILITY: Q2 repo and lift- Refuses repositioning and cares  PAIN MANAGMENT: Denied  DIET: Reg  BOWEL/BLADDER: Incont of urine; no BM this shift  ABNL LAB/BG: BG before meals WNL  DRAIN/DEVICES: R midline removed  TELEMETRY RHYTHM: NA  SKIN: BLE wounds- CDI, sacral wound vashe rinse and triad paste applied,  TESTS/PROCEDURES: NA  D/C DATE: Was to discharge back to LTC today, however pt refusing and niece wanting to appeal transfer. TBD.  OTHER IMPORTANT INFO:

## 2023-07-14 NOTE — PLAN OF CARE
Problem: Oral Intake Inadequate  Goal: Improved Oral Intake  Outcome: Not Progressing   Goal Outcome Evaluation:       Ongoing 0% intakes documented - pt refusing to eat  Has declined NGT and PEG per chart review   Nutrition POC per MD Yana Perez  Clinical Dietitian - [facility]

## 2023-07-14 NOTE — PROGRESS NOTES
[facility]    Medicine Progress Note - Hospitalist Service    Date of Admission:  6/28/2023    Assessment & Plan   Joselin Sanchez is a 89 year old male admitted on 6/28/2023. He has a past medical history of hypertension, diabetes mellitus type 2, chronic atrial fibrillation, not on anticoagulation, chronic systolic congestive heart failure, last EF 40 to 45% in July 2020, dyslipidemia, diabetic foot ulcer, lymphedema, dementia, BPH who was sent from his facility for evaluation of altered mental status.        #Concern for melena.    -There was patient was suspected to have an upper GI bleed while on heparin drip.  - Differential diagnosis include peptic ulcer disease, stress ulcers, gastritis, esophagitis.  - Variceal bleeding is felt to be unlikely given the CT imaging on 7/1 which showed cirrhosis but no clear signs of varices.   - Melena seem to have slowed up since discontinue anticoagulation and keeping patient on PPI therapy only.  - Given advanced age, GI deferring EGD.    Continue Protonix twice daily x8 weeks then once daily afterwards    Patient now on diet    Avoid anticoagulation.     #Agitation, anxiety and refusal of care at times.  - Zyprexa p.o. every 6 as needed, lorazepam as needed has been ordered.  - Patient appears to have the capacity to make his own decisions based on assessment by Dr. Dubois on 7/10/23.  -  He is very consistent with what he would like as to doing what he does not want us to do.  -He is agreeable to personal care medications, does not like tube feeding.       #Severe sepsis due to UTI  #Bacteriemia  - Presented with altered mental status  - Was in A-fib with RVR upon arrival in ER, blood pressure also on lower side, lowest BP 85/62  - White blood cells on admission 11.5 lactic acid 1.3; Pro-Ryan 0.26  - Chest x-ray with no infiltrates  - Nails Catheter was placed in ER, UA was not able to be performed by the lab as of purulent urine  - CT  abdomen/ pelvis- Sequela of chronic bladder outlet obstruction, likely secondary to prostatomegaly   - in ER - he was given IVF and 1 dose of Zosyn  - Admitted to ICU; continued on IV Zosyn  - 6/29 - Blood pressure improved initially but was intermittently hypotensive overnight  - he is Full Code as per his records;  - Echo- EF 40-45%.   - BP improved  - was hypothermic 2 nights ago, now temp- WNL  - WBc down to 7.1  - Blood cultures from 6/28, 1 of 2 bottles positive for Corynebacterium striatum, and 1/2 bottles positive for GPC in clusters, initially suspected contamination, growing anaerococcus species  - Urine culture positive >100,000 colony of yeast/Candida Dubliniensis  - Given his a severe sepsis; started on fluconazole 400 mg IV daily on 6/29.  -ID consulted, zosyn changed to augmentin, day 9 abx  - repeat BC on 6/29- 1/2 bottles again positive for GPC in clusters--> staph pettenkferi  -Vancomycin stopped per ID  - MRSA swab returned negative  - repeat bld cx 7/1, 7/2 ngtd  - MRSA cx nares negative  -pt has been afebrile. WBC 11.5 admission--> normalized to 8-9 range  -clinically improving. More verbal today. Eating better     Plan;  - Followed by ID  -Completed course of fluconazole  -Patient was to complete additional 4 days of Augmentin from 7/5/2023.    Discontinue Augmentin on 7/11/23 as course is already completed.     # Acute/ early subacute ischemic infarct  - he has a history of A-fib, not on anticoagulation prior to admission  -Presented with altered mental status thought to be related to severe sepsis  - CT head on admission negative for acute pathology  - 6/28- noted to have left-sided weakness  - Code stroke called  - CT head- findings concerning for acute/early subacute ischemic infarct  involving the left occipital lobe; No acute intracranial hemorrhage, extra axial fluid collection, or mass effect; Brain atrophy and presumed chronic ischemic changes   - CTA head and neck   1. No definite  high-grade stenosis or large vessel occlusion involving the major intracranial arteries.  2. Mild nonflow-limiting atherosclerosis of the bilateral carotid siphons.  3. No intracranial aneurysm or high flow vascular malformation.  4. Mild bilateral carotid bifurcation atherosclerosis, left more than right, with less than 50% stenosis by NASCET criteria.  5. Patent cervical vertebral arteries without flow-limiting stenosis.  6. 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 CTA of the chest could be considered for further evaluation, as indicated.     - Stroke neuro consult appreciated  - PTA on aspirin 81 mg p.o. daily  - MRI of the brain-   Subacute ischemia left occipital lobe along with tiny focus posterior right frontal lobe near vertex  - Echo-  EF 40-45%. 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 No evidence of active hemorrhage.  - 7/1- concern for left pupil irregular and larger that rt pupil (unclear if new)  - repeat CT head-  Subacute infarct left occipital lobe as seen on previous MRI, no hemorrhagic transformation     - Stroke Neuro- recommended anticoagulation for secondary stroke prevention in the setting of atrial fibrillation (see note from 6/30)  -On 7/1/2023, patient was initiated on heparin gtt, while PTA aspirin and Lovenox DVT PPx was discontinued  -However given concerns of melena, anticoagulation has since been discontinued.  - PT/OT- rec LTC  - NGT placed 7/1 and TF started as per nutritionist; of note, tube feeds has since been discontinued as this is not in line with patient's wish.     Plan     follow po intake, nutrition and speech following    Patient has not been cooperative with speech therapy    Since patient is not a  candidate for anticoagulation, will opt to re-initiate aspirin once daily.        #Acute encephalopathy, multifactorial, infectious, metabolic  - Patient awake and alert and refusing care and medications. Complains of insomnia.     # Underlying dementia, stable   - Gentle redirectioning, avoid sedating or pain medications as able  - PT/OT rec LTC  - /care coordinator consult; reported the patient being a vulnerable adult; he was admitted to [facility] the end of March/23 and discharged to [facility]        #KIMO in a setting of CKD stage III  #Hyperkalemia, resolved  - Creatinine on admission 2.6; Potassium on admission 6.7  - Recommended CKD stage III in Care Everywhere but recent BMP on June/9//23 showed a potassium of 4.1, creatinine 0.86  - This is likely related to severe sepsis, decreased intravascular volume, hypotension  - Noted that prior to admission he was on Lasix, lisinopril, potassium supplementation  -initially held lasix, Lisinopril but restarted 7/4   -BMP 7/6, Na down to 146. Cr and K wnl.      Plan;   - Avoid nephrotoxic drugs  - Okay to resume Lasix as sodium is back to normal  - Cont Lisinopril  - monitor BMP      # Diabetes mellitus type 2  # Hypoglycemia-resolved  - PTA on metformin 500 mg p.o. daily  - Hemoglobin A1c is low 5.8    Holding PTA metformin, given patient's age and A1c status, might require this medication on discharge.    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  - Treated UTI as above  -Telemetry-heart rate in 90- 100s  -sbp good  control  - K and Mg have been wnl  -Hb has been stable. No bleeding     # Systolic congestive heart failure  # h/o Hypertension  - A prior echocardiogram in July/2020 showed EF of 40 to 45%, LV hypokinesis  -TTE obtained this admission showed EF 45-45%, with moderate global hypokinesis left ventricle.    Furosemide 20 mg daily resumed    Continue lisinopril 2.5 mg daily for cardioprotection.    Started Metoprolol 25 mg XR with holding parameters     # Peripheral arterial disease  # Diabetic foot ulcer (right heel)  # History of lymphedema  # History of polymicrobial bacterial infection of the legs  - He had an admission to [facility] in [place] from 3/27 through 4/4/2023 for polymicrobial bacterial infection of his legs.  He was taken to the OR by surgery for debridement; he was treated with IV Zosyn for 14 days  - He is wounds does not seem to be infected at this time  - Wound care consult requested  - Podiatry consult appreciated  - Xray rt foot - 2 cm partial-thickness soft tissue ulceration overlying the posterior margin of the calcaneus. No osseous volume loss suggest osteomyelitis  - as per Podiatry- The wound base might need excisional debridement at some point and given his tenderness in the region, likely would be done in the OR.  No urgency.   - If wound cannot be converted to a healthier wound bed with local wound cares, please re-consult podiatry for possible excisional debridement in OR.     Plan-  - offloading of site all times   - Discussed with his niece, she does not want him to have surgery or amputation.  - Continue wound care  -Family does not want to pursue surgery at this time.     # BPH  - Nails placed in ER on admission; attempting trial void on 7/12/2023.    PTA Flomax resumed again on 7/12/2023.     # Right hip pain  - X ray rt hip- no fracture  - pain management      # Goals of care  -Per discussion, patient remains full code  - See ethics consult from 7/8/2023.       Diet:  "Regular Diet Adult Thin Liquids (level 0)  Room Service  Diet    DVT Prophylaxis: Pneumatic Compression Devices  Nails Catheter: Not present  Lines: None     Cardiac Monitoring: None  Code Status: Full Code      Clinically Significant Risk Factors        # Hypokalemia: Lowest K = 3.2 mmol/L in last 2 days, will replace as needed       # Hypoalbuminemia: Lowest albumin = 1.6 g/dL at 7/7/2023  5:45 PM, will monitor as appropriate            # Obesity: Estimated body mass index is 34.64 kg/m  as calculated from the following:    Height as of this encounter: 1.7 m (5' 6.93\").    Weight as of this encounter: 100.1 kg (220 lb 10.9 oz).   # Severe Malnutrition: based on nutrition assessment         Disposition Plan  Patient is medically ready to be discharged to the next level of care-nursing home.  The patient was ready to be discharged today until his niece who is also power of  disagreed with the facility that he is going back to.  She is status that this facility neglected him in the past and to help relieve it was facility's fault that he ended up being hospitalized.  She believes that he has been neglecting that nursing home.  She is adamantly asking not to take him back to that place hands she appealed to Medicare from the discharge.       Expected Discharge Date: 07/14/2023, 12:00 PM  Discharge Delays: *Early Discharge Anticipated  *Medically Ready for Discharge    Discharge Comments: palliative consult for assistance of [facility]          Radhames Glez MD  Hospitalist Service  [facility]  Securely message with Jayda (more info)  Text page via [employer] Paging/Directory   ______________________________________________________________________    Interval History   - No acute events overnight  - Has remained afebrile and hemodynamically stable  -Trial void initiated yesterday as Nails catheter discontinued.  --Received IV dilaudid for pain control today  --He offers no complaints " this morning.    Physical Exam   Vital Signs: Temp: 98.4  F (36.9  C) Temp src: Oral BP: 117/62 Pulse: 78   Resp: 16 SpO2: 94 % O2 Device: None (Room air)    Weight: 220 lbs 10.89 oz    General Appearance: Obese adult male, lying in bed, on room air in no acute distress or discomfort.  HEENT: PERRL: EOMI; moist mucous membrane w/o lesions  Neck: No JVD  Pulmonary: Clear to auscultation bilaterally, no wheezes or crackles in anterior lung fields  CVS: Regular rhythm, no murmurs, rubs or gallops  GI: BS (+), soft nontender, no rebound or guarding   Extremities: Extensive bilateral lower extremity wound, in dressing.      Medical Decision Making       55 MINUTES SPENT BY ME on the date of service doing chart review, history, exam, documentation & further activities per the note.      Data   ------------------------- PAST 24 HR DATA REVIEWED -----------------------------------------------    I have personally reviewed the following data over the past 24 hrs:    N/A  \   N/A   / N/A     141 109 (H) 5.4 (L) /  88   3.2 (L) 22 0.72 \       Imaging results reviewed over the past 24 hrs:   No results found for this or any previous visit (from the past 24 hour(s)).

## 2023-07-14 NOTE — PROGRESS NOTES
Care Management Follow Up    Length of Stay (days): 16    Expected Discharge Date: 07/14/2023     Concerns to be Addressed:       Patient plan of care discussed at interdisciplinary rounds: Yes    Anticipated Discharge Disposition: Skilled Nursing Facility     Anticipated Discharge Services:    Anticipated Discharge DME:      Patient/family educated on Medicare website which has current facility and service quality ratings: no  Education Provided on the Discharge Plan:    Patient/Family in Agreement with the Plan:      Referrals Placed by CM/SW: Post Acute Facilities  Private pay costs discussed: Not applicable    Additional Information:  Hospitalist expressed understanding that eliu doesn't want patient to return to [facility] SNF. Writer explained we are aware of this and that while patient was on IMC multiple referrals were placed and no facility accepted patient.   MD placed discharge order. Transportation arranged via [employer] [facility] and coordinated return to [facility] with Josiane ([phone]) Sent orders via Epic and faxed script.   Writer called eliuMariamamichel Rochaah at [phone] and updated her of discharge and that transport will arrive between 1110 and 1150..  She again expressed oposition to patient returning to the care center. Writer explained we have no other SNF available. She stated she knows her uncle can be challenging but she doesn't think staff care for him at the SNf.    Encouraged her to call the [facility] office which has advocates to work with her and [facility]. She requested writer send this to her email;  [email or web address]    Update at [phone] voice mail left from eliu. Her her message she explains she is patient's HCA and that she will not consent for patient to go back to [facility]. She said she wants to appeal patient going back to [facility].   Eliu is patient's HCA however Ethics consult does assess patient as being decisional.  Dr Srivastava has told  writer that patient told him this morning he did not want to return to [facility].  However no other SNF has accepted patient.     1600 Earlier this afternoon, thru intrepeter, writer also spoke with patient. He stated he did not feel ready for discharge.  He stated he was having pain and not well.  Writer will update bedside RN.  Ramonita did submit an appeal for patient.  Writer spoke with Verito at [facility] admissions. She states patient's Medical Assistance bed hold is good thru 7/15.  She stated the facility will informally hold patient's long term care bed thru the w/e and into Monday.   She stated she will update the .  Ramonita asked what will happen if patient loses the appeal and writer advised her that [facility] is informally holding his bed on their second floor long term care unit.          Magdalena Garcia, [facility] Maine Medical CenterSW

## 2023-07-14 NOTE — PROGRESS NOTES
"[facility]  WO Nurse Inpatient Assessment      Consulted for: Wound Right heel, bilateral shins, under the breasts. Wound posterior left heel; no clincial signs of infection; XR negative;  fibro-necrotic wound base. Options to convert or stabilize w/o excisional debridement? This would require OR, due to pain.     Summary: patient refused assessment 7/14    Patient History (according to provider note(s):       \"89 year old male admitted on 6/28/2023. He has a past medical history of hypertension, diabetes mellitus type 2, chronic atrial fibrillation, not on anticoagulation, chronic systolic congestive heart failure, last EF 40 to 45% in July 2020, dyslipidemia, diabetic foot ulcer, lymphedema, dementia, BPH who was sent from his facility for evaluation of altered mental status..\"     Assessment:       Areas visualized during today's visit: refused     S-(situation): consulted this admission for wounds to right heel, BLE, under breasts, and buttock     B-(background): most recent visit completed 7/10     A-(assessment): assessment attempted in person with phone  at bedside, met with patient and primary nurse     R-(recommendations): continue current plan of care     Amie MENDOZA   1st: [employer], call \"Amie Eugene\" or call Group \"[facility] Nurse\"   (2nd option: leave a voicemail at Dept. Office Number: [phone]. Messages checked occasionally M-F)      "

## 2023-07-15 NOTE — PLAN OF CARE
Goal Outcome Evaluation:       DATE & TIME: 07/14/23 5985-9250  Cognitive Concerns/ Orientation : A&Ox2-3. Frustrated with cares and repositioning. Refusing turns/cares.  BEHAVIOR & AGGRESSION TOOL COLOR: Green   ABNL VS/O2: VSS on RA  MOBILITY: Refusing positioning most of shift. Lift.  PAIN MANAGMENT: Verbalizing discomfort with any touch.   DIET: Regular.   BOWEL/BLADDER: Incont of urine; no BM this shift  ABNL LAB/BG:   DRAIN/DEVICES: No IV access.  TELEMETRY RHYTHM: NA  SKIN: BLE wounds, wrapped with some drainage on chucks. Refusing wound care and reinforcement.  Abrasions on coccyx and perineum, Attempting T&R.    TESTS/PROCEDURES: NA  D/C DATE: Was to discharge back to LTC today, however pt refusing and niece wanting to appeal transfer. Appeal in process.   OTHER IMPORTANT INFO: Refusing cares. Asks staff to leave when attempting cares. Bed alarm on for safety.

## 2023-07-15 NOTE — PLAN OF CARE
DATE & TIME: 07/14/23 Evenings   Cognitive Concerns/ Orientation : A&Ox2-3. Frustrated with cares and repositioning. Refusing turns. Needs encouragement for turning and cares.   BEHAVIOR & AGGRESSION TOOL COLOR: Green   ABNL VS/O2: VSS on RA  MOBILITY: Refusing positioning most of shift. Needs encouragement for turns and cares.   PAIN MANAGMENT: Verbalizing discomfort with turns and touching legs.   DIET: Regular. Good appetite. Particular about dinner this evening.   BOWEL/BLADDER: Incont of urine; no BM this shift  ABNL LAB/BG:   DRAIN/DEVICES: R midline removed  TELEMETRY RHYTHM: NA  SKIN: BLE wounds, wrapped with some drainage on chucks. Refusing assessment and cares. Abrasions on coccyx and perineum, barrier paste applied.    TESTS/PROCEDURES: NA  D/C DATE: Was to discharge back to LTC today, however pt refusing and niece wanting to appeal transfer. TBD.  OTHER IMPORTANT INFO: Refusing cares. Asks staff to leave when encouraging cares. Bed alarm on for safety.

## 2023-07-15 NOTE — PROGRESS NOTES
Pt A&O x2/3. Forgetful. [place] Islander speaking. Uses [employer] language line . VSS.RA. Refuses turns and wound care. Takes all med. Poor appetite. Incontinent of B&B. Incontinent care x1. No BM. Discharge still pending.

## 2023-07-15 NOTE — CONSULTS
"Care Management Follow Up    Length of Stay (days): 17    Expected Discharge Date: 07/15/2023     Concerns to be Addressed:  Discharge planning Appeal filed with [employer] 7/14.   Patient plan of care discussed at interdisciplinary rounds: Yes    Anticipated Discharge Disposition: Skilled Nursing Facility       Patient/family educated on Medicare website which has current facility and service quality ratings: no  Education Provided on the Discharge Plan:  No did not meet with the patient today  Patient/Family in Agreement with the Plan:  No appeal filed through [employer] 7/14 per look up on [employer] website 7/15 at 11:58 a.m.  Appeal Started:  7/14/2023 4:20:41 PM  Medical Records Received:  7/15/2023 10:15:11 AM  Appeal is in clinical review awaiting outcome which will be sent to initiated party    Referrals Placed by CM/SW: Post Acute Facilities  Private pay costs discussed: Not applicable    Additional Information:  Writer did not meet with patient/family.  Appeal request was received 7/14 and requested information was submitted and received through [employer] 7/15 and the appeal is currently in clinical review per website.  Once the review is completed [employer] will notify the family/patient of outcome and further directions.  Will continue to check [employer] website for furhter information sent informational FYI to sanjay REYES, Bedside RN.    Addendum: 7/16 11:15  Writer called [employer] [phone] left a voicemail for outcome. Status still at \"outcome notifications pending\" per website  [email or web address]  [id number]     Awaiting call back they are open until 14:00 CST so most likely no outcome for today. Will update Sanjay REYES/Bedside/Charge.      Marycarmen Fung RN, BSN, ACM   Care Transitions Specialist  [facility] Transitions Specialist  [id number] [id number]  [email or web address]  Office: [phone] Fax: [phone]  [facility] " Services          Marycarmen Fung RN, BSN, ACM   Care Transitions Specialist  [facility]  Care Transitions Specialist  [id number] [email or web address]  [email or web address]  Office: [phone] Fax: [phone]  [facility]

## 2023-07-16 NOTE — PLAN OF CARE
Goal Outcome Evaluation:       DATE & TIME: 07/15/23 Night  Cognitive Concerns/ Orientation : A&Ox2-3.    BEHAVIOR & AGGRESSION TOOL COLOR: Green/yellow. Combative w/ cares  ABNL VS/O2: VSS on RA  MOBILITY: Refusing Q2h turns. Lift sheet placed under pt. Encouraged shifting of weight more freq.   PAIN MANAGMENT: Denied  DIET: Regular.   BOWEL/BLADDER: Incont of urine; no BM this shift.   ABNL LAB/BG: BG 64, 95  DRAIN/DEVICES: No IV access.  TELEMETRY RHYTHM: NA  SKIN: BLE wounds.  TESTS/PROCEDURES: NA  D/C DATE: Was to discharge back to LTC yesterday, however pt refusing and niece wanting to appeal transfer. Appeal in process.   OTHER IMPORTANT INFO: Refusing cares at times. Bed alarm on for safety.

## 2023-07-16 NOTE — PLAN OF CARE
Goal Outcome Evaluation:    DATE & TIME: 07/15/23 7019-8402  Cognitive Concerns/ Orientation : A&Ox2    BEHAVIOR & AGGRESSION TOOL COLOR: Green/yellow. Combative w/ cares  ABNL VS/O2: VSS on RA  MOBILITY: Remains refusing Q2h turns - writer attempted multiple times and provided education - pt refused and became verbally aggressive. Lift sheet placed under pt. Encouraged shifting of weight more freq.   PAIN MANAGMENT: Denied  DIET: Regular.   BOWEL/BLADDER: Incont of urine; no BM this shift.   ABNL LAB/BG: BG 86/65/67 - received oral glucagon   DRAIN/DEVICES: No IV access.  TELEMETRY RHYTHM: NA  SKIN: BLE wounds. *refused BLE wound care multiple times - dressings still CDI - scattered brusing  TESTS/PROCEDURES: NA  D/C DATE: Was to discharge back to LTC yesterday, however pt refusing and niece wanting to appeal transfer. Appeal in process.   OTHER IMPORTANT INFO: Refusing cares at times. Bed alarm on for safety.

## 2023-07-16 NOTE — PROGRESS NOTES
[facility]    Medicine Progress Note - Hospitalist Service    Date of Admission:  6/28/2023    Assessment & Plan   Joselin Sanchez is a 89 year old male admitted on 6/28/2023. He has a past medical history of hypertension, diabetes mellitus type 2, chronic atrial fibrillation, not on anticoagulation, chronic systolic congestive heart failure, last EF 40 to 45% in July 2020, dyslipidemia, diabetic foot ulcer, lymphedema, dementia, BPH who was sent from his facility for evaluation of altered mental status.  Noted to have UTI with bacteremia with sepsis and also found to have suacute stroke.  His hospital course has been complicated by GI bleed while on anticoagulation    Severe sepsis due to UTI  Bacteriemia  Likely septic encephalopathy complicated by stroke causing AMS  - Presented with altered mental status.  - Was in A-fib with RVR upon arrival in ER, blood pressure also on lower side, lowest BP 85/62  - White blood cells on admission 11.5 lactic acid 1.3; Pro-Ryan 0.26  - Chest x-ray with no infiltrates  - Nails Catheter was placed in ER, UA was not able to be performed by the lab due to purulent urine  - CT abdomen/ pelvis- Sequela of chronic bladder outlet obstruction, likely secondary to prostatomegaly  -Initially admitted to ICU; started on Zosyn  - Echo- EF 40-45%.   - BP improved  - was hypothermic 2 nights ago, now temp- WNL  - WBc down to 7.1  - Blood cultures from 6/28, 1 of 2 bottles positive for Corynebacterium striatum, and 1/2 bottles positive for anaerococcus species.  Repeat blood culture on 6/29 positive for staph pettenkoferi  - Urine culture positive >100,000 colony of yeast/Candida Dubliniensis  -ID followed while in-house.    -Treated with fluconazole 6/29/2023-7/10/2023, Zosyn changed to Augmentin and patient completed antibiotics on 7/11/2023  -Patient probably back to his baseline     Acute/ early subacute ischemic infarct  - he has a history of A-fib, not on  anticoagulation prior to admission  -Presented with altered mental status thought to be related to severe sepsis  - CT head on admission negative for acute pathology  - 6/28- noted to have left-sided weakness  - Code stroke called and repeat head CT was concerning for acute/early subacute ischemic infarct.  CTA head and neck with no definite high-grade stenosis or large vessel occlusion.  - PTA on aspirin 81 mg p.o. daily  - Echo-  EF 40-45%. 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 No evidence of active hemorrhage.  - 7/1- concern for left pupil irregular and larger that rt pupil (unclear if new)  - repeat CT head-  Subacute infarct left occipital lobe as seen on previous MRI, no hemorrhagic transformation   -Stroke neurology evaluated the patient and recommended anticoagulation for secondary stroke prevention in the setting of atrial fibrillation (see note from 6/30)  -On 7/1/2023, patient was initiated on heparin gtt-However given concerns of melena, anticoagulation has since been discontinued(See below).  Aspirin 81 mg started by previous provider on 7/13/2023 and patient has been tolerating it.  -PT OT recommended back to LTC where patient was supposed to be discharged yesterday, however niece filed appeal to discharge as the niece feels the facility has neglected him and is the main reason for hospitalization  -Per speech patient is close to his baseline level of swallow function and has not been receptive to swallow treatment recommendation.    GI bleed, melena  Acute blood loss anemia  Patient was noted to have subacute stroke as above.  He was started on anticoagulation, heparin infusion on 7/1/2023.  He was noted to have multiple melanotic stool overnight on 7/7/2023  -Hemoglobin on arrival was 13.4 however with hydration had dropped and has remained stable around 11.   With melanotic stool hemoglobin dropped to around 9 and since has remained stable 8-9 with most recent hemoglobin of 8.6 on 7/9.  -GI was consulted who recommended supportive care given risk versus benefit of endoscopy after reviewing goals of cares and wishes with patient and family.  -Continue with Protonix twice daily for 8 weeks and then daily  -Patient has not had further bleed since.  -Avoid anticoagulation     Agitation, anxiety and refusal of care at times.  Cognitive impairment  # Goals of care  -Per discussion by palliative care, patient remains full code  - See ethics consult from 7/8/2023 and palliative consult (7/6/2023 )as well.  - Zyprexa p.o. every 6 as needed, lorazepam as needed has been ordered.  - Patient appears to have the capacity to make his own decisions based on assessment by Dr. Dubois on 7/10/23.  -  He is very consistent with what he would like as to doing what he does not want us to do.  -He is agreeable to personal care medications, does not like tube feeding.    - /care coordinator consult; reported the patient being a vulnerable adult; he was admitted to [facility] the end of March/23 and discharged to [facility]      KIMO in a setting of CKD stage III  #Hyperkalemia, resolved  - Creatinine on admission 2.6; Potassium on admission 6.7  -Baseline creatinine 0.86   -KIMO likely in the setting of sepsis as above  -Renal function has now improved with improvement of sepsis and patient now slightly in the hypokalemic side, replace potassium as needed  -Avoid nephrotoxin medication     History of diabetes mellitus type 2  # Hypoglycemia-resolved  - PTA on metformin 500 mg p.o. 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  -Now off anticoagulation due to GI bleed as above     Chronic systolic congestive heart failure  h/o Hypertension  - A prior echocardiogram in July/2020 showed EF of 40 to 45%, LV hypokinesis  -TTE obtained this admission showed EF 45-45%, with moderate global hypokinesis left ventricle.  -Continue PTA furosemide 20 mg daily, lisinopril 2.5 mg and metoprolol 25 mg XR  -Patient appears euvolemic at this time  -Continuous I's and O's and daily weight     # Peripheral arterial disease  # Diabetic foot ulcer (right heel)  # History of lymphedema  # History of polymicrobial bacterial infection of the legs  - He had an admission to [facility] in [place] from 3/27 through 4/4/2023 for polymicrobial bacterial infection of his legs.  He was taken to the OR by surgery for debridement; he was treated with IV Zosyn for 14 days  - His wounds does not seem to be infected at this time  - Wound care consult requested  - Podiatry consult appreciated  - Xray rt foot - 2 cm partial-thickness soft tissue ulceration overlying the posterior margin of the calcaneus. No osseous volume loss suggest osteomyelitis  - as per Podiatry- The wound base might need excisional debridement at some point and given his tenderness in the region, likely would be done in the OR.  No urgency.   - If wound cannot be converted to a healthier wound bed with local wound cares, please re-consult podiatry for possible excisional debridement in OR.  -Continue offloading of site all times   -Previous provider had discussed with his niece, she does not want him to have  "surgery or amputation.  - Continue wound care     # BPH  - Nails placed in ER on admission; attempting trial void on 7/12/2023.  -Doing well with voiding trial, continue PTA Flomax     # Right hip pain  - X ray rt hip- no fracture  - pain management       Diet: Regular Diet Adult Thin Liquids (level 0)  Room Service  Diet    DVT Prophylaxis: Pneumatic Compression Devices  Nails Catheter: Not present  Lines: None     Cardiac Monitoring: None  Code Status: Full Code      Clinically Significant Risk Factors        # Hypokalemia: Lowest K = 3.1 mmol/L in last 2 days, will replace as needed       # Hypoalbuminemia: Lowest albumin = 1.6 g/dL at 7/7/2023  5:45 PM, will monitor as appropriate            # Obesity: Estimated body mass index is 34.64 kg/m  as calculated from the following:    Height as of this encounter: 1.7 m (5' 6.93\").    Weight as of this encounter: 100.1 kg (220 lb 10.9 oz).   # Severe Malnutrition: based on nutrition assessment         Disposition Plan           Lashae Heard MD  Hospitalist Service  [facility]  Securely message with [employer] (more info)  Text page via [facility] Paging/Directory   ______________________________________________________________________    Interval History   Patient with no new complaints.  No new nursing concerns.  Patient has been stable, refuses care intermittently    Physical Exam   Vital Signs: Temp: 98.2  F (36.8  C) Temp src: Oral BP: 113/45 Pulse: 72   Resp: 18 SpO2: 95 % O2 Device: None (Room air)    Weight: 220 lbs 10.89 oz    Exam:  Constitutional: Awake, alert and no distress. Appears comfortable  Head: Normocephalic. No masses, lesions, tenderness or abnormalities  ENT: ENT exam normal, no neck nodes or sinus tenderness  Cardiovascular: RRR.  no murmurs, no rubs or JVD  Respiratory:normal WOB,b/l equal air entry, no wheezes or crackles   Gastrointestinal: Abdomen soft, non-tender. BS normal. No masses, organomegaly  : " Deferred  Extremities : Dressing on bilateral lower extremity.    Medical Decision Making       33 MINUTES SPENT BY ME on the date of service doing chart review, history, exam, documentation & further activities per the note.      Data     I have personally reviewed the following data over the past 24 hrs:    N/A  \   N/A   / N/A     141 108 (H) 6.5 (L) /  65 (L)   3.3 (L); 3.3 (L) 25 0.72 \       Imaging results reviewed over the past 24 hrs:   No results found for this or any previous visit (from the past 24 hour(s)).  Recent Labs   Lab 07/16/23  0941 07/16/23  0847 07/16/23  0515 07/16/23  0206 07/15/23  1439 07/15/23  0741 07/14/23  0819 07/14/23  0755 07/09/23  1355 07/09/23  1244   WBC  --   --   --   --   --   --   --   --   --  7.3   HGB  --   --   --   --   --   --   --   --   --  8.6*   MCV  --   --   --   --   --   --   --   --   --  100   PLT  --   --   --   --   --   --   --   --   --  114*   NA  --   --  141  --   --  143  --  141   < > 143   POTASSIUM  --   --  3.3*  3.3*  --  3.2* 3.1*  --  3.2*   < > 4.2   CHLORIDE  --   --  108*  --   --  109*  --  109*   < > 112*   CO2  --   --  25  --   --  24  --  22   < > 24   BUN  --   --  6.5*  --   --  5.9*  --  5.4*   < > 15.9   CR  --   --  0.72  --   --  0.73  --  0.72   < > 0.64*   ANIONGAP  --   --  8  --   --  10  --  10   < > 7   BERHANE  --   --  8.5  --   --  8.7  --  8.6   < > 8.9   GLC 65* 67* 86   < >  --  71   < > 78   < > 83    < > = values in this interval not displayed.

## 2023-07-16 NOTE — CONSULTS
Care Management Follow Up    Length of Stay (days): 18    Expected Discharge Date: 07/15/2023     Concerns to be Addressed: discharge planning      Patient plan of care discussed at interdisciplinary rounds: Yes    Anticipated Discharge Disposition: Skilled Nursing Facility     Anticipated Discharge Services:    Anticipated Discharge DME:      Patient/family educated on Medicare website which has current facility and service quality ratings: no  Education Provided on the Discharge Plan:  no  Patient/Family in Agreement with the Plan:  no    Referrals Placed by CM/SW: Post Acute Facilities  Private pay costs discussed: pending discharge plan and outcome of appeal    Additional Information:  Writer reviewed [employer] website for update on appeal.  Per website review has been completed by the Physician reviewer and they are in the process of communicating the outcome with the initiating party (family).   Will attempt to call [employer] for further details today for outcome decision and timing of decision/discharge to next level of care.  Addendum: 7/16 11:21  Per [employer] still in review case number:  [email or web address]  [id number]       Updated Sanjay REYES, Charge RN and Bedside.          Marycarmen Fung RN, BSN, ACM   Care Transitions Specialist  [facility]  Care Transitions Specialist  [id number] [place] [address]  [email or web address]  Office: [phone] Fax: [phone]  [facility]

## 2023-07-16 NOTE — PLAN OF CARE
Goal Outcome Evaluation:      Plan of Care Reviewed With: patient    DATE & TIME: 07/15/23 3211-2034  Cognitive Concerns/ Orientation : A&Ox2, disoriented to situation  BEHAVIOR & AGGRESSION TOOL COLOR: Green/yellow. Noncompliant with cares  ABNL VS/O2: VSS on RA  MOBILITY: up with lift, turn/repo q2h, pt noncompliant.    PAIN MANAGMENT: Denied  DIET: Regular.   BOWEL/BLADDER: Incont of urine; no BM this shift.   ABNL LAB/BG: BG 62, 79, 80, pt asymptomatic with 62, attempted to give prn glucose gel 15 g, pt refused after taking less than a quarter of the tube, recheck BG 79, notified MD, as long as pt is asymptomatic, MD   okayed to not keep replacing it until >100 per protocol.   DRAIN/DEVICES: No IV access.  TELEMETRY RHYTHM: NA  SKIN: BLE wounds. refused BLE wound cares - dressings CDI -elevated on pillows.  scattered brusing  TESTS/PROCEDURES: NA  D/C DATE: Was to discharge back to LTC on 7/15, however niece filed appeal to discharge as she feels the facility has neglected him.  Appeal in process.   OTHER IMPORTANT INFO: Refusing cares at times. Bed alarm on for safety.

## 2023-07-16 NOTE — PLAN OF CARE
DATE & TIME: 07/15/23 Evenings   Cognitive Concerns/ Orientation : A&Ox2-3. Pleasant and cooperative this shift. Niece at bedside assisting pt and helped with translation and encouragement to completed cares and change wounds.   BEHAVIOR & AGGRESSION TOOL COLOR: Green   ABNL VS/O2: VSS on RA  MOBILITY: Refusing Q2h turns, pt did allow full bed change and wound cares this shift. Niece at bedside helping with encouragement. Lift sheet placed under pt. Encouraged shifting of weight more freq.   PAIN MANAGMENT: PRN Tylenol and Oxy 5mg given prior to wound care. Tolerated well with some discomforts. Now comfortable this evening.   DIET: Regular. Fair appetite this evening. Niece brought in food this evening.   BOWEL/BLADDER: Incont of urine; no BM this shift.   ABNL LAB/BG: K+ replaced this shift orally for 3.2. Recheck scheduled. BGL checks discontinued on days.   DRAIN/DEVICES: No IV access.  TELEMETRY RHYTHM: NA  SKIN: BLE wound care completed. Pericares completed x2 this shift for incontinence.   TESTS/PROCEDURES: NA  D/C DATE: Was to discharge back to LTC yesterday, however pt refusing and niece wanting to appeal transfer. Appeal in process.   OTHER IMPORTANT INFO: Refusing cares at times, more cooperative this shift. Bed alarm on for safety.

## 2023-07-17 NOTE — DISCHARGE SUMMARY
"[facility]  Hospitalist Discharge Summary      Date of Admission:  6/28/2023  Date of Discharge:  7/17/2023  Discharging Provider: Lashae Heard MD  Discharge Service: Hospitalist Service    Discharge Diagnoses   Severe sepsis secondary to UTI at presentation  Bacteremia  Septic encephalopathy complicated by stroke causing AMS at presentation  Acute/early subacute ischemic stroke  Severe malnutrition  GI bleed, melena  Acute blood loss anemia  Agitation/anxiety and refusal of care  Cognitive impairment  KIMO in the setting of CKD stage III  Hyperkalemia, resolved  History of type 2 diabetes mellitus  Hypoglycemia, resolved  A-fib with RVR  Persistent A-fib  Peripheral arterial disease  Diabetic foot ulcer  History of lymphedema  History of polymicrobial bacterial infection of the leg  BPH  Right hip pain    Clinically Significant Risk Factors     # Obesity: Estimated body mass index is 34.64 kg/m  as calculated from the following:    Height as of this encounter: 1.7 m (5' 6.93\").    Weight as of this encounter: 100.1 kg (220 lb 10.9 oz).  # Severe Malnutrition: based on nutrition assessment      Follow-ups Needed After Discharge   Follow-up Appointments     Follow Up and recommended labs and tests      Follow up with Nursing home physician.  No follow up labs or test are   needed.  Follow-up in 6-8 weeks with general neurology ([phone])          Unresulted Labs Ordered in the Past 30 Days of this Admission     Date and Time Order Name Status Description    7/7/2023  5:59 PM Prepare red blood cells (unit) Preliminary     7/7/2023  5:59 PM Prepare red blood cells (unit) Preliminary         Discharge Disposition   Discharged to long-term care facility  Condition at discharge: Stable    Hospital Course   Joselin Sanchez is a 89 year old male admitted on 6/28/2023. He has a past medical history of hypertension, diabetes mellitus type 2, chronic atrial fibrillation, not on " anticoagulation, chronic systolic congestive heart failure, last EF 40 to 45% in July 2020, dyslipidemia, diabetic foot ulcer, lymphedema, dementia, BPH who was sent from his facility for evaluation of altered mental status.  Noted to have UTI with bacteremia with sepsis and also found to have suacute stroke.  His hospital course has been complicated by GI bleed while on anticoagulation started for stroke due to A-fib    Severe sepsis due to UTI  Bacteriemia  Likely septic encephalopathy complicated by stroke causing AMS  - Presented with altered mental status.  - Was in A-fib with RVR upon arrival in ER, blood pressure also on lower side, lowest BP 85/62  - White blood cells on admission 11.5 lactic acid 1.3; Pro-Ryan 0.26  - Chest x-ray with no infiltrates  - Nails Catheter was placed in ER, UA was not able to be performed by the lab due to purulent urine  - CT abdomen/ pelvis- Sequela of chronic bladder outlet obstruction, likely secondary to prostatomegaly  -Initially admitted to ICU; started on Zosyn  - Echo- EF 40-45%.   - BP improved  - was hypothermic 2 nights ago, now temp- WNL  - WBc down to 7.1  - Blood cultures from 6/28, 1 of 2 bottles positive for Corynebacterium striatum, and 1/2 bottles positive for anaerococcus species.  Repeat blood culture on 6/29 positive for staph pettenkoferi  - Urine culture positive >100,000 colony of yeast/Candida Dubliniensis  -ID followed while in-house.    -Treated with fluconazole 6/29/2023-7/10/2023, Zosyn changed to Augmentin and patient completed antibiotics on 7/11/2023  -Patient's mentation probably back to his baseline at the time of discharge     Acute/ early subacute ischemic infarct  - he has a history of A-fib, not on anticoagulation prior to admission  -Presented with altered mental status thought to be related to severe sepsis  - CT head on admission negative for acute pathology  - 6/28- noted to have left-sided weakness  - Code stroke called and repeat head  CT was concerning for acute/early subacute ischemic infarct.  CTA head and neck with no definite high-grade stenosis or large vessel occlusion.  - PTA on aspirin 81 mg p.o. daily  - Echo-  EF 40-45%. 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 No evidence of active hemorrhage.  - 7/1- concern for left pupil irregular and larger that rt pupil (unclear if new)  - repeat CT head-  Subacute infarct left occipital lobe as seen on previous MRI, no hemorrhagic transformation   -Stroke neurology evaluated the patient and recommended anticoagulation for secondary stroke prevention in the setting of atrial fibrillation (see note from 6/30)  -On 7/1/2023, patient was initiated on heparin gtt-However given concerns of melena, anticoagulation has since been discontinued(See below).  Aspirin 81 mg started by previous provider on 7/13/2023 and patient has been tolerating it.  -PT OT recommended back to LTC where patient was supposed to be discharged, however niece filed appeal to discharge as the niece feels the facility has neglected him and so discharge was delayed  -Per speech patient is close to his baseline level of swallow function and has not been receptive to swallow treatment recommendation.    GI bleed, melena  Acute blood loss anemia  Patient was noted to have subacute stroke as above.  He was started on anticoagulation, heparin infusion on 7/1/2023.  He was noted to have multiple melanotic stool overnight on 7/7/2023  -Hemoglobin on arrival was 13.4 however with hydration had dropped and has remained stable around 11.  With melanotic stool hemoglobin dropped to around 9 and since has remained stable 8-9 with most recent hemoglobin of 8.6 on 7/9.  -GI was consulted who recommended supportive care given risk versus benefit of endoscopy after reviewing goals of cares and wishes with  patient and family.  -Continue with Protonix twice daily for 8 weeks and then daily  -Patient has not had further bleed since.  -Avoid anticoagulation     Agitation, anxiety and refusal of care at times.  Cognitive impairment  # Goals of care  -Per discussion by palliative care, patient remains full code  - See ethics consult from 7/8/2023 and palliative consult (7/6/2023 )as well.  - Zyprexa p.o. every 6 as needed, lorazepam as needed has been ordered.  - Patient appears to have the capacity to make his own decisions based on assessment by Dr. Dubois on 7/10/23.  -  He is very consistent with what he would like as to doing what he does not want us to do.  -He is agreeable to personal care medications, does not like tube feeding.    - /care coordinator consult; reported the patient being a vulnerable adult; he was admitted to [facility] the end of March/23 and discharged to [facility]      KIMO in a setting of CKD stage III  #Hyperkalemia, resolved  - Creatinine on admission 2.6; Potassium on admission 6.7  -Baseline creatinine 0.86   -KIMO likely in the setting of sepsis as above  -Renal function has now improved with improvement of sepsis and patient now slightly in the hypokalemic side, potassium being replaced per protocol while in the hospital  -Avoid nephrotoxin medication     History of diabetes mellitus type 2  # Hypoglycemia-resolved  - PTA on metformin 500 mg p.o. 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  -Now off anticoagulation due to GI bleed as above     Chronic systolic congestive heart failure  h/o Hypertension  - A prior echocardiogram in July/2020 showed EF of 40 to 45%, LV hypokinesis  -TTE obtained this admission showed EF 45-45%, with moderate global hypokinesis left ventricle.  -Continue PTA furosemide 20 mg daily, lisinopril 2.5 mg and metoprolol 25 mg XR  -Patient appears euvolemic at this time  -Continuous I's and O's and daily weight     # Peripheral arterial disease  # Diabetic foot ulcer (right heel)  # History of lymphedema  # History of polymicrobial bacterial infection of the legs  - He had an admission to [facility] in [place] from 3/27 through 4/4/2023 for polymicrobial bacterial infection of his legs.  He was taken to the OR by surgery for debridement; he was treated with IV Zosyn for 14 days  - His wounds does not seem to be infected at this time  - Wound care consult requested  - Podiatry consult appreciated  - Xray rt foot - 2 cm partial-thickness soft tissue ulceration overlying the posterior margin of the calcaneus. No osseous volume loss suggest osteomyelitis  - as per Podiatry- The wound base might need excisional debridement at some point and given his tenderness in the region, likely would be done in the OR.  No urgency.   - If wound cannot be converted to a healthier wound bed with local wound cares, please re-consult podiatry for possible excisional debridement in OR.  -Continue offloading of site all times   -Previous provider had discussed with his niece, she does not want him to have surgery or amputation.  - Continue wound care     # BPH  - Nails placed in ER on admission; attempting trial void on 7/12/2023.  -Doing well with voiding trial, continue PTA Flomax     # Right hip pain  - X ray rt hip- no  fracture  - pain management     Consultations This Hospital Stay   WOUND OSTOMY CONTINENCE NURSE  IP CONSULT  CARE MANAGEMENT / SOCIAL WORK IP CONSULT  SPEECH LANGUAGE PATH ADULT IP CONSULT  VASCULAR ACCESS ADULT IP CONSULT  VASCULAR ACCESS ADULT IP CONSULT  INTENSIVIST IP CONSULT  VASCULAR ACCESS ADULT IP CONSULT  PODIATRY IP CONSULT  SPIRITUAL HEALTH SERVICES IP CONSULT  PHYSICAL THERAPY ADULT IP CONSULT  OCCUPATIONAL THERAPY ADULT IP CONSULT  NUTRITION SERVICES ADULT IP CONSULT  PHARMACY IP CONSULT  NUTRITION SERVICES ADULT IP CONSULT  WOUND OSTOMY CONTINENCE NURSE  IP CONSULT  PHARMACY IP CONSULT  PHARMACY IP CONSULT  PHARMACY TO DOSE VANCO  INFECTIOUS DISEASES IP CONSULT  PHARMACY LIAISON FOR MEDICATION COVERAGE CONSULT  PALLIATIVE CARE ADULT IP CONSULT  PHARMACY IP CONSULT  GASTROENTEROLOGY IP CONSULT  GASTROENTEROLOGY IP CONSULT  PSYCHIATRY IP CONSULT  ETHICS IP CONSULT  VASCULAR ACCESS ADULT IP CONSULT  PHYSICAL THERAPY ADULT IP CONSULT  OCCUPATIONAL THERAPY ADULT IP CONSULT    Code Status   Full Code    Time Spent on this Encounter   ILashae MD, personally saw the patient today and spent greater than 30 minutes discharging this patient.       Lashae Heard MD  [place] [address] [facility]  [address] [place] [address]  Phone: [phone]  ______________________________________________________________________    Physical Exam   Vital Signs: Temp: 98.3  F (36.8  C) Temp src: Oral BP: 111/53 Pulse: 91   Resp: 18 SpO2: 97 % O2 Device: None (Room air)    Weight: 220 lbs 10.89 oz  Exam:  Constitutional: Awake, alert and no distress. Appears comfortable  Head: Normocephalic. No masses, lesions, tenderness or abnormalities  ENT: ENT exam normal, no neck nodes or sinus tenderness  Cardiovascular: Irregular but rate controlled, 2+ murmurs, no rubs or JVD  Respiratory: Normal WOB,b/l equal air entry, no wheezes or crackles   Gastrointestinal: Abdomen soft, non-tender. BS  normal. No masses, organomegaly  : Deferred  Extremities : Dressing on bilateral lower extremity       Primary Care Physician   Alistair Ross    Discharge Orders      Mantoux instructions    Give two-step Mantoux (PPD) Per Facility Policy Yes     Follow Up and recommended labs and tests    Follow up with Nursing home physician.  No follow up labs or test are needed.     Reason for your hospital stay    Sepsis  Stroke  Lower extremity wounds     Glucose monitor nursing POCT    Before meals and at bedtime     Intake and output    Every shift     Daily weights    Call Provider for weight gain of more than 2 pounds per day or 5 pounds per week.     Wound care    Site:   Lower extremities  Instructions:   wounds- CDI, sacral wound vashe rinse and triad paste applied, breast fold wound open to air     Activity - Up with assistive device     Activity - Up with nursing assistance     General info for SNF    Length of Stay Estimate: Long Term Care:   Condition at Discharge: Stable  Level of care:skilled   Rehabilitation Potential: Fair  Admission H&P remains valid and up-to-date: Yes  Recent Chemotherapy: N/A  Use Nursing Home Standing Orders: Yes     Physical Therapy Adult Consult    Evaluate and treat as clinically indicated.    Reason:  deconditioning from a prolonged hospitalization     Occupational Therapy Adult Consult    Evaluate and treat as clinically indicated.    Reason:    deconditioning from a prolonged hospitalization     Fall precautions     Diet    Follow this diet upon discharge: Orders Placed This Encounter      Room Service      Regular Diet Adult Thin Liquids (level 0)     Stroke Hospital Follow Up (for neurologist use only)    [employer] will call you to coordinate care as prescribed by your provider. If you don t hear from a representative within 2 business days, please call [phone].         Significant Results and Procedures   Results for orders placed or performed during the hospital  encounter of 06/28/23   CT Head w/o Contrast    Narrative    CT SCAN OF THE HEAD WITHOUT CONTRAST   6/28/2023 1:36 PM     HISTORY: Altered mental status.    TECHNIQUE:  Axial images of the head and coronal reformations without  IV contrast material. Radiation dose for this scan was reduced using  automated exposure control, adjustment of the mA and/or kV according  to patient size, or iterative reconstruction technique.    COMPARISON: None.    FINDINGS:  No evidence of hemorrhage, mass, or hydrocephalus. Volume loss and  patchy white matter hypoattenuation which likely represents chronic  small vessel ischemic change. No acute osseous abnormality.      Impression    IMPRESSION:   No acute intracranial abnormality.    ALETHEA AREVALO MD         SYSTEM ID:  [id number]   XR Chest Port 1 View    Narrative    XR CHEST PORT 1 VIEW   6/28/2023 1:19 PM     HISTORY: Hypoxemia    COMPARISON: None.      Impression    IMPRESSION: No acute cardiopulmonary disease.    RAJEEV ERWIN MD         SYSTEM ID:  [id number]   CT Abdomen Pelvis w/o Contrast    Narrative    CT ABDOMEN PELVIS W/O CONTRAST 6/28/2023 3:07 PM    CLINICAL HISTORY: purulent urine, eval for kidney stone  TECHNIQUE: CT scan of the abdomen and pelvis was performed without IV  contrast. Multiplanar reformats were obtained. Dose reduction  techniques were used.  CONTRAST: None.    COMPARISON: None.    FINDINGS:   LOWER CHEST: Unremarkable.    HEPATOBILIARY: Subtle no focal lesion seen on this noncontrast CT. No  evidence of biliary obstruction. Nodular contour of the liver with  some widening of the fissures.    PANCREAS: Normal.    SPLEEN: Normal.    ADRENAL GLANDS: Normal.    KIDNEYS/BLADDER: No nephroureterolithiasis or hydronephrosis. Urinary  bladder is decompressed with Nails catheter. Multiple bladder  diverticuli noted.    BOWEL: Diverticulosis in the colon. No acute inflammatory change. No  obstruction. Normal appendix.    LYMPH NODES: Normal.    VASCULATURE:  Moderate calcified atherosclerosis. Ectasia of the  infrarenal abdominal aorta without jorge aneurysmal dilation.    PELVIC ORGANS: Prostatomegaly.    OTHER: No significant ascites.    MUSCULOSKELETAL: No acute bony abnormality.      Impression    IMPRESSION:   1.  Sequela of chronic bladder outlet obstruction, likely secondary to  prostatomegaly, Nails catheter now in place.  2.  No nephroureterolithiasis or hydronephrosis.  3.  Possible subtle/early changes of chronic liver disease. No  ascites.    RAJEEV ERWIN MD         SYSTEM ID:  [id number]   CT Head w/o Contrast    Narrative    CT SCAN OF THE HEAD WITHOUT CONTRAST   6/29/2023 12:11 PM     HISTORY: Code stroke.    TECHNIQUE: Axial images of the head and coronal reformations without  IV contrast material. Radiation dose for this scan was reduced using  automated exposure control, adjustment of the mA and/or kV according  to patient size, or iterative reconstruction technique.    COMPARISON: 6/28/2023    FINDINGS: No acute intracranial hemorrhage, extra axial fluid  collection, or mass effect. The ventricles are proportionate to the  sulci. Unchanged small focus of probable chronic gliosis in the left  subinsular region, possibly small chronic infarct or chronic ischemic  change. There is a new small area of cortical/subcortical hypodensity  and loss of gray-white differentiation (series 3 image 12, series 7  image 62) concerning for acute/early subacute ischemic infarct.  Elsewhere, mild to moderate patchy nonspecific hypoattenuation in the  cerebral white matter, likely due to chronic small vessel ischemic  disease. Moderate generalized brain parenchymal volume loss.    Bilateral lens implants. The visualized paranasal sinuses are clear.  The mastoid air cells are clear. The calvarium appears intact.      Impression    IMPRESSION:  1. Findings concerning for acute/early subacute ischemic infarct  involving the left occipital lobe.  2. No acute intracranial  hemorrhage, extra axial fluid collection, or  mass effect.  3. Brain atrophy and presumed chronic ischemic changes, as described.    Findings were discussed with Dr. Hernandez by phone by myself at  approximately 12:20 PM on 6/29/2023.    BRII WEBSTER MD         SYSTEM ID:  [id number]   CTA Head Neck w Contrast    Narrative    CT ANGIOGRAM OF THE HEAD AND NECK WITH CONTRAST  June 29, 2023 12:12  PM     HISTORY: Code stroke.     TECHNIQUE: CT angiography with an injection of 75mL [id number]     IV  with scans through the head and neck. Images were transferred to a  separate 3-D workstation where multiplanar reformations and 3-D images  were created. Estimates of carotid stenoses are made relative to the  distal internal carotid artery diameters except as noted. Radiation  dose for this scan was reduced using automated exposure control,  adjustment of the mA and/or kV according to patient size, or iterative  reconstruction technique.     COMPARISON: CT head same day.     CT HEAD FINDINGS: No contrast enhancing lesions. Cerebral blood flow  is grossly normal.     CT ANGIOGRAM HEAD FINDINGS: There is mild nonflow-limiting  atherosclerosis of the bilateral carotid siphons. The major proximal  branches of the anterior cerebral and middle cerebral arteries appear  patent. The bilateral vertebral arteries are patent. The basilar  artery is patent. The proximal branches of the posterior cerebral  arteries appear to be patent. No intracranial aneurysm or high flow  vascular malformation identified. Expected enhancement of the major  dural venous sinuses.    CT ANGIOGRAM NECK FINDINGS: Mixed atherosclerotic disease of the  aortic arch. There is a dome shaped lesion along the lateral margin of  the distal aortic arch on the left (series 5 image 1) measuring  approximately 18 mm x 29 mm in the axial plane. 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  Common origin of the brachycephalic  and left common carotid arteries, a normal variant. No significant  stenosis at the origins of the great vessels.     Right carotid artery: The right common and internal carotid arteries  are patent. Mild atherosclerotic disease at the carotid bifurcation  and proximal internal carotid artery without significant stenosis by  NASCET criteria.     Left carotid artery: The left common and internal carotid arteries are  patent. Mild atherosclerotic disease at the carotid bifurcation and  proximal internal carotid artery with less than 50% stenosis by NASCET  criteria.     Vertebral arteries: Vertebral arteries are patent without evidence of  dissection. No significant stenosis.     Other findings: Emphysematous changes of the upper lung zones  bilaterally. Multiple foci of air are noted in the right   space region, presumably due to some intravenous air. A few foci of  air are noted in the right internal jugular vein, likely due to recent  contrast injection. There is a calcified lesion measuring up to 14 mm  in the left submandibular space that appears to replace the normal  left submandibular tissue, which may represent a large  calculus/sialolith. Multilevel degenerative changes in the cervical  spine are noted.      Impression    IMPRESSION:  1. No definite high-grade stenosis or large vessel occlusion involving  the major intracranial arteries.  2. Mild nonflow-limiting atherosclerosis of the bilateral carotid  siphons.  3. No intracranial aneurysm or high flow vascular malformation.  4. Mild bilateral carotid bifurcation atherosclerosis, left more than  right, with less than 50% stenosis by NASCET criteria.  5. Patent cervical vertebral arteries without flow-limiting stenosis.  6. 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 CTA of the  chest could  be considered for further evaluation, as indicated.  7. Emphysema.    BRII WEBSTER MD         SYSTEM ID:  [id number]   MR Brain w/o & w Contrast    Narrative    EXAM: MR BRAIN W/O and W CONTRAST  LOCATION: [facility]  DATE: 6/29/2023    INDICATION: Follow-up stroke.  COMPARISON: 29 2023.  CONTRAST: 8mL Gadavist  TECHNIQUE: MRI brain without and with contrast.    FINDINGS: On the diffusion-weighted images there is ischemia involving the left occipital lobe along with a tiny focus within the subcortical white matter of the posterior right frontal lobe near the vertex. No other focus of ischemia or restricted   diffusion is visualized. There is no discrete mass lesion or midline shift. There is no acute extra-axial fluid collection or acute intraparenchymal hemorrhage. There are appropriate flow voids within the cavernous portions of the internal carotid   arteries and the basilar artery. The areas of ischemia visualized on the FLAIR and diffusion-weighted images are visualized on the FLAIR and T2 to weighted images consistent with subacute ischemia. On the FLAIR and T2-weighted images there are multiple   foci of high signal within the periventricular and subcortical white matter. The ventricular system, basal cisterns and the cortical sulci are consistent with diffuse volume loss. Following the administration of contrast no abnormal enhancement is   visualized.    There is no evidence of cerebellar tonsillar ectopia. Corpus callosum and the sella region have appropriate configuration and signal intensity for the patient's. The orbit regions are unremarkable. There is no significant paranasal sinus disease. The   mastoid air cells and the middle ear regions are clear.      Impression    IMPRESSION:  1. Subacute ischemia left occipital lobe along with tiny focus posterior right frontal lobe near vertex.  2. No evidence of a midline shift or hemorrhage.  3. No abnormal  enhancement.  4. Diffuse age related changes.    These findings were communicated by phone to Nurse Erich Henley at 12:29 AM on 06/30/2023.   XR Thoracic Lumbar Standing 2 Views    Narrative    EXAM: XR THORACIC LUMBAR STANDING 2 VIEWS  LOCATION: [facility]  DATE: 6/29/2023    INDICATION: Assess hard ware for MRI  COMPARISON: None.      Impression    IMPRESSION: In the thoracolumbar region there is no evidence of hardware.    XR Calcaneus Port Right G/E 2 Views    Narrative    EXAM: XR CALCANEUS PORT RIGHT G/E 2 VIEWS  LOCATION: [facility]  DATE: 6/30/2023    INDICATION: right heel ulcer   evaluate for osteomyelitis  COMPARISON: None.      Impression    IMPRESSION: 2 cm partial-thickness soft tissue ulceration overlying the posterior margin of the calcaneus. No osseous volume loss suggest osteomyelitis. Adjacent soft tissue. Osteopenia. No ankle joint effusion.   CTA Chest with Contrast    Narrative    EXAM: CTA CHEST WITH CONTRAST  LOCATION: [facility]  DATE: 7/1/2023    INDICATION: Distal aortic arch saccular pseudoaneurysm.  COMPARISON: CTA 06/09/2023.  TECHNIQUE: Helical acquisition through the chest was performed during the arterial phase of contrast enhancement. 2D and 3D reconstructions performed by the CT technologist. Dose reduction techniques were used.  CONTRAST: 80mL Isovue 370        CT ANGIOGRAM CHEST: Partially thrombosed saccular aneurysm or pseudoaneurysm arising from the left inferior aspect of the aortic arch measuring 3.8 x 2.8 x 3.1 cm (13/#265, 15/#55). No evidence of active hemorrhage. Moderate irregular mixed   atherosclerotic plaque throughout the aortic arch and proximal descending thoracic aorta. No aortic dissection. Aortic arch vessels and their visualized branches are patent. Pulmonary arteries are normal in caliber. No pulmonary embolism to the segmental   level; many of the subsegmental vessels, particularly  at the lung bases, are poorly opacified and not well evaluated.    LUNGS AND PLEURA: Moderate upper lobe predominant centrilobular and paraseptal emphysema. Dependent atelectasis at the lung bases. No pneumothorax or consolidations. Strands of mucus within the trachea and left mainstem bronchus.    MEDIASTINUM/AXILLAE: Mild cardiomegaly. No pericardial effusion. Feeding tube tip traverses the esophagus with tip in the proximal stomach. No enlarged thoracic lymph node.    CORONARY ARTERY CALCIFICATION: Mild.    UPPER ABDOMEN: Cirrhotic liver. No other significant abnormalities.     MUSCULOSKELETAL: Mild multilevel degenerative changes of the spine. No acute bony abnormality.      Impression    IMPRESSION:    1.  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 No evidence of active hemorrhage.    2.  Small bilateral pleural effusions with adjacent atelectasis.    3.  Moderate upper lobe predominant centrilobular emphysema.    4.  Cirrhotic liver.   XR Abdomen Port 1 View    Narrative    EXAM: XR ABDOMEN PORT 1 VIEW  LOCATION: [facility]  DATE: 7/1/2023    INDICATION: Nasogastric tube placement.  COMPARISON: CT abdomen pelvis 06/28/2023.      Impression    IMPRESSION: Feeding tube tip is in the expected location of the stomach body. Internal stylette remains in place. No dilated bowel loops in the upper abdomen. Visualized lung bases are clear.   CT Head w/o Contrast    Narrative    EXAM: CT HEAD W/O CONTRAST  LOCATION: [facility]  DATE: 7/1/2023    INDICATION: irregular pupil  COMPARISON: MRI 06/29/2023.  TECHNIQUE: Routine CT Head without IV contrast. Multiplanar reformats. Dose reduction techniques were used.    FINDINGS:  INTRACRANIAL CONTENTS: No intracranial hemorrhage, extraaxial collection, or mass effect.  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 No hemorrhagic transformation. Moderate presumed  chronic small vessel ischemic changes. Moderate generalized   volume loss. No hydrocephalus.     VISUALIZED ORBITS/SINUSES/MASTOIDS: Prior bilateral cataract surgery. Visualized portions of the orbits are otherwise unremarkable. No paranasal sinus mucosal disease. No middle ear or mastoid effusion.    BONES/SOFT TISSUES: No acute abnormality.      Impression    IMPRESSION:  1.  Subacute infarct left occipital lobe as seen on previous MRI, no hemorrhagic transformation.  2.  Moderate age-related changes as above.   XR Hip Right 1 View    Narrative    EXAM: XR HIP RIGHT 1 VIEW  LOCATION: [facility]  DATE: 2023    INDICATION: Right hip pain, rule out fracture.  COMPARISON: None.      Impression    IMPRESSION: Single view of the right hip is negative for fracture or dislocation. Degenerative change at the hip joint. Diffuse demineralization.       Echocardiogram Complete     Value    LVEF  40-45%    Narrative    [id number]  [id number]  [id number]  [id number]^AIDEN^JANA     [facility]  Echocardiography Laboratory  [address] [place] [address]     Name: QI PACHECO  MRN: [id number]  : 1924  Study Date: 2023 09:54 AM  Age: 89 yrs  Gender: Male  Patient Location: [facility]  Reason For Study: Tachycardia  Ordering Physician: AIDEN SON  Referring Physician: Alistair Ross MD  Performed By: Leora Bess     BSA: 1.9 m2  Height: 66 in  Weight: 167 lb  HR: 117  BP: 86/51 mmHg  ______________________________________________________________________________  Procedure  Complete Portable Echo Adult.  ______________________________________________________________________________  Interpretation Summary     The left ventricle is normal in size.  The visual ejection fraction is 40-45%.  There is moderate global hypokinesia of the left ventricle.  No significant valvular heart disease.  The rhythm was rapid atrial  fibrillation.  ______________________________________________________________________________  Left Ventricle  The left ventricle is normal in size. There is normal left ventricular wall  thickness. The visual ejection fraction is 40-45%. There is moderate global  hypokinesia of the left ventricle.     Right Ventricle  The right ventricle is normal in size and function.     Atria  The left atrium is moderately dilated. The right atrium is mild to moderately  dilated. There is no color Doppler evidence of an atrial shunt.     Mitral Valve  The mitral valve leaflets are mildly thickened. There is trace mitral  regurgitation.     Tricuspid Valve  There is mild (1+) tricuspid regurgitation. The right ventricular systolic  pressure is approximated at 34.4 mmHg plus the right atrial pressure.     Aortic Valve  There is trivial trileaflet aortic sclerosis. No aortic regurgitation is  present.     Pulmonic Valve  There is trace pulmonic valvular regurgitation.     Vessels  Normal size aorta. The aortic root is normal size.     Pericardium  There is no pericardial effusion.     Rhythm  The rhythm was rapid atrial fibrillation.  ______________________________________________________________________________  MMode/2D Measurements & Calculations  Ao root diam: 3.4 cm     asc Aorta Diam: 2.8 cm  LVOT diam: 2.1 cm  LVOT area: 3.5 cm2  LA Volume (BP): 74.1 ml  LA Volume Index (BP): 40.1 ml/m2  TAPSE: 1.6 cm     Doppler Measurements & Calculations  MV E max bashir: 85.6 cm/sec  MV dec slope: 470.0 cm/sec2  MV dec time: 0.18 sec  Ao V2 max: 127.0 cm/sec  Ao max P.0 mmHg  Ao V2 mean: 91.7 cm/sec  Ao mean P.0 mmHg  Ao V2 VTI: 18.9 cm  SHILO(I,D): 2.8 cm2  SHILO(V,D): 2.7 cm2     LV V1 max P.0 mmHg  LV V1 max: 99.4 cm/sec  LV V1 VTI: 15.4 cm  SV(LVOT): 53.3 ml  SI(LVOT): 28.8 ml/m2  PA acc time: 0.11 sec  TR max bashir: 305.0 cm/sec  TR max P.4 mmHg  AV Bashir Ratio (DI): 0.78  SHILO Index (cm2/m2): 1.5  E/E' av.4  Lateral E/e':  6.2  [facility] E/e': 8.6  RV S Bashir: 11.2 cm/sec     ______________________________________________________________________________  Report approved by: Aury Spivey 06/29/2023 02:16 PM               Discharge Medications   Current Discharge Medication List      START taking these medications    Details   dutasteride (AVODART) 0.5 MG capsule Take 1 capsule (0.5 mg) by mouth daily  Qty: 30 capsule, Refills: 0    Associated Diagnoses: Sepsis with acute renal failure without septic shock, due to unspecified organism, unspecified acute renal failure type (H); Transient alteration of awareness; 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); Gram-positive bacteremia; Candida UTI      metoprolol succinate ER (TOPROL XL) 25 MG 24 hr tablet Take 1 tablet (25 mg) by mouth daily  Qty: 12.5 tablet, Refills: 1    Associated Diagnoses: Sepsis with acute renal failure without septic shock, due to unspecified organism, unspecified acute renal failure type (H); Transient alteration of awareness; 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); Gram-positive bacteremia; Candida UTI      OLANZapine zydis (ZYPREXA) 5 MG ODT Take 1 tablet (5 mg) by mouth nightly as needed for agitation  Qty: 30 tablet, Refills: 0    Associated Diagnoses: Sepsis with acute renal failure without septic shock, due to unspecified organism, unspecified acute renal failure type (H); Transient alteration of awareness; 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); Gram-positive bacteremia; Candida UTI      pantoprazole (PROTONIX) 40 MG EC tablet Take 1 tablet (40 mg) by mouth 2 times daily (before meals)  Qty: 60 tablet, Refills: 0    Associated Diagnoses: Sepsis with acute renal failure without septic shock, due to unspecified organism, unspecified acute renal failure type (H); Transient alteration of awareness; Cerebrovascular accident (CVA) due to embolism of precerebral artery  (H); Gram-positive bacteremia; Candida UTI      polyethylene glycol (MIRALAX) 17 GM/Dose powder Take 17 g by mouth daily  Qty: 510 g, Refills: 1    Associated Diagnoses: Sepsis with acute renal failure without septic shock, due to unspecified organism, unspecified acute renal failure type (H); Transient alteration of awareness; 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); Gram-positive bacteremia; Candida UTI      thiamine 50 MG TABS Take 1 tablet (50 mg) by mouth daily  Qty: 30 tablet, Refills: 1    Associated Diagnoses: Sepsis with acute renal failure without septic shock, due to unspecified organism, unspecified acute renal failure type (H); Transient alteration of awareness; 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); Gram-positive bacteremia; Candida UTI         CONTINUE these medications which have CHANGED    Details   oxyCODONE IR (ROXICODONE) 5 MG tablet Take 0.5 tablets (2.5 mg) by mouth every 4 hours as needed for moderate pain  Qty: 30 tablet, Refills: 0    Associated Diagnoses: Sepsis with acute renal failure without septic shock, due to unspecified organism, unspecified acute renal failure type (H); Transient alteration of awareness; 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); Gram-positive bacteremia; Candida UTI         CONTINUE these medications which have NOT CHANGED    Details   acetaminophen (TYLENOL) 500 MG tablet Take 1,000 mg by mouth every 8 hours as needed for pain      albuterol (PROAIR HFA/PROVENTIL HFA/VENTOLIN HFA) 108 (90 Base) MCG/ACT inhaler Inhale 2 puffs into the lungs every 4 hours as needed for shortness of breath or wheezing      aspirin 81 MG EC tablet Take 81 mg by mouth daily      Carboxymethylcellulose Sodium (REFRESH LIQUIGEL) 1 % GEL Apply 1 drop to eye 4 times daily      diclofenac (VOLTAREN) 1 % topical gel Apply topically 2 times daily      docusate sodium (COLACE) 100 MG capsule Take  100 mg by mouth 2 times daily      folic acid (FOLVITE) 1 MG tablet Take 1 mg by mouth daily      furosemide (LASIX) 20 MG tablet Take 20 mg by mouth daily      lisinopril (ZESTRIL) 2.5 MG tablet Take 2.5 mg by mouth daily      potassium chloride ER (K-TAB/KLOR-CON) 10 MEQ CR tablet Take 20 mEq by mouth daily      senna-docusate (SENOKOT-S/PERICOLACE) 8.6-50 MG tablet Take 1 tablet by mouth 2 times daily      tamsulosin (FLOMAX) 0.4 MG capsule Take 0.4 mg by mouth daily         STOP taking these medications       metFORMIN (GLUCOPHAGE) 500 MG tablet Comments:   Reason for Stopping:             Allergies   Allergies   Allergen Reactions     Gabapentin

## 2023-07-17 NOTE — PROGRESS NOTES
Care Management Follow Up    Length of Stay (days): 19    Expected Discharge Date: 07/15/2023     Concerns to be Addressed:       Patient plan of care discussed at interdisciplinary rounds: Yes    Anticipated Discharge Disposition: Skilled Nursing Facility     Anticipated Discharge Services:    Anticipated Discharge DME:      Patient/family educated on Medicare website which has current facility and service quality ratings: no  Education Provided on the Discharge Plan:    Patient/Family in Agreement with the Plan:      Referrals Placed by CM/SW: Post Acute Facilities  Private pay costs discussed: Not applicable    Additional Information:    Sw called [facility] admissions and confirmed that they are still holding pt's bed despite MA  expiring.  Sw to update facility once decision of appeal is known.  Sw to continue to follow.      Magdalena Rivera, ZEUSSW

## 2023-07-17 NOTE — PROGRESS NOTES
Care Management Follow Up    Length of Stay (days): 19    Expected Discharge Date: 07/15/2023     Concerns to be Addressed:  Transition back to [facility] or pay privately here     Patient plan of care discussed at interdisciplinary rounds: Yes    Anticipated Discharge Disposition: Skilled Nursing Facility     Anticipated Discharge Services:  transportation  Anticipated Discharge DME:  NA    Patient/family educated on Medicare website which has current facility and service quality ratings: no  Education Provided on the Discharge Plan:    Patient/Family in Agreement with the Plan:      Referrals Placed by CM/SW: Post Acute Facilities  Private pay costs discussed: Not applicable    Additional Information:    [place] Medicare appeal:  [place] Agrees with termination of Hospital services  SW is aware and will be contacting patient's family concerning patient discharging back to the NH    Machelle Funes, RN   Inpatient Care Management  [phone]

## 2023-07-17 NOTE — PLAN OF CARE
Goal Outcome Evaluation:         Pt discharge and went to the facility with all the paperwork and belongings.Periferal line removed.
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
## 2023-07-17 NOTE — PLAN OF CARE
Goal Outcome Evaluation:  SUMMARY: altered mental status, sepsis, stroke (resolved)    DATE & TIME:7/17/23 8510-9336    Cognitive Concerns/ Orientation : A&Ox4, Non compliant with cares.  BEHAVIOR & AGGRESSION TOOL COLOR: Green/yellow. Noncompliant with cares  ABNL VS/O2: VSS on RA  MOBILITY: up with lift, turn/repo q2h, pt noncompliant.    PAIN MANAGMENT: Complaint of BLE pain. Tylanol and Oxy given and effective.   DIET: Regular but poor appetite.   BOWEL/BLADDER: Incont of urine; No BM this shift  ABNL LAB/BG: K 3.6,   DRAIN/DEVICES: No IV access.  TELEMETRY RHYTHM: NA  SKIN: BLE wounds. refused BLE wound cares - dressings CDI and changed. -elevated on pillows. Scattered brusing.   TESTS/PROCEDURES: NA  D/C DATE: Was to discharge back to LTC   between 2211-3475. Appeal was rejected.   OTHER IMPORTANT INFO: Discharging today. Changed at 1418.

## 2023-07-18 NOTE — PROGRESS NOTES
[facility] [place]    Background: Transitional Care Management program identified per system criteria and reviewed by [facility] team for possible outreach.    Assessment: Upon chart review, CCRC Team member will not proceed with patient outreach related to this episode of Transitional Care Management program due to reason below:    Non-[facility] TCU: CCRC team member noted patient discharged to TCU/ARU/LTACH. Patient is not established with a [facility] currently supported by Primary Care-Care Coordination therefore handoff to Primary Care-Care Coordination is not appropriate at this time.    Plan: Transitional Care Management episode addressed appropriately per reason noted above.      NAOMI Marquez  [facility] [place], [facility]    *Connected Care Resource Team does NOT follow patient ongoing. Referrals are identified based on internal discharge reports and the outreach is to ensure patient has an understanding of their discharge instructions.

## 2023-07-27 PROBLEM — R53.1 GENERALIZED WEAKNESS: Status: ACTIVE | Noted: 2023-01-01
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
## 2023-07-27 PROBLEM — N30.01 ACUTE CYSTITIS WITH HEMATURIA: Status: ACTIVE | Noted: 2023-01-01

## 2023-07-27 NOTE — ED TRIAGE NOTES
Triage Assessment       Row Name 07/27/23 1535       Triage Assessment (Adult)    Airway WDL WDL       Respiratory WDL    Respiratory WDL WDL       Skin Circulation/Temperature WDL    Skin Circulation/Temperature WDL X       Cardiac WDL    Cardiac WDL X       Peripheral/Neurovascular WDL    Peripheral Neurovascular WDL X       Cognitive/Neuro/Behavioral WDL    Cognitive/Neuro/Behavioral WDL X    Level of Consciousness intermittent confusion      Row Name 07/27/23 1510       Triage Assessment (Adult)    Airway WDL WDL       Respiratory WDL    Respiratory WDL WDL

## 2023-07-27 NOTE — ED TRIAGE NOTES
Fall - right leg right hip pain - unwitness fall ?out of bed this morning 0730 EMS was called pt decline to be seen - EMS called back this afternoon per staff at the NH - pt has chronic wound on legs        Triage Assessment       Row Name 07/27/23 1510       Triage Assessment (Adult)    Airway WDL WDL       Respiratory WDL    Respiratory WDL WDL

## 2023-07-27 NOTE — ED NOTES
Bed: FT01  Expected date:   Expected time:   Means of arrival:   Comments:  Angelica - 89 M fall leg and hip pain eta 1507

## 2023-07-27 NOTE — ED PROVIDER NOTES
History   Chief Complaint:  Fall    The history is provided by the patient. A  was used (Citizen of [place]).      History limited as the patient is a poor historian.     Joselin Sanchez is a 89 year old male with history of hypertension, diabetes mellitus type 2, chronic atrial fibrillation (not on anticoagulation), chronic systolic congestive heart failure, last EF 40 to 45% in July 2020, dyslipidemia, diabetic foot ulcer, lymphedema, dementia  who presents from his nursing home by EMS for evaluation after a fall from bed. Patient was admitted here at [facility] 6/28-7/17 for severe sepsis secondary to urinary tract infection, bacteremia, and septic encephalopathy complicated by stroke. Patient says that something fell on him this morning, but could not specify what when asked. However, his nursing home reports that he fell out of bed this morning, hit his head and was found on his left side. Patient was found conscious.  According to the nurse I spoke with at his nursing home, he was seen by Dr. Ross at the facility and was sent to the ED in order to get x-rays for his hips and left shoulder. Patient reports that the pain he is feeling is an ongoing pain. EMS reports that he has chronic lower extremity wounds and currently has wound paste on his lower leg. En route to the ED, his blood pressure was 90 systolic and heart rate was 55 beats per minute. Patient is not ambulatory at baseline and uses a wheelchair.    Independent Historian:   EMS supplemented history above.     Patient's niece explains that she was called earlier today by the patient and during this call his nurse had told her that he had a fell and EMS was on their way. On EMS arrival, the patient adamantly refused EMS emergency department evaluation. Patient's niece states that the patient does not have side rails on his bed at home so he likely fell out of his bed. He told her that he slipped while carrying stuff but she states that this is  not possible because he non-ambulatory. She reports that his right lower leg and heel pain is chronic. Patient was seen by Dr. Ross at his facility and Dr. Ross recommended left shoulder and bilateral hip imaging.     Patient's niece adds that during his recent discharge, she was told that the patient could return to [facility] or transition into palliative care. She states that she does not want the patient to return to [facility] because they are reportedly ignoring his cares including missing meals and neglecting his safety. She states that the  they were working with for discharge told her that no one else would take the patient. She feels that he is difficult at baseline but has become increasingly disoriented since returning to [facility].     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  Type 2 diabetes  Varicose veins  Being prostatic hyperplasia  Chronic kidney disease  Cognitive communication deficit  Dysphagia  Lymphedema  Anemias  Peripheral vascular disease    Past Surgical History:    EGD with Biopsy  Transrectal biopsy    Physical Exam     Patient Vitals for the past 24 hrs:   BP Temp Temp src Pulse Resp SpO2   07/27/23 1900 100/64 -- -- 88 -- --   07/27/23 1845 111/63 -- -- 89 -- --   07/27/23 1830 93/55 -- -- 85 -- --   07/27/23 1815 107/69 -- -- 92 -- 99 %   07/27/23 1715 110/79 -- -- -- -- --   07/27/23 1700 (!) 141/58 -- -- 85 -- --   07/27/23 1519 (!) 89/55 -- -- -- -- --   07/27/23 1517 (!) 89/55 -- -- 92 -- 95 %   07/27/23 1515 -- 97.2  F  (36.2  C) Temporal -- -- --   07/27/23 1512 -- -- -- 91 18 100 %     Physical Exam  General: Bedbound frail elderly male laying on gurney.  HENT:   Ears: No hemotympanum.  Head: No raccoon eyes or whittaker signs.  Mouth/Throat: Oropharynx clear and moist.  Eyes: Conjunctive and EOM normal. PERRLA.  Neck: Normal ROM. No rigidity.  No midline C-spine tenderness.  CV: Irregularly irregular.  Resp: Lungs clear to auscultation bilaterally. Normal respiratory effort. No cough observed.  GI: Bowel sounds normal. Abdomen soft, non distended and nontender. No rebound or guarding.  MSK: Patient nonambulatory and wheelchair-bound at his baseline.  Skin: Patient has chronic diabetic wounds to lower extremities, worse on the right.  Diabetic foot ulcer on patient's right heel.  Overlying bandage clean and dry.  Patient has wound care paste on his lower extremities.  Neuro: Awake, alert, oriented x 3.  No facial droop.  Fluent speech.    Psych: Irritable at baseline.      Emergency Department Course     Imaging:  Head CT w/o contrast   Final Result   IMPRESSION:   1.  No CT evidence for acute intracranial process.   2.  Subacute to chronic infarction left occipital lobe with developing encephalomalacia.   3.  Brain atrophy and presumed chronic microvascular ischemic changes as above.         Ribs XR, unilat 3 views + PA chest,  left   Final Result   IMPRESSION: Superior humeral head migration is likely related to a chronic full-thickness rotator cuff tear. There is no evidence of an acute fracture about the left shoulder. Moderate glenohumeral joint degenerative change.      There is no radiographic evidence of an acute displaced left-sided rib fracture.      No evidence of pneumonia or pulmonary edema. Heart size is borderline. Atherosclerotic vascular calcifications and tortuosity of the aorta, which is prominent at the aortic arch where there is a focal increased density related to the patient's known    history of a thrombosed  saccular aneurysm as seen on recent CT. No pneumothorax. Small bilateral pleural effusions.         XR Pelvis and Hip Bilateral 2 Views   Final Result   IMPRESSION: Bones are demineralized. There is no radiographic evidence of an acute displaced fracture. No dislocation. Hip joint spaces are maintained.      XR Shoulder Left G/E 3 Views   Final Result   IMPRESSION: Superior humeral head migration is likely related to a chronic full-thickness rotator cuff tear. There is no evidence of an acute fracture about the left shoulder. Moderate glenohumeral joint degenerative change.      There is no radiographic evidence of an acute displaced left-sided rib fracture.      No evidence of pneumonia or pulmonary edema. Heart size is borderline. Atherosclerotic vascular calcifications and tortuosity of the aorta, which is prominent at the aortic arch where there is a focal increased density related to the patient's known    history of a thrombosed saccular aneurysm as seen on recent CT. No pneumothorax. Small bilateral pleural effusions.            Report per radiology    Laboratory:  Labs Ordered and Resulted from Time of ED Arrival to Time of ED Departure   ROUTINE UA WITH MICROSCOPIC REFLEX TO CULTURE - Abnormal       Result Value    Color Urine Yellow      Appearance Urine Clear      Glucose Urine Negative      Bilirubin Urine Negative      Ketones Urine Negative      Specific Gravity Urine 1.013      Blood Urine Trace (*)     pH Urine 5.5      Protein Albumin Urine Negative      Urobilinogen Urine Normal      Nitrite Urine Negative      Leukocyte Esterase Urine Large (*)     Mucus Urine Present (*)     RBC Urine 5 (*)     WBC Urine 46 (*)     Hyaline Casts Urine 5 (*)    BASIC METABOLIC PANEL - Abnormal    Sodium 136      Potassium 4.7      Chloride 100      Carbon Dioxide (CO2) 26      Anion Gap 10      Urea Nitrogen 11.0      Creatinine 1.03      Calcium 9.4      Glucose 121 (*)     GFR Estimate 65     CBC WITH PLATELETS  AND DIFFERENTIAL - Abnormal    WBC Count 7.6      RBC Count 3.65 (*)     Hemoglobin 11.7 (*)     Hematocrit 37.7 (*)      (*)     MCH 32.1      MCHC 31.0 (*)     RDW 17.2 (*)     Platelet Count 182      % Neutrophils 62      % Lymphocytes 27      % Monocytes 9      % Eosinophils 1      % Basophils 0      % Immature Granulocytes 1      NRBCs per 100 WBC 0      Absolute Neutrophils 4.7      Absolute Lymphocytes 2.1      Absolute Monocytes 0.7      Absolute Eosinophils 0.1      Absolute Basophils 0.0      Absolute Immature Granulocytes 0.1      Absolute NRBCs 0.0     ROUTINE UA WITH MICROSCOPIC REFLEX TO CULTURE - Abnormal    Color Urine Yellow      Appearance Urine Clear      Glucose Urine Negative      Bilirubin Urine Negative      Ketones Urine Negative      Specific Gravity Urine 1.012      Blood Urine Large (*)     pH Urine 5.5      Protein Albumin Urine Negative      Urobilinogen Urine Normal      Nitrite Urine Negative      Leukocyte Esterase Urine Large (*)     RBC Urine 30 (*)     WBC Urine 12 (*)    GLUCOSE MONITOR NURSING POCT   URINE CULTURE   BLOOD CULTURE   BLOOD CULTURE     Emergency Department Course & Assessments:    Interventions:  Medications   albuterol (PROVENTIL HFA/VENTOLIN HFA) inhaler (has no administration in time range)   aspirin EC tablet 81 mg (has no administration in time range)   Carboxymethylcellulose Sodium 1 % GEL 1 drop (has no administration in time range)   diclofenac (VOLTAREN) 1 % topical gel 2 g (has no administration in time range)   docusate sodium (COLACE) capsule 100 mg (has no administration in time range)   dutasteride (AVODART) capsule 0.5 mg (has no administration in time range)   metoprolol succinate ER (TOPROL XL) 24 hr tablet 25 mg (has no administration in time range)   oxyCODONE IR (ROXICODONE) half-tab 2.5 mg (has no administration in time range)   pantoprazole (PROTONIX) EC tablet 40 mg (has no administration in time range)   polyethylene glycol (MIRALAX)  powder 17 g (has no administration in time range)   senna-docusate (SENOKOT-S/PERICOLACE) 8.6-50 MG per tablet 1 tablet (has no administration in time range)   tamsulosin (FLOMAX) capsule 0.4 mg (has no administration in time range)   lidocaine 1 % 0.1-1 mL (has no administration in time range)   lidocaine (LMX4) cream (has no administration in time range)   sodium chloride (PF) 0.9% PF flush 3 mL (has no administration in time range)   sodium chloride (PF) 0.9% PF flush 3 mL (has no administration in time range)   acetaminophen (TYLENOL) tablet 650 mg (has no administration in time range)     Or   acetaminophen (TYLENOL) Suppository 650 mg (has no administration in time range)   melatonin tablet 3 mg (has no administration in time range)   senna-docusate (SENOKOT-S/PERICOLACE) 8.6-50 MG per tablet 1 tablet (has no administration in time range)     Or   senna-docusate (SENOKOT-S/PERICOLACE) 8.6-50 MG per tablet 2 tablet (has no administration in time range)   ondansetron (ZOFRAN ODT) ODT tab 4 mg (has no administration in time range)     Or   ondansetron (ZOFRAN) injection 4 mg (has no administration in time range)   insulin aspart (NovoLOG) injection (RAPID ACTING) (has no administration in time range)   insulin aspart (NovoLOG) injection (RAPID ACTING) (has no administration in time range)   glucose gel 15-30 g (has no administration in time range)     Or   dextrose 50 % injection 25-50 mL (has no administration in time range)     Or   glucagon injection 1 mg (has no administration in time range)   cefTRIAXone (ROCEPHIN) 2 g vial to attach to  ml bag for ADULTS or NS 50 ml bag for PEDS (has no administration in time range)   lidocaine (XYLOCAINE) 2 % external gel 10 mL (10 mLs Urethral $Given 7/27/23 1950)   cefTRIAXone (ROCEPHIN) 2 g vial to attach to  ml bag for ADULTS or NS 50 ml bag for PEDS (2 g Intravenous $New Bag 7/27/23 7857)   melatonin tablet 3 mg (3 mg Oral $Given 7/27/23 2097)    acetaminophen (TYLENOL) tablet 650 mg (650 mg Oral $Given 7/27/23 6399)     Assessments:  1500 I obtained history and examined the patient an as noted above  1515 I attempted to call Cyn to obtain history from the patient.   1519 Patient's daughter is now present. I obtained history from the patient's daughter.   1530 I spoke with Cyn regarding the patient.   1716 I rechecked and updated the patient on the results of his x-rays.  2018 I rechecked the patient.    2050 Rechecked and updated the patient on his admittance in the hospital.    Independent Interpretation (X-rays, CTs, rhythm strip):  None    Consultations/Discussion of Management or Tests:  ED Course as of 07/27/23 2118 Thu Jul 27, 2023 2052 I paged out to [facility] for a hospitalist and staffed the patient under Dr. Crump.   2115 I consulted with Dr. Shankar, hospitalist, regarding the patient's history and presentation here in the emergency department who accepted the patient for admission.     Social Determinants of Health affecting care:   Dementia, resides in a nursing home    Disposition:  The patient was admitted to the hospital under the care of Dr. Shankar    Impression & Plan    Medical Decision Making:  Joselin Sanchez is a 89 year old male with history of hypertension, diabetes mellitus type 2, chronic atrial fibrillation (not on anticoagulation), chronic systolic congestive heart failure, last EF 40 to 45% in July 2020, dyslipidemia, diabetic foot ulcer, lymphedema, dementia  who presented from his nursing home by EMS for evaluation after a fall from bed. Patient was admitted here at [facility] 6/28-7/17 for severe sepsis secondary to urinary tract infection, bacteremia, and septic encephalopathy complicated by stroke.  Patient's nursing facility was contacted and the indicated he was found on the floor beside his bed by staff today and must have fallen out, landing on his left side.  He had been evaluated by a physician at the  facility who wanted x-rays of both of his hips and his left shoulder.  On arrival here, the patient is afebrile and nontoxic-appearing.  With the unwitnessed fall, I obtained a head CT.  This was negative for any acute bleed.  There does appear to be a subacute left occipital infarct, which was noted on previous head CT during the patient's recent hospital admission.  No new findings.  X-ray of the patient's left shoulder negative for acute fracture.  Rib x-ray negative for any acute fracture.  Chest x-ray did show a torturous aorta and the patient recently had CTA showing aneurysm.  No complaints of any chest pain here today concerning for ruptured aneurysm or dissection.  Bilateral hip x-rays also negative for fracture.  Considering the patient had a prolonged hospital stay recently, I did pursue basic labs and urine.  Basic labs grossly unremarkable.  Urine obtained by catheter showed evidence for infection with white cells and leukocyte esterase.  Blood cultures were obtained before patient was treated with IV antibiotics.  Ultimately, I discussed his case with the hospitalist service, who graciously excepted him for inpatient admission.  Due to the need for admission, I did staff this patient with Dr. Crump.  See separate APC supervisory note.    Diagnosis:    ICD-10-CM    1. Fall, initial encounter  W19.XXXA       2. Acute cystitis with hematuria  N30.01       3. Generalized weakness  R53.1         Scribe Disclosure:  I, Jenni Bernal & Ewelina Win, am serving as a scribe at 4:26 PM on 7/27/2023 to document services personally performed by Batsheva Reyes PA-C based on my observations and the provider's statements to me.     7/27/2023   Batsheva Reyes PA-C Dewing, Jennifer C, PA-C  07/27/23 4517

## 2023-07-28 NOTE — PROGRESS NOTES
Notified provider about indwelling antunez catheter discussed removal or continued need.    Did provider choose to remove indwelling antunez catheter? NO    Provider's antunez indication for keeping indwelling antunez catheter: Indication for continued use: Retention    Is there an order for indwelling antunez catheter? YES    *If there is a plan to keep antunez catheter in place at discharge daily notification with provider is not necessary, but please add a notation in the treatment team sticky note that the patient will be discharging with the catheter.

## 2023-07-28 NOTE — ED PROVIDER NOTES
ED ATTENDING PHYSICIAN NOTE:   I evaluated this patient in conjunction with Batsheva Reyes PA-C  I have participated in the care of the patient and personally performed key elements of the history, exam, and medical decision making.      HPI:   Joselin Sanchez is a 89 year old male who presents with a fall. He sustained a fall earlier this morning, but the patient does not definitively remember this as he is a poor historian.  The patient reports diffuse pain.    Independent Historian:   None - Patient Only    Review of External Notes:   None      EXAM:   General:  Sitting on bed, comfortable appearing.   HENT:  No obvious trauma to head  Right Ear:  External ear normal.   Left Ear:  External ear normal.   Nose:  Nose normal.   Eyes:  Conjunctivae and EOM are normal.  Neck: Normal range of motion. Neck supple. No tracheal deviation present.   Pulm/Chest: No respiratory distress  M/S: Normal range of motion.   Neuro: Alert. GCS 14, pleasantly confused.  No facial droop.  Moving all extremities.  Skin: Skin is warm and dry. No rash noted. Not diaphoretic.   Psych: Normal mood and affect. Behavior is normal.     Independent Interpretation (X-rays, CTs, rhythm strip):  None    Consultations/Discussion of Management or Tests:  None     Social Determinants of Health affecting care:   None     MEDICAL DECISION MAKING/ASSESSMENT AND PLAN:   Joselin Sanchez is a very pleasant 89 year old year old patient who presents to the emergency department with concern of generalized weakness and a possible fall earlier this morning.  Multiple x-rays obtained and are negative for acute fractures.  Numerous incidental findings which the PA reviewed with the niece who is at bedside.  CT of the head shows no acute stroke.  There are findings of a subacute to chronic left occipital stroke which she had in prior CT imaging.  There is no new acute stroke.  He is afebrile.  Initial urine analysis suggesting contaminants a straight cath urine  analysis was obtained that shows evidence of urinary tract infection.  Urine culture ordered along with blood cultures.  He is provided antibiotics.  The patient is in agreement for admission.  The hospitalist was contacted who has agreed to admit the patient.     DIAGNOSIS:     ICD-10-CM    1. Fall, initial encounter  W19.XXXA       2. Acute cystitis with hematuria  N30.01       3. Generalized weakness  R53.1                DISPOSITION:   Admit     Andrez Crump, DO  7/27/2023  [facility] EMERGENCY DEPT       Andrez Crump, DO  07/27/23 2140

## 2023-07-28 NOTE — PROVIDER NOTIFICATION
"MD Notification    Notified Person: MD    Notified Person Name:  Rlhimary    Notification Date/Time: 0959, 7/28/23    Notification Interaction: Page    Purpose of Notification: Pt refused vitals, blood sugar checks, repo and medications again. Pt yells \"get out of my room, don't touch me.\"     Orders Received:    Comments:   "

## 2023-07-28 NOTE — PROGRESS NOTES
Patient refused to do dressing change/wound care. Nephew in the room at the time of refusal. Patient stated he will allow dressing change tomorrow on niece Mariama is present.

## 2023-07-28 NOTE — ED NOTES
While using  services, the pt asked multiple times about his cell phone. I was able to determine that he had left his cell phone at his nursing home. He requested that someone from the ER go get it. I informed him several times that is not possible, that family would have to bring it to him tomorrow, and in the mean time, we can give him a room phone to use to contact family. Pt did not seem to understand and repeatedly asked for us to retrieve his phone.

## 2023-07-28 NOTE — PROGRESS NOTES
[facility]    Medicine Progress Note - Hospitalist Service    Date of Admission:  7/27/2023    Assessment & Plan     Joselin Sanchez is a 89-year-old male with dementia, atrial fibrillation, BPH, urinary retention, recent ischemic stroke and recent upper GI bleed who presented on 7/27/2023 from his care facility due to unwitnessed fall.  He was felt to be confused and have UTI.    He was recently hospitalized from 6/28-7/17 due to severe sepsis secondary to UTI.  CT A/P showed prostamegaly and chronic bladder outlet obstruction.  Blood cultures grew corynebacterium striated him, Anaerococcus, Staphylococcus pettenkoferi.  Urine culture grew Candida Dubliniensis.  Per ID he was treated with IV Zosyn, Augmentin, fluconazole.  Hospital stay was complicated by new onset A-fib with RVR and acute/early subacute ischemic stroke involving left occipital lobe.  Echo showed EF 40-45% with global hypokinesia.  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  Anticoagulation was attempted but patient developed GI bleed and acute blood loss anemia.  GI recommended conservative management due to high risk of complications with endoscopy.  He was started on PPI.  Eventually aspirin 81 mg was introduced without further bleeding.  Ethics and palliative care were consulted for goals of care discussion.  His knees is the decision maker.  He stabilized and was discharged to TCU on 7/17/2023.    Patient now presented with unwitnessed fall.  Afebrile, initial blood pressure 93/55 that improved without intervention.  Not hypoxic.  No leukocytosis.  UA showed mild pyuria and hematuria.  X-rays of hips, left shoulder, chest x-ray and ribs showed no acute fractures or acute pathology.  Head CT showed subacute/chronic infarct in left occipital lobe.    Presentation felt to be due to acute metabolic encephalopathy and  probable UTI.    Acute metabolic encephalopathy   Probable acute cystitis with microscopic hematuria  -Nails catheter placed in ER.  UA showed 46 WBCs, trace blood, large leukocyte esterase, negative nitrites  -Urine culture and blood cultures pending  -Continue IV ceftriaxone  -Received IV fluids overnight, now discontinued  -Keep Nails in for now    Dementia with behavioral disturbance  Unwitnessed fall  -patient had an unwitnessed fall at his care facility.  He is nonambulatory at baseline  -No injuries.  -X-ray ribs and chest showed no infiltrate, no fractures, no pulmonary edema  -X-ray pelvis with bilateral hips-showed demineralized bones, no fractures or dislocation  -X-ray left shoulder showed superior humeral head migration likely due to chronic full-thickness rotator cuff tear, unchanged from before.  No acute fractures.  -Fall precautions  -As needed Tylenol and low-dose oxycodone for pain  -As needed Zyprexa      BPH  Chronic urinary retention  S/p Nails placement, 7/27/2023  -Continue Nails.  Continue Avodart and Flomax    Right upper extremity edema  -Unclear etiology.  Ultrasound ordered to rule out DVT which patient has declined    Acute kidney injury on top of chronic kidney disease stage III  -Creatinine increased from 0.7 to 1.03  -Likely prerenal.  Received IV fluids overnight.  Now discontinued  -Monitor renal function    Chronic lower extremity wounds-due to lymphedema, peripheral arterial disease and diabetes  -Was hospitalized at [facility] in [place], [place] from 3/27-4/4/2023 for polymicrobial infection of legs.  He underwent surgical debridement and was treated with IV Zosyn.  -On most recent admission few weeks ago, podiatry was consulted.  X-ray of right foot showed no clear evidence of osteomyelitis.  -Wounds do not appear to be infected  -Consult [facility]    Recent GI bleed  Subacute anemia  -Developed melena during recent hospitalization when anticoagulation was  initiated.  GI was consulted and did not recommend endoscopies due to high risk of complications.    -Hemoglobin improved from before, now 11.7   -monitor CBC  -Continue PPI      Chronic paroxysmal atrial fibrillation  -Continue Toprol-XL  -Not on anticoagulation due to recent GI bleed    Recent left occipital ischemic stroke, 7/2023  -Continue aspirin 81 mg  -Head CT on admission showed no acute changes.  Subacute/chronic left occipital lobe infarction with developing encephalomalacia    Chronic systolic CHF with reduced EF  Essential hypertension  -Lasix and lisinopril on hold due to mild hypotension and mild KIMO    Diabetes mellitus type 2, diet controlled, A1c 5.8 on 6/28/2023  -Sliding scale ordered    Palliative care and ethics committee were consulted during previous admission as it seems patient's goals of care did not align with his Niece's goals of care.  She has kept him full code and wanting aggressive treatments.  She has now raised concerns about patient returning to his current living facility.  Attempted to call niece to discuss further care and management as patient has been declining tests and medications.  Left voice message    Met with patient's niece in hospital.  She has reviewed videos of CPR and has decided against keeping him full code.  We discussed intubation.  She is okay with prearrest intubation.  She wants all treatments continued up until the time of death.  We will switch to no code.           Diet: Combination Diet Regular Diet    DVT Prophylaxis: Pneumatic Compression Devices  Nails Catheter: PRESENT, indication: Retention  Lines: None     Cardiac Monitoring: None  Code Status: Full Code      Clinically Significant Risk Factors Present on Admission                # Drug Induced Platelet Defect: home medication list includes an antiplatelet medication   # Hypertension: Home medication list includes antihypertensive(s)      # Obesity: Estimated body mass index is 34.64 kg/m  as  "calculated from the following:    Height as of 6/28/23: 1.7 m (5' 6.93\").    Weight as of 7/12/23: 100.1 kg (220 lb 10.9 oz).            Disposition Plan      Expected Discharge Date: 07/31/2023                  Montse Stroud MD  Hospitalist Service  [facility]  Securely message with [employer] (more info)  Text page via [employer] Paging/Directory   ______________________________________________________________________    Interval History   Patient has been declining cares and medications.  He declined upper extremity ultrasound.  He declined to be examined.  Offers no complaints at this time.  Denies pain    Physical Exam   Vital Signs: Temp: 96.9  F (36.1  C) Temp src: Axillary BP: 104/77 Pulse: 95   Resp: 18 SpO2: 93 % O2 Device: None (Room air)    Weight: 0 lbs 0 oz    Constitutional - awake and alert, resting in bed, in no acute distress  Moving all 4 extremities, normal speech, no focal deficits  Did not allow any further examination    Medical Decision Making       ------------------ MEDICAL DECISION MAKING ------------------------------------------------------------------------------------------------------  Medical complexity over the past 24 hours:  -------------------------- MODERATE RISK FOR MORBIDITY --------------------------------------------------      Data     I have personally reviewed the following data over the past 24 hrs:    7.6  \   11.7 (L)   / 182     136 100 11.0 /  83   4.7 26 1.03 \       Imaging results reviewed over the past 24 hrs:   Recent Results (from the past 24 hour(s))   XR Shoulder Left G/E 3 Views    Narrative    EXAM: XR SHOULDER LEFT G/E 3 VIEWS, XR RIBS AND CHEST LEFT 3 VIEWS  LOCATION: [facility]  DATE: 7/27/2023    INDICATION: Unwitnessed fall out of bed.    COMPARISON: 7/1/2023.      Impression    IMPRESSION: Superior humeral head migration is likely related to a chronic full-thickness rotator cuff tear. There is no evidence of " an acute fracture about the left shoulder. Moderate glenohumeral joint degenerative change.    There is no radiographic evidence of an acute displaced left-sided rib fracture.    No evidence of pneumonia or pulmonary edema. Heart size is borderline. Atherosclerotic vascular calcifications and tortuosity of the aorta, which is prominent at the aortic arch where there is a focal increased density related to the patient's known   history of a thrombosed saccular aneurysm as seen on recent CT. No pneumothorax. Small bilateral pleural effusions.     XR Pelvis and Hip Bilateral 2 Views    Narrative    EXAM: XR PELVIS AND HIP BILATERAL 2 VIEWS  LOCATION: [facility]  DATE: 7/27/2023    INDICATION: Unwitnessed fall out of bed.  COMPARISON: None.      Impression    IMPRESSION: Bones are demineralized. There is no radiographic evidence of an acute displaced fracture. No dislocation. Hip joint spaces are maintained.   Ribs XR, unilat 3 views + PA chest,  left    Narrative    EXAM: XR SHOULDER LEFT G/E 3 VIEWS, XR RIBS AND CHEST LEFT 3 VIEWS  LOCATION: [facility]  DATE: 7/27/2023    INDICATION: Unwitnessed fall out of bed.    COMPARISON: 7/1/2023.      Impression    IMPRESSION: Superior humeral head migration is likely related to a chronic full-thickness rotator cuff tear. There is no evidence of an acute fracture about the left shoulder. Moderate glenohumeral joint degenerative change.    There is no radiographic evidence of an acute displaced left-sided rib fracture.    No evidence of pneumonia or pulmonary edema. Heart size is borderline. Atherosclerotic vascular calcifications and tortuosity of the aorta, which is prominent at the aortic arch where there is a focal increased density related to the patient's known   history of a thrombosed saccular aneurysm as seen on recent CT. No pneumothorax. Small bilateral pleural effusions.     Head CT w/o contrast    Narrative     EXAM: CT HEAD W/O CONTRAST  LOCATION: [facility]  DATE: 7/27/2023    INDICATION: Unwitnessed fall out of bed.    COMPARISON: Head CT 7/1/2023.    TECHNIQUE: Routine CT head without IV contrast. Multiplanar reformats. Dose reduction techniques were used.    FINDINGS:  INTRACRANIAL CONTENTS: No intracranial hemorrhage, extra-axial collection, or mass effect. Subacute to chronic infarction left occipital lobe with interval development of encephalomalacia/volume loss. Moderate presumed chronic small vessel ischemic   changes. Moderate generalized volume loss. No hydrocephalus.     VISUALIZED ORBITS/SINUSES/MASTOIDS: No intraorbital abnormality. No paranasal sinus mucosal disease. No middle ear or mastoid effusion.    BONES/SOFT TISSUES: No acute abnormality.      Impression    IMPRESSION:  1.  No CT evidence for acute intracranial process.  2.  Subacute to chronic infarction left occipital lobe with developing encephalomalacia.  3.  Brain atrophy and presumed chronic microvascular ischemic changes as above.

## 2023-07-28 NOTE — CONSULTS
"[facility] Nurse Inpatient Assessment     Consulted for: LE wounds    Summary: 1) Bilateral LE wounds, L>R, PAD, diabetes, and edema 2) POA PI to R heel    Patient History (according to provider note(s):      \"Joselin Sanchez is a markedly pleasant 89 year old gentleman with extensive past medical history that is most notable for chronic dementia, atrial fibrillation, BPH, and recent hospitalization for sepsis due to UTI, afib with RVR, acute stroke, and acute upper GI bleeding; who presents with a fall and confusion and is found to have suspected acute metabolic encephalopathy due to UTI.\"    Assessment:      Areas visualized during today's visit: Heels  and Lower extremities     Wound location: Bilateral legs    Left lateral leg      Right shin      Left knee      Last photo: 7/28/23  Wound due to: Mixed Etiology: PAD, diabetes, edema  Wound history/plan of care: Patient has been followed by [facility] team recently. Leg wounds appear improved. Patient's RLE caked with white paste on initial assessment. Noted one area of stable eschar to R shin. LLE wrapped with Kerlix and Xeroform. Multiple ulcers noted.   Wound base: pink-red, moist tissue     Palpation of the wound bed:  Fleshy       Drainage: moderate     Description of drainage: bloody     Measurements (length x width x depth, in cm): Multiple ulcers ranging in size on LLE, circumferential from ankle to knee      Tunneling: N/A     Undermining: N/A  Periwound skin: Scar tissue      Color: pink      Temperature: normal   Odor: none  Pain: Irritable or crying out at intervals and unable to assess due to  language barrier , intermittent and with palpation, movement  Pain interventions prior to dressing change: oral medication, slow and gentle cares , and distraction  Treatment goal: Drainage control, Infection control/prevention, Maintain (prevention of deterioration), and Protection  STATUS: initial assessment  Supplies ordered: " gathered, ordered Vashe, Polymem roll, Plurogel, supplies stored on unit, discussed with RN, and discussed with patient       Pressure Injury Location: Right heel        Last photo: 7/28/23  Wound type: Pressure Injury     Pressure Injury Stage: present on admission      Wound history/plan of care:   Unknown history. Present on admission, and WOC team followed during patient's recent admission. Dressed with 4x4 gauze pad and tape.    Wound base: marbled red and pink tissue with overlying white/gray, fibrinous tissue     Palpation of the wound bed:  spongy       Drainage: small     Description of drainage: serous and green     Measurements (length x width x depth, in cm) 1.5  x 2.2  x  0.3 cm      Tunneling N/A     Undermining N/A  Periwound skin: Scar tissue      Color: pink      Temperature: normal   Odor: none  Pain: Irritable or crying out at intervals and unable to assess due to  language barrier , intermittent  Pain intervention prior to dressing change: oral medication and slow and gentle cares   Treatment goal: Drainage control, Infection control/prevention, Increase granulation, and Protection  STATUS: initial assessment  Supplies ordered: gathered, at bedside, and discussed with RN    My PI Risk Assessment     Sensory Perception: 3 - Slightly Limited     Moisture: 3 - Occasionally moist      Activity: 1 - Bedfast      Mobility: 2 - Very limited     Nutrition: 2 - Probably inadequate      Friction/Shear: 1 - Problem     TOTAL: 12     Treatment Plan:     Lower extremity wound(s): Every other day and PRN for soiling  Cleanse legs with Vashe soaked gauze. Soak a Kerlix roll with Vashe and wrap leg. Allow to soak for about 10 minutes.   Remove gauze and let leg dry.  Cut pieces of Polymem foam to fit over open, bleeding/draining wounds.   Spread a layer of Plurogel directly on the foam using a tongue depressor. Apply foam to wounds.  5.  Wrap legs with Kerlix to hold foam in place. Secure with tape.    Right  "Heel wound: Every other day and PRN if dressing is damaged  Cleanse with wound cleanser and gauze. Ensure all Iodosorb is removed from wound bed.  Swab anita-wound area with no-sting barrier and allow to dry.  Apply a dime sized amount of Iodosorb gel (brown gel) to 4x4 Mepilex and cover wound.   Initial and date.    Pressure Injury Prevention (PIP) Plan:  -If patient is declining pressure injury prevention interventions: Explore reason why and address patient's concerns, Educate on pressure injury risk and prevention intervention(s), If patient is still declining, document \"informed refusal\" , and Ensure Care team is aware ( provider, charge nurse, etc)  -Mattress: Follow bed algorithm, reassess daily and order specialty mattress, if indicated.  -HOB: Maintain at or below 30 degrees, unless contraindicated  -Repositioning in bed: Every 1-2 hours  and Raise foot of bed prior to raising head of bed, to reduce patient sliding down (shear)  -Heels: Keep elevated off mattress  -Protective Dressing: None  -Positioning Equipment:  pillows  -Chair positioning: Repositions self: patient to shift weight every 15 minutes, Assist patient to reposition hourly, and Do NOT use a donut for sitting (this increases pressure to smaller area and creates a higher potential for injury)    -Moisture Management: Perineal cleansing /protection: Follow Incontinence Protocol, Avoid brief in bed, and Moisturize dry skin  -Under Devices: Inspect skin under all medical devices during skin inspection , Ensure tubes are stabilized without tension, and Ensure patient is not lying on medical devices or equipment when repositioned     Orders: Written    RECOMMEND PRIMARY TEAM ORDER: None, at this time  Education provided: plan of care and wound progress  Discussed plan of care with: Patient, Family, and Nurse  [facility] nurse follow-up plan: weekly  Notify [facility] if wound(s) deteriorate.  Nursing to notify the Provider(s) and re-consult the [facility] Nurse if new " "skin concern.    DATA:     Current support surface: Standard  Standard gel/foam mattress (IsoFlex, Atmos air, etc)  Containment of urine/stool:  Not assessed today  BMI: There is no height or weight on file to calculate BMI.   Active diet order: Orders Placed This Encounter      Combination Diet Regular Diet     Output: I/O last 3 completed shifts:  In: -   Out: 250 [Urine:250]     Labs:   Recent Labs   Lab 07/27/23  1900   HGB 11.7*   WBC 7.6     Pressure injury risk assessment:   Sensory Perception: 3-->slightly limited  Moisture: 3-->occasionally moist  Activity: 2-->chairfast  Mobility: 2-->very limited  Nutrition: 2-->probably inadequate  Friction and Shear: 2-->potential problem  Tushar Score: 14    Lulu Joe RN, CWON  Please contact via [employer] at name or group \"[facility] nurse\"- M-F [phone]  Leave VM @ [phone] for non-urgent needs. Checked occasionally M-F.   "

## 2023-07-28 NOTE — PHARMACY-ADMISSION MEDICATION HISTORY
Pharmacist Admission Medication History    Admission medication history is complete. The information provided in this note is only as accurate as the sources available at the time of the update.    Medication reconciliation/reorder completed by provider prior to medication history? No    Information Source(s): Facility ([facility]/) medication list/MAR via N/A    Pertinent Information: None    Changes made to PTA medication list:  Added: None  Deleted: None  Changed: None    Allergies reviewed with patient and updates made in EHR: no    Medication History Completed By: Chantale Fowler RPH 7/27/2023 10:06 PM    Prior to Admission medications    Medication Sig Last Dose Taking? Auth Provider Long Term End Date   acetaminophen (TYLENOL) 500 MG tablet Take 1,000 mg by mouth every 8 hours as needed for pain  Yes Unknown, Entered By History     albuterol (PROAIR HFA/PROVENTIL HFA/VENTOLIN HFA) 108 (90 Base) MCG/ACT inhaler Inhale 2 puffs into the lungs every 4 hours as needed for shortness of breath or wheezing  Yes Unknown, Entered By History Yes    aspirin 81 MG EC tablet Take 81 mg by mouth daily 7/26/2023 Yes Unknown, Entered By History     Carboxymethylcellulose Sodium (REFRESH LIQUIGEL) 1 % GEL Apply 1 drop to eye 4 times daily 7/26/2023 Yes Unknown, Entered By History     diclofenac (VOLTAREN) 1 % topical gel Apply topically 2 times daily 7/26/2023 Yes Unknown, Entered By History     docusate sodium (COLACE) 100 MG capsule Take 100 mg by mouth 2 times daily 7/26/2023 Yes Unknown, Entered By History     dutasteride (AVODART) 0.5 MG capsule Take 1 capsule (0.5 mg) by mouth daily 7/26/2023 Yes Radhames Glez MD     folic acid (FOLVITE) 1 MG tablet Take 1 mg by mouth daily 7/26/2023 Yes Unknown, Entered By History     furosemide (LASIX) 20 MG tablet Take 20 mg by mouth daily 7/26/2023 Yes Unknown, Entered By History Yes    lisinopril (ZESTRIL) 2.5 MG tablet Take 2.5 mg by mouth daily 7/26/2023 Yes Unknown,  Entered By History Yes    metoprolol succinate ER (TOPROL XL) 25 MG 24 hr tablet Take 1 tablet (25 mg) by mouth daily 7/26/2023 Yes Radhames Glez MD Yes    OLANZapine zydis (ZYPREXA) 5 MG ODT Take 1 tablet (5 mg) by mouth nightly as needed for agitation  Yes Radhames Glez MD Yes    oxyCODONE IR (ROXICODONE) 5 MG tablet Take 0.5 tablets (2.5 mg) by mouth every 4 hours as needed for moderate pain 7/26/2023 Yes Radhames Glez MD No    pantoprazole (PROTONIX) 40 MG EC tablet Take 1 tablet (40 mg) by mouth 2 times daily (before meals) 7/26/2023 Yes Radhames Glez MD     polyethylene glycol (MIRALAX) 17 GM/Dose powder Take 17 g by mouth daily 7/26/2023 Yes Radhames Glez MD     potassium chloride ER (K-TAB/KLOR-CON) 10 MEQ CR tablet Take 20 mEq by mouth daily 7/26/2023 Yes Unknown, Entered By History     senna-docusate (SENOKOT-S/PERICOLACE) 8.6-50 MG tablet Take 1 tablet by mouth 2 times daily 7/26/2023 Yes Unknown, Entered By History     tamsulosin (FLOMAX) 0.4 MG capsule Take 0.4 mg by mouth daily 7/26/2023 Yes Unknown, Entered By History     thiamine 50 MG TABS Take 1 tablet (50 mg) by mouth daily 7/26/2023 Yes Radhames Glez MD

## 2023-07-28 NOTE — PROVIDER NOTIFICATION
MD Notification    Notified Person: MD    Notified Person Name: Dr. Stroud    Notification Date/Time: 0740, 7/28/23    Notification Interaction: Page    Purpose of Notification: Pt is refusing to go down to US and is refusing medications at the moment. Told me to leave the room, I will attempt to educate him about US and to take meds in an hour.    Orders Received:    Comments:

## 2023-07-28 NOTE — ED NOTES
[facility]  ED Nurse Handoff Report    ED Chief complaint: Fall      ED Diagnosis:   Final diagnoses:   Fall, initial encounter   Acute cystitis with hematuria   Generalized weakness       Code Status: Full Code    Allergies:   Allergies   Allergen Reactions    Gabapentin        Patient Story: Patient brought in by ambulance from nursing home after unwitnessed fall out of this bed morning.   Recent admission for urosepsis and bacteremia.  Focused Assessment:  Left rib pain.  Imaging negative for fractures. UA shows UTI.    Treatments and/or interventions provided: IV antibiotics  Patient's response to treatments and/or interventions: No change at this time.    To be done/followed up on inpatient unit:  IV antibiotics    Does this patient have any cognitive concerns?:  none    Activity level - Baseline/Home:  Independent  Activity Level - Current:   Stand with Assist    Patient's Preferred language: Armenian   Needed?: Yes    Isolation: None  Infection: Not Applicable  Patient tested for COVID 19 prior to admission: NO  Bariatric?: No    Vital Signs:   Vitals:    07/27/23 1815 07/27/23 1830 07/27/23 1845 07/27/23 1900   BP: 107/69 93/55 111/63 100/64   Pulse: 92 85 89 88   Resp:       Temp:       TempSrc:       SpO2: 99%          Cardiac Rhythm:     Was the PSS-3 completed:   Yes  What interventions are required if any?               Family Comments: None  OBS brochure/video discussed/provided to patient/family: No              Name of person given brochure if not patient: na              Relationship to patient: na    For the majority of the shift this patient's behavior was Green.   Behavioral interventions performed were none.    ED NURSE PHONE NUMBER: [phone]

## 2023-07-28 NOTE — PROGRESS NOTES
RECEIVING UNIT ED HANDOFF REVIEW    ED Nurse Handoff Report was reviewed by: Chanatle Mittal RN on July 27, 2023 at 10:51 PM

## 2023-07-28 NOTE — PLAN OF CARE
00:  Pt is alert to self. Finnish speaking, but able to speak some English. Agitated due to wanting his cell phone, which is at his nursing home. VSS on RA. Chronic wounds to BLE, WOC consult put in. Legs elevated on pillows. T/R while in bed, but Pt refused. Jesu put in down in ED due to retention and difficulty w/ straight cath. Regular diet w/ BG checks. Discharge plan pending.

## 2023-07-28 NOTE — PROGRESS NOTES
Orientation/Cognitive: A/Ox1, Nepalese speaking  Mobility Level/Assist Equipment: Ax2, lift  Fall Risk (Y/N): yes  Behavior Concerns: pt agitated, yelling and refusing cares  Pain Management: no pain reported  Tele/VS/O2: pt refused vitals, pt on RA  ABNL Lab/BG: pt refused labs, MD notified  Diet: Reg  Bowel/Bladder: antunez in place for retention, no BM  Skin Concerns: bilateral chronic leg wounds covered in gauze, right arm is edematous  Drains/Devices: Right PIV, antunez  Tests/Procedures for next shift: Right ankle xray and right arm US ordered  Anticipated DC date & active delays: tbd

## 2023-07-28 NOTE — H&P
[facility]    History and Physical  Hospitalist       Date of Admission:  7/27/2023  Date of Service (when I saw the patient): 07/27/23    ASSESSMENT  Joselin Sanchez is a markedly pleasant 89 year old gentleman with extensive past medical history that is most notable for chronic dementia, atrial fibrillation, BPH, and recent hospitalization for sepsis due to UTI, afib with RVR, acute stroke, and acute upper GI bleeding; who presents with a fall and confusion and is found to have suspected acute metabolic encephalopathy due to UTI.    PLAN     Suspected acute metabolic encephalopathy due to UTI: Of note, Mr. Sanchez has a history reportedly notable for chronic dementia, chronic atrial fibrillation, and chronic lower extremity diabetic ulcerations. He was recently admitted here 6/28/2023 for altered mental status, and found to have severe sepsis due to UTI, with purulnt urine drained from a Nails catheter that was placed. CT of abdomen and pelvis showed prostatomegaly and chronic bladder outlet obstruction.Blood cultures grew Corynebacterium striatum, anaerococcus, and Staph pettenkoferi. Urine culture grew Candida Dubliniensis. ID was consulted, and he was treated with Zosyn, Augmentin, and Fluconazole. His hospital course was prolonged, and complicated by atrial fibrillation with RVR. He also developed acute left sided weakness and was found on imaging to have acute to early subacute ischemic stroke in the left occipital region. 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 Anticoagulation was attempted, but he then developed melena and acute blood loss anemia so that was stopped. GI was consulted and conservative management recommended in light of high risk of complications from endoscopy. BID PPI was started. Eventually ASA 81 mg was  introduced without further bleeding. Ethics and Palliative Care were consulted for ongoing care goal discussions with his niece and he is a Full Code. Eventually he was stabilized and discharged to a TCU on 7/17/2023.    Now, he presents from there after an unwitnessed fall. In the ED, he is afebrile. Initially BP 93/55, improved while in the ED without specific intervention. He is not hypoxic. WBC is normal. UA shows pyuria and hematuria. X-rays of pelvis and hips, left shoulder, chest and ribs show no fractures or other acute pathology. CT of head shows subacute to chronic infarct in the left occipital lobe without acute pathology. Overall therefore Mr. Sanchez's symptoms seem consistent with acute metabolic encephalopathy due to complex UTI.      -- Inpatient. Fall precautions. Empiric Ceftriaxone continued. Follow up blood and urine cultures.    -- Cautious IV NS 75 ml/hour ordered overnight with a 10 hour stop date for clinical reassessment    -- Tylenol and low dose Oxycodone continued as needed for pain    -- Will also order x-rays of right ankle to rule out fracture there    -- Nails placed in the ED; maintain for now but try to remove in AM if possible    -- Alpha blocker and Avodart continued for BPH    Right upper extremity edema, acute: Cause unclear.     -- Will order x-rays and US of the extremity for further evaluation    KIMO: On CKD Stage 3: Suspect due to hypovolemia.  IVF cautiously ordered as above. Repeat BMP in AM.    Recent Labs   Lab Test 07/27/23  1900 07/17/23  0744 07/16/23  0515   CR 1.03 0.72 0.72       Chronic anemia, ongoing; recent suspected upper GI bleeding: Macrocytic. As above, recently treated for melena, and on ASA 81 mg. No current signs of new bleeding at present.       -- Repeat CBC this AM    -- BID PPI continued  Recent Labs   Lab Test 07/27/23  1900 07/09/23  1244 07/08/23  2248   HGB 11.7* 8.6* 8.7*      Recent ischemic stroke; chronic partially thrombosed ductus arteriouus  aneurysm As above.    -- ASA 81 mg continued    Chronic dementia: with behavioral disturbance.    -- PRN Zyprexa continued    Atrial fibrillation: Heart rates seem regular currently.    -- Toprol XL continued    Chronic systolic CHF: Recent TTE as above. He also has Hypertension.    -- Outpatient Lasix and Lisinopril held for now for hypotension and KIMO    Type 2 Diabetes mellitus:   Recent Labs   Lab Test 06/28/23  1250   A1C 5.8*   .Now diet controlled as an outpatient.    -- ISS insulin while hospitalized.      Chronic right lower extremity ulcerations due to Lymphedema, PAD and Diabetes: Reportedly he was admitted at [facility] in [place], [place] from 3/27/23 through 4/4/2023 for a polymicrobial bacterial infection of his legs. He underwent surgical debridement and treatment with Zosyn there. On his recent admission here above, Podiatry was consulted and x-rays of the right foot showed no clear evidence of osteomyelitis. His current wounds do not at present seem to be infected superficially.    -- WOC consulted    I have spent 120 minutes on the date of service doing chart review, history, examination, documentation, and further activities per the note.    Chief Complaint   Fall    History is obtained from the patient and the ED physician whom I have spoken with    History of Present Illness   Joselin Sanchez is a markedly pleasant 89 year old gentleman who is [place] speaking and interviewed with the aid of a professional phone . He presents with after a fall at his TCU earlier today. Per the EMS note it seems they had been called to see him this morning after an unwitnessed fall out of bed; he reportedly declined transfer to the ED at that time, but they were subsequently called back this evening after he developed acute pain in the right hip and leg. On my interview Mr. Sanchez is disoriented to time, and says that he was at home today and reached for some items on the shelf and they fell onto  him. He is able to recall being told by the ED team tonight that he may have a UTI, and endorses recent dysuria. He also affirms ongoing pain in both ankles. He says he has chronic pain in the left calf areaa where he has chronic ulcerations. His right arm appears swollen and he is unsure how that happened. He says he has significant insomnia. It seems that his niece was here earlier and mentioned to the ED team that she thinks the facility he is currently at does not take sufficient care of him. He otherwise denies fever, chills, sweats, nausea, hematuria, or any other acute complaints.    In the ED, T 97.2, BP 93/55, Pulse 91, RR 18, Sats 100% on room air.    CBC and BMP were notable for HGB 11.7, , otherwise were within the normal reference range. WBC was 7.6. Glucose 121. Initial UA was reportedly dirty; repeated UA from a catheterized specimen showed 12 WBC, 30 RBC, Large blood, large LE, negative nitrite. Blood and urine cultures were sent.    IV Ceftriaxone was given in the ED.    Recent Results (from the past 24 hour(s))   XR Shoulder Left G/E 3 Views    Narrative    EXAM: XR SHOULDER LEFT G/E 3 VIEWS, XR RIBS AND CHEST LEFT 3 VIEWS  LOCATION: [facility]  DATE: 7/27/2023    INDICATION: Unwitnessed fall out of bed.    COMPARISON: 7/1/2023.      Impression    IMPRESSION: Superior humeral head migration is likely related to a chronic full-thickness rotator cuff tear. There is no evidence of an acute fracture about the left shoulder. Moderate glenohumeral joint degenerative change.    There is no radiographic evidence of an acute displaced left-sided rib fracture.    No evidence of pneumonia or pulmonary edema. Heart size is borderline. Atherosclerotic vascular calcifications and tortuosity of the aorta, which is prominent at the aortic arch where there is a focal increased density related to the patient's known   history of a thrombosed saccular aneurysm as seen on recent CT. No  pneumothorax. Small bilateral pleural effusions.     XR Pelvis and Hip Bilateral 2 Views    Narrative    EXAM: XR PELVIS AND HIP BILATERAL 2 VIEWS  LOCATION: [facility]  DATE: 7/27/2023    INDICATION: Unwitnessed fall out of bed.  COMPARISON: None.      Impression    IMPRESSION: Bones are demineralized. There is no radiographic evidence of an acute displaced fracture. No dislocation. Hip joint spaces are maintained.   Ribs XR, unilat 3 views + PA chest,  left    Narrative    EXAM: XR SHOULDER LEFT G/E 3 VIEWS, XR RIBS AND CHEST LEFT 3 VIEWS  LOCATION: [facility]  DATE: 7/27/2023    INDICATION: Unwitnessed fall out of bed.    COMPARISON: 7/1/2023.      Impression    IMPRESSION: Superior humeral head migration is likely related to a chronic full-thickness rotator cuff tear. There is no evidence of an acute fracture about the left shoulder. Moderate glenohumeral joint degenerative change.    There is no radiographic evidence of an acute displaced left-sided rib fracture.    No evidence of pneumonia or pulmonary edema. Heart size is borderline. Atherosclerotic vascular calcifications and tortuosity of the aorta, which is prominent at the aortic arch where there is a focal increased density related to the patient's known   history of a thrombosed saccular aneurysm as seen on recent CT. No pneumothorax. Small bilateral pleural effusions.     Head CT w/o contrast    Narrative    EXAM: CT HEAD W/O CONTRAST  LOCATION: [facility]  DATE: 7/27/2023    INDICATION: Unwitnessed fall out of bed.    COMPARISON: Head CT 7/1/2023.    TECHNIQUE: Routine CT head without IV contrast. Multiplanar reformats. Dose reduction techniques were used.    FINDINGS:  INTRACRANIAL CONTENTS: No intracranial hemorrhage, extra-axial collection, or mass effect. Subacute to chronic infarction left occipital lobe with interval development of encephalomalacia/volume loss.  Moderate presumed chronic small vessel ischemic   changes. Moderate generalized volume loss. No hydrocephalus.     VISUALIZED ORBITS/SINUSES/MASTOIDS: No intraorbital abnormality. No paranasal sinus mucosal disease. No middle ear or mastoid effusion.    BONES/SOFT TISSUES: No acute abnormality.      Impression    IMPRESSION:  1.  No CT evidence for acute intracranial process.  2.  Subacute to chronic infarction left occipital lobe with developing encephalomalacia.  3.  Brain atrophy and presumed chronic microvascular ischemic changes as above.         PHYSICAL EXAM  Blood pressure 100/64, pulse 88, temperature 97.2  F (36.2  C), temperature source Temporal, resp. rate 18, SpO2 99 %.  Constitutional: Alert and oriented to person only; no apparent distress; very frail appearing  Respiratory: lungs clear to auscultation bilaterally  Cardiovascular: regular S1 S2  GI: abdomen soft non tender non distended bowel sounds positive  Musculoskeletal: extensive LLE tibial anterior and lateral ulcerations, dressed; RUE edematous     DVT Prophylaxis: Pneumatic Compression Devices  Code Status: Full Code    Disposition: Expected discharge in several days    Andrez Shankar MD, MD    Past Medical History    I have reviewed this patient's medical history and updated it with pertinent information if needed.   Past Medical History:   Diagnosis Date    Aneurysm (arteriovenous) of coronary vessels     Partially thrombosed saccular aneurysm of ductus arteriosus    Anxiety     Atrial fibrillation (H)     BPH (benign prostatic hyperplasia)     Shannen UTI 07/02/2023    Chronic heart failure, unspecified heart failure type (H)     Chronic pain     CKD (chronic kidney disease) stage 3, GFR 30-59 ml/min (H)     Dementia (H)     Dysphagia     Dysuria     GI bleed     Hypertension     Lymphedema     Mixed hyperlipidemia     Mood disturbance     Old age 07/02/2023    Year of birth 1924    SHEILA (obstructive sleep apnea)     Peripheral  vascular disease (H)     Pressure ulcer of heel     Stroke (H)     Type 2 diabetes mellitus (H)        Past Surgical History   I have reviewed this patient's surgical history and updated it with pertinent information if needed.  Past Surgical History:   Procedure Laterality Date    CATARACT EXTRACTION      PROSTATE BIOPSY      WOUND DEBRIDEMENT         Prior to Admission Medications   Prior to Admission Medications   Prescriptions Last Dose Informant Patient Reported? Taking?   Carboxymethylcellulose Sodium (REFRESH LIQUIGEL) 1 % GEL  Nursing Home Yes No   Sig: Apply 1 drop to eye 4 times daily   OLANZapine zydis (ZYPREXA) 5 MG ODT   No No   Sig: Take 1 tablet (5 mg) by mouth nightly as needed for agitation   acetaminophen (TYLENOL) 500 MG tablet  Nursing Home Yes No   Sig: Take 1,000 mg by mouth every 8 hours as needed for pain   albuterol (PROAIR HFA/PROVENTIL HFA/VENTOLIN HFA) 108 (90 Base) MCG/ACT inhaler  Nursing Home Yes No   Sig: Inhale 2 puffs into the lungs every 4 hours as needed for shortness of breath or wheezing   aspirin 81 MG EC tablet  Nursing Home Yes No   Sig: Take 81 mg by mouth daily   diclofenac (VOLTAREN) 1 % topical gel  Nursing Home Yes No   Sig: Apply topically 2 times daily   docusate sodium (COLACE) 100 MG capsule  Nursing Home Yes No   Sig: Take 100 mg by mouth 2 times daily   dutasteride (AVODART) 0.5 MG capsule   No No   Sig: Take 1 capsule (0.5 mg) by mouth daily   folic acid (FOLVITE) 1 MG tablet  Nursing Home Yes No   Sig: Take 1 mg by mouth daily   furosemide (LASIX) 20 MG tablet  Nursing Home Yes No   Sig: Take 20 mg by mouth daily   lisinopril (ZESTRIL) 2.5 MG tablet  Nursing Home Yes No   Sig: Take 2.5 mg by mouth daily   metoprolol succinate ER (TOPROL XL) 25 MG 24 hr tablet   No No   Sig: Take 1 tablet (25 mg) by mouth daily   oxyCODONE IR (ROXICODONE) 5 MG tablet   No No   Sig: Take 0.5 tablets (2.5 mg) by mouth every 4 hours as needed for moderate pain   pantoprazole  (PROTONIX) 40 MG EC tablet   No No   Sig: Take 1 tablet (40 mg) by mouth 2 times daily (before meals)   polyethylene glycol (MIRALAX) 17 GM/Dose powder   No No   Sig: Take 17 g by mouth daily   potassium chloride ER (K-TAB/KLOR-CON) 10 MEQ CR tablet  Nursing Home Yes No   Sig: Take 20 mEq by mouth daily   senna-docusate (SENOKOT-S/PERICOLACE) 8.6-50 MG tablet  Nursing Home Yes No   Sig: Take 1 tablet by mouth 2 times daily   tamsulosin (FLOMAX) 0.4 MG capsule  Nursing Home Yes No   Sig: Take 0.4 mg by mouth daily   thiamine 50 MG TABS   No No   Sig: Take 1 tablet (50 mg) by mouth daily      Facility-Administered Medications: None     Allergies   Allergies   Allergen Reactions    Gabapentin        Social History   I have reviewed this patient's social history and updated it with pertinent information if needed. Joselin Sanchez      Family History   Family history assessed and, except as above, is non-contributory.    History reviewed. No pertinent family history.    Review of Systems   The 10 point Review of Systems is negative other than noted in the HPI or here.     Primary Care Physician   Alistair Ross    Data   Labs Ordered and Resulted from Time of ED Arrival to Time of ED Departure   ROUTINE UA WITH MICROSCOPIC REFLEX TO CULTURE - Abnormal       Result Value    Color Urine Yellow      Appearance Urine Clear      Glucose Urine Negative      Bilirubin Urine Negative      Ketones Urine Negative      Specific Gravity Urine 1.013      Blood Urine Trace (*)     pH Urine 5.5      Protein Albumin Urine Negative      Urobilinogen Urine Normal      Nitrite Urine Negative      Leukocyte Esterase Urine Large (*)     Mucus Urine Present (*)     RBC Urine 5 (*)     WBC Urine 46 (*)     Hyaline Casts Urine 5 (*)    BASIC METABOLIC PANEL - Abnormal    Sodium 136      Potassium 4.7      Chloride 100      Carbon Dioxide (CO2) 26      Anion Gap 10      Urea Nitrogen 11.0      Creatinine 1.03      Calcium 9.4      Glucose 121 (*)      GFR Estimate 65     CBC WITH PLATELETS AND DIFFERENTIAL - Abnormal    WBC Count 7.6      RBC Count 3.65 (*)     Hemoglobin 11.7 (*)     Hematocrit 37.7 (*)      (*)     MCH 32.1      MCHC 31.0 (*)     RDW 17.2 (*)     Platelet Count 182      % Neutrophils 62      % Lymphocytes 27      % Monocytes 9      % Eosinophils 1      % Basophils 0      % Immature Granulocytes 1      NRBCs per 100 WBC 0      Absolute Neutrophils 4.7      Absolute Lymphocytes 2.1      Absolute Monocytes 0.7      Absolute Eosinophils 0.1      Absolute Basophils 0.0      Absolute Immature Granulocytes 0.1      Absolute NRBCs 0.0     ROUTINE UA WITH MICROSCOPIC REFLEX TO CULTURE - Abnormal    Color Urine Yellow      Appearance Urine Clear      Glucose Urine Negative      Bilirubin Urine Negative      Ketones Urine Negative      Specific Gravity Urine 1.012      Blood Urine Large (*)     pH Urine 5.5      Protein Albumin Urine Negative      Urobilinogen Urine Normal      Nitrite Urine Negative      Leukocyte Esterase Urine Large (*)     RBC Urine 30 (*)     WBC Urine 12 (*)    URINE CULTURE   BLOOD CULTURE   BLOOD CULTURE       Data reviewed today:  I personally reviewed the chest x-ray image(s) showing no acute pathology and the head CT image(s) showing no acute pathology .

## 2023-07-29 NOTE — PLAN OF CARE
"Goal Outcome Evaluation:      Plan of Care Reviewed With: patient, durable power of     Orientation: Alert to self, family, combative, refuses care, Wants EDUARDO Leslie (Niece) to be present for dressing change , antunez care, , combative at times    Vitals/TeleVSS on RA, Tylenol x 1 for pain    IV Access/drains: MARY SL, Intermittent antibiotics    Diet: Regular, poor appetite, can feed himself, Refusing BG checks    Mobility: Lift, refuses Repositioning    GI/: Chronic antunez in use, refused Bowel regimen this shift    Wound/Skin: R heel wound    Consults: WOC following with every other day  wound care, R heel wound dressing done by WOC today ( Family BELKIS was present, BLE wound care/dressing refused from primary RN, stated \"tomorrow 8 am\")    Discharge Plan: pending clinical improvement      See Flow sheets for assessment                    "

## 2023-07-29 NOTE — PLAN OF CARE
"Goal Outcome Evaluation:    A&OX2, hxt of dementia. VSS on RA. Calm and pleasant this shift but still continues to decline turn and repo, wound cares despite education about possible infection to which pt says, \" Let me suffer through it\". Asking to remove antunez catheter. MD contacted, pt with low UO, not eating or drinking as much. MD wants to continue antunez catheter. Skin assessments declined as well. On regular diet, needs help with ordering. BG stable. No coverage needed this shift. PIV Sled. RUE continues to be edematous. Continue to monitor.    P.S MD wants to remove the antunez now. Please ensure antunez removal today.                       "

## 2023-07-29 NOTE — PROGRESS NOTES
Notified provider about indwelling antunez catheter discussed removal or continued need.    Did provider choose to remove indwelling antunez catheter? No    Provider's antunez indication for keeping indwelling antunez catheter: Retention.    Is there an order for indwelling antunez catheter? Yes    *If there is a plan to keep antunez catheter in place at discharge daily notification with provider is not necessary, but please add a notation in the treatment team sticky note that the patient will be discharging with the catheter.     MD Dr Stroud contacted about clarifications on antunez orders. There is an order from yesterday for antunez removal. It has not been removed and in MD's note, it says to continue the antunez. Pt with low UO overnight as well. Not eating and drinking much. Is the plan still to remove antunez? Awaiting response from MD

## 2023-07-29 NOTE — PLAN OF CARE
Goal Outcome Evaluation:    Pt A&OX2, base line dementia. VSS on RA except soft BP.pt refused skin assessment and reposition. BLE wound. Nails removed today. BG check. Denies pain at rest.Will continue to monitor.

## 2023-07-29 NOTE — PLAN OF CARE
Goal Outcome Evaluation:       Patient remains stable with vitals in the normal range. Alert but disoriented to  Situation, time and place. Communication is impeded by language barrier. Denies pain except he is moved for care. Refusing care including repositioning and skin assessment. Appears frustrated at times when his message does not get across. Guarding behavior when attempting antunez care. Lower extremity wound but patient is adamant to refuse wound care. Lift to transfer. Discahrge pending placement.

## 2023-07-29 NOTE — PROGRESS NOTES
[facility] [place]    Medicine Progress Note - Hospitalist Service    Date of Admission:  7/27/2023    Assessment & Plan     Joselin Sanchez is a 89-year-old male with dementia, atrial fibrillation, BPH, urinary retention, recent ischemic stroke and recent upper GI bleed who presented on 7/27/2023 from his care facility due to unwitnessed fall.  He was felt to be confused and have UTI.    He was recently hospitalized from 6/28-7/17 due to severe sepsis secondary to UTI.  CT A/P showed prostamegaly and chronic bladder outlet obstruction.  Blood cultures grew corynebacterium striated him, Anaerococcus, Staphylococcus pettenkoferi.  Urine culture grew Candida Dubliniensis.  Per ID he was treated with IV Zosyn, Augmentin, fluconazole.  Hospital stay was complicated by new onset A-fib with RVR and acute/early subacute ischemic stroke involving left occipital lobe.  Echo showed EF 40-45% with global hypokinesia.  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  Anticoagulation was attempted but patient developed GI bleed and acute blood loss anemia.  GI recommended conservative management due to high risk of complications with endoscopy.  He was started on PPI.  Eventually aspirin 81 mg was introduced without further bleeding.  Ethics and palliative care were consulted for goals of care discussion.  His knees is the decision maker.  He stabilized and was discharged to TCU on 7/17/2023.    Patient now presented with unwitnessed fall.  Afebrile, initial blood pressure 93/55 that improved without intervention.  Not hypoxic.  No leukocytosis.  UA showed mild pyuria and hematuria.  X-rays of hips, left shoulder, chest x-ray and ribs showed no acute fractures or acute pathology.  Head CT showed subacute/chronic infarct in left occipital lobe.    Presentation felt to be due to acute metabolic encephalopathy and  probable UTI.    Acute metabolic encephalopathy   Probable acute cystitis with microscopic hematuria  -Antunez catheter placed in ER.  UA showed 46 WBCs, trace blood, large leukocyte esterase, negative nitrites  -Urine culture and blood cultures pending  -Continue IV ceftriaxone  - remove Antunez    Dementia with behavioral disturbance  Unwitnessed fall at care facility, 7/27  -patient had an unwitnessed fall at his care facility.  He is nonambulatory at baseline  -No injuries.  -X-ray ribs and chest showed no infiltrate, no fractures, no pulmonary edema  -X-ray pelvis with bilateral hips-showed demineralized bones, no fractures or dislocation  -X-ray left shoulder showed superior humeral head migration likely due to chronic full-thickness rotator cuff tear, unchanged from before.  No acute fractures.  -Fall precautions  -As needed Tylenol and low-dose oxycodone for pain  -As needed Zyprexa    Superficial thrombophlebitis of right basilic vein, 7/28  - supportive care      BPH  Chronic urinary retention  S/p Antunez placement, 7/27/2023  - remove antunez for voiding trial.  Continue Avodart and Flomax    Acute kidney injury on top of chronic kidney disease stage III  -Creatinine increased from 0.7 to 1.03  -Likely prerenal.  Received IV fluids overnight.  Now discontinued  -Monitor renal function    Chronic lower extremity wounds-due to lymphedema, peripheral arterial disease and diabetes  -Was hospitalized at [facility] in [place], [place] from 3/27-4/4/2023 for polymicrobial infection of legs.  He underwent surgical debridement and was treated with IV Zosyn.  -On most recent admission few weeks ago, podiatry was consulted.  X-ray of right foot showed no clear evidence of osteomyelitis.  -Wounds do not appear to be infected  -Consulted WOC, wound care per WOC    Recent GI bleed  Subacute anemia  -Developed melena during recent hospitalization when anticoagulation was initiated.  GI was consulted and did  not recommend endoscopies due to high risk of complications.    -Hemoglobin improved from before, now 11.7   -monitor CBC  -Continue PPI      Chronic paroxysmal atrial fibrillation  -Continue Toprol-XL  -Not on anticoagulation due to recent GI bleed    Recent left occipital ischemic stroke, 7/2023  -Continue aspirin 81 mg  -Head CT on admission showed no acute changes.  Subacute/chronic left occipital lobe infarction with developing encephalomalacia    Chronic systolic CHF with reduced EF  Essential hypertension  -Lasix and lisinopril on hold due to mild hypotension and mild KIMO    Diabetes mellitus type 2, diet controlled, A1c 5.8 on 6/28/2023  -Sliding scale ordered    Palliative care and ethics committee were consulted during previous admission as it seems patient's goals of care did not align with his Niece's goals of care.  She has kept him full code and wanting aggressive treatments.  She has now raised concerns about patient returning to his current living facility.  Discussed with niece.  She has reviewed videos of CPR and has decided against CPR. She is okay with prearrest intubation.  She wants all treatments continued up until the time of death.           Diet: Combination Diet Regular Diet    DVT Prophylaxis: Pneumatic Compression Devices  Nails Catheter: PRESENT, indication: Retention  Lines: None     Cardiac Monitoring: None  Code Status: No CPR- Pre-arrest intubation OK      Clinically Significant Risk Factors                                      Disposition Plan     Expected Discharge Date: 07/31/2023                  Montse Stroud MD  Hospitalist Service  [employer]  Securely message with [employer] (more info)  Text page via [facility] Paging/Directory   ______________________________________________________________________    Interval History   Patient has been declining cares and medications.    Has not been eating and drinking well  Has pain in right ankle      Physical Exam    Vital Signs: Temp: 97.6  F (36.4  C) Temp src: Axillary BP: 96/58 Pulse: 83   Resp: 18 SpO2: (!) 84 % O2 Device: None (Room air)    Weight: 0 lbs 0 oz    Constitutional - awake and alert, resting in bed, in no acute distress  Moving all 4 extremities, normal speech, no focal deficits  Lower extremity wounds     Medical Decision Making       ------------------ MEDICAL DECISION MAKING ------------------------------------------------------------------------------------------------------  Medical complexity over the past 24 hours:  -------------------------- MODERATE RISK FOR MORBIDITY --------------------------------------------------      Data         Imaging results reviewed over the past 24 hrs:   No results found for this or any previous visit (from the past 24 hour(s)).

## 2023-07-30 NOTE — PROVIDER NOTIFICATION
Brief update:    Paged as Nails catheter removed this evening.  Nursing attempting to do bladder scan 4 hours after removal to assess for retention and patient is refusing, reports he does not feel that he needs to urinate.    Can attempt bladder scan when patient is more cooperative, otherwise monitor for any discomfort or incontinence.  If incontinent, consider weighing pads for output estimation.    Valentino Marcus MD  12:20 AM

## 2023-07-30 NOTE — CONSULTS
Care Management Initial Consult    General Information  Assessment completed with: Other (Niece), Mariama  Type of CM/SW Visit: Initial Assessment    Primary Care Provider verified and updated as needed:     Readmission within the last 30 days: current reason for admission unrelated to previous admission      Reason for Consult: discharge planning  Advance Care Planning: Advance Care Planning Reviewed: present on chart          Communication Assessment  Patient's communication style: spoken language (English or Bilingual) (Niece is )        Wear Glasses or Blind: yes    Cognitive  Cognitive/Neuro/Behavioral: .WDL except, orientation  Level of Consciousness: alert, confused  Arousal Level: opens eyes spontaneously  Orientation: disoriented to, time, situation  Mood/Behavior: calm  Best Language: 0 - No aphasia  Speech: clear, spontaneous    Living Environment:   People in home: facility resident     Current living Arrangements: residential facility  Name of Facility: [facility]   Able to return to prior arrangements: yes       Family/Social Support:  Care provided by: other (see comments) (staff)  Provides care for: no one, unable/limited ability to care for self      (Niece is [facility])          Description of Support System:           Current Resources:   Patient receiving home care services: No     Community Resources: Transportation Services  Equipment currently used at home: lift device (harman lift)  Supplies currently used at home: Wound Care Supplies    Employment/Financial:  Employment Status:          Financial Concerns:             Does the patient's insurance plan have a 3 day qualifying hospital stay waiver?  Yes   Will the waiver be used for post-acute placement? Yes    Lifestyle & Psychosocial Needs:  Social Determinants of Health     Tobacco Use: Not on file   Alcohol Use: Not on file   Financial Resource Strain: Not on file   Food Insecurity: Not on file   Transportation Needs: Not on file  "  Physical Activity: Not on file   Stress: Not on file   Social Connections: Not on file   Intimate Partner Violence: Not on file   Depression: Not on file   Housing Stability: Not on file       Functional Status:  Prior to admission patient needed assistance:   Dependent ADLs:: Bathing, Dressing, Grooming, Incontinence, Positioning, Transfers, Wheelchair-with assist  Dependent IADLs:: Cleaning, Cooking, Laundry, Shopping, Meal Preparation, Medication Management, Money Management, Transportation, Incontinence       Mental Health Status:          Chemical Dependency Status:                Values/Beliefs:  Spiritual, Cultural Beliefs, Zoroastrianism Practices, Values that affect care:                 Additional Information:  Spoke with niken and [facility] Mariama for initial consult/discharge planning. Pt was admitted on 7/27/23 for a fall, confusion, and suspected acute metabolic encephalopathy due to UTI. Pt is a resident at [facility] and was recently at [facility] from 6/28-7/17. Eliu appealed discharge last hospital stay, as she was concerned with cares received at facility. Appeal was declined and pt returned to [facility]. Per MD, plan for discharge for tomorrow. Spoke to Mariama regarding potential discharge for tomorrow. Mariama asked if SW could help in locating another facility for pt, and said \"have you read the chart from last time.\" SW stated they had, but since it is a Sunday and pt is likely medically ready tomorrow, not feasible to locate another LTC and will have to return to facility. Mariama became frustrated and stated \"I don't know why you guys even call me then.\" \"He has rights.\" \"I have nothing else to say to you.\" ERI stated eliu is [facility] and want to make sure she is updated on discharge plans. Eliu stated \"you can just do whatever you want and send him back then.\"     Scheduled  Health stretcher transport for tomorrow (stretcher due to pt alert to self only). Transport window is [phone]. "     Call to [facility] admissions and left  for Josiane to update on potential discharge/transport time.     NAOMI Urrutia  Social Work  [facility]

## 2023-07-30 NOTE — PLAN OF CARE
Goal Outcome Evaluation:         [id number]:    A/Ox2-3. Slovak speaking but can understand simple English. VSS on RA. Pt is particular with cares and refused skin assessment this shift. Continent, using urinal bedside, no BM. BG ACHS, insulin per sliding scale. On reg diet, fair appetite. Q2h T/R as pt allows, pt refused all turn/repo this shift. Legs elevated on pillow. Chronic wounds to BLE, done from previous shift per WOC orders.  Discharge back to nursing home tomorrow 7/31 , ride set up around noon per SW note.

## 2023-07-30 NOTE — PLAN OF CARE
Goal Outcome Evaluation:  A&OX2-3, redirectable. Baseline dementia. Comoran speaking,  in use. CNA spoke Comoran which helped. Pt still refusing cares and repos, after much encouragement from the CNA, pt finally agreeable to getting washed up and doing wound cares. Pt was found sitting in a pool of stool and urine, full bedding change and bed bath done. Wound cares to the BLE and R heel done per POC. No open wounds on the buttocks, preventative foam dressing placed on the bottom. BLE elevated on pillows. On regular diet. Poor appetite. BG remained stable although on the lower side. Plan to discharge back to the old nursing home tomorrow. Ride already set up, please refer to SW notes. PIV Sled.VSS on RA. Oxy X1 given for leg pain.Continue to monitor.

## 2023-07-30 NOTE — PLAN OF CARE
Goal Outcome Evaluation:       Pt is alert to self. Baseline Dementia. Regular diet. Assist x2 with lift. Nails was removed last night around 5pm. Attempted to bladder scan around 9pm and pt refused. Pt has yet to void. MD notified. BG check was 75. Encouraged pt to eat and he did not want too. Pt is consistently refusing repositioning and wound cares to LE's. Right arm/hand has some swelling from superficial thrombophlebitis but seems to be improving. Discharge pending improvement.

## 2023-07-31 NOTE — PLAN OF CARE
Goal Outcome Evaluation:       Pt alert to self. Baseline Dementia. Regular diet. Assist x 2 lift. R PIV Sl'd. Pt kept asking to remove IV overnight. Pt refused assessment, repositioning, and BG checks. Stated he wanted to be left alone. Urinal at bedside. Plan is to discharge today back to [facility] between [id number]. Ride is already setup.

## 2023-07-31 NOTE — PROGRESS NOTES
Care Management Discharge Note    Discharge Date: 07/31/2023       Discharge Disposition: Long Term Care    Discharge Services: Transportation Services    Discharge DME: None    Discharge Transportation: agency    Private pay costs discussed: Not applicable    Does the patient's insurance plan have a 3 day qualifying hospital stay waiver?  No    PAS Confirmation Code:    Patient/family educated on Medicare website which has current facility and service quality ratings: no    Education Provided on the Discharge Plan:    Persons Notified of Discharge Plans: family, facility, MD, nursing   Patient/Family in Agreement with the Plan: yes    Handoff Referral Completed: Yes    Additional Information:  Discharge orders received and faxed to TCU. Family updated 7/30 on discharge for today.     JENN Brown, LGSW   Social Work   [facility]

## 2023-07-31 NOTE — CARE PLAN
Alert to self only. VSS. Denies pain. Up with lift. Incontinent of bladder. Discharge paperwork sent with pt as well as belongings.

## 2023-07-31 NOTE — DISCHARGE SUMMARY
[facility]  Hospitalist Discharge Summary      Date of Admission:  7/27/2023  Date of Discharge:  7/31/2023  1:20 PM  Discharging Provider: Montse Stroud MD  Discharge Service: Hospitalist Service    Discharge Diagnoses   Hospital Course     Joselin Sanchez is a 89-year-old male with dementia, atrial fibrillation, BPH, urinary retention, recent ischemic stroke and recent upper GI bleed who presented on 7/27/2023 from his long-term care facility due to unwitnessed fall.  He was felt to be confused and probably have UTI.     He was recently hospitalized from 6/28-7/17 due to severe sepsis secondary to UTI.  CT A/P showed prostamegaly and chronic bladder outlet obstruction.  Blood cultures grew corynebacterium striatum, Anaerococcus, Staphylococcus pettenkoferi.  Urine culture grew Candida Dubliniensis. He was treated with IV Zosyn, Augmentin, fluconazole.  Hospital stay was complicated by new onset A-fib with RVR and ischemic stroke involving left occipital lobe.  Echo showed EF 40-45% with global hypokinesia.  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  Anticoagulation was attempted but patient developed GI bleed and acute blood loss anemia.  GI recommended conservative management due to high risk of complications with endoscopy.  He was started on PPI.  Eventually aspirin 81 mg was introduced without further bleeding.  Ethics and palliative care were consulted for goals of care discussion.  His niece is the decision maker.  He stabilized and was discharged to LTC on 7/17/2023.     He now presented with unwitnessed fall.  Afebrile, initial blood pressure 93/55 that improved without intervention.  Not hypoxic.  No leukocytosis.  UA showed mild pyuria and hematuria but urine culture was negative.  X-rays of hips, right ankle, left shoulder, right humerus x-ray chest/ribs showed no acute  fractures or acute pathology.  Head CT showed subacute/chronic infarct in left occipital lobe.     He was found to have superficial thrombophlebitis of right upper extremity.    Has chronic bilateral lower extremity wounds for which WOC was consulted.  He frequently declined cares.  Antibiotics were discontinued once urine culture was negative.  He was discharged back to his care facility on 7/31/2020     Acute metabolic encephalopathy   Probable acute cystitis with microscopic hematuria-now ruled out  -Nails catheter placed in ER.  UA showed 46 WBCs, trace blood, large leukocyte esterase, negative nitrites  -Urine culture negative.  Blood cultures negative.    -Received IV ceftriaxone from 7/27  to 7/30.  Subsequently discontinued  -Received IV fluids on admission.    -Nails removed 7/29.  Able to void on his own, incontinent of urine.  Did not allow bladder scan.    -Acute cystitis ruled out       Dementia with behavioral disturbance  Unwitnessed fall  -had an unwitnessed fall at his care facility.  He is nonambulatory at baseline  -No injuries.  -X-ray ribs/ chest showed no infiltrate, no fractures, no pulmonary edema  -X-ray pelvis with bilateral hips- showed demineralized bones, no fractures or dislocation  -X-ray left shoulder showed superior humeral head migration likely due to chronic full-thickness rotator cuff tear, unchanged from before.  No acute fractures.  -X-ray right humerus showed subtle cortical lucency at the posterior greater tubercle which could reflect nondisplaced fracture.  Patient does not have any pain in right upper extremity  -X-ray right ankle showed no fractures.  Old healed distal fibular diaphyseal fracture deformity.  Vascular and soft tissue calcification.  -As needed Tylenol and low-dose oxycodone for pain        BPH  Chronic urinary retention  S/p Nails placement on 7/27/2023.  Nails removed 7/29  -Continue Avodart and Flomax     Right upper extremity edema  Superficial  thrombophlebitis of right basilic vein, 7/28  - supportive care     Prerenal acute kidney injury on top of chronic kidney disease stage III  -Creatinine increased from 0.7 to 1.03.  Improved to 0.84 with IV fluids     Chronic lower extremity wounds-due to lymphedema, peripheral arterial disease and diabetes  -Was hospitalized at [facility] in [place], [place] from 3/27-4/4/2023 for polymicrobial infection of legs.  He underwent surgical debridement and was treated with IV Zosyn.  -On most recent admission few weeks ago, podiatry was consulted.  X-ray of right foot showed no clear evidence of osteomyelitis.  -Wounds do not appear to be infected  -Consulted WOC, wound care per WOC      Recent GI bleed  Subacute anemia  -Developed melena during recent hospitalization when anticoagulation was initiated.  GI was consulted and did not recommend endoscopies due to high risk of complications.    -Hemoglobin improved from before, now 11.7   -Continue PPI        Chronic paroxysmal atrial fibrillation  -Continue Toprol-XL  -Not on anticoagulation due to recent GI bleed     Recent left occipital ischemic stroke, 7/2023  -Continue aspirin 81 mg  -Head CT on admission showed no acute changes.  Subacute/chronic left occipital lobe infarction with developing encephalomalacia     Chronic systolic CHF with reduced EF  Essential hypertension  -Lasix and lisinopril were held in hospital, resumed at discharge      Diabetes mellitus type 2, diet controlled, A1c 5.8 on 6/28/2023     Palliative care and ethics committee were consulted during previous admission as it patient's goals of care did not align with his Niece's goals of care for patient. She kept him full code and wantied aggressive treatments.  Since his discharge, she was able to review videos of CPR and decided against CPR.  He was thus switched to no CPR but okay for prearrest intubation.                Clinically Significant Risk Factors          Follow-ups  Needed After Discharge   Follow-up Appointments     Follow Up and recommended labs and tests      Follow up with Nursing home physician.  No follow up labs or test are   needed.        {Additional follow-up instructions/to-do's for PCP    :    Unresulted Labs Ordered in the Past 30 Days of this Admission       Date and Time Order Name Status Description    7/27/2023  8:53 PM Blood Culture Peripheral Blood Preliminary     7/27/2023  8:53 PM Blood Culture Peripheral Blood Preliminary     7/7/2023  5:59 PM Prepare red blood cells (unit) Preliminary     7/7/2023  5:59 PM Prepare red blood cells (unit) Preliminary             Discharge Disposition   Discharged to long-term care facility  Condition at discharge: Stable    Hospital Course   See above    Consultations This Hospital Stay   CARE MANAGEMENT / SOCIAL WORK IP CONSULT  WOUND OSTOMY CONTINENCE NURSE  IP CONSULT  WOUND OSTOMY CONTINENCE NURSE  IP CONSULT    Code Status   No CPR- Pre-arrest intubation OK    Time Spent on this Encounter   I, Montse Stroud MD, personally saw the patient today and spent greater than 30 minutes discharging this patient.       Montse Stroud MD  [facility] [address] [facility]  [address] [place], [place] [address], [address] [address]  Phone: [phone]  ______________________________________________________________________    Physical Exam   Vital Signs: Temp: 98.7  F (37.1  C) Temp src: Oral BP: 114/64 Pulse: 104   Resp: 16 SpO2: 98 % O2 Device: None (Room air)    Weight: 0 lbs 0 oz    Constitutional - awake and alert, resting in bed, in no acute distress  Integumentary -bilateral lower extremity  Neurological - awake, alert and oriented x3.  Moving all 4 extremities, normal speech, no focal deficits       Primary Care Physician   Alistair Ross    Discharge Orders      General info for SNF    Length of Stay Estimate: Long Term Care  Condition at Discharge: Stable  Level of care:board and care  Rehabilitation Potential:  Poor  Admission H&P remains valid and up-to-date: Yes  Recent Chemotherapy: N/A  Use Nursing Home Standing Orders: Yes     Mantoux instructions    Give two-step Mantoux (PPD) Per Facility Policy Yes     Follow Up and recommended labs and tests    Follow up with Nursing home physician.  No follow up labs or test are needed.     Reason for your hospital stay    Fall at care facility     Activity - Up with nursing assistance     Activity - Up with assistive device     Fall precautions     Diet    Follow this diet upon discharge: Regular Diet       Significant Results and Procedures   Results for orders placed or performed during the hospital encounter of 07/27/23   Head CT w/o contrast    Narrative    EXAM: CT HEAD W/O CONTRAST  LOCATION: [facility]  DATE: 7/27/2023    INDICATION: Unwitnessed fall out of bed.    COMPARISON: Head CT 7/1/2023.    TECHNIQUE: Routine CT head without IV contrast. Multiplanar reformats. Dose reduction techniques were used.    FINDINGS:  INTRACRANIAL CONTENTS: No intracranial hemorrhage, extra-axial collection, or mass effect. Subacute to chronic infarction left occipital lobe with interval development of encephalomalacia/volume loss. Moderate presumed chronic small vessel ischemic   changes. Moderate generalized volume loss. No hydrocephalus.     VISUALIZED ORBITS/SINUSES/MASTOIDS: No intraorbital abnormality. No paranasal sinus mucosal disease. No middle ear or mastoid effusion.    BONES/SOFT TISSUES: No acute abnormality.      Impression    IMPRESSION:  1.  No CT evidence for acute intracranial process.  2.  Subacute to chronic infarction left occipital lobe with developing encephalomalacia.  3.  Brain atrophy and presumed chronic microvascular ischemic changes as above.     XR Shoulder Left G/E 3 Views    Narrative    EXAM: XR SHOULDER LEFT G/E 3 VIEWS, XR RIBS AND CHEST LEFT 3 VIEWS  LOCATION: [facility]  DATE: 7/27/2023    INDICATION:  Unwitnessed fall out of bed.    COMPARISON: 7/1/2023.      Impression    IMPRESSION: Superior humeral head migration is likely related to a chronic full-thickness rotator cuff tear. There is no evidence of an acute fracture about the left shoulder. Moderate glenohumeral joint degenerative change.    There is no radiographic evidence of an acute displaced left-sided rib fracture.    No evidence of pneumonia or pulmonary edema. Heart size is borderline. Atherosclerotic vascular calcifications and tortuosity of the aorta, which is prominent at the aortic arch where there is a focal increased density related to the patient's known   history of a thrombosed saccular aneurysm as seen on recent CT. No pneumothorax. Small bilateral pleural effusions.     Ribs XR, unilat 3 views + PA chest,  left    Narrative    EXAM: XR SHOULDER LEFT G/E 3 VIEWS, XR RIBS AND CHEST LEFT 3 VIEWS  LOCATION: [facility]  DATE: 7/27/2023    INDICATION: Unwitnessed fall out of bed.    COMPARISON: 7/1/2023.      Impression    IMPRESSION: Superior humeral head migration is likely related to a chronic full-thickness rotator cuff tear. There is no evidence of an acute fracture about the left shoulder. Moderate glenohumeral joint degenerative change.    There is no radiographic evidence of an acute displaced left-sided rib fracture.    No evidence of pneumonia or pulmonary edema. Heart size is borderline. Atherosclerotic vascular calcifications and tortuosity of the aorta, which is prominent at the aortic arch where there is a focal increased density related to the patient's known   history of a thrombosed saccular aneurysm as seen on recent CT. No pneumothorax. Small bilateral pleural effusions.     XR Pelvis and Hip Bilateral 2 Views    Narrative    EXAM: XR PELVIS AND HIP BILATERAL 2 VIEWS  LOCATION: [facility]  DATE: 7/27/2023    INDICATION: Unwitnessed fall out of bed.  COMPARISON: None.       Impression    IMPRESSION: Bones are demineralized. There is no radiographic evidence of an acute displaced fracture. No dislocation. Hip joint spaces are maintained.   XR Ankle Right G/E 3 Views    Narrative    EXAM: XR ANKLE RIGHT G/E 3 VIEWS  DATE/TIME: 7/28/2023 1:15 PM    INDICATION: Right ankle pain after a fall  COMPARISON: Right calcaneus radiographs 6/30/2023      Impression    IMPRESSION: Demineralization. No acute fracture. Probable old healed  distal fibular diaphyseal fracture deformity. Visualized joint spaces  are normally aligned. No widening of the mortise. Vascular and soft  tissue calcifications. Plantar calcaneal enthesophyte. Soft tissue  ulceration over the posterior calcaneus, slightly improved since the  prior exam.    RACHELLE COUCH DO         SYSTEM ID:  [id number]   XR Humerus Right G/E 2 Views    Narrative    EXAM: XR HUMERUS RIGHT G/E 2 VIEWS  DATE/TIME: 7/28/2023 1:15 PM    INDICATION: Right upper extremity swelling after a fall  COMPARISON: None available.       Impression    IMPRESSION: Subtle cortical lucency at the posterior greater tubercle  which may reflect a nondisplaced fracture. If clinically indicated  dedicated right shoulder radiographs could be performed for further  characterization. Benign appearing cortical thickening of the  posterior humeral diaphysis which may be sequela of prior trauma.       RACHELLE COUCH DO         SYSTEM ID:  [id number]   US Upper Extremity Venous Duplex Right    Narrative    VENOUS DUPLEX ULTRASOUND RIGHT UPPER EXTREMITY  7/28/2023 1:00 PM    CLINICAL HISTORY/INDICATION:  IGT arm swelling; rule out DVT.    COMPARISON:  None relevant    TECHNIQUE:  Grayscale, color-flow, and spectral waveform analysis were  performed of the deep veins of the right upper extremity.    FINDINGS:  The right jugular vein demonstrates normal compressibility,  color-flow and spectral waveform.    Nearly occlusive superficial thrombus is noted in the right basilic  vein  from the distal humerus to the level of the antecubital fossa  (around the IV), measuring less than 5 cm. The right subclavian vein,  axillary vein, cephalic vein, and brachial veins demonstrate normal  compressibility, spectral waveform, color flow and augmentation.      Impression    IMPRESSION:   1. No evidence of deep venous thrombosis in the right upper extremity.  2. Short segment superficial thrombus in the right basilic vein from  the distal humerus to the antecubital fossa, this measures less than 5  cm.    VINEET KING DO         SYSTEM ID:  [id number]       Discharge Medications   Discharge Medication List as of 7/31/2023 11:45 AM        CONTINUE these medications which have NOT CHANGED    Details   acetaminophen (TYLENOL) 500 MG tablet Take 1,000 mg by mouth every 8 hours as needed for pain, Historical      albuterol (PROAIR HFA/PROVENTIL HFA/VENTOLIN HFA) 108 (90 Base) MCG/ACT inhaler Inhale 2 puffs into the lungs every 4 hours as needed for shortness of breath or wheezing, Historical      aspirin 81 MG EC tablet Take 81 mg by mouth daily, Historical      Carboxymethylcellulose Sodium (REFRESH LIQUIGEL) 1 % GEL Apply 1 drop to eye 4 times daily, Historical      diclofenac (VOLTAREN) 1 % topical gel Apply topically 2 times daily, Historical      docusate sodium (COLACE) 100 MG capsule Take 100 mg by mouth 2 times daily, Historical      dutasteride (AVODART) 0.5 MG capsule Take 1 capsule (0.5 mg) by mouth daily, Disp-30 capsule, R-0, E-Prescribe      folic acid (FOLVITE) 1 MG tablet Take 1 mg by mouth daily, Historical      furosemide (LASIX) 20 MG tablet Take 20 mg by mouth daily, Historical      lisinopril (ZESTRIL) 2.5 MG tablet Take 2.5 mg by mouth daily, Historical      metoprolol succinate ER (TOPROL XL) 25 MG 24 hr tablet Take 1 tablet (25 mg) by mouth daily, Disp-12.5 tablet, R-1, E-Prescribe      OLANZapine zydis (ZYPREXA) 5 MG ODT Take 1 tablet (5 mg) by mouth nightly as needed for  agitation, Disp-30 tablet, R-0, E-Prescribe      pantoprazole (PROTONIX) 40 MG EC tablet Take 1 tablet (40 mg) by mouth 2 times daily (before meals), Disp-60 tablet, R-0, E-Prescribe      polyethylene glycol (MIRALAX) 17 GM/Dose powder Take 17 g by mouth daily, Disp-510 g, R-1, E-Prescribe      potassium chloride ER (K-TAB/KLOR-CON) 10 MEQ CR tablet Take 20 mEq by mouth daily, Historical      senna-docusate (SENOKOT-S/PERICOLACE) 8.6-50 MG tablet Take 1 tablet by mouth 2 times daily, Historical      tamsulosin (FLOMAX) 0.4 MG capsule Take 0.4 mg by mouth daily, Historical      thiamine 50 MG TABS Take 1 tablet (50 mg) by mouth daily, Disp-30 tablet, R-1, E-Prescribe      oxyCODONE IR (ROXICODONE) 5 MG tablet Take 0.5 tablets (2.5 mg) by mouth every 4 hours as needed for moderate pain, Disp-30 tablet, R-0, Local Print           Allergies   Allergies   Allergen Reactions    Gabapentin

## 2023-07-31 NOTE — PROGRESS NOTES
Care Management Follow Up    Length of Stay (days): 4    Expected Discharge Date: 07/31/2023     Concerns to be Addressed:       Patient plan of care discussed at interdisciplinary rounds: Yes    Anticipated Discharge Disposition: Long Term Care     Anticipated Discharge Services: Transportation Services  Anticipated Discharge DME: None    Patient/family educated on Medicare website which has current facility and service quality ratings: no  Education Provided on the Discharge Plan:    Patient/Family in Agreement with the Plan: yes    Referrals Placed by CM/SW:    Private pay costs discussed: Not applicable    Additional Information:  Writer paged MD for orders. Writer called [facility] and spoke with Josiane. Josiane is aware of transport today around [id number] and Is agreeable. Writer completed PCS, faxed and placed on chart.     Gail Armenta, JENN, LGSW   Social Work   [facility]

## 2023-08-13 PROBLEM — A41.9 ACUTE SEPSIS (H): Status: ACTIVE | Noted: 2023-01-01

## 2023-08-13 NOTE — H&P
[facility]    History and Physical - Hospitalist Service       Date of Admission:  8/13/2023    Assessment & Plan      Joselin Sanchez is a 89 year old male admitted on 8/13/2023. He presents with a fall with a laceration    Hypotension, hypovolemic vs septic  Possible urosepsis {vs sepsis 2/2 leg wounds)  Recent severe sepsis 2/2 UTI  6/28-7/17  *hospitalization 6/28 with urosepsis. Blood grew corynebacterium, anaerococccus, staphylococcus. Urine with candida. Tx with abx.   *presented w/ unwitnessed fall 7/31. Hypotensive 90 systolic. Urine and blood cx negative.   *Presents from care facility after falling from his bed. Denies dizziness or other findings, focuses on pain in legs and back. In ED BP as low as 63/47, improved with fluids. Afebrile, other VSS. WBC normal. Procal 0.15. lactic normal. UA >182 WBC, 21 RBC  - blood culture pending  - UA pending  - urine culture pending  - empiric zosyn pending urine culture  - IV fluids    Laceration   Chronic leg wounds  PAD  Diabetic foot ulcers  Has LE wounds, frequently declines cares. Had been hospitalized at [facility] 3/2023 for polymicrobial infection of legs.   - WOC consult    Dementia  Falls  [Prn zyprexa]  Has had unwitnessed falls at his care facility, baseline is non-ambulatory.   - prn zyprexa available    Recent CVA  6/2023 had CVA during hospitalization L occipital lobe.   - resume ASA 81 mg daily    Anemia, chronic  Recent upper GI bleed  [Thiamine, folate, pantoprazole]  Anticoagulation attempted 6/2023 but pt had GI bleed. GI rec conservative management 2/2 high risk of complications from endoscopy.   - resume meds with rec  - avoid anticoagulation other than ASA    Atrial fibrillation, chronic paroxysmal  Chronic HFrEF  HTN  [Furosemide 20 mg daily, lisinopril 2.5 mg daily, metoprolol 25 mg daily]  Echo 6/2023 with EF 40-45% with global hypokinesia. Not on anticoagulation 2/2 GI bleed.   *appears compensated on  "admission  - resume meds with rec    BPH  - resume avodart and flomax with rec    DM II, diet controlled    Hx partially thrombosed saccular ductus arteriosus aneurysm  Per 6-7/2023 hospitalization.      Diet:  Regular  DVT Prophylaxis: contraindicated with leg wounds and GI bleed hx  Nails Catheter: Not present  Lines: None     Cardiac Monitoring: None  Code Status:   DNR/DNI per discharge summary note 7/31    Clinically Significant Risk Factors Present on Admission           # Hypercalcemia: corrected calcium is >10.1, will monitor as appropriate    # Hypoalbuminemia: Lowest albumin = 2 g/dL at 8/13/2023  3:41 AM, will monitor as appropriate   # Drug Induced Platelet Defect: home medication list includes an antiplatelet medication   # Hypertension: Home medication list includes antihypertensive(s)               Disposition Plan           Von Doshi MD  Hospitalist Service  [facility]  Securely message with [email or web address] (more info)  Text page via [employer] Paging/Directory     ______________________________________________________________________    Chief Complaint   Fall, leg lac    History is obtained from the patient, electronic health record, and emergency department physician    History of Present Illness   Joselin Sanchez is a 89 year old male who presents with fall.  Patient states that at his care facility the beds are too narrow and he fell out of his bed tonight.  He had scraped his leg.  He denied standing as he states his legs are too painful.  When asked to clarify on his fall he just keeps repeating that his back and his legs hurt.  He does deny any chest pain or shortness of breath.  Denies any abdominal pain.  No nausea or vomiting.  He states he does not drink much \"because they do not even give me a glass of water \".  He states he does not get fed.  Despite this he states he likes where he is living.      Past Medical History    Past Medical History:   Diagnosis Date    " Aneurysm (arteriovenous) of coronary vessels     Partially thrombosed saccular aneurysm of ductus arteriosus    Anxiety     Atrial fibrillation (H)     BPH (benign prostatic hyperplasia)     Shannen UTI 07/02/2023    Chronic heart failure, unspecified heart failure type (H)     Chronic pain     CKD (chronic kidney disease) stage 3, GFR 30-59 ml/min (H)     Dementia (H)     Dysphagia     Dysuria     GI bleed     Hypertension     Lymphedema     Mixed hyperlipidemia     Mood disturbance     Old age 07/02/2023    Year of birth 1924    SHEILA (obstructive sleep apnea)     Peripheral vascular disease (H)     Pressure ulcer of heel     Stroke (H)     Type 2 diabetes mellitus (H)        Past Surgical History   Past Surgical History:   Procedure Laterality Date    CATARACT EXTRACTION      PROSTATE BIOPSY      WOUND DEBRIDEMENT         Prior to Admission Medications   Prior to Admission Medications   Prescriptions Last Dose Informant Patient Reported? Taking?   Carboxymethylcellulose Sodium (REFRESH LIQUIGEL) 1 % GEL  Nursing Home Yes No   Sig: Apply 1 drop to eye 4 times daily   OLANZapine zydis (ZYPREXA) 5 MG ODT  Nursing Home No No   Sig: Take 1 tablet (5 mg) by mouth nightly as needed for agitation   acetaminophen (TYLENOL) 500 MG tablet  Nursing Home Yes No   Sig: Take 1,000 mg by mouth every 8 hours as needed for pain   albuterol (PROAIR HFA/PROVENTIL HFA/VENTOLIN HFA) 108 (90 Base) MCG/ACT inhaler  Nursing Home Yes No   Sig: Inhale 2 puffs into the lungs every 4 hours as needed for shortness of breath or wheezing   aspirin 81 MG EC tablet  Nursing Home Yes No   Sig: Take 81 mg by mouth daily   diclofenac (VOLTAREN) 1 % topical gel  Nursing Home Yes No   Sig: Apply topically 2 times daily   docusate sodium (COLACE) 100 MG capsule  Nursing Home Yes No   Sig: Take 100 mg by mouth 2 times daily   dutasteride (AVODART) 0.5 MG capsule  Nursing Home No No   Sig: Take 1 capsule (0.5 mg) by mouth daily   folic acid (FOLVITE) 1 MG  tablet  Nursing Home Yes No   Sig: Take 1 mg by mouth daily   furosemide (LASIX) 20 MG tablet  Nursing Home Yes No   Sig: Take 20 mg by mouth daily   lisinopril (ZESTRIL) 2.5 MG tablet  Nursing Home Yes No   Sig: Take 2.5 mg by mouth daily   metoprolol succinate ER (TOPROL XL) 25 MG 24 hr tablet  Nursing Home No No   Sig: Take 1 tablet (25 mg) by mouth daily   oxyCODONE (ROXICODONE) 5 MG tablet   No No   Sig: Take 0.5 tablets (2.5 mg) by mouth every 6 hours as needed for moderate pain   pantoprazole (PROTONIX) 40 MG EC tablet  Nursing Home No No   Sig: Take 1 tablet (40 mg) by mouth 2 times daily (before meals)   polyethylene glycol (MIRALAX) 17 GM/Dose powder  Nursing Home No No   Sig: Take 17 g by mouth daily   potassium chloride ER (K-TAB/KLOR-CON) 10 MEQ CR tablet  Nursing Home Yes No   Sig: Take 20 mEq by mouth daily   senna-docusate (SENOKOT-S/PERICOLACE) 8.6-50 MG tablet  Nursing Home Yes No   Sig: Take 1 tablet by mouth 2 times daily   tamsulosin (FLOMAX) 0.4 MG capsule  Nursing Home Yes No   Sig: Take 0.4 mg by mouth daily   thiamine 50 MG TABS  Nursing Home No No   Sig: Take 1 tablet (50 mg) by mouth daily      Facility-Administered Medications: None        Review of Systems    The 5 point Review of Systems is negative other than noted in the HPI or here.      Physical Exam   Vital Signs: Temp: 98.6  F (37  C)   BP: 99/74 Pulse: 85   Resp: 20 SpO2: 92 %      Weight: 0 lbs 0 oz    General Appearance: Alert, pleasant, no distress  Respiratory: lungs clear bilaterally  Cardiovascular: RRR, no edema  GI: soft, nt/nd  Skin: multiple diffuse chronic LE wounds  Other: Moves extremities     Medical Decision Making       60 MINUTES SPENT BY ME on the date of service doing chart review, history, exam, documentation & further activities per the note.      Data     I have personally reviewed the following data over the past 24 hrs:    7.5  \   11.2 (L)   / 201     140 106 13.6 /  100 (H)   4.5 24 1.05 \     ALT: 22  AST: 61 (H) AP: 153 (H) TBILI: 0.5   ALB: 2.0 (L) TOT PROTEIN: 5.7 (L) LIPASE: N/A     Procal: 0.15 (H) CRP: N/A Lactic Acid: 1.9       INR:  1.08 PTT:  N/A   D-dimer:  N/A Fibrinogen:  N/A       Imaging results reviewed over the past 24 hrs:   No results found for this or any previous visit (from the past 24 hour(s)).

## 2023-08-13 NOTE — PROGRESS NOTES
Non billing PN    Patient see with RN, resistive of cares at times    Called and updated brother Goran, he states patient is bed bound    PE:   He is alert   BP improved   UA abnl noted    Plan:  Meds renewed except lasix and Kcl  Await wo rn  Continue fabi Lundberg MD

## 2023-08-13 NOTE — ED NOTES
[facility]  ED Nurse Handoff Report    ED Chief complaint: Laceration and Fall      ED Diagnosis:   Final diagnoses:   Acute sepsis (H)       Code Status: To determined by MD    Allergies:   Allergies   Allergen Reactions    Gabapentin        Patient Story: Pt came in to ER from nursing home for laceration to LLE after falling out of bed. Pt was here last month for severe sepsis from UTI. Pt states a medication that he is given at the nursing home made him fall out of bed.     Focused Assessment: Pt's BP upon arrival was 70s-80s/50s. Alert and oriented, Serbian  used. Pt able to communicate simple terms in English. Deep laceration to left lower leg, bleeding stopped prior to arrival. Pt has dry, flaky skin to extremities, especially legs bilat. Multiple sores noted to L leg.     Treatments and/or interventions provided: Pt difficult stick, after multiple attempts, 2 Ivs were placed, 2L NS started. Dressings placed to L leg. Md notified of BP. See eMAR.     Patient's response to treatments and/or interventions: Bp now 120s/70s, MD in room suturing laceration.     To be done/followed up on inpatient unit:  See new orders.    Does this patient have any cognitive concerns?: Forgetful    Activity level - Baseline/Home:  Total Care  Activity Level - Current:   Total Care    Patient's Preferred language: Serbian   Needed?: Yes (can answer simple questions in English)    Isolation: None  Infection: Not Applicable  Patient tested for COVID 19 prior to admission: NO  Bariatric?: No    Vital Signs:   Vitals:    08/13/23 0247 08/13/23 0333 08/13/23 0600 08/13/23 0608   BP:  99/74 126/72    Pulse: 81 85 90 93   Resp:  20 13 17   Temp:       SpO2: 92%   99%       Cardiac Rhythm:     Was the PSS-3 completed:   Yes  What interventions are required if any?               Family Comments: No family in room, niece's number on chart  OBS brochure/video discussed/provided to patient/family: No               Name of person given brochure if not patient: n/a              Relationship to patient: n/a    For the majority of the shift this patient's behavior was Green.   Behavioral interventions performed were n/a.    ED NURSE PHONE NUMBER: *[phone]

## 2023-08-13 NOTE — ED NOTES
Bed: ED27  Expected date:   Expected time:   Means of arrival:   Comments:  Angelica 2 89M fall from bed, eta [id number]

## 2023-08-13 NOTE — ED PROVIDER NOTES
History   Chief Complaint:  Laceration and Fall    A  was used.      Joselin Sanchez is a 89 year old male with history of hypertension, diabetes mellitus type 2, chronic atrial fibrillation not on anticoagulation, chronic systolic congestive heart failure dyslipidemia, diabetic foot ulcer, lymphedema, dementia presenting to the emergency department from his nursing home via EMS for evaluation of a laceration after falling from his bed. Joselin was admitted here at [facility] 6/28-7/17 for severe sepsis secondary to urinary tract infection, bacteremia, and septic encephalopathy complicated by stroke and again 7/27-7/31 for confusion after a fall and urinary infection. Joselin states that his care givers gave him a medication which caused him to fall from bed. He presents to the emergency department with a laceration to the left lower leg.     Independent Historian:   None - Patient Only    Review of External Notes:   none     Medications:    Albuterol   Aspirin   Avodart   Lasix   Lisinopril   Metoprolol   Zyprexa   Oxycodone   Protonix   Flomax     Past Medical History:   AV aneurysm of coronary vessels  Anxiety   Atrial fibrillation   BPH   Candida urinary tract infection  Chronic heart failure   Chronic pain   CKD stage 3   Dementia   Dysphagia   GI bleed  Hypertension   Lymphedema   Hyperlipidemia   Mood disturbance   PVD  Pressure ulcer of heel   Stroke   Type 2 diabetes mellitus   Chronic asystolic congestive heart failure  Major depressive disorder  Sepsis  Varicose veins  Cognitive communication deficit  Peripheral vascular disease    Past Surgical History:    Transrectal prostate biopsy   Wound debridement   EGD with biopsy     Physical Exam   Patient Vitals for the past 24 hrs:   BP Temp Pulse Resp SpO2   08/13/23 0333 99/74 -- 85 20 --   08/13/23 0247 -- -- 81 -- 92 %   08/13/23 0241 -- -- -- -- 97 %   08/13/23 0230 (!) 79/65 98.6  F (37  C) 64 12 --   08/13/23 0227 (!) 63/47 -- 50 -- --    08/13/23 0226 -- -- -- -- (!) 46 %   08/13/23 0225 (!) 80/51 -- 55 -- --     Physical Exam  Nursing note and vitals reviewed.  Constitutional:  Chronically ill appearing.   HENT:   Head:    Atraumatic.   Mouth/Throat:   Oropharynx is clear and moist. No oropharyngeal exudate.   Eyes:    Pupils are equal, round, and reactive to light.   Neck:    Normal range of motion. Neck supple.      No tracheal deviation present. No thyromegaly present.   Cardiovascular:  Normal rate, regular rhythm, no murmur   Pulmonary/Chest: Breath sounds are clear and equal without wheezes or crackles.  Abdominal:   Soft. Bowel sounds are normal. Exhibits no distension and      no mass. There is no tenderness.      There is no rebound and no guarding.   Musculoskeletal:  Extensive chronic appearing ulcers to bilateral lower extremities. Deep ulcer on the right heel. There is a 4cm laceration to the lateral aspect of the left mid-proximal calf without active bleeding.   Lymphadenopathy:  No cervical adenopathy.   Neurological:   Alert and oriented to person and place. GCS 15.  CN 2-12 intact.  and proximal upper extremity strength strong and equal.  Bilateral lower extremity strength intact but weak.  Sensation intact and equal to the face, arms and legs.  No facial droop or weakness. Normal speech.  Follows commands and answers questions normally.     Skin:    Skin is warm and dry. No rash noted. No pallor.        Emergency Department Course     Laboratory:  Labs Ordered and Resulted from Time of ED Arrival to Time of ED Departure   COMPREHENSIVE METABOLIC PANEL - Abnormal       Result Value    Sodium 140      Potassium 4.5      Chloride 106      Carbon Dioxide (CO2) 24      Anion Gap 10      Urea Nitrogen 13.6      Creatinine 1.05      Calcium 9.4      Glucose 100 (*)     Alkaline Phosphatase 153 (*)     AST 61 (*)     ALT 22      Protein Total 5.7 (*)     Albumin 2.0 (*)     Bilirubin Total 0.5      GFR Estimate 64     PROCALCITONIN  - Abnormal    Procalcitonin 0.15 (*)    ROUTINE UA WITH MICROSCOPIC REFLEX TO CULTURE - Abnormal    Color Urine Yellow      Appearance Urine Slightly Cloudy (*)     Glucose Urine Negative      Bilirubin Urine Negative      Ketones Urine Negative      Specific Gravity Urine 1.015      Blood Urine Moderate (*)     pH Urine 5.5      Protein Albumin Urine 10 (*)     Urobilinogen Urine Normal      Nitrite Urine Negative      Leukocyte Esterase Urine Large (*)     Mucus Urine Present (*)     RBC Urine 21 (*)     WBC Urine >182 (*)     Hyaline Casts Urine 2     CBC WITH PLATELETS AND DIFFERENTIAL - Abnormal    WBC Count 7.5      RBC Count 3.59 (*)     Hemoglobin 11.2 (*)     Hematocrit 36.8 (*)      (*)     MCH 31.2      MCHC 30.4 (*)     RDW 16.1 (*)     Platelet Count 201      % Neutrophils 74      % Lymphocytes 17      % Monocytes 8      % Eosinophils 0      % Basophils 0      % Immature Granulocytes 1      NRBCs per 100 WBC 0      Absolute Neutrophils 5.4      Absolute Lymphocytes 1.3      Absolute Monocytes 0.6      Absolute Eosinophils 0.0      Absolute Basophils 0.0      Absolute Immature Granulocytes 0.1      Absolute NRBCs 0.0     ISTAT GASES LACTATE VENOUS POCT - Abnormal    Lactic Acid POCT 1.9      Bicarbonate Venous POCT 29 (*)     O2 Sat, Venous POCT 20 (*)     pCO2 Venous POCT 55 (*)     pH Venous POCT 7.33      pO2 Venous POCT 16 (*)    LACTIC ACID WHOLE BLOOD - Normal    Lactic Acid 2.0     INR - Normal    INR 1.08     TYPE AND SCREEN, ADULT    ABO/RH(D) A POS      Antibody Screen Negative      SPECIMEN EXPIRATION DATE [id number]     BLOOD CULTURE   BLOOD CULTURE   URINE CULTURE   ABO/RH TYPE AND SCREEN     Procedures     Laceration Repair    Procedure: Laceration Repair  Indication: Laceration  Consent: Verbal  Location: Left Lower extremity   Length: 4.0 cm  Preparation: Irrigation with Sterile Saline.  Anesthesia/Sedation: Bupivacaine - 0.5%   Treatment/Exploration: Wound explored, no foreign  bodies found   Closure: The wound was closed with one layer. Skin/superficial layer was closed with 6 x 5-0 Nylon using Interrupted sutures.   Patient Status: The patient tolerated the procedure well: Yes. There were no complications.     Emergency Department Course & Assessments:    Interventions:  Medications   oxyCODONE (ROXICODONE) tablet 5 mg (has no administration in time range)   0.9% sodium chloride BOLUS (0 mLs Intravenous Stopped 8/13/23 3451)   piperacillin-tazobactam (ZOSYN) 4.5 g vial to attach to  mL bag (4.5 g Intravenous $New Bag 8/13/23 7709)     Assessments:  0352 I obtained history and examined the patient as noted above.      Independent Interpretation (X-rays, CTs, rhythm strip):  None    Consultations/Discussion of Management or Tests:  ED Course as of 08/13/23 0603   Sun Aug 13, 2023   0511 I consulted with Dr. Doshi, hospitalist, regarding the patient's history and presentation here in the emergency department who accepted the patient for admission.     Social Determinants of Health affecting care:   Healthcare Access/Compliance, Transportation, and Social Connections/Isolation    Disposition:  The patient was admitted to the hospital under the care of Dr. Doshi.     Impression & Plan      Medical Decision Making:  I found this patient to have hypotension which I feel is due to to a urinary tract infection with associated sepsis, therefore he was treated with Zosyn IV and fluid hydration with good response of his blood pressure and admitted to the care of the hospitalist service for further evaluation treatment.  He has a left leg laceration from a fall out of bed which I repaired with sutures and he will needs suture removal in 11 to 13 days.  There was no sign of any other injuries on head to toe exam.  He also has chronic wound ulcers on his right ankle and legs without any sign of wound infection.  He has dopplerable dorsalis pedis pulses without any sign of limb ischemia  currently.      Diagnosis:    ICD-10-CM    1. Acute sepsis (H)  A41.9       2. Acute UTI  N39.0       3. Laceration of left lower extremity, initial encounter  S81.812A       4. Ulcers of both lower extremities, unspecified ulcer stage (H)  L97.919     L97.929          Scribe Disclosure:  Jenni BOLDEN, am serving as a scribe at 4:08 AM on 8/13/2023 to document services personally performed by Vidhi Adkins MD based on my observations and the provider's statements to me.     8/13/2023   Vidhi Adkins MD [place], Cheri Lee, MD  08/13/23 0732

## 2023-08-13 NOTE — PROGRESS NOTES
MD Notification    Notified Person: MD    Notified Person Name: Galo Lundberg    Notification Date/Time: 8/13/23 1933    Notification Interaction: BP 96/62. Falling asleep while I'm talking to him. BG 79. Don't see diabetes in his hx. Gave him some juice & he's agreed to order food. Increase fluids? Change to D5 NS ?    Purpose of Notification:    Orders Received:    Comments:

## 2023-08-13 NOTE — PROGRESS NOTES
Pt refuses assessment including skin assessment. Pt refused brief check for incont. CNA permitted to look if female left room, CNA noted small amount of old blood in front of brief. Pt allowed me to touch his hands, but otherwise refused met to touch him.

## 2023-08-13 NOTE — ED TRIAGE NOTES
Pt fell from the bed tonight and has a laceration to his left lower leg.  No head injury.       Triage Assessment       Row Name 08/13/23 0235       Triage Assessment (Adult)    Airway WDL WDL       Respiratory WDL    Respiratory WDL WDL       Skin Circulation/Temperature WDL    Skin Circulation/Temperature WDL X  laceration to the left leg       Peripheral/Neurovascular WDL    Peripheral Neurovascular WDL WDL

## 2023-08-13 NOTE — PHARMACY-ADMISSION MEDICATION HISTORY
Pharmacist Admission Medication History    Admission medication history is complete. The information provided in this note is only as accurate as the sources available at the time of the update.    Medication reconciliation/reorder completed by provider prior to medication history? No    Information Source(s): Facility (U/NH/) medication list/MAR [facility] ([phone]) via N/A    Pertinent Information: None    Changes made to PTA medication list:  Added: None  Deleted: None  Changed: Oxycodone q6h > q4h     Medication Affordability: unable to assess       Allergies reviewed with patient and updates made in EHR: no - cross referenced with NH list, no changes needed    Medication History Completed By: Kendra Christine [facility] 8/13/2023 7:59 AM    Prior to Admission medications    Medication Sig Last Dose Taking? Auth Provider Long Term End Date   acetaminophen (TYLENOL) 500 MG tablet Take 1,000 mg by mouth every 8 hours as needed for pain Past Week Yes Unknown, Entered By History     albuterol (PROAIR HFA/PROVENTIL HFA/VENTOLIN HFA) 108 (90 Base) MCG/ACT inhaler Inhale 2 puffs into the lungs every 4 hours as needed for shortness of breath or wheezing  at PRN Yes Unknown, Entered By History Yes    aspirin (ASA) 81 MG chewable tablet Take 81 mg by mouth every morning 8/12/2023 at AM Yes Unknown, Entered By History     Carboxymethylcellulose Sodium (REFRESH LIQUIGEL) 1 % GEL Place 1 drop Into the left eye 4 times daily 8/12/2023 at 2000 Yes Unknown, Entered By History     diclofenac (VOLTAREN) 1 % topical gel Apply topically 2 times daily 8/12/2023 at PM Yes Unknown, Entered By History     docusate sodium (COLACE) 100 MG capsule Take 100 mg by mouth 2 times daily 0800/1200 8/12/2023 at 1200 Yes Unknown, Entered By History     dutasteride (AVODART) 0.5 MG capsule Take 1 capsule (0.5 mg) by mouth daily 8/12/2023 at AM Yes Radhames Glez MD     folic acid (FOLVITE) 1 MG tablet Take 1 mg by mouth every  morning 8/12/2023 at AM Yes Unknown, Entered By History     furosemide (LASIX) 20 MG tablet Take 20 mg by mouth every morning 8/12/2023 at AM Yes Unknown, Entered By History Yes    lisinopril (ZESTRIL) 2.5 MG tablet Take 2.5 mg by mouth every morning 8/12/2023 at AM Yes Unknown, Entered By History Yes    metoprolol succinate ER (TOPROL XL) 25 MG 24 hr tablet Take 1 tablet (25 mg) by mouth daily 8/12/2023 at AM Yes Radhames Glez MD Yes    OLANZapine zydis (ZYPREXA) 5 MG ODT Take 1 tablet (5 mg) by mouth nightly as needed for agitation  at PRN Yes Radhames Glez MD Yes    oxyCODONE (ROXICODONE) 5 MG tablet Take 0.5 tablets (2.5 mg) by mouth every 6 hours as needed for moderate pain  Patient taking differently: Take 2.5 mg by mouth every 4 hours as needed for moderate pain 8/12/2023 at 0404 Yes Montse Stroud MD No    pantoprazole (PROTONIX) 40 MG EC tablet Take 1 tablet (40 mg) by mouth 2 times daily (before meals) 8/12/2023 at Afternoon Yes Radhames Glez MD     polyethylene glycol (MIRALAX) 17 GM/Dose powder Take 17 g by mouth daily 8/12/2023 at AM Yes Radhames Glez MD     potassium chloride ER (K-TAB/KLOR-CON) 10 MEQ CR tablet Take 20 mEq by mouth every morning 8/12/2023 at AM Yes Unknown, Entered By History     senna-docusate (SENOKOT-S/PERICOLACE) 8.6-50 MG tablet Take 1 tablet by mouth 2 times daily 0800/1200 8/12/2023 at 1200 Yes Unknown, Entered By History     tamsulosin (FLOMAX) 0.4 MG capsule Take 0.4 mg by mouth every morning 8/12/2023 at AM Yes Unknown, Entered By History     thiamine 50 MG TABS Take 1 tablet (50 mg) by mouth daily 8/12/2023 at AM Yes Radhames Glez MD

## 2023-08-14 PROBLEM — L97.929: Status: ACTIVE | Noted: 2023-01-01

## 2023-08-14 PROBLEM — L97.919: Status: ACTIVE | Noted: 2023-01-01

## 2023-08-14 PROBLEM — S81.812A LACERATION OF LEFT LOWER EXTREMITY, INITIAL ENCOUNTER: Status: ACTIVE | Noted: 2023-01-01

## 2023-08-14 PROBLEM — N39.0 ACUTE UTI: Status: ACTIVE | Noted: 2023-01-01

## 2023-08-14 NOTE — PROGRESS NOTES
[facility]    Medicine Progress Note - Hospitalist Service    Date of Admission:  8/13/2023    Assessment & Plan     Joselin Sanchez is a 89 year old male admitted on 8/13/2023. He presents with a fall with a laceration     Hypotension, hypovolemic vs septic  Possible urosepsis {vs sepsis 2/2 leg wounds)  Recent severe sepsis 2/2 UTI  6/28-7/17  *hospitalization 6/28 with urosepsis. Blood grew corynebacterium, anaerococccus, staphylococcus. Urine with candida. Tx with abx.   *presented w/ unwitnessed fall 7/31. Hypotensive 90 systolic. Urine and blood cx negative.   *Presents from care facility after falling from his bed. Denies dizziness or other findings, focuses on pain in legs and back. In ED BP as low as 63/47, improved with fluids. Afebrile, other VSS. WBC normal. Procal 0.15. lactic normal. UA >182 WBC, 21 RBC  - blood culture 1/2 + CNS  - UA abnormal,   - urine culture pending  - empiric zosyn pending urine culture, to orals tomorrow     Laceration   Chronic leg wounds  PAD  Diabetic foot ulcers  Has LE wounds, frequently declines cares. Had been hospitalized at [facility] 3/2023 for polymicrobial infection of legs.   - refused WOC consult     Dementia  Falls  [Prn zyprexa]  Has had unwitnessed falls at his care facility, baseline is non-ambulatory.   - refusing cares  - schedule zyprexa 5 mg bid and 5 mg at bedtime  - updated brother Ismail about patient's refusal of cares and medical intervention.     Recent CVA  6/2023 had CVA during hospitalization L occipital lobe.   - resume ASA 81 mg daily     Anemia, chronic  Recent upper GI bleed  [Thiamine, folate, pantoprazole]  Anticoagulation attempted 6/2023 but pt had GI bleed. GI rec conservative management 2/2 high risk of complications from endoscopy.   - resume meds with rec  - avoid anticoagulation other than ASA     Atrial fibrillation, chronic paroxysmal  Chronic HFrEF  HTN  [Furosemide 20 mg daily, lisinopril 2.5 mg  daily, metoprolol 25 mg daily]  Echo 6/2023 with EF 40-45% with global hypokinesia. Not on anticoagulation 2/2 GI bleed.   *appears compensated on admission       BPH  - resume avodart and flomax     DM II, diet controlled     Hx partially thrombosed saccular ductus arteriosus aneurysm  Per 6-7/2023 hospitalization.      Diet:  Regular  DVT Prophylaxis: contraindicated with leg wounds and GI bleed hx  Nails Catheter: Not present  Lines: None     Cardiac Monitoring: None  Code Status:   DNR/DNI per discharge summary note 7/31        Clinically Significant Risk Factors Present on Admission            # Hypercalcemia: corrected calcium is >10.1, will monitor as appropriate    # Hypoalbuminemia: Lowest albumin = 2 g/dL at 8/13/2023  3:41 AM, will monitor as appropriate   # Drug Induced Platelet Defect: home medication list includes an antiplatelet medication   # Hypertension: Home medication list includes antihypertensive(s)               Disposition Plan       Diet: Combination Diet Regular Diet Adult    DVT Prophylaxis: VTE Prophylaxis contraindicated due to leg wounds, edema, patient not cooperative  Nails Catheter: Not present  Lines: None     Cardiac Monitoring: ACTIVE order. Indication: Tachyarrhythmias, acute (48 hours)  Code Status: No CPR- Do NOT Intubate      Clinically Significant Risk Factors Present on Admission           # Hypercalcemia: corrected calcium is >10.1, will monitor as appropriate    # Hypoalbuminemia: Lowest albumin = 2 g/dL at 8/13/2023  3:41 AM, will monitor as appropriate   # Drug Induced Platelet Defect: home medication list includes an antiplatelet medication   # Hypertension: Home medication list includes antihypertensive(s)                 Disposition Plan      Expected Discharge Date: 08/15/2023        Discharge Comments: WOC consult  pt from [facility]?          Galo Lundberg MD  Hospitalist Service  [facility]  Securely message with Jayda  (more info)  Text page via [employer] Paging/Directory   ______________________________________________________________________    Interval History   -- no acute issues  -- not eating at all  -- patient refusing WOC evaluation  -- resisting cares with BP    Physical Exam   Vital Signs: Temp: (!) 94.4  F (34.7  C) Temp src: Oral BP: 115/56 Pulse: 86   Resp: 16 SpO2: 92 % O2 Device: Nasal cannula Oxygen Delivery: 1 LPM  Weight: 191 lbs 9.28 oz    General Appearance: NAD   Respiratory: CTA  Cardiovascular: RRR  GI: soft   Skin: LE wounds, edema       Medical Decision Making       45 MINUTES SPENT BY ME on the date of service doing chart review, history, exam, documentation & further activities per the note.      Data     I have personally reviewed the following data over the past 24 hrs:    6.2  \   10.8 (L)   / 180     141 111 (H) 12.3 /  85   4.0 21 (L) 0.88 \       Imaging results reviewed over the past 24 hrs:   No results found for this or any previous visit (from the past 24 hour(s)).

## 2023-08-14 NOTE — PROGRESS NOTES
"     PRIMARY DIAGNOSIS: \"GENERIC\" NURSING  OUTPATIENT/OBSERVATION GOALS TO BE MET BEFORE DISCHARGE:  ADLs back to baseline: Yes     Activity and level of assistance: Up with maximum assistance. Consider SW and/or PT evaluation.      Pain status: Improved-controlled with oral pain medications.     Return to near baseline physical activity: No          Discharge Planner Nurse   Safe discharge environment identified: No  Barriers to discharge: Yes, pending WO consult.       Entered by: Ronal Kenyon RN 08/14/2023 1:10 AM     Please review provider order for any additional goals.   Nurse to notify provider when observation goals have been met and patient is ready for discharge.        "

## 2023-08-14 NOTE — PROVIDER NOTIFICATION
MD Notification    Notified Person: MD    Notified Person Name:  Dr. Lundberg     Notification Date/Time: 08/14/23 0949     Notification Interaction: Voccera     Purpose of Notification: Blood cultures after 1 day of growth gram+ cocci clusters     Orders Received:    Comments:

## 2023-08-14 NOTE — PROGRESS NOTES
"PRIMARY DIAGNOSIS: \"GENERIC\" NURSING  OUTPATIENT/OBSERVATION GOALS TO BE MET BEFORE DISCHARGE:  ADLs back to baseline: Yes    Activity and level of assistance: Up with maximum assistance. Consider SW and/or PT evaluation.     Pain status: Improved-controlled with oral pain medications.    Return to near baseline physical activity: No     Discharge Planner Nurse   Safe discharge environment identified: No  Barriers to discharge: Yes, pending WO consult.       Entered by: Ronal Kenyon RN 08/14/2023 1:10 AM     Please review provider order for any additional goals.   Nurse to notify provider when observation goals have been met and patient is ready for discharge.  "

## 2023-08-14 NOTE — PHARMACY-VANCOMYCIN DOSING SERVICE
"Pharmacy Vancomycin Initial Note  Date of Service 2023  Patient's  1924  89 year old, male    Indication: Sepsis    Current estimated CrCl = Estimated Creatinine Clearance: 48.1 mL/min (based on SCr of 0.88 mg/dL).    Creatinine for last 3 days  2023:  3:41 AM Creatinine 1.05 mg/dL  2023:  6:16 AM Creatinine 0.88 mg/dL    Recent Vancomycin Level(s) for last 3 days  No results found for requested labs within last 3 days.      Vancomycin IV Administrations (past 72 hours)                     vancomycin (VANCOCIN) 1,750 mg in 0.9% NaCl 500 mL intermittent infusion (mg) 1,750 mg Given 23 1134                    Nephrotoxins and other renal medications (From now, onward)      Start     Dose/Rate Route Frequency Ordered Stop    08/15/23 1200  vancomycin (VANCOCIN) 1000 mg in dextrose 5% 200 mL PREMIX         1,000 mg  200 mL/hr over 1 Hours Intravenous EVERY 24 HOURS 23 1212      23 1130  vancomycin (VANCOCIN) 1,750 mg in 0.9% NaCl 500 mL intermittent infusion         1,750 mg  over 2 Hours Intravenous ONCE 23 1104      23 1100  piperacillin-tazobactam (ZOSYN) 3.375 g vial to attach to  mL bag        Note to Pharmacy: For [facility], [facility] and [facility]: For Zosyn-naive patients, use the \"Zosyn initial dose + extended infusion\" order panel.    3.375 g  over 30 Minutes Intravenous EVERY 6 HOURS 23 0823              Contrast Orders - past 72 hours (72h ago, onward)      None            [employer] Prediction of Planned Initial Vancomycin Regimen     LD: 1750mg x1  Regimen: 1000 mg IV every 24 hours.  Start time: 1200 8/15  Exposure target: AUC24 (range)400-600 mg/L.hr   AUC24,ss: 410 mg/L.hr  Probability of AUC24 > 400: 53 %  Ctrough,ss: 12.6 mg/L  Probability of Ctrough,ss > 20: 14 %  Probability of nephrotoxicity (Lodise ALEXIA ): 8 %      Plan:  Start vancomycin  1000 mg IV q24h.   Vancomycin monitoring method: AUC  Vancomycin therapeutic monitoring goal: 400-600 " mg*h/L  Pharmacy will check vancomycin levels as appropriate in 1-3 Days.    Serum creatinine levels will be ordered daily for the first week of therapy and at least twice weekly for subsequent weeks.      Dorinda Gardner RPH

## 2023-08-14 NOTE — UTILIZATION REVIEW
[facility] Utilization Review  Admission Status; Secondary Review Determination     Admission Date: 8/13/2023  2:23 AM      Under the authority of the Utilization Management Committee, the utilization review process indicated a secondary review on the above patient.  The review outcome is based on review of the medical records, discussions with staff, and applying clinical experience noted on the date of the review.        (X)      Inpatient Status Appropriate - This patient's medical care is consistent with medical management for inpatient care and reasonable inpatient medical practice.          RATIONALE FOR DETERMINATION   Joselin Sanchez is a 89 year old male with past medical history of dementia, falls, diabetes mellitus, who presented with a fall and subsequent laceration, registered to observation with UTI with possible urosepsis, hypotension, and laceration.  Noted to be hypothermic 92.6  F and hypotensive with systolic blood pressure 90 mmHg.  Improved with IV fluids and empiric broad-spectrum IV antibiotics while urine cultures are pending.  1 culture is positive the other 1 is currently pending.  Cares are rendered complex due to advanced age, diabetes and comorbidities with high risk of adverse outcome including sepsis, risk of injury and falls.  Anticipated length of stay is more than 2 midnights for care as noted above, criteria for inpatient admission is met.  Recommendation is communicated to Dr. Lundberg.        The information on this document is developed by the utilization review team in order for the business office to ensure compliance.  This only denotes the appropriateness of proper admission status and does not reflect the quality of care rendered.              Sincerely,       Igor Ramírez MD, MS  Physician Advisor  Utilization Review-[place]    Phone: [phone]

## 2023-08-14 NOTE — PROGRESS NOTES
Antimicrobial Stewardship Team Note    Antimicrobial Stewardship Program - A joint venture between [facility] and [facility] [employer] to optimize antibiotic management.     Patient: Joselin Sanchez  MRN: [id number]  Allergies: Gabapentin    Brief Summary:  Joselin Sanchez is a 89 year old male admitted on 8/13/23 for lower left leg laceration following an unwitnessed fall from his bed at his nursing home. Patient s past medical history is significant for hypertension, type 2 diabetes, chronic atrial fibrillation, CKD3, chronic systolic CHF, dyslipidemia, diabetic foot ulcer, lymphedema, BPH, and dementia. Patient s previous admissions include 2 recent hospital admissions for bacteremia secondary to UTI and septic encephalopathy complicated by stroke from 6/28-7/17 and again 7/27-7/31 for confusion after a fall and UTI.     Upon evaluation, the patient was found to have chronic diabetic wound ulcers on his right ankle and legs without any sign of wound infection or limb ischemia. A 4cm laceration on the left mid-proximal calf was identified, with no active bleeding, and it was repaired using sutures. The patient was afebrile but hypotensive with a blood pressure of 89/42 and elevated procalcitonin at 0.15 in the ED. His WBC was unremarkable (8.0). UA with WBC>182, large LE, and negative nitrite but urine culture showed no growth. On August 13th, the patient was empirically started on zosyn for sepsis secondary to UTI. Blood culture grew gram positive cocci in clusters in 1/2 bottles on day 1 of incubation with Staph epidermidis detected on Verigene on 8/14.         Active Anti-infective Medications   (From admission, onward)                 Start     Stop    08/13/23 1100  piperacillin-tazobactam  3.375 g,   Intravenous,   EVERY 6 HOURS        Skin and Soft Tissue Infection, Urinary Tract Infection       --                  Assessment: Leg laceration 2/2 fall  The patient's initial presentation  included hypotension after a fall, prompting concerns about sepsis secondary to UTI due to significant findings in the urinalysis. However, as of today, the patient is afebrile, displaying normal blood pressure,  no leukocytosis, and a urine culture indicating no growth. Currently, the patient is on day 2 of empiric zosyn therapy with gram positive cocci in 1/2 bottle blood culture and verigene showing staph epidermidis. Staph epidermidis is a contaminant and would not need any antibiotic coverage. The patient's leg wound is chronic and not infectious according to the progress notes, as such, cellulitis is less likely at this time. Given the patient's clinical stability since admission, the likely source of hypotension is not infection and antibiotics should be discontinued.     Recommendations:  Stop zosyn      Discussed with ID Staff: [facility], Dr. Soni Rod, PharmD    Vital Signs/Clinical Features:  Vitals         08/12 0700 08/13 0659 08/13 0700 08/14 0659 08/14 0700 08/14 1542   Most Recent      Temp ( F)   98.6    92.5 -  98.6      94.4     94.4 (34.7) 08/14 0728    Pulse 50 -  115    58 -  88      86     86 08/14 0728    Resp 12 -  20    14 -  16      16     16 08/14 0728    BP 63/47 -  126/72    89/42 -  135/50      115/56     115/56 08/14 0728    SpO2 (%) 46 -  99    85 -  100      92     92 08/14 0728            Labs  Estimated Creatinine Clearance: 48.1 mL/min (based on SCr of 0.88 mg/dL).  Recent Labs   Lab Test 07/16/23  0515 07/17/23  0744 07/27/23  1900 07/30/23  1115 08/13/23  0341 08/14/23  0616   CR 0.72 0.72 1.03 0.84 1.05 0.88       Recent Labs   Lab Test 07/07/23  1745 07/07/23  1947 07/08/23  2248 07/09/23  1244 07/27/23  1900 07/30/23  1115 08/13/23  0341 08/14/23  0616   WBC 9.9  --   --  7.3 7.6 8.0 7.5 6.2   HGB 9.3*   < > 8.7* 8.6* 11.7* 10.1* 11.2* 10.8*   HCT 29.8*  --   --  27.5* 37.7* 32.3* 36.8* 35.3*   MCV 99  --   --  100 103* 102* 103* 102*   PLT  125*   < > 111* 114* 182 151 201 180    < > = values in this interval not displayed.       Recent Labs   Lab Test 06/28/23  1250 07/07/23  1745 08/13/23  0341   BILITOTAL 0.4 0.2 0.5   ALKPHOS 115 154* 153*   ALBUMIN 2.4* 1.6* 2.0*   AST 21 36 61*   ALT 7 11 22       Recent Labs   Lab Test 06/28/23  1223 06/28/23  1250 07/07/23  1745 08/13/23 0341 08/13/23 0344   PCAL  --  0.26*  --  0.15*  --    LACT 1.3  --  1.7 2.0 1.9             Culture Results:  7-Day Micro Results       Procedure Component Value Units Date/Time    Urine Culture [[id number]] Collected: 08/13/23 0532    Order Status: Completed Lab Status: Final result Updated: 08/14/23 0737    Specimen: Urine, Nails Catheter      Culture No Growth    Blood Culture Peripheral Blood [[id number]]  (Abnormal) Collected: 08/13/23 0452    Order Status: Completed Lab Status: Preliminary result Updated: 08/14/23 0940    Specimen: Peripheral Blood      Culture Positive on the 1st day of incubation      Gram positive cocci in clusters     Comment: 1 of 2 bottles       Verigene GP Panel [[id number]]  (Abnormal) Collected: 08/13/23 0452    Order Status: Completed Lab Status: Final result Updated: 08/14/23 1219    Specimen: Peripheral Blood      Staphylococcus aureus Not Detected     Staphylococcus epidermidis Detected     Comment: Positive for methicillin-resistant Staphylococcus epidermidis (MRSE) by [employer] multiplex nucleic acid test. Final identification and antimicrobial susceptibility testing will be verified by standard methods.        Staphylococcus lugdunensis Not Detected     Enterococcus faecalis Not Detected     Enterococcus faecium Not Detected     Streptococcus species Not Detected     Streptococcus agalactiae Not Detected     Streptococcus anginosus group Not Detected     Streptococcus pneumoniae Not Detected     Streptococcus pyogenes Not Detected     Listeria species Not Detected    Narrative:      Specimen tested with [facility] multiplex,  gram-positive blood culture nucleic acid test for the following targets: Staphylococcus aureus, Staphylococcus epidermidis, Staphylococcus lugdunensis, other Staphylococcus species, Enterococcus faecalis, Enterococcus faecium, Streptococcus species, Streptococcus agalactiae, Streptococcus anginosus group, Streptococcus pneumoniae, Streptococcus pyogenes, Listeria species, mecA (methicillin resistance), and Theresa/vanB (vancomycin resistance).    Blood Culture Peripheral Blood [[id number]]  (Normal) Collected: 08/13/23 0341    Order Status: Completed Lab Status: Preliminary result Updated: 08/14/23 0546    Specimen: Peripheral Blood      Culture No growth after 1 day            Recent Labs   Lab Test 06/29/23  1759 07/27/23  1712 07/27/23  1952 08/13/23  0532   URINEPH 5.0 5.5 5.5 5.5   NITRITE Negative Negative Negative Negative   LEUKEST Large* Large* Large* Large*   WBCU >182* 46* 12* >182*                         Imaging: No results found.

## 2023-08-14 NOTE — PROGRESS NOTES
"MD Notification    Notified Person: MD YOLI Lundberg     Notified Person Name: Amie ALARCON RN    Notification Date/Time: 8/14 11:14a    Notification Interaction: \"[address] patient refused WOC consult. refused BLE assessment. refused buttock assessment. seems to have experienced a cognitive decline since last admission. \"     Purpose of Notification: escalate refusal of consult to provider     Orders Received: none     Comments: spoke to primary nurse and spoke to charge nurse in person about this patients consult refusal       Amie MENDOZA   1st: [employer], call \"Amie Eugene\" or call Group \"[facility] Nurse\"   (2nd option: leave a voicemail at Dept. Office Number: [phone]. Messages checked occasionally M-F)    "

## 2023-08-14 NOTE — CONSULTS
"[facility] Nurse Inpatient Assessment     Consulted for: Wound bilateral leg wounds     Summary: patient known to [facility] team due to previous admissions with documented BLE wounds and right heel wound and buttock wounds, refused assessment 8/14, MD notified     Patient History (according to provider note(s):      \"89 year old male admitted on 8/13/2023. He presents with a fall with a laceration \"    Assessment:      Areas visualized during today's visit: Patient refused     Phone  used during visit. Patient yelled contradictory statements at writer and  including requesting writer put his legs \"Up up up! Down down down!\" Patient frequently interrupted the , spoke over her when she was providing translation/interpretation, made contradictory statements about the wounds and wound consult (\"yes, you can see my legs they are there\" and \"no, 10 doctors have already assessed my legs, do not touch them.\")       Treatment Plan:       Skin care precautions  EFFECTIVE NOW        Comments: Pressure Injury Prevention (PIP) Plan:  If patient is declining pressure injury prevention interventions: Explore reason why and address patient's concerns, Educate on pressure injury risk and prevention intervention(s), If patient is still declining, document \"informed refusal\" , and Ensure Care team is aware ( provider, charge nurse, etc)  Mattress: Follow bed algorithm, reassess daily and order specialty mattress, if indicated.  HOB: Maintain at or below 30 degrees, unless contraindicated  Repositioning in bed: Every 1-2 hours , Left/right positioning; avoid supine, and Raise foot of bed prior to raising head of bed, to reduce patient sliding down (shear)  Heels: Keep elevated off mattress and Heel lift boots  Protective Dressing: Sacral Mepilex for prevention (#493965),  especially for the agitated patient  Chair positioning: Chair cushion (#717080) , Assist patient to " "reposition hourly, and Do NOT use a donut for sitting (this increases pressure to smaller area and creates a higher potential for injury)    If patient has a buttock pressure injury, or high risk for PI use chair cushion or SPS.  Moisture Management: Perineal cleansing /protection: Follow Incontinence Protocol, Avoid brief in bed, Clean and dry skin folds with bathing , Consider InterDry (#419516) between folds, and Moisturize dry skin  Under Devices: Inspect skin under all medical devices during skin inspection , Ensure tubes are stabilized without tension, and Ensure patient is not lying on medical devices or equipment when repositioned  Ask provider to discontinue device when no longer needed.          Wound care  EVERY OTHER DAY      Comments: Location: Bilateral Lower extremity wound(s)  Care: provided every other day by primary RN  1. Cleanse legs with Vashe (#555122) soaked gauze. Soak a Kerlix roll with Vashe and wrap leg. Allow to soak for about 10 minutes.  2. Remove gauze and let leg dry.  3. Cut pieces of Polymem foam (#931290) to fit over open, bleeding/draining wounds.  4. Spread a layer of Plurogel (#762617) directly on the foam using a tongue depressor. Apply foam to wounds.  5.         Wrap legs with Kerlix to hold foam in place. Secure with tape.          Wound care  DAILY        Comments: Location: Right Heel wound  Care: provided every other day by primary RN  1. Cleanse with commercial wound cleanser and 4x4\" gauze. Ensure all Iodosorb is removed from wound bed.  2. Swab anita-wound area with 3M no-sting barrier film (skin prep) and allow to dry.  3. Apply a dime sized amount of Iodosorb gel (#151576) to 4x4\" Mepilex border and cover wound.  4. Initial and date.  5. Apply prevalon heel offloading boots when in bed          Wound care  EVERY SHIFT        Comments: Location: buttock  Care: provided qshift by primary RN  1. Cleanse with each incontinence episode with lyssa spray cleanser and soft dry " "wipes, or at least every 2 hours with routine turns for fecal and urinary incontinence  2. Apply Triad paste (#602224) to perianal. No need to wipe off all Triad paste, ok to wipe off soiled top layer and reapply more  3. Cover sacrum with sacral mepilex, ok to use each up to 5 days  4. Do Not apply diaper, use covidien underpad for incontinence and external catheter        Orders: Written    RECOMMEND PRIMARY TEAM ORDER:  palliative care consult  Education provided: importance of repositioning and plan of care  Discussed plan of care with: Patient, Nurse, and charge nurse, text page sent to MD  WOC nurse follow-up plan: weekly, will reattempt skin assessment 8/15 as [facility] staffing allows   Notify WOC if wound(s) deteriorate.  Nursing to notify the Provider(s) and re-consult the [facility] Nurse if new skin concern.    DATA:     Current support surface: Standard  Standard gel/foam mattress (IsoFlex, Atmos air, etc)  Containment of urine/stool: Incontinence Protocol and Incontinent pad in bed  BMI: Body mass index is 30.07 kg/m .   Active diet order: Orders Placed This Encounter      Combination Diet Regular Diet Adult     Output: I/O last 3 completed shifts:  In: 120 [P.O.:120]  Out: -      Labs:   Recent Labs   Lab 08/14/23  0616 08/13/23  0341   ALBUMIN  --  2.0*   HGB 10.8* 11.2*   INR  --  1.08   WBC 6.2 7.5     Pressure injury risk assessment:   Sensory Perception: 3-->slightly limited  Moisture: 3-->occasionally moist  Activity: 1-->bedfast  Mobility: 2-->very limited  Nutrition: 2-->probably inadequate  Friction and Shear: 2-->potential problem  Tushar Score: 13    Amie CWOCN   1st: [email or web address] Connect, call \"Amie Eugene\" or call Group \"[facility] Nurse\"   (2nd option: leave a voicemail at Dept. Office Number: [phone]. Messages checked occasionally M-F)      "

## 2023-08-15 NOTE — PROGRESS NOTES
[facility]    Medicine Progress Note - Hospitalist Service    Date of Admission:  8/13/2023    Assessment & Plan   Joselin Sanchez is a 89 year old male admitted on 8/13/2023. He presented with a fall with a laceration.     Hypotension, likely hypovolemic  Possible urosepsis, ruled out  Recent severe sepsis 2/2 UTI  6/28-7/17  *Hospitalization 6/28 with urosepsis. Blood grew corynebacterium, anaerococccus, staphylococcus. Urine with candida. Tx with abx.   *Presented w/ unwitnessed fall 7/31. Hypotensive 90 systolic. Urine and blood cx negative.   *Presented now from care facility after falling from his bed. Denies dizziness or other findings, focuses on pain in legs and back. In ED, BP as low as 63/47, improved with fluids. Afebrile, other VSS. WBC normal. Procal 0.15. lactic normal. UA >182 WBC, 21 RBC.  Admitted to inpatient.  Blood cultures from admission with 1/4 bottles positive for Staph epi, this is likely a contaminant.  UA was abnormal, but urine culture negative.  Initially started on empiric Zosyn, discontinued on 8/14/23.  Recheck labs in AM.  Monitor for signs/symptoms of infection.     Laceration   Chronic leg wounds  PAD  Diabetic foot ulcers  Has LE wounds, frequently declines cares. Had been hospitalized at [facility] 3/2023 for polymicrobial infection of legs. Wounds viewed on 8/15/23 with [facility] nurse, do not appear infected at this time.  WO nurse consulted, appreciate their assistance.  Continue wound cares per WO nurse.  Left leg laceration was repaired in the ER. Sutures should be removed on about 8/25/13 - which is 12 days after placement.     Dementia  Falls  [Prn zyprexa]  Has had unwitnessed falls at his care facility, baseline is non-ambulatory.   Frequently refusing cares in the hospital. His brother states that the patient frequently will do this, but we just need to go ahead with the cares.  Continue PTA PRN zyprexa.  Has also been started on a  scheduled dose of Zyprexa at bedtime.  Updated brother Ismail about patient's refusal of cares and medical intervention.     Recent CVA  6/2023 had CVA during hospitalization L occipital lobe.   Continue PTA ASA 81 mg daily.     Anemia, chronic  Recent upper GI bleed  [Thiamine, folate, pantoprazole]  Anticoagulation attempted 6/2023 but pt had GI bleed. GI rec conservative management 2/2 high risk of complications from endoscopy.   Continue PTA protonix.  Avoid anticoagulation other than PTA 81 mg ASA.     Atrial fibrillation, chronic paroxysmal  Chronic HFrEF  Hypertension  [Furosemide 20 mg daily, lisinopril 2.5 mg daily, metoprolol 25 mg daily]  Echo 6/2023 with EF 40-45% with global hypokinesia. Not on anticoagulation 2/2 GI bleed.   *appears compensated on admission.  PTA furosemide and lisinopril on hold for now.  Continue PTA metoprolol.    BPH  Continue PTA avodart and flomax.     DM II, diet controlled  Blood sugars low at times during the day.  Patient states he is fasting today and does not want any oral intake, although did eat breakfast earlier and take his medications.  Continue to monitor blood sugars.  Hypoglycemia protocol in place.     Hx partially thrombosed saccular ductus arteriosus aneurysm  Noted on CTA chest during 6-7/2023 hospitalization.        Diet: Combination Diet Regular Diet Adult    DVT Prophylaxis: VTE Prophylaxis contraindicated due to lower extremity edema and wounds, patient not cooperative  Nails Catheter: Not present  Lines: None     Cardiac Monitoring: ACTIVE order. Indication: Tachyarrhythmias, acute (48 hours)  Code Status: No CPR- Do NOT Intubate      Clinically Significant Risk Factors Present on Admission              # Hypoalbuminemia: Lowest albumin = 2 g/dL at 8/13/2023  3:41 AM, will monitor as appropriate   # Drug Induced Platelet Defect: home medication list includes an antiplatelet medication   # Hypertension: Home medication list includes antihypertensive(s)                  Disposition Plan      Expected Discharge Date: 08/16/2023        Discharge Comments: WOC consult-refused  pt from [facility]?          Jai Bonilla MD  Hospitalist [facility]  Securely message with Jayda (more info)  Text page via [employer] Paging/Directory   ______________________________________________________________________    Interval History   Joselin Sanchez was seen this afternoon. He was seen with his nurse, the wound care nurse, and a nurse aide who translated for him. He was resistant to cares. Stated he was fasting and that we should come back to see him after 9:00 pm. Discussed plan of care with his brother, Goran, by phone this afternoon.    Physical Exam   Vital Signs: Temp: 98.4  F (36.9  C) Temp src: Oral BP: 109/43 Pulse: 84   Resp: 16 SpO2: 93 % O2 Device: None (Room air)    Weight: 191 lbs 9.28 oz    Constitutional: awake, alert, cooperative, no apparent distress, laying in the hospital bed, frequently refusing cares  Respiratory: no increased work of breathing, clear to auscultation bilaterally, no crackles or wheezing  Cardiovascular: regular rate and rhythm, normal S1 and S2, no murmur noted  GI: normal bowel sounds, soft, non-distended, non-tender  Skin: warm, dry, wounds on legs as pictured below in WOC nurse photos  Musculoskeletal: lower extremity pitting edema present  Neurologic: awake, alert, answers some questions appropriately through the  but also seems confused at times as well, strength 4/5 in bilateral upper extremities, 2-3/5 in bilateral lower extremities        Medical Decision Making       40 MINUTES SPENT BY ME on the date of service doing chart review, history, exam, documentation & further activities per the note.      Data   NOTE: Data reviewed over the past 24 hrs contributes toward MDM complexity

## 2023-08-15 NOTE — PROGRESS NOTES
Agitation with cares. Refusing staff to do any incontinence/wound cares. Hitting and grabbing staff by arm when providing cares. Needed 3 staff members to safely provide repositioning and anita care.

## 2023-08-15 NOTE — DISCHARGE INSTRUCTIONS
WOUND CARE  Location: BLE, right heel, (left lateral lower leg with sutures from this admission)  Care: provided daily by primary RN  1. Cleanse daily with commercial wound cleanser and soft dry wipes, No need to wipe off all Triad paste, ok to wipe off soiled top layer and reapply more  2. Apply Triad paste (or like product) to wounds and periwound skin  3. Ok to leave open to air while patient is in bed. Ok to apply absorbant pad under legs, over pillows, to elevate heels and to absorb drainage. (Ok to wrap legs with gauze if patient is up out of bed)

## 2023-08-15 NOTE — PROGRESS NOTES
Wound care was done by woc RN. With the help of 4 staff( including MD), pt was turned to the side and skin assessed.

## 2023-08-15 NOTE — PROVIDER NOTIFICATION
MD Notification    Notified Person: MD    Notified Person Name:  Chad    Notification Date/Time: 8/15/2023 1026      Notification Interaction: via text     Purpose of Notification: bg 65,76. Refusing to eat     Orders Received:    Comments:

## 2023-08-15 NOTE — PROGRESS NOTES
Orientation/Cognitive: AO self. Confused. Bruneian speaking.  Observation Goals (Met/ Not Met): Inpt  Mobility Level/Assist Equipment: A2. Total care. T/R. Lift. Bed bound.  Fall Risk (Y/N): Yes  Behavior Concerns: Refuses care with incontinence and repositioned.  Pain Management: Denies  Tele/VS/O2: VSS on RA ex soft BP. Tele- Afib CVR.  ABNL Lab/BG: BG- 73, 76, 81. Hgb - 10.8.  Diet: Reg diet. Poor appetite.  Bowel/Bladder: Incontinent B/B. Smear BM X1.  Skin Concerns: BLE wound, R heel dressing CDI. Coccyx open sore. Refused wound care.  Drains/Devices: PIV SL.  Tests/Procedures for next shift: none  Anticipated DC date & active delays: TBD. SW consult.  Patient Stated Goal for Today: none

## 2023-08-15 NOTE — CONSULTS
"[facility] Nurse Inpatient Assessment     Consulted for: Wound bilateral leg wounds     Summary: patient known to [facility] team due to previous admissions with documented BLE wounds and right heel wound and buttock wounds, assessment completed 8/15 with MD present due to patient behaviors      Patient History (according to provider note(s):      \"89 year old male admitted on 8/13/2023. He presents with a fall with a laceration \"    Assessment:      Areas visualized during today's visit: Focused:, Sacrum/coccyx, and Lower extremities     Wound location: left lateral leg, distal to knee     Last photo: 8/15  Wound due to: Trauma fall PTA   Wound history/plan of care: found with sutures in place   Wound base: epidermis, well approximated      Palpation of the wound bed: normal      Drainage: scant     Description of drainage: serosanguinous     Measurements (length x width x depth, in cm): 4  x 0.1  x  0 cm      Tunneling: N/A     Undermining: N/A  Periwound skin: Denuded and Scar tissue      Color: normal and consistent with surrounding tissue      Temperature: normal   Odor: none  Pain: moderate, severe, and Irritable or crying out at intervals, tender  Pain interventions prior to dressing change: slow and gentle cares  and distraction, team work   Treatment goal: Heal  and Protection  STATUS: initial assessment  Supplies ordered: at bedside    Wound location: right heel     Last photo: 8/15  Wound due to: Pressure Injury POA unstagable with superimposed DTI to periwound skin   Wound history/plan of care: found with nonstick pad in place with kerlix   Wound base: slough     Palpation of the wound bed: boggy      Drainage: scant     Description of drainage: serosanguinous     Measurements (length x width x depth, in cm): 3.5  x 3.2  x  0.4 cm      Tunneling: N/A     Undermining: N/A  Periwound skin:  maroon purple nonblanchable       Color: purple      Temperature: normal   Odor: mild  Pain: " Irritable or crying out at intervals, tender  Pain interventions prior to dressing change: slow and gentle cares  and distraction  Treatment goal: Protection  STATUS: initial assessment  Supplies ordered: gathered and at bedside    Wound location: BLE    Last photo: 8/15  Wound due to: Venous Ulcer  Wound history/plan of care: found with nonstick pads and kerlix in place   Wound base:  dermis, non-granular tissue, serous scabbing, and superficial scab     Palpation of the wound bed: normal      Drainage: small     Description of drainage: serosanguinous     Measurements (length x width x depth, in cm): largest appears left posterior cluster of 10  x 6  x  0.1 cm      Tunneling: N/A     Undermining: N/A  Periwound skin: Denuded, Dry/scaly, Edematous, Superficial erosion, and Scar tissue      Color: normal and consistent with surrounding tissue, pink, and red      Temperature: normal   Odor: none  Pain: moderate, severe, and Irritable or crying out at intervals, tender  Pain interventions prior to dressing change: slow and gentle cares  and distraction  Treatment goal: Heal , Drainage control, and Pain control  STATUS: initial assessment  Supplies ordered: gathered and at bedside     location: buttock     Last photo: 8/15  Wound due to: Moisture Associated Skin Damage (MASD) POA scars   Wound history/plan of care: found open to air with diaper on   Wound base:   scar     Palpation of the wound bed: normal      Drainage: none     Description of drainage: none     Measurements (length x width x depth, in cm): did not note open areas      Tunneling: N/A     Undermining: N/A  Periwound skin: Intact      Color: normal and consistent with surrounding tissue      Temperature: normal   Odor: none  Pain: denies , none, none to wound area but complaint of discomfort with repositioning   Pain interventions prior to dressing change: slow and gentle cares   Treatment goal: Protection  STATUS: initial assessment  Supplies ordered:  "gathered and at bedside    Treatment Plan:       Skin care precautions  EFFECTIVE NOW        Comments: Pressure Injury Prevention (PIP) Plan:  If patient is declining pressure injury prevention interventions: Explore reason why and address patient's concerns, Educate on pressure injury risk and prevention intervention(s), If patient is still declining, document \"informed refusal\" , and Ensure Care team is aware ( provider, charge nurse, etc)  Mattress: Follow bed algorithm, reassess daily and order specialty mattress, if indicated.  HOB: Maintain at or below 30 degrees, unless contraindicated  Repositioning in bed: Every 1-2 hours , Left/right positioning; avoid supine, and Raise foot of bed prior to raising head of bed, to reduce patient sliding down (shear)  Heels: Keep elevated off mattress and Heel lift boots  Protective Dressing: Sacral Mepilex for prevention (#229414),  especially for the agitated patient  Chair positioning: Chair cushion (#418878) , Assist patient to reposition hourly, and Do NOT use a donut for sitting (this increases pressure to smaller area and creates a higher potential for injury)    If patient has a buttock pressure injury, or high risk for PI use chair cushion or SPS.  Moisture Management: Perineal cleansing /protection: Follow Incontinence Protocol, Avoid brief in bed, Clean and dry skin folds with bathing , Consider InterDry (#939664) between folds, and Moisturize dry skin  Under Devices: Inspect skin under all medical devices during skin inspection , Ensure tubes are stabilized without tension, and Ensure patient is not lying on medical devices or equipment when repositioned  Ask provider to discontinue device when no longer needed.          Wound care  Start:  08/15/23 0537,   EVERY SHIFT,   Routine        Comments: Location: buttock  Care: provided qshift by primary RN  1. Cleanse with each incontinence episode with lyssa spray cleanser and soft dry wipes  2. Apply Triad paste " (#897457) to perianal. No need to wipe off all Triad paste, ok to wipe off soiled top layer and reapply more  3. Do Not apply diaper, use covidien underpad for incontinence and external catheter      Modified from: Wound care - EVERY SHIFT Start: 08/14/23 1210, EVERY SHIFT, Routine          Wound care  Start:  08/16/23 0600,   DAILY,   Routine        Comments: Location: BLE, right heel, (left lateral lower leg with sutures from this admission)  Care: provided daily by primary RN  1. Cleanse daily with lyssa spray cleanser and soft dry wipes, No need to wipe off all Triad paste, ok to wipe off soiled top layer and reapply more  2. Apply Triad paste (#146933) to wounds and periwound skin  3. Ok to leave open to air while patient is in bed. Ok to apply covidien pad under legs, over pillows, to elevate heels and to absorb drainage. (Ok to wrap legs with kerlix if patient is up out of bed)      Modified from: Wound care - DAILY Start: 08/15/23 0600, DAILY, Routine        Orders: Reviewed and Updated    RECOMMEND PRIMARY TEAM ORDER: None, at this time  Education provided: importance of repositioning and plan of care  Discussed plan of care with: Patient, Nurse, and charge nurse, text page sent to MD  WOC nurse follow-up plan: weekly  Notify WOC if wound(s) deteriorate.  Nursing to notify the Provider(s) and re-consult the WOC Nurse if new skin concern.    DATA:     Current support surface: Standard  Standard gel/foam mattress (IsoFlex, Atmos air, etc)  Containment of urine/stool: Incontinence Protocol and Incontinent pad in bed  BMI: Body mass index is 30.07 kg/m .   Active diet order: Orders Placed This Encounter      Combination Diet Regular Diet Adult     Output: I/O last 3 completed shifts:  In: 240 [P.O.:240]  Out: -      Labs:   Recent Labs   Lab 08/14/23  0616 08/13/23  0341   ALBUMIN  --  2.0*   HGB 10.8* 11.2*   INR  --  1.08   WBC 6.2 7.5       Pressure injury risk assessment:   Sensory Perception: 2-->very  "limited  Moisture: 2-->very moist  Activity: 1-->bedfast  Mobility: 2-->very limited  Nutrition: 1-->very poor  Friction and Shear: 1-->problem  Tushar Score: 9    Amie VELASCOOCMARIA T   1st: [employer] Connect, call \"Amie Eugene\" or call Group \"[facility] Nurse\"   (2nd option: leave a voicemail at Dept. Office Number: [phone]. Messages checked occasionally M-F)      "

## 2023-08-16 NOTE — DISCHARGE SUMMARY
[facility]  Hospitalist Discharge Summary      Date of Admission:  8/13/2023  Date of Discharge:  8/16/2023  Discharging Provider: Jai Bonilla MD  Discharge Service: Hospitalist Service    Discharge Diagnoses   Hypotension, likely hypovolemic  Possible urosepsis, ruled out  Recent severe sepsis 2/2 UTI  6/28-7/17  Laceration   Chronic leg wounds  Right heel unstagable pressure injury with superimposed DTI to periwound skin, POA   PAD  Diabetic foot ulcers  Dementia with behavioral disturbances  Falls  Recent CVA  Anemia, chronic  Recent upper GI bleed  Atrial fibrillation, chronic paroxysmal  Chronic HFrEF  Hypertension  BPH  DM II, diet controlled  Hx partially thrombosed saccular ductus arteriosus aneurysm    Clinically Significant Risk Factors          Follow-ups Needed After Discharge   Follow-up Appointments     Follow Up and recommended labs and tests      Follow up with [facility] physician.  The following labs/tests are   recommended: CBC and BMP.            Unresulted Labs Ordered in the Past 30 Days of this Admission       Date and Time Order Name Status Description    8/13/2023  3:34 AM Blood Culture Peripheral Blood Preliminary     8/13/2023  3:34 AM Blood Culture Peripheral Blood Preliminary         These results will be followed up by Dr. Bonilla    Discharge Disposition   Discharged to long-term care facility  Condition at discharge: Stable    Hospital Course   Joselin Sanchez is a 89 year old male admitted on 8/13/2023. He presented with a fall with a laceration.     Hypotension, likely hypovolemic  Possible urosepsis, ruled out  Recent severe sepsis 2/2 UTI  6/28-7/17  *Hospitalization 6/28 with urosepsis. Blood grew corynebacterium, anaerococccus, staphylococcus. Urine with candida. Tx with abx.   *Presented w/ unwitnessed fall 7/31. Hypotensive 90 systolic. Urine and blood cx negative.   *Presented now from care facility after falling from his bed. Denies dizziness or  other findings, focuses on pain in legs and back. In ED, BP as low as 63/47, improved with fluids. Afebrile, other VSS. WBC normal. Procal 0.15. lactic normal. UA >182 WBC, 21 RBC.  Admitted to inpatient.  Blood cultures from admission with 1/4 bottles positive for Staph epi, this is likely a contaminant.  UA was abnormal, but urine culture negative.  Initially started on empiric Zosyn, discontinued on 8/14/23.  Blood pressure improved with IV fluids.  Suspect hypotension was related to hypovolemia and poor oral intake in the setting of furosemide use.  Blood pressure improved and stable.  Discharge back to long term care facility today.  Follow-up with primary care provider.  Plan of care discussed with his brother, Goran, on 8/15/23.     Laceration   Chronic leg wounds  Right heel unstagable pressure injury with superimposed DTI to periwound skin, POA   PAD  Diabetic foot ulcers  Has LE wounds, frequently declines cares. Had been hospitalized at [facility] 3/2023 for polymicrobial infection of legs. Wounds viewed on 8/15/23 with [facility] nurse, do not appear infected at this time.  WOC nurse consulted, appreciate their assistance.  Continue wound cares per WOC nurse, see orders below.  Left leg laceration was repaired in the ER. Sutures should be removed 11-13 days after placement per ER provider, this would be on about 8/25/13.     Dementia with behavioral disturbances  Falls  [Prn zyprexa]  Has had unwitnessed falls at his care facility, baseline is non-ambulatory.   Frequently refusing cares in the hospital. His brother states that the patient frequently will do this, but we just need to go ahead with the cares.  Continue PTA PRN zyprexa.  Has also been started on a scheduled dose of Zyprexa at bedtime.  Updated brother Isruss about patient's refusal of cares and medical intervention.     Recent CVA  6/2023 had CVA during hospitalization L occipital lobe.   Continue PTA ASA 81 mg daily.     Anemia,  chronic  Recent upper GI bleed  [Thiamine, folate, pantoprazole]  Anticoagulation attempted 6/2023 but pt had GI bleed. GI rec conservative management 2/2 high risk of complications from endoscopy.   Continue PTA protonix.  Avoid anticoagulation other than PTA 81 mg ASA.     Atrial fibrillation, chronic paroxysmal  Chronic HFrEF  Hypertension  [Furosemide 20 mg daily, lisinopril 2.5 mg daily, metoprolol 25 mg daily]  Echo 6/2023 with EF 40-45% with global hypokinesia. Not on anticoagulation 2/2 GI bleed.   *appears compensated on admission.  PTA furosemide and lisinopril on hold for now. Concern for poor oral intake. Will continue to hold PTA furosemide, lisinopril, and potassium supplement upon discharge until follow-up with primary care provider.  Continue PTA metoprolol.    BPH  Continue PTA avodart and flomax.     DM II, diet controlled  Blood sugars low at times during the day.  Patient stated he was fasting at times during his hospitalization and refused oral intake.  Monitored blood sugars with hypoglycemia protocol in place.     Hx partially thrombosed saccular ductus arteriosus aneurysm  Noted on CTA chest during 6-7/2023 hospitalization.     Consultations This Hospital Stay   WOUND OSTOMY CONTINENCE NURSE  IP CONSULT  CARE MANAGEMENT / SOCIAL WORK IP CONSULT  PHARMACY TO DOSE VANCO  PHARMACY TO DOSE VANCO    Code Status   No CPR- Do NOT Intubate    Time Spent on this Encounter   I, Jai Bonilla MD, personally saw the patient today and spent greater than 30 minutes discharging this patient.       Jai Bonilla MD  [facility] EXTEND[facility] RECOV[facility] SHORT STAY  [address] [place] [address]  Phone: [phone]  ______________________________________________________________________    Physical Exam   Vital Signs: Temp: 97.7  F (36.5  C) Temp src: Axillary BP: 106/55 Pulse: 64   Resp: 18 SpO2: 100 % O2 Device: None (Room air)    Weight: 191 lbs 9.28  "oz  Constitutional: awake, alert, cooperative, no apparent distress, laying in the hospital bed, eating with assistance from staff  Respiratory: no increased work of breathing, clear to auscultation bilaterally, no crackles or wheezing  Cardiovascular: regular rate and rhythm, normal S1 and S2, no murmur noted  GI: normal bowel sounds, soft, non-distended, non-tender  Skin: warm, dry, dressings in place on lower extremity wounds, see [facility] nurse photos in EPIC form 8/15/23  Musculoskeletal: lower extremity pitting edema present  Neurologic: awake, alert, answers some questions appropriately through  but also seems confused at times as well, strength 4/5 in bilateral upper extremities, 2-3/5 in bilateral lower extremities       Primary Care Physician   Alistair Ross    Discharge Orders      General info for SNF    Length of Stay Estimate: Long Term Care  Condition at Discharge: Improving  Level of care:board and care  Rehabilitation Potential: Poor  Admission H&P remains valid and up-to-date: Yes  Recent Chemotherapy: N/A  Use Nursing Home Standing Orders: Yes     Mantoux instructions    Give two-step Mantoux (PPD) Per Facility Policy Yes     Follow Up and recommended labs and tests    Follow up with [facility] physician.  The following labs/tests are recommended: CBC and BMP.     Wound care (specify)    Skin care precautions  EFFECTIVE NOW       Comments: Pressure Injury Prevention (PIP) Plan:  If patient is declining pressure injury prevention interventions: Explore reason why and address patient's concerns, Educate on pressure injury risk and prevention intervention(s), If patient is still declining, document \"informed refusal\" , and Ensure Care team is aware ( provider, charge nurse, etc)  Mattress: Follow bed algorithm, reassess daily and order specialty mattress, if indicated.  HOB: Maintain at or below 30 degrees, unless contraindicated  Repositioning in bed: Every 1-2 hours , Left/right " positioning; avoid supine, and Raise foot of bed prior to raising head of bed, to reduce patient sliding down (shear)  Heels: Keep elevated off mattress and Heel lift boots  Protective Dressing: Sacral Mepilex for prevention (#799539),  especially for the agitated patient  Chair positioning: Chair cushion (#523260) , Assist patient to reposition hourly, and Do NOT use a donut for sitting (this increases pressure to smaller area and creates a higher potential for injury)    If patient has a buttock pressure injury, or high risk for PI use chair cushion or SPS.  Moisture Management: Perineal cleansing /protection: Follow Incontinence Protocol, Avoid brief in bed, Clean and dry skin folds with bathing , Consider InterDry (#687027) between folds, and Moisturize dry skin  Under Devices: Inspect skin under all medical devices during skin inspection , Ensure tubes are stabilized without tension, and Ensure patient is not lying on medical devices or equipment when repositioned  Ask provider to discontinue device when no longer needed.            Wound care  Start:  08/15/23 1547,   EVERY SHIFT,   Routine       Comments: Location: buttock  Care: provided qshift by primary RN  1. Cleanse with each incontinence episode with lyssa spray cleanser and soft dry wipes  2. Apply Triad paste (#681065) to perianal. No need to wipe off all Triad paste, ok to wipe off soiled top layer and reapply more  3. Do Not apply diaper, use covidien underpad for incontinence and external catheter             Wound care  Start:  08/16/23 0600,   DAILY,   Routine       Comments: Location: BLE, right heel, (left lateral lower leg with sutures from this admission)  Care: provided daily by primary RN  1. Cleanse daily with lyssa spray cleanser and soft dry wipes, No need to wipe off all Triad paste, ok to wipe off soiled top layer and reapply more  2. Apply Triad paste (#883727) to wounds and periwound skin  3. Ok to leave open to air while patient is in  bed. Ok to apply covidien pad under legs, over pillows, to elevate heels and to absorb drainage. (Ok to wrap legs with kerlix if patient is up out of bed)     Reason for your hospital stay    Hypotension, likely hypovolemic     Daily weights    Call Provider for weight gain of more than 2 pounds per day or 5 pounds per week.     Activity - Up with assistive device     Activity - Up with nursing assistance     No CPR- Do NOT Intubate     Fall precautions     Diet    Follow this diet upon discharge: Regular Diet Adult     Significant Results and Procedures   Most Recent 3 CBC's:  Recent Labs   Lab Test 08/16/23  0654 08/14/23  0616 08/13/23  0341   WBC 6.3 6.2 7.5   HGB 11.2* 10.8* 11.2*   * 102* 103*   * 180 201     Most Recent 3 BMP's:  Recent Labs   Lab Test 08/16/23  1627 08/16/23  1224 08/16/23  0959 08/16/23  0902 08/16/23  0654 08/14/23  1712 08/14/23  0616 08/13/23  1357 08/13/23  0341   NA  --   --   --   --  143  --  141  --  140   POTASSIUM  --   --   --   --  3.7  --  4.0  --  4.5   CHLORIDE  --   --   --   --  111*  --  111*  --  106   CO2  --   --   --   --  23  --  21*  --  24   BUN  --   --   --   --  10.8  --  12.3  --  13.6   CR  --   --   --   --  0.71  --  0.88  --  1.05   ANIONGAP  --   --   --   --  9  --  9  --  10   BERHANE  --   --   --   --  9.3  --  8.7  --  9.4   GLC 93 85 127*   < > 58*   < > 85   < > 100*    < > = values in this interval not displayed.     Results for orders placed or performed during the hospital encounter of 07/27/23   Head CT w/o contrast    Narrative    EXAM: CT HEAD W/O CONTRAST  LOCATION: [facility]  DATE: 7/27/2023    INDICATION: Unwitnessed fall out of bed.    COMPARISON: Head CT 7/1/2023.    TECHNIQUE: Routine CT head without IV contrast. Multiplanar reformats. Dose reduction techniques were used.    FINDINGS:  INTRACRANIAL CONTENTS: No intracranial hemorrhage, extra-axial collection, or mass effect. Subacute to chronic  infarction left occipital lobe with interval development of encephalomalacia/volume loss. Moderate presumed chronic small vessel ischemic   changes. Moderate generalized volume loss. No hydrocephalus.     VISUALIZED ORBITS/SINUSES/MASTOIDS: No intraorbital abnormality. No paranasal sinus mucosal disease. No middle ear or mastoid effusion.    BONES/SOFT TISSUES: No acute abnormality.      Impression    IMPRESSION:  1.  No CT evidence for acute intracranial process.  2.  Subacute to chronic infarction left occipital lobe with developing encephalomalacia.  3.  Brain atrophy and presumed chronic microvascular ischemic changes as above.     XR Shoulder Left G/E 3 Views    Narrative    EXAM: XR SHOULDER LEFT G/E 3 VIEWS, XR RIBS AND CHEST LEFT 3 VIEWS  LOCATION: [facility]  DATE: 7/27/2023    INDICATION: Unwitnessed fall out of bed.    COMPARISON: 7/1/2023.      Impression    IMPRESSION: Superior humeral head migration is likely related to a chronic full-thickness rotator cuff tear. There is no evidence of an acute fracture about the left shoulder. Moderate glenohumeral joint degenerative change.    There is no radiographic evidence of an acute displaced left-sided rib fracture.    No evidence of pneumonia or pulmonary edema. Heart size is borderline. Atherosclerotic vascular calcifications and tortuosity of the aorta, which is prominent at the aortic arch where there is a focal increased density related to the patient's known   history of a thrombosed saccular aneurysm as seen on recent CT. No pneumothorax. Small bilateral pleural effusions.     Ribs XR, unilat 3 views + PA chest,  left    Narrative    EXAM: XR SHOULDER LEFT G/E 3 VIEWS, XR RIBS AND CHEST LEFT 3 VIEWS  LOCATION: [facility]  DATE: 7/27/2023    INDICATION: Unwitnessed fall out of bed.    COMPARISON: 7/1/2023.      Impression    IMPRESSION: Superior humeral head migration is likely related to a chronic  full-thickness rotator cuff tear. There is no evidence of an acute fracture about the left shoulder. Moderate glenohumeral joint degenerative change.    There is no radiographic evidence of an acute displaced left-sided rib fracture.    No evidence of pneumonia or pulmonary edema. Heart size is borderline. Atherosclerotic vascular calcifications and tortuosity of the aorta, which is prominent at the aortic arch where there is a focal increased density related to the patient's known   history of a thrombosed saccular aneurysm as seen on recent CT. No pneumothorax. Small bilateral pleural effusions.     XR Pelvis and Hip Bilateral 2 Views    Narrative    EXAM: XR PELVIS AND HIP BILATERAL 2 VIEWS  LOCATION: [facility]  DATE: 7/27/2023    INDICATION: Unwitnessed fall out of bed.  COMPARISON: None.      Impression    IMPRESSION: Bones are demineralized. There is no radiographic evidence of an acute displaced fracture. No dislocation. Hip joint spaces are maintained.   XR Ankle Right G/E 3 Views    Narrative    EXAM: XR ANKLE RIGHT G/E 3 VIEWS  DATE/TIME: 7/28/2023 1:15 PM    INDICATION: Right ankle pain after a fall  COMPARISON: Right calcaneus radiographs 6/30/2023      Impression    IMPRESSION: Demineralization. No acute fracture. Probable old healed  distal fibular diaphyseal fracture deformity. Visualized joint spaces  are normally aligned. No widening of the mortise. Vascular and soft  tissue calcifications. Plantar calcaneal enthesophyte. Soft tissue  ulceration over the posterior calcaneus, slightly improved since the  prior exam.    RACHELLE COUCH DO         SYSTEM ID:  [id number]   XR Humerus Right G/E 2 Views    Narrative    EXAM: XR HUMERUS RIGHT G/E 2 VIEWS  DATE/TIME: 7/28/2023 1:15 PM    INDICATION: Right upper extremity swelling after a fall  COMPARISON: None available.       Impression    IMPRESSION: Subtle cortical lucency at the posterior greater tubercle  which may reflect a  nondisplaced fracture. If clinically indicated  dedicated right shoulder radiographs could be performed for further  characterization. Benign appearing cortical thickening of the  posterior humeral diaphysis which may be sequela of prior trauma.       RACHELLE COUCH DO         SYSTEM ID:  [id number]   US Upper Extremity Venous Duplex Right    Narrative    VENOUS DUPLEX ULTRASOUND RIGHT UPPER EXTREMITY  7/28/2023 1:00 PM    CLINICAL HISTORY/INDICATION:  IGT arm swelling; rule out DVT.    COMPARISON:  None relevant    TECHNIQUE:  Grayscale, color-flow, and spectral waveform analysis were  performed of the deep veins of the right upper extremity.    FINDINGS:  The right jugular vein demonstrates normal compressibility,  color-flow and spectral waveform.    Nearly occlusive superficial thrombus is noted in the right basilic  vein from the distal humerus to the level of the antecubital fossa  (around the IV), measuring less than 5 cm. The right subclavian vein,  axillary vein, cephalic vein, and brachial veins demonstrate normal  compressibility, spectral waveform, color flow and augmentation.      Impression    IMPRESSION:   1. No evidence of deep venous thrombosis in the right upper extremity.  2. Short segment superficial thrombus in the right basilic vein from  the distal humerus to the antecubital fossa, this measures less than 5  cm.    VINEET KING DO         SYSTEM ID:  [id number]     Discharge Medications   Current Discharge Medication List        CONTINUE these medications which have CHANGED    Details   furosemide (LASIX) 20 MG tablet Take 1 tablet (20 mg) by mouth every morning --- 8/16/23 --- HOLD UNTIL FOLLOW UP WITH PRIMARY CARE PROVIDER ---    Associated Diagnoses: Chronic systolic congestive heart failure (H)      lisinopril (ZESTRIL) 2.5 MG tablet Take 1 tablet (2.5 mg) by mouth every morning --- 8/16/23 --- HOLD UNTIL FOLLOW UP WITH PRIMARY CARE PROVIDER ---    Associated Diagnoses: Benign  essential hypertension      oxyCODONE (ROXICODONE) 5 MG tablet Take 0.5 tablets (2.5 mg) by mouth every 6 hours as needed for moderate pain  Qty: 5 tablet, Refills: 0    Associated Diagnoses: Transient alteration of awareness; Gram-positive bacteremia; Candida UTI; 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); Sepsis with acute renal failure without septic shock, due to unspecified organism, unspecified acute renal failure type (H)      potassium chloride ER (K-TAB/KLOR-CON) 10 MEQ CR tablet Take 2 tablets (20 mEq) by mouth every morning --- 8/16/23 --- HOLD UNTIL FOLLOW UP WITH PRIMARY CARE PROVIDER ---    Associated Diagnoses: Chronic systolic congestive heart failure (H)           CONTINUE these medications which have NOT CHANGED    Details   acetaminophen (TYLENOL) 500 MG tablet Take 1,000 mg by mouth every 8 hours as needed for pain      albuterol (PROAIR HFA/PROVENTIL HFA/VENTOLIN HFA) 108 (90 Base) MCG/ACT inhaler Inhale 2 puffs into the lungs every 4 hours as needed for shortness of breath or wheezing      aspirin (ASA) 81 MG chewable tablet Take 81 mg by mouth every morning      Carboxymethylcellulose Sodium (REFRESH LIQUIGEL) 1 % GEL Place 1 drop Into the left eye 4 times daily      diclofenac (VOLTAREN) 1 % topical gel Apply topically 2 times daily      docusate sodium (COLACE) 100 MG capsule Take 100 mg by mouth 2 times daily 0800/1200      dutasteride (AVODART) 0.5 MG capsule Take 1 capsule (0.5 mg) by mouth daily  Qty: 30 capsule, Refills: 0    Associated Diagnoses: Sepsis with acute renal failure without septic shock, due to unspecified organism, unspecified acute renal failure type (H); Transient alteration of awareness; 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); Gram-positive bacteremia; Candida UTI      folic acid (FOLVITE) 1 MG tablet Take 1 mg by mouth every morning      metoprolol succinate ER (TOPROL XL) 25 MG 24 hr tablet Take 1 tablet  (25 mg) by mouth daily  Qty: 12.5 tablet, Refills: 1    Associated Diagnoses: Sepsis with acute renal failure without septic shock, due to unspecified organism, unspecified acute renal failure type (H); Transient alteration of awareness; 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); Gram-positive bacteremia; Candida UTI      OLANZapine zydis (ZYPREXA) 5 MG ODT Take 1 tablet (5 mg) by mouth nightly as needed for agitation  Qty: 30 tablet, Refills: 0    Associated Diagnoses: Sepsis with acute renal failure without septic shock, due to unspecified organism, unspecified acute renal failure type (H); Transient alteration of awareness; 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); Gram-positive bacteremia; Candida UTI      pantoprazole (PROTONIX) 40 MG EC tablet Take 1 tablet (40 mg) by mouth 2 times daily (before meals)  Qty: 60 tablet, Refills: 0    Associated Diagnoses: Sepsis with acute renal failure without septic shock, due to unspecified organism, unspecified acute renal failure type (H); Transient alteration of awareness; 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); Gram-positive bacteremia; Candida UTI      polyethylene glycol (MIRALAX) 17 GM/Dose powder Take 17 g by mouth daily  Qty: 510 g, Refills: 1    Associated Diagnoses: Sepsis with acute renal failure without septic shock, due to unspecified organism, unspecified acute renal failure type (H); Transient alteration of awareness; 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); Gram-positive bacteremia; Candida UTI      senna-docusate (SENOKOT-S/PERICOLACE) 8.6-50 MG tablet Take 1 tablet by mouth 2 times daily 0800/1200      tamsulosin (FLOMAX) 0.4 MG capsule Take 0.4 mg by mouth every morning      thiamine 50 MG TABS Take 1 tablet (50 mg) by mouth daily  Qty: 30 tablet, Refills: 1    Associated Diagnoses: Sepsis with acute renal failure without  septic shock, due to unspecified organism, unspecified acute renal failure type (H); Transient alteration of awareness; 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); Gram-positive bacteremia; Candida UTI           Allergies   Allergies   Allergen Reactions    Gabapentin

## 2023-08-16 NOTE — CONSULTS
Care Management Initial Consult    General Information  Assessment completed with: Other, Mariama  Type of CM/SW Visit: Initial Assessment    Primary Care Provider verified and updated as needed: Yes   Readmission within the last 30 days: current reason for admission unrelated to previous admission      Reason for Consult: discharge planning  Advance Care Planning: Advance Care Planning Reviewed: present on chart          Communication Assessment  Patient's communication style: spoken language (non-English)             Cognitive  Cognitive/Neuro/Behavioral: .WDL except  Level of Consciousness: confused  Arousal Level: opens eyes spontaneously  Orientation: disoriented to, place, situation  Mood/Behavior: angry  Best Language: 0 - No aphasia  Speech: slow    Living Environment:   People in home: facility resident  alone  Current living Arrangements: extended care facility  Name of Facility: [facility]   Able to return to prior arrangements: yes  Living Arrangement Comments: MA bed hold    Family/Social Support:  Care provided by: self, other (see comments) (facility staff)  Provides care for: no one  Marital Status: Single  Other (specify), Facility resident(s)/Staff (niece)          Description of Support System: Supportive, Involved    Support Assessment: Adequate family and caregiver support, Adequate social supports    Current Resources:   Patient receiving home care services: No     Community Resources: None  Equipment currently used at home:    Supplies currently used at home: Wound Care Supplies    Employment/Financial:  Employment Status: retired        Financial Concerns: No concerns identified   Referral to Financial Worker: No  Finance Comments: Torey Hernandez    Does the patient's insurance plan have a 3 day qualifying hospital stay waiver?  No    Lifestyle & Psychosocial Needs:  Social Determinants of Health     Tobacco Use: Not on file   Alcohol Use: Not on file   Financial Resource Strain: Not on  file   Food Insecurity: Not on file   Transportation Needs: Not on file   Physical Activity: Not on file   Stress: Not on file   Social Connections: Not on file   Intimate Partner Violence: Not on file   Depression: Not on file   Housing Stability: Not on file       Functional Status:  Prior to admission patient needed assistance:   Dependent ADLs:: Wheelchair-with assist, Transfers, Incontinence, Eating, Grooming, Dressing, Bathing  Dependent IADLs:: Cleaning, Cooking, Laundry, Shopping, Meal Preparation, Medication Management, Money Management, Transportation, Incontinence  Assesssment of Functional Status: At functional baseline    Mental Health Status:  Mental Health Status: No Current Concerns       Chemical Dependency Status:  Chemical Dependency Status: No Current Concerns             Values/Beliefs:  Spiritual, Cultural Beliefs, Congregational Practices, Values that affect care: no               Additional Information:  Per care management consult for discharge planning, chart was reviewed. Patient is a 89 year old male with a past medical history of ypertension, diabetes mellitus type 2, chronic atrial fibrillation not on anticoagulation, chronic systolic congestive heart failure dyslipidemia, diabetic foot ulcer, lymphedema, dementia. Patient has past admissions for was admitted here at [facility] 6/28-7/17 for severe sepsis secondary to urinary tract infection, bacteremia, and septic encephalopathy complicated by stroke and again 7/27-7/31 for confusion after a fall and urinary infection. Spoke to Josiane at [facility]. Patient is a LTC resident since April 2023. Patient has a MA bed hold and can return anytime. Writer spoke with Mariama JOHNSON (niece) she is aware patient is medically ready and needs to return to LTC. She is agreeable. Writer stated she would set up a ride and let her know. Writer called  [facility] and spoke with René. Stretcher ride due to bed bound, confusion and wounds set for today  at [phone]. Write called Mariama and left a VM on transport time. Writer paged MD for orders. Writer called Josiane in admissions and updated her on transport time via voicemail    JENN Brown, [facility]   Social Work   [facility]

## 2023-08-16 NOTE — PLAN OF CARE
"Goal Outcome Evaluation:      Plan of Care Reviewed With: patient      Summary: pt here with fall from his LTC.    Orientation/Cognitive: alert to self. Disoriented otherwise. Pt is Kazakh speaking but can converse in english with simple sentence. For eg, pt said \" slowly\" when attempted to touch leg. Than he said, \" I will kill you\" when attempted to raise the HOB with feeding. Also stated, \" I am Uatsdin, dont touch me\" when attempted to provide wound care.   Observation Goals (Met/ Not Met): inpatient status   Mobility Level/Assist Equipment: bed bound baseline. Up with lift.   Fall Risk (Y/N): yes  Behavior Concerns: agitated and aggressive with cares. Grabbing and hitting staff arm when providing incontinence care.   Pain Management: denies pain. But when attempting to touch legs pt yells at staff.  Tele/VS/O2: VSS,. Refusing at times   ABNL Lab/BG: BG in mid 60-70s. MD aware.   Diet: regular , feeder.   Bowel/Bladder: incontinence of both bowel and bladder.. BM X1  this shift  Skin Concerns: multiple wounds through out his body. BLE, coccyx, R heels. Refusing to let RN touch it. Yelling at the RN. Multiple attempts done to talk pt into getting wound care.   Drains/Devices: PIV SL  Tests/Procedures for next shift: none  Anticipated DC date & active delays: 1-2 days  back to LTC  Patient Stated Goal for Today: none.              "
Goal Outcome Evaluation:     Plan of Care   Summary: pt here with fall from his LTC.     Orientation/Cognitive: alert to self. Disoriented otherwise. Pt is Syrian speaking but can speaks some english  Observation Goals (Met/ Not Met): inpatient status   Mobility Level/Assist Equipment: bed bound baseline. Up with lift; T/R   Fall Risk (Y/N): yes  Behavior Concerns: agitated with cares and frustrated as language barrier in the way  Pain Management: denies pain while lying still. ex when attempting to touch legs pt gets angry  Tele/VS/O2: VSS Refusing at times   ABNL Lab/BG: BG 74 lower at times-MD aware.   Diet: regular, feeder  Bowel/Bladder: used urinal this shift with a little assistance. No BM this shift  Skin Concerns: multiple wounds through out his body. BLE, coccyx, R heel T/R  Drains/Devices: PIV SL  Tests/Procedures for next shift: none  Anticipated DC date & active delays: 1-2 days  back to LTC  Patient Stated Goal for Today: none given        
Goal Outcome Evaluation:    Overall Patient Progress: no change     Orientation/Cognitive: A&O to self, confused,  had trouble understanding pt  Observation Goals (Met/ Not Met): Inpatient   Mobility Level/Assist Equipment: Bedbound, not OOB, T&R Q2; refuses repositioning  Fall Risk (Y/N): Yes  Behavior Concerns: Frustrated/Agitated, refusing nursing care  Pain Management: Denies  Tele/VS/O2: Tele: Afib CVR, VSS on RA;soft BPs  ABNL Lab/BG: BC: Gram + cocci clusters  Diet: Reg, poor diet  Bowel/Bladder: Incont to B/B  Skin Concerns: BLE Wounds; refuses wound care, sore to coccyx; FRANCHESKA d/t pt refusing  Drains/Devices: PIV SL  Tests/Procedures for next shift: SW Consult  Anticipated DC date & active delays: TBD  Patient Stated Goal for Today:             
Goal Outcome Evaluation:    Summary: Romanian/ Swahili speaking  Orientation/Cognitive: Oriented to self  Observation Goals (Met/ Not Met): Inpatient   Mobility Level/Assist Equipment: Bed bound at baseline. Ax2/lift  Fall Risk (Y/N): yes  Behavior Concerns: Agitation/aggressive with cares  Pain Management: Denies  Tele/VS/O2: VSS on RA  ABNL Lab/BG: BG 77, MD aware of low BGs  Diet: Regular, feeder    Bowel/Bladder: Incontinent B&B   Skin Concerns: multiple wounds through out his body. BLE, coccyx, R heels.   Drains/Devices: PIV SL  Tests/Procedures for next shift: none  Anticipated DC date & active delays: TBD, SW following   Patient Stated Goal for Today: None             
Orientation/Cognitive: A to self. Singaporean speaking. Refused skin assessment of legs. Refuses cares at times. Allowed RN to help with incont care after encouragement from family.   Observation Goals (Met/ Not Met): not met  Mobility Level/Assist Equipment: Lift Repo  Fall Risk (Y/N): Y  Behavior Concerns: N  Pain Management: currently denies. Oxy given prev shift  Tele/VS/O2: Tele Afib CVR. Soft bp. Prev RA, 1 L O2 now. Unable to get accurate temp  ABNL Lab/BG: , dextrose inf. procal  Diet: comb reg  Bowel/Bladder: incont. BM x1 this shift  Skin Concerns: BLE wounds wrapped cdi, foot ulcers, pt refused assessment and wound care.   Wound penis, small amount of bright red blood, pt unable to state how he got wound. Scar coccyx, no open wound.   Drains/Devices: no  Tests/Procedures for next shift: woc  Anticipated DC date & active delays: tbd  Patient Stated Goal for Today: fidelina    
Orientation: A&O to self only. Confused. Swahili speaking. Refusing cares at times.     Vitals/Tele: VSS 1L O2. Pain with reposition, denies otherwise. Afib CVR.    IV Access/drains PIV infusing.     Diet: Regular    , refused 200 check      Mobility: A2 lift. T/r q2, refusing repo at times.     GI/: Incontinent B&B, refusing cares. Will not use urinal. Writer made repeated attempts for patient to use urinal.     Wound/Skin: BLE wounds wrapped gauze, CDI. Open sores, scabbing, and bruising.     Consults: WOC, ERI.    Discharge Plan: Pending progress.      See Flow sheets for assessment       
Orientation: A&O to self only. Confused. Swahili speaking. Refusing cares at times.     Vitals/Tele: VSS 1L O2. Pain with reposition, denies otherwise. Afib CVR.    IV Access/drains PIV infusing.     Diet: Regular    BG checks refused     Mobility: A2 lift. T&R Q2, refusing    GI/: Incontinent B&B, refusing cares. Will not use urinal    Wound/Skin: BLE wounds wrapped gauze, CDI. Open sores, scabbing, and bruising. Refused WOC assessment     Consults: WOC, SW.    Discharge Plan: MD notified family of pt refusal of cares and medical interventions. Blood cultures came back positive for MRSE, abx regiment ordered, discharge home tomorrow.        
Pt is discharging back to [facility] at this time w/ all pt's belongings. AVS and education given. Questions answered. Transport via [facility].       Plan of Care Reviewed With: patient, durable power of                  
Summary: Admitted today after fall w/ laceration  Hx: Chronic leg wounds, PAD, diabetic foot ulcers, dementia, AFIB, DM II  Orientation: A/O to self  Observation Goals (met & not met): Not met  Activity Level: A2 lift  Fall Risk: Y  Pain Management: Leg pain managed with PRN Oxycodone x1 & PRN Tylenol  ABNL VS/O2: VSS on RA  Diet: Reg. Poor appetite.  Bowel/Bladder: Inc. No BM.   Drains/Devices: PIV infusing w/ int. ABX  Other Important Info: Pt refusing pericares. Pt has poor oral intake. BG 79- juice & snack given.   
Jonel Johnson(Attending)

## 2023-08-16 NOTE — PROGRESS NOTES
Care Management Discharge Note    Discharge Date: 08/17/2023       Discharge Disposition: Long Term Care    Discharge Services: None    Discharge DME: None    Discharge Transportation: agency    Private pay costs discussed: transportation costs    Does the patient's insurance plan have a 3 day qualifying hospital stay waiver?  No    PAS Confirmation Code: N/A  Patient/family educated on Medicare website which has current facility and service quality ratings: no    Education Provided on the Discharge Plan: Yes  Persons Notified of Discharge Plans: Family, LTC, nursing   Patient/Family in Agreement with the Plan: yes    Handoff Referral Completed: No    Additional Information:  PCS form completed, faxed and placed on chart. Bedside RN and HUC updated. Confirmation from Josiane at [facility] that transport time works    Addendum 1445: Orders received and faxed to [facility]. Script fax to LTC    JENN Brown, SW   Social Work   [facility]

## 2023-08-20 PROBLEM — N17.9 ACUTE KIDNEY INJURY (H): Status: ACTIVE | Noted: 2023-01-01

## 2023-08-20 PROBLEM — G93.41 METABOLIC ENCEPHALOPATHY: Status: ACTIVE | Noted: 2023-01-01

## 2023-08-20 NOTE — ED TRIAGE NOTES
BIBA from NH. Pt. Not eating and has pain per family. Family requested pt to come in to ED for eval. Glucose for EMS 49. OJ sips given, will recheck. Pt. Alert, VSS. Dry skin. No appetite. Low glucose and B/P per family.

## 2023-08-20 NOTE — ED NOTES
Pt given 2nd dose of dextrose for hypoglycemia, pt is confused and lethargic. Hospitalist in the room at this time visiting with family via the interpretor phone.

## 2023-08-20 NOTE — ED NOTES
[facility]  ED Nurse Handoff Report    ED Chief complaint: Abdominal Pain      ED Diagnosis:   Final diagnoses:   Metabolic encephalopathy   Acute cystitis with hematuria   Hypoglycemia   Acute kidney injury (H)       Code Status: Full Code    Allergies:   Allergies   Allergen Reactions    Gabapentin        Patient Story:   Pt comes from Nursing home per family request with confusion, no appetite, and failure to thrive.    Focused Assessment:    Neuro: Alert, oriented x self  Respiratory:Clear lung sounds, on room air   Cardiology:  SR 70   Gastrointestinal: soft, tender, slightly distended   Genitourinary/Renal:  Incontinent  Musculoskeletal: moves all extremities   Skin: Intact skin   Lines: PIV 20 g Right AC US insertion    Labs Ordered and Resulted from Time of ED Arrival to Time of ED Departure   COMPREHENSIVE METABOLIC PANEL - Abnormal       Result Value    Sodium 145      Potassium 4.8      Chloride 112 (*)     Carbon Dioxide (CO2) 25      Anion Gap 8      Urea Nitrogen 18.0      Creatinine 1.41 (*)     Calcium 9.2      Glucose 45 (*)     Alkaline Phosphatase 149 (*)      (*)     ALT 44      Protein Total 5.7 (*)     Albumin 2.2 (*)     Bilirubin Total 0.4      GFR Estimate 45 (*)    ROUTINE UA WITH MICROSCOPIC REFLEX TO CULTURE - Abnormal    Color Urine Yellow      Appearance Urine Slightly Cloudy (*)     Glucose Urine Negative      Bilirubin Urine Negative      Ketones Urine Negative      Specific Gravity Urine 1.019      Blood Urine Moderate (*)     pH Urine 5.5      Protein Albumin Urine 20 (*)     Urobilinogen Urine Normal      Nitrite Urine Negative      Leukocyte Esterase Urine Large (*)     Bacteria Urine Few (*)     RBC Urine 5 (*)     WBC Urine 95 (*)    GLUCOSE BY METER - Abnormal    GLUCOSE BY METER POCT 67 (*)    CBC WITH PLATELETS AND DIFFERENTIAL - Abnormal    WBC Count 6.6      RBC Count 3.46 (*)     Hemoglobin 10.6 (*)     Hematocrit 35.8 (*)      (*)     MCH  30.6      MCHC 29.6 (*)     RDW 17.3 (*)     Platelet Count 127 (*)     % Neutrophils 75      % Lymphocytes 17      % Monocytes 5      % Eosinophils 2      % Basophils 0      % Immature Granulocytes 1      NRBCs per 100 WBC 0      Absolute Neutrophils 5.0      Absolute Lymphocytes 1.1      Absolute Monocytes 0.3      Absolute Eosinophils 0.1      Absolute Basophils 0.0      Absolute Immature Granulocytes 0.1      Absolute NRBCs 0.0     GLUCOSE BY METER - Abnormal    GLUCOSE BY METER POCT 145 (*)    LACTIC ACID WHOLE BLOOD - Normal    Lactic Acid 1.8     URINE CULTURE   BLOOD CULTURE   BLOOD CULTURE        US Lower Extremity Venous Duplex Bilateral    (Results Pending)         Treatments and/or interventions provided:      Medications   sodium chloride 0.9% infusion ( Intravenous $New Bag 8/20/23 1239)   glucose gel 15-30 g (has no administration in time range)     Or   dextrose 50 % injection 25-50 mL (has no administration in time range)     Or   glucagon injection 1 mg (has no administration in time range)   OLANZapine zydis (zyPREXA) ODT tab 5 mg (5 mg Oral $Given 8/20/23 1158)   ceFEPIme (MAXIPIME) 1g vial to attach to  ml bag for ADULTS or NS 50 ml bag for PEDS (0 g Intravenous Stopped 8/20/23 1317)   dextrose 50 % injection 50 mL (50 mLs Intravenous $Given 8/20/23 1313)        Patient's response to treatments and/or interventions:   Resting comfortably    To be done/followed up on inpatient unit:    See any in-patient orders    Does this patient have any cognitive concerns?: Baseline dementia    Activity level - Baseline/Home:    Total Care    Activity Level - Current:     Total Care    Patient's Preferred language: [facility]     Needed?: Yes    Isolation: None  Infection: Not Applicable  Patient tested for COVID 19 prior to admission: NO    Bariatric?: No    Vital Signs:   Vitals:    08/20/23 1019 08/20/23 1138   BP: 108/69    Pulse: 60    Resp: 22    Temp: 97.8  F (36.6  C) (!) 91.3  F (32.9   C)   TempSrc: Temporal Rectal   SpO2: 94%        Cardiac Rhythm:     Was the PSS-3 completed:   Yes  What interventions are required if any?               Family Comments: Family at bedside  OBS brochure/video discussed/provided to patient/family: N/A              Name of person given brochure if not patient:              Relationship to patient:    For the majority of the shift this patient's behavior was Yellow.   Behavioral interventions performed were warm blankets, repositioning.    ED NURSE PHONE NUMBER: [phone]

## 2023-08-20 NOTE — PHARMACY-ADMISSION MEDICATION HISTORY
Pharmacist Admission Medication History    Admission medication history is complete. The information provided in this note is only as accurate as the sources available at the time of the update.    Medication reconciliation/reorder completed by provider prior to medication history? No    Information Source(s): Facility ([facility]/NH/) medication list/MAR and CareEverywhere/SureScripts via N/A    Pertinent Information:   -finasteride 5 mg filled 8/9/23 #30ds #30, medication list from facility states patient taking dutasteride 0.5 mg  -omeprazole 20 mg filled 7/26/23 #30ds #30, medication list from facility states patient taking pantoprazole 40 mg  Neither dutasteride nor pantoprazole has fill history in the last 12 months - unclear which patient is taking. Staff at facility deferred to medication list.   Last doses; facility staff confirmed patient refused doses AM 8/20/23, but took all scheduled medications 8/19/23. Staff confirmed that medications held on discharge (furosemide, lisinopril, potassium) have indeed been held at facility. Medication list was faxed, not MAR, from facility ([facility]).     Changes made to PTA medication list:  Added: None  Deleted: None  Changed: None    Medication Affordability:  Not including over the counter (OTC) medications, was there a time in the past 3 months when you did not take your medications as prescribed because of cost?:  (not addressed)    Allergies reviewed with patient and updates made in EHR: no - already reviewed    Medication History Completed By: Verito Corea [facility] 8/20/2023 6:20 PM    Prior to Admission medications    Medication Sig Last Dose Taking? Auth Provider   acetaminophen (TYLENOL) 500 MG tablet Take 1,000 mg by mouth every 8 hours as needed for pain Unknown at PRN Yes Unknown, Entered By History   albuterol (PROAIR HFA/PROVENTIL HFA/VENTOLIN HFA) 108 (90 Base) MCG/ACT inhaler Inhale 2 puffs into the lungs every 4 hours as needed for shortness of  breath or wheezing Unknown at PRN Yes Unknown, Entered By History   aspirin (ASA) 81 MG chewable tablet Take 81 mg by mouth every morning 8/19/2023 at AM Yes Unknown, Entered By History   Carboxymethylcellulose Sodium (REFRESH LIQUIGEL) 1 % GEL Place 1 drop Into the left eye 4 times daily 8/19/2023 at Unknown time Yes Unknown, Entered By History   diclofenac (VOLTAREN) 1 % topical gel Apply topically 2 times daily 8/19/2023 at Unknown time Yes Unknown, Entered By History   docusate sodium (COLACE) 100 MG capsule Take 100 mg by mouth 2 times daily 8/19/2023 at PM Yes Unknown, Entered By History   dutasteride (AVODART) 0.5 MG capsule Take 1 capsule (0.5 mg) by mouth daily 8/19/2023 at AM Yes Radhames Glez MD   folic acid (FOLVITE) 1 MG tablet Take 1 mg by mouth every morning 8/19/2023 at AM Yes Unknown, Entered By History   furosemide (LASIX) 20 MG tablet Take 1 tablet (20 mg) by mouth every morning --- 8/16/23 --- HOLD UNTIL FOLLOW UP WITH PRIMARY CARE PROVIDER ---  at HELD since 8/16/23 Yes Jai Bonilla MD   lisinopril (ZESTRIL) 2.5 MG tablet Take 1 tablet (2.5 mg) by mouth every morning --- 8/16/23 --- HOLD UNTIL FOLLOW UP WITH PRIMARY CARE PROVIDER ---  at HELD since 8/16/23 Yes Jai Bonilla MD   metoprolol succinate ER (TOPROL XL) 25 MG 24 hr tablet Take 1 tablet (25 mg) by mouth daily 8/19/2023 at AM Yes Radhames Glez MD   OLANZapine zydis (ZYPREXA) 5 MG ODT Take 1 tablet (5 mg) by mouth nightly as needed for agitation Unknown at PRN Yes Radhames Glez MD   oxyCODONE (ROXICODONE) 5 MG tablet Take 0.5 tablets (2.5 mg) by mouth every 6 hours as needed for moderate pain Unknown at PRN Yes Jai Bonilla MD   pantoprazole (PROTONIX) 40 MG EC tablet Take 1 tablet (40 mg) by mouth 2 times daily (before meals) 8/19/2023 at PM Yes Radhames Glez MD   polyethylene glycol (MIRALAX) 17 GM/Dose powder Take 17 g by mouth daily 8/19/2023 at AM  Yes Radhames Glez MD   potassium chloride ER (K-TAB/KLOR-CON) 10 MEQ CR tablet Take 2 tablets (20 mEq) by mouth every morning --- 8/16/23 --- HOLD UNTIL FOLLOW UP WITH PRIMARY CARE PROVIDER ---  at HELD since 8/16/23 Yes Jai Bonilla MD   senna-docusate (SENOKOT-S/PERICOLACE) 8.6-50 MG tablet Take 1 tablet by mouth 2 times daily 8/19/2023 at PM Yes Unknown, Entered By History   tamsulosin (FLOMAX) 0.4 MG capsule Take 0.4 mg by mouth every morning 8/19/2023 at AM Yes Unknown, Entered By History   thiamine 50 MG TABS Take 1 tablet (50 mg) by mouth daily 8/19/2023 at AM Yes Radhames Glez MD

## 2023-08-20 NOTE — ED NOTES
Pt incontinent of stool. Cleaned up. Rectal temp 91.3. Warmer blanket applied. PIV attempted right hand, pt combative, unable to collect blood.  Wound dressings taken off lower legs. Pt. Moaning consistently. Warm compress to both hands for IV start.

## 2023-08-20 NOTE — H&P
[facility]    History and Physical - Hospitalist Service       Date of Admission:  8/20/2023    Assessment & Plan        Joselin Sanchez is a 89-year-old male with dementia, atrial fibrillation, BPH, urinary retention, recent ischemic stroke and recent upper GI bleed who was brought in on 8/20/2023 from his long-term care facility due to lower extremity pain, acute confusion, poor oral intake for past several days.      He has had multiple hospitalizations recently  Was hospitalized 6/28-7/17 due to severe sepsis secondary to UTI,  new onset A-fib with RVR, ischemic stroke involving left occipital lobe.  Echo showed EF 40-45% with global hypokinesia. GI bleed and acute blood loss anemia with anticoagulation managed conservatively. Ethics and palliative care were consulted for goals of care discussion with his niece who is the decision maker.       Admitted 7/27-7/31 due to unwitnessed fall without injuries.  UA was abnormal with pyuria but urine culture was negative. He frequently declined cares.       Admitted 8/13-8/16 due to fall with left lower extremity laceration that was repaired.  UA was abnormal but urine culture was negative.  He again declined cares.       Acute metabolic encephalopathy   -Multifactorial-secondary to hypoglycemia, hypovolemia/dehydration, KIMO, pain, probable UTI    Probable acute cystitis with microscopic hematuria  -UA showed 82 WBCs, large leukocyte esterase, negative nitrites  -Started on IV Zosyn pending urine culture results    Diabetes mellitus type 2, diet controlled, A1c 5.8 on 6/28/2023  Hypoglycemia, blood sugar 45  -Has hypoglycemia with blood sugar of 45 due to poor oral intake for last several days.  Not on insulin.    -Start on D5  0.45 NS at 75 ml/h  -Monitor blood sugars and cover with sliding scale        Prerenal acute kidney injury on top of chronic kidney disease stage III  -Creatinine increased from 0.9 to 1.4   -IV fluids as above    Chronic  systolic CHF with reduced EF of 40-45%, echo 6/2023  Essential hypertension  Mild hypotension  -Is on Lasix and lisinopril at baseline.  Mildly hypotensive on admission, improved with IV fluids  -Has 2+ lower extremity edema but otherwise does not show signs of hypervolemia.  In fact is mildly hypovolemic  -Lisinopril and Lasix were held at discharge during recent hospitalization.   Will not resume due to recurrent episodes of hypotension and poor oral intake  -Hold metoprolol for now, resume when blood pressure allows      Chronic lower extremity wounds-due to lymphedema, peripheral arterial disease and diabetes  Recent left lower lobe laceration, s/p repair  -Was hospitalized at [facility] in [place], [place] from 3/27-4/4/2023 for polymicrobial infection of legs.  He underwent surgical debridement and was treated with IV Zosyn.  -Wounds do not appear to be infected  -Consult WOC   -Sutures to be removed around 8/25       Dementia with behavioral disturbance  -Frequently refuses cares.  All family members aware of this  -During recent hospitalization was started on scheduled Zyprexa and changes  -Continue Zyprexa as needed    BPH  Chronic urinary retention  -Continue Avodart and Flomax     Superficial thrombophlebitis of right basilic vein, 7/28  - supportive care     Chronic paroxysmal atrial fibrillation  -Continue Toprol-XL  -Not on anticoagulation due to recent GI bleed     Recent left occipital ischemic stroke, 7/2023  -Continue aspirin 81 mg     Partially thrombosed saccular ductus arteriosus aneurysm  - Noted on CTA chest during 6-7/2023 hospitalization.          Diet:  Clear liquid diet, advance as tolerated  DVT Prophylaxis: Enoxaparin (Lovenox) SQ  Nails Catheter: Not present  Lines: None     Cardiac Monitoring: None  Code Status:  DNR/DNI    Clinically Significant Risk Factors Present on Admission           # Hypercalcemia: corrected calcium is >10.1, will monitor as appropriate    #  Hypoalbuminemia: Lowest albumin = 2.2 g/dL at 8/20/2023 12:35 PM, will monitor as appropriate   # Drug Induced Platelet Defect: home medication list includes an antiplatelet medication  # Acute Kidney Injury, unspecified: based on a >150% or 0.3 mg/dL increase in last creatinine compared to past 90 day average, will monitor renal function  # Hypertension: Home medication list includes antihypertensive(s)                 Disposition Plan      Expected Discharge Date: 08/22/2023                  Montse Stroud MD  Hospitalist Service  [facility]  Securely message with Tapad (more info)  Text page via [facility] Paging/Directory     ______________________________________________________________________    Chief Complaint   Acute confusion, generalized weakness, poor oral intake, lower extremity pain    History is obtained from the patient's sister    History of Present Illness     Joselin Sanchez is a 89-year-old male with dementia, atrial fibrillation, BPH, urinary retention, recent ischemic stroke and recent upper GI bleed who was brought in on 8/20/2023 from his long-term care facility due to lower extremity pain, acute confusion, poor oral intake for past several days.      He has had multiple hospitalizations recently  Was hospitalized 6/28-7/17 due to severe sepsis secondary to UTI.  CT A/P showed prostamegaly and chronic bladder outlet obstruction.  Blood cultures grew corynebacterium striatum, Anaerococcus, Staphylococcus pettenkoferi.  Urine culture grew Candida Dubliniensis. He was treated with IV Zosyn, Augmentin, fluconazole.  Hospital stay was complicated by new onset A-fib with RVR and ischemic stroke involving left occipital lobe.  Echo showed EF 40-45% with global hypokinesia.  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  Anticoagulation was attempted but patient developed GI bleed  and acute blood loss anemia.  GI recommended conservative management due to high risk of complications with endoscopy.  He was started on PPI.  Eventually aspirin 81 mg was introduced without further bleeding.  Ethics and palliative care were consulted for goals of care discussion.  His niece is the decision maker.  He stabilized and was discharged to LTC on 7/17/2023.     Admitted 7/27-7/31 due to unwitnessed fall.  Was mildly hypotensive that did not require intervention.  UA showed mild pyuria and hematuria but urine culture was negative.  He was found to have superficial thrombophlebitis of right upper extremity. He frequently declined cares.  This was communicated with his power of , Mariama.     Admitted 8/13-8/16 due to fall with left leg laceration requiring stitches.  Also had hypotension due to hypovolemia and poor oral intake follow-up.  UA was abnormal with significant pyuria but urine culture was negative.  He again declined cares and medical interventions.  His brother was updated    Now presents with his sister.  History is obtained with the help of a professional [facility] .  Sister reports that he has been complaining of pain in his bilateral lower extremities for past 3 days.  He has been declining to eat and drink.  He has been getting weaker and more confused.  No reports of fever.    Blood pressure 87/59, temperature 92.9, heart rate 72 in ER.  Blood pressure improved with normal saline bolus.  Temperature improved with Felix hugger.    Labs showed hypoglycemia with blood sugar of 45 for which she received dextrose and was subsequently started on D5 0.45 NS    Labs also showed acute kidney injury with creatinine of 1.4, up from baseline of 1.9.  Albumin is low at 2.2    No leukocytosis, stable hemoglobin    UA is again abnormal with 95 WBCs, large leukocyte esterase, negative nitrite    Sister again expressed concerns that his LTC is not able to provide adequate cares for example  they would not change his diaper if he complains of pain.  They do not feed him, daily food near him.        Past Medical History    Past Medical History:   Diagnosis Date    Aneurysm (arteriovenous) of coronary vessels     Partially thrombosed saccular aneurysm of ductus arteriosus    Anxiety     Atrial fibrillation (H)     BPH (benign prostatic hyperplasia)     Shannen UTI 07/02/2023    Chronic heart failure, unspecified heart failure type (H)     Chronic pain     CKD (chronic kidney disease) stage 3, GFR 30-59 ml/min (H)     Dementia (H)     Dysphagia     Dysuria     GI bleed     Hypertension     Lymphedema     Mixed hyperlipidemia     Mood disturbance     Old age 07/02/2023    Year of birth 1924    SHEILA (obstructive sleep apnea)     Peripheral vascular disease (H)     Pressure ulcer of heel     Stroke (H)     Type 2 diabetes mellitus (H)        Past Surgical History   Past Surgical History:   Procedure Laterality Date    CATARACT EXTRACTION      PROSTATE BIOPSY      WOUND DEBRIDEMENT         Prior to Admission Medications   Prior to Admission Medications   Prescriptions Last Dose Informant Patient Reported? Taking?   Carboxymethylcellulose Sodium (REFRESH LIQUIGEL) 1 % GEL  Nursing Home Yes Yes   Sig: Place 1 drop Into the left eye 4 times daily   OLANZapine zydis (ZYPREXA) 5 MG ODT  Nursing Home No Yes   Sig: Take 1 tablet (5 mg) by mouth nightly as needed for agitation   acetaminophen (TYLENOL) 500 MG tablet  Nursing Home Yes Yes   Sig: Take 1,000 mg by mouth every 8 hours as needed for pain   albuterol (PROAIR HFA/PROVENTIL HFA/VENTOLIN HFA) 108 (90 Base) MCG/ACT inhaler  [facility] Yes Yes   Sig: Inhale 2 puffs into the lungs every 4 hours as needed for shortness of breath or wheezing   aspirin (ASA) 81 MG chewable tablet  Nursing Home Yes Yes   Sig: Take 81 mg by mouth every morning   diclofenac (VOLTAREN) 1 % topical gel  Nursing Home Yes Yes   Sig: Apply topically 2 times daily   docusate sodium (COLACE)  100 MG capsule  Nursing Home Yes Yes   Sig: Take 100 mg by mouth 2 times daily   dutasteride (AVODART) 0.5 MG capsule  Nursing Home No Yes   Sig: Take 1 capsule (0.5 mg) by mouth daily   folic acid (FOLVITE) 1 MG tablet  Nursing Home Yes Yes   Sig: Take 1 mg by mouth every morning   furosemide (LASIX) 20 MG tablet   No Yes   Sig: Take 1 tablet (20 mg) by mouth every morning --- 8/16/23 --- HOLD UNTIL FOLLOW UP WITH PRIMARY CARE PROVIDER ---   lisinopril (ZESTRIL) 2.5 MG tablet   No Yes   Sig: Take 1 tablet (2.5 mg) by mouth every morning --- 8/16/23 --- HOLD UNTIL FOLLOW UP WITH PRIMARY CARE PROVIDER ---   metoprolol succinate ER (TOPROL XL) 25 MG 24 hr tablet  Nursing Home No Yes   Sig: Take 1 tablet (25 mg) by mouth daily   oxyCODONE (ROXICODONE) 5 MG tablet   No Yes   Sig: Take 0.5 tablets (2.5 mg) by mouth every 6 hours as needed for moderate pain   pantoprazole (PROTONIX) 40 MG EC tablet  Nursing Home No Yes   Sig: Take 1 tablet (40 mg) by mouth 2 times daily (before meals)   polyethylene glycol (MIRALAX) 17 GM/Dose powder  Nursing Home No Yes   Sig: Take 17 g by mouth daily   potassium chloride ER (K-TAB/KLOR-CON) 10 MEQ CR tablet   No Yes   Sig: Take 2 tablets (20 mEq) by mouth every morning --- 8/16/23 --- HOLD UNTIL FOLLOW UP WITH PRIMARY CARE PROVIDER ---   senna-docusate (SENOKOT-S/PERICOLACE) 8.6-50 MG tablet  Nursing Home Yes Yes   Sig: Take 1 tablet by mouth 2 times daily   tamsulosin (FLOMAX) 0.4 MG capsule  Nursing Home Yes Yes   Sig: Take 0.4 mg by mouth every morning   thiamine 50 MG TABS  Nursing Home No Yes   Sig: Take 1 tablet (50 mg) by mouth daily      Facility-Administered Medications: None           Physical Exam   Vital Signs: Temp: (!) 92.9  F (33.8  C) Temp src: Rectal BP: (!) 107/92 Pulse: 67   Resp: 22 SpO2: 98 % O2 Device: None (Room air)    Weight: 0 lbs 0 oz    Constitutional -confused, moaning   Head - normocephalic, atraumatic  ENT - normal eye lids and lashes, no conjunctival  hyperemia, no icterus, extraocular movements are normal, normal nose, no discharge,   Neck - no thyromegaly or lymphadenopathy. Tracheal is midline  CV - regular rate and rhythm, no murmurs, 2+  edema  Pulmonary - lungs are clear to auscultation bilaterally, no wheezing or rhonchi  GI - abdomen is soft, non distended, non tender  Neurological -confused, moving all 4 extremities, no focal deficits   Musculoskeletal - no joint erythema or swelling, ROM is ok  Integumentary-multiple wounds on bilateral lower extremities, do not appear infected          Medical Decision Making       75 MINUTES SPENT BY ME on the date of service doing chart review, history, exam, documentation & further activities per the note.      Data     I have personally reviewed the following data over the past 24 hrs:    6.6  \   10.6 (L)   / 127 (L)     145 112 (H) 18.0 /  110 (H)   4.8 25 1.41 (H) \     ALT: 44 AST: 113 (H) AP: 149 (H) TBILI: 0.4   ALB: 2.2 (L) TOT PROTEIN: 5.7 (L) LIPASE: N/A     Procal: N/A CRP: N/A Lactic Acid: 1.8         Imaging results reviewed over the past 24 hrs:   Recent Results (from the past 24 hour(s))   US Lower Extremity Venous Duplex Bilateral    Narrative    EXAM: US LOWER EXTREMITY VENOUS DUPLEX BILATERAL  LOCATION: [facility]  DATE: 8/20/2023    INDICATION: Leg swelling  COMPARISON: None.  TECHNIQUE: Venous Duplex ultrasound of bilateral lower extremities with and without compression, augmentation and duplex. Color flow and spectral Doppler with waveform analysis performed.    FINDINGS: Exam includes the common femoral, femoral, popliteal veins as well as segmentally visualized deep calf veins and greater saphenous vein.     Evaluation was limited due to patient's combative nature.    RIGHT: Nearly occlusive thrombus in the right proximal femoral vein including the profunda femoris extending down to the popliteal vein. Calf veins were not evaluated due to patient refusal. Thrombus is  also identified at the origin of the great   saphenous vein. No popliteal cyst.    LEFT: No evidence of deep venous thrombosis with the caveat that the veins distal to the femoral vein could not be evaluated due to patient refusal. No superficial thrombophlebitis. No popliteal cyst.      Impression    IMPRESSION: Limited exam.  1.  Extensive right lower extremity deep venous thrombosis as described above.  2.  Superficial venous thrombosis in the right great saphenous vein.

## 2023-08-20 NOTE — ED NOTES
Bed: ED21  Expected date: 8/20/23  Expected time: 10:04 AM  Means of arrival: Ambulance  Comments:  Chaz 89m fail to thrive ETA 1010

## 2023-08-20 NOTE — ED PROVIDER NOTES
History     Chief Complaint:  Abdominal Pain       The history is limited by the condition of the patient. A  was used (Fijian).      Joselin Sanchez is a 89 year old male with a history of hypertension, DM II, a-fib, and CHF who present with failure to thrive at his nursing home.     Independent Historian:   His sister reports that he has not been eating well for the past 4 days, and he is experiencing pain, mostly in his lower extremities.  He has chronic wounds of been dressed at his care facility.  The patient himself has dementia and family notes that he seems increasingly confused in the last couple of days.  He seems to indicate suprapubic discomfort when asked through an .  He has diabetes and his family notes that his blood sugars have been running low at the nursing home.  There is no report of fever.  No vomiting or diarrhea.  He is not coughing or having any congestion.  No falls or injuries.  No reported headache.  No new focal weakness or paresthesias.    Review of External Notes:   Discharge summary reviewed from August 16, 2023 when the patient was seen with hypotension and volume depletion.  He was evaluated for urinary tract infection and his wounds were dressed.    Medications:    Albuterol   Aspirin   Avodart   Lasix   Lisinopril   Metoprolol   Zyprexa   Oxycodone   Protonix   Flomax      Past Medical History:   AV aneurysm of coronary vessels  Anxiety   Atrial fibrillation   BPH   Candida urinary tract infection  Chronic heart failure   Chronic pain   CKD stage 3   Dementia   Dysphagia   GI bleed  Hypertension   Lymphedema   Hyperlipidemia   Mood disturbance   PVD  Pressure ulcer of heel   Stroke   Type 2 diabetes mellitus   Chronic asystolic congestive heart failure  Major depressive disorder  Sepsis  Varicose veins  Cognitive communication deficit  Peripheral vascular disease     Past Surgical History:    Transrectal prostate biopsy   Wound debridement   EGD with  biopsy     Physical Exam   Patient Vitals for the past 24 hrs:   BP Temp Temp src Pulse Resp SpO2   08/20/23 1138 -- (!) 91.3  F (32.9  C) Rectal -- -- --   08/20/23 1019 108/69 97.8  F (36.6  C) Temporal 60 22 94 %        Physical Exam  Constitutional:       General: He is not in acute distress.     Appearance: Normal appearance. He is not toxic-appearing.   HENT:      Head: Atraumatic.      Right Ear: External ear normal.      Left Ear: External ear normal.      Mouth/Throat:      Mouth: Mucous membranes are dry.      Pharynx: Oropharynx is clear.   Eyes:      General: No scleral icterus.     Conjunctiva/sclera: Conjunctivae normal.      Pupils: Pupils are equal, round, and reactive to light.   Cardiovascular:      Rate and Rhythm: Normal rate. Rhythm irregular.      Heart sounds: Normal heart sounds.   Pulmonary:      Effort: Pulmonary effort is normal. No respiratory distress.      Breath sounds: Normal breath sounds.   Abdominal:      General: There is no distension.      Palpations: Abdomen is soft.      Tenderness: There is no abdominal tenderness.   Musculoskeletal:      Cervical back: Neck supple.      Comments: Chronic bilateral lower extremity edema.  He has chronic appearing wounds on the lower extremities in the pretibial areas and over the heels.  These are dressed and do not appear to be infected.   Skin:     General: Skin is warm.   Neurological:      Mental Status: He is alert.      Comments: Disoriented.  He is able to answer questions through an .  Grossly moves all extremities well.   Psychiatric:      Comments: At times uncooperative.           Emergency Department Course     ECG results from 08/20/23 - EKG read by Dr. Norton at 1122.   EKG 12-lead, tracing only     Value    Systolic Blood Pressure     Diastolic Blood Pressure     Ventricular Rate 72    Atrial Rate     TN Interval     QRS Duration 98        QTc 420    P Axis     R AXIS -62    T Axis 241    Interpretation ECG       Atrial fibrillation  Left axis deviation  Low voltage QRS  Inferior infarct , age undetermined  Abnormal ECG  When compared with ECG of 28-JUN-2023 12:23,  No significant changes       Laboratory:  Labs Ordered and Resulted from Time of ED Arrival to Time of ED Departure   COMPREHENSIVE METABOLIC PANEL - Abnormal       Result Value    Sodium 145      Potassium 4.8      Chloride 112 (*)     Carbon Dioxide (CO2) 25      Anion Gap 8      Urea Nitrogen 18.0      Creatinine 1.41 (*)     Calcium 9.2      Glucose 45 (*)     Alkaline Phosphatase 149 (*)      (*)     ALT 44      Protein Total 5.7 (*)     Albumin 2.2 (*)     Bilirubin Total 0.4      GFR Estimate 45 (*)    ROUTINE UA WITH MICROSCOPIC REFLEX TO CULTURE - Abnormal    Color Urine Yellow      Appearance Urine Slightly Cloudy (*)     Glucose Urine Negative      Bilirubin Urine Negative      Ketones Urine Negative      Specific Gravity Urine 1.019      Blood Urine Moderate (*)     pH Urine 5.5      Protein Albumin Urine 20 (*)     Urobilinogen Urine Normal      Nitrite Urine Negative      Leukocyte Esterase Urine Large (*)     Bacteria Urine Few (*)     RBC Urine 5 (*)     WBC Urine 95 (*)    GLUCOSE BY METER - Abnormal    GLUCOSE BY METER POCT 67 (*)    CBC WITH PLATELETS AND DIFFERENTIAL - Abnormal    WBC Count 6.6      RBC Count 3.46 (*)     Hemoglobin 10.6 (*)     Hematocrit 35.8 (*)      (*)     MCH 30.6      MCHC 29.6 (*)     RDW 17.3 (*)     Platelet Count 127 (*)     % Neutrophils 75      % Lymphocytes 17      % Monocytes 5      % Eosinophils 2      % Basophils 0      % Immature Granulocytes 1      NRBCs per 100 WBC 0      Absolute Neutrophils 5.0      Absolute Lymphocytes 1.1      Absolute Monocytes 0.3      Absolute Eosinophils 0.1      Absolute Basophils 0.0      Absolute Immature Granulocytes 0.1      Absolute NRBCs 0.0     LACTIC ACID WHOLE BLOOD - Normal    Lactic Acid 1.8     URINE CULTURE   BLOOD CULTURE   BLOOD CULTURE         Emergency Department Course & Assessments:    Interventions:  Medications   sodium chloride 0.9% infusion ( Intravenous $New Bag 8/20/23 1239)   glucose gel 15-30 g (has no administration in time range)     Or   dextrose 50 % injection 25-50 mL (has no administration in time range)     Or   glucagon injection 1 mg (has no administration in time range)   OLANZapine zydis (zyPREXA) ODT tab 5 mg (5 mg Oral $Given 8/20/23 1158)   ceFEPIme (MAXIPIME) 1g vial to attach to  ml bag for ADULTS or NS 50 ml bag for PEDS (0 g Intravenous Stopped 8/20/23 1317)   dextrose 50 % injection 50 mL (50 mLs Intravenous $Given 8/20/23 1313)        Assessments:  1022 I obtained history and examined the patient, as noted above.  1210 I rechecked and updated the patient.       Social Determinants of Health affecting care:   Lives in a care facility.    Disposition:  The patient was admitted to the hospital under the care of Dr. Stroud.     Impression & Plan      Medical Decision Making:  This patient is a 89-year-old man who presents to the emergency department for evaluation of confusion with weakness and poor appetite.  He appears to have a new urinary tract infection.  In discussion with pharmacy and reviewing all cultures he was given cefepime.  He does not appear to be septic or toxic although he was somewhat hypothermic.    The patient is confused.  He has a known history dementia.  He is agitated at times.  He was given Zyprexa to help facilitate the work-up.  He tolerated this well.    The patient is hypoglycemic.  This may be in part related to poor oral intake but ongoing use of insulin and his diabetic medications at the outpatient facility.  He was given some orange juice but his blood sugar continues to trend down.  We will initiate the hypoglycemia protocol.    The patient will be admitted to the hospital service.        Diagnosis:    ICD-10-CM    1. Metabolic encephalopathy  G93.41       2. Acute cystitis with  hematuria  N30.01       3. Hypoglycemia  E16.2       4. Acute kidney injury (H)  N17.9                 Scribe Disclosure:  I, Thom Patricio, am serving as a scribe at 10:22 AM on 8/20/2023 to document services personally performed by Hadley Norton MD based on my observations and the provider's statements to me.        Hadley Norton MD  08/20/23 2415

## 2023-08-20 NOTE — ED NOTES
Family in room asking for pain meds, pt hypotensive. Unable to get an accurate spo2 reading as pt is cold with poor circulation.

## 2023-08-21 PROBLEM — N17.9 ACUTE KIDNEY FAILURE, UNSPECIFIED (H): Status: ACTIVE | Noted: 2023-01-01

## 2023-08-21 NOTE — ED NOTES
Pt repositioned and incontinent of stool. Skin ulcers present in anita area.  Pt. Moans with movement and grabs at staff. Pt rectal temp checked at this time and remains low at 93.0. Pt. [place] warmer back on pt. Wounds open in bilat lower legs. Glucose checked again 83. B/P improved slightly while lying flat.

## 2023-08-21 NOTE — CODE/RAPID RESPONSE
"[facility]  House [facility] RRT Note  8/21/2023   Time called: 0234  RRT called for: Hypoglycemia and hypotension  Code status: No CPR- Do NOT Intubate    Assessment & Plan   Patient is a 89 year old male with PMH significant for HFpEF 40-45% dementia, afib, BPH, urinary retention, recent ischemic stroke and recent upper GI bleed who came in from his long-term care facility due to lower extremity pain, acute confusion, and poor intake for several days. Patient admitted on 8/20/2023. Historically during hospitalization the patient frequently denies cares.    I was paged to the bedside to evaluate Mr. Joselin Sanchez for an acute episode of hypotension and ongoing hypoglycemia.    Upon my arrival the patient was supine in bed and restless. Patient was alert and repeatedly telling staff \"no\" when they attempted to perform tasks or interventions. Patient's BP was 65/36 when I arrived and patient's IV access had infiltrated with D50% that was being given to correct his hypoglycemia. I ordered extravasation protocol with hyaluronidase injections. Patient was alert and had palpable pulses in all extremities so I decided to await CRNA for peripheral access rather than place IO as patient was very restless, making humoral a poor option and patient had BLE leg wounds. MDA was called after CRNA unable to obtain access without US and a LUE IV was placed with US guidance. IVF bolus was started and patient's pressure improved initially to 95/45 but then fell again to 70s/40s. I added albumin but patient could also be in cardiogenic shock. He had been on IVF since admission so it would be possible that he became hypervolemic.    Diagnosis:  -- Shock: hypovolemic versus cardiogenic  Occurred when IVF were paused due to loss of access. Suspect hypovolemia and intravascularly dry despite BLE edema. Patient refuses cooperation with NICOM testing so for now give gentle fluids and monitor. BNP is elevated. Family does " "not want escalation to vasopressors so will attempt management with lesser interventions. If diuresis is needed it will be difficult as it is limited by BP.    -- Hypoglycemia  Ongoing and improved from admission. Patient was on D10 1/2NS infusion but it had to be stopped due to loss of IV access.     -- Extravasation of vesicant  D50% potentially extravasated with administration. Patient's RUE swollen and reddened. Begin extravasation protocol.    Plan:  -- 500 ml LR bolus over an hour  -- 25g 5% albumin  -- Hyaluronidase and extravasation protocol  -- vascular access consult  -- TTE in am  -- Labs   *CBC   *CMP   *Lactic acid   *BNP   *Troponin    I called patient's niece who is his healthcare agent and discussed the situation. Patient's niece stated that she \"wanted him to get better\" but she would want to do what made sense and not cause the patient harm if it would be futile. I discussed vasopressor management and requiring central lines with her and a scenario in which it appeared that death may be inevitable, in that scenario patient's niece stated that she would not want the patient to suffer. I discussed the likelihood of the patient becoming sick enough to require vasopressors likely also being a scenario in which he would be dying and how this could mean that we would be causing the patient pain or suffering and she stated that she would not want to do that. I discussed not escalating care to the point of utilizing vasopressors and she agreed that she would not want to escalate to vasopressor management. Goals of care discussion with eliu lasted 15 minutes.    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    Anxiety     Atrial fibrillation (H)     BPH (benign prostatic " hyperplasia)     Candida UTI 07/02/2023    Chronic heart failure, unspecified heart failure type (H)     Chronic pain     CKD (chronic kidney disease) stage 3, GFR 30-59 ml/min (H)     Dementia (H)     Dysphagia     Dysuria     GI bleed     Hypertension     Lymphedema     Mixed hyperlipidemia     Mood disturbance     Old age 07/02/2023    Year of birth 1924    SHEILA (obstructive sleep apnea)     Peripheral vascular disease (H)     Pressure ulcer of heel     Stroke (H)     Type 2 diabetes mellitus (H)      Past Surgical History:   Procedure Laterality Date    CATARACT EXTRACTION      PROSTATE BIOPSY      WOUND DEBRIDEMENT         Review of Systems   The 10 point Review of Systems is negative other than noted in the HPI or here.     Allergies   Allergies   Allergen Reactions    Gabapentin        Physical Exam   Physical Exam  Constitutional:       Appearance: He is ill-appearing.   HENT:      Nose: Nose normal.   Cardiovascular:      Rate and Rhythm: Normal rate. Rhythm irregular.   Pulmonary:      Effort: No respiratory distress.   Abdominal:      General: There is no distension.      Palpations: Abdomen is soft.      Tenderness: There is no abdominal tenderness.   Musculoskeletal:      Right lower leg: Edema present.      Left lower leg: Edema present.   Skin:     General: Skin is warm and dry.      Capillary Refill: Capillary refill takes less than 2 seconds.   Neurological:      General: No focal deficit present.      Mental Status: He is alert. He is disoriented.       Vital Signs with Ranges:  Temp:  [91.3  F (32.9  C)-97.8  F (36.6  C)] 94.4  F (34.7  C)  Pulse:  [60-87] 60  Resp:  [12-22] 19  BP: ()/(48-92) 101/68  SpO2:  [91 %-98 %] 96 %  No intake/output data recorded.    Data   Results for orders placed or performed during the hospital encounter of 08/20/23 (from the past 24 hour(s))   UA with Microscopic reflex to Culture    Specimen: Urine, Catheter   Result Value Ref Range    Color Urine Yellow  Colorless, Straw, Light Yellow, Yellow    Appearance Urine Slightly Cloudy (A) Clear    Glucose Urine Negative Negative mg/dL    Bilirubin Urine Negative Negative    Ketones Urine Negative Negative mg/dL    Specific Gravity Urine 1.019 1.003 - 1.035    Blood Urine Moderate (A) Negative    pH Urine 5.5 5.0 - 7.0    Protein Albumin Urine 20 (A) Negative mg/dL    Urobilinogen Urine Normal Normal, 2.0 mg/dL    Nitrite Urine Negative Negative    Leukocyte Esterase Urine Large (A) Negative    Bacteria Urine Few (A) None Seen /HPF    RBC Urine 5 (H) <=2 /HPF    WBC Urine 95 (H) <=5 /HPF    Narrative    Urine Culture ordered based on laboratory criteria   EKG 12-lead, tracing only   Result Value Ref Range    Systolic Blood Pressure  mmHg    Diastolic Blood Pressure  mmHg    Ventricular Rate 72 BPM    Atrial Rate  BPM    CO Interval  ms    QRS Duration 98 ms     ms    QTc 420 ms    P Axis  degrees    R AXIS -62 degrees    T Axis 241 degrees    Interpretation ECG       Atrial fibrillation  Left axis deviation  Low voltage QRS  Inferior infarct , age undetermined  Abnormal ECG  When compared with ECG of 28-JUN-2023 12:23,  Inferior infarct is now Present  Nonspecific T wave abnormality now evident in Inferior leads  Nonspecific T wave abnormality, worse in Anterior leads  Confirmed by GENERATED REPORT, COMPUTER ([id number]),  Jay Clark ([id number]) on 8/20/2023 8:24:07 PM     Glucose by meter   Result Value Ref Range    GLUCOSE BY METER POCT 67 (L) 70 - 99 mg/dL   Lactic acid whole blood   Result Value Ref Range    Lactic Acid 1.8 0.7 - 2.0 mmol/L   Blood Culture Line, venous    Specimen: Line, venous; Blood   Result Value Ref Range    Culture No growth after 12 hours     Narrative    Only an Aerobic Blood Culture Bottle was collected, interpret results with caution.       Blood Culture Line, venous    Specimen: Line, venous; Blood   Result Value Ref Range    Culture No growth after 12 hours    CBC with platelets  differential    Narrative    The following orders were created for panel order CBC with platelets differential.  Procedure                               Abnormality         Status                     ---------                               -----------         ------                     CBC with platelets and d...[[id number]]  Abnormal            Final result                 Please view results for these tests on the individual orders.   Comprehensive metabolic panel   Result Value Ref Range    Sodium 145 136 - 145 mmol/L    Potassium 4.8 3.4 - 5.3 mmol/L    Chloride 112 (H) 98 - 107 mmol/L    Carbon Dioxide (CO2) 25 22 - 29 mmol/L    Anion Gap 8 7 - 15 mmol/L    Urea Nitrogen 18.0 8.0 - 23.0 mg/dL    Creatinine 1.41 (H) 0.67 - 1.17 mg/dL    Calcium 9.2 8.2 - 9.6 mg/dL    Glucose 45 (LL) 70 - 99 mg/dL    Alkaline Phosphatase 149 (H) 40 - 129 U/L     (H) 0 - 45 U/L    ALT 44 0 - 70 U/L    Protein Total 5.7 (L) 6.4 - 8.3 g/dL    Albumin 2.2 (L) 3.5 - 5.2 g/dL    Bilirubin Total 0.4 <=1.2 mg/dL    GFR Estimate 45 (L) >60 mL/min/1.73m2   CBC with platelets and differential   Result Value Ref Range    WBC Count 6.6 4.0 - 11.0 10e3/uL    RBC Count 3.46 (L) 4.40 - 5.90 10e6/uL    Hemoglobin 10.6 (L) 13.3 - 17.7 g/dL    Hematocrit 35.8 (L) 40.0 - 53.0 %     (H) 78 - 100 fL    MCH 30.6 26.5 - 33.0 pg    MCHC 29.6 (L) 31.5 - 36.5 g/dL    RDW 17.3 (H) 10.0 - 15.0 %    Platelet Count 127 (L) 150 - 450 10e3/uL    % Neutrophils 75 %    % Lymphocytes 17 %    % Monocytes 5 %    % Eosinophils 2 %    % Basophils 0 %    % Immature Granulocytes 1 %    NRBCs per 100 WBC 0 <1 /100    Absolute Neutrophils 5.0 1.6 - 8.3 10e3/uL    Absolute Lymphocytes 1.1 0.8 - 5.3 10e3/uL    Absolute Monocytes 0.3 0.0 - 1.3 10e3/uL    Absolute Eosinophils 0.1 0.0 - 0.7 10e3/uL    Absolute Basophils 0.0 0.0 - 0.2 10e3/uL    Absolute Immature Granulocytes 0.1 <=0.4 10e3/uL    Absolute NRBCs 0.0 10e3/uL   [place] Draw    Narrative    The  following orders were created for panel order [place] Draw.  Procedure                               Abnormality         Status                     ---------                               -----------         ------                     Extra Blue Top Tube[[id number]]                              Final result               Extra Red Top Tube[[id number]]                               Final result                 Please view results for these tests on the individual orders.   Extra Blue Top Tube   Result Value Ref Range    Hold Specimen JIC    Extra Red Top Tube   Result Value Ref Range    Hold Specimen JIC    Glucose by meter   Result Value Ref Range    GLUCOSE BY METER POCT 145 (H) 70 - 99 mg/dL   Glucose by meter   Result Value Ref Range    GLUCOSE BY METER POCT 53 (L) 70 - 99 mg/dL   US Lower Extremity Venous Duplex Bilateral    Narrative    EXAM: US LOWER EXTREMITY VENOUS DUPLEX BILATERAL  LOCATION: [facility]  DATE: 8/20/2023    INDICATION: Leg swelling  COMPARISON: None.  TECHNIQUE: Venous Duplex ultrasound of bilateral lower extremities with and without compression, augmentation and duplex. Color flow and spectral Doppler with waveform analysis performed.    FINDINGS: Exam includes the common femoral, femoral, popliteal veins as well as segmentally visualized deep calf veins and greater saphenous vein.     Evaluation was limited due to patient's combative nature.    RIGHT: Nearly occlusive thrombus in the right proximal femoral vein including the profunda femoris extending down to the popliteal vein. Calf veins were not evaluated due to patient refusal. Thrombus is also identified at the origin of the great   saphenous vein. No popliteal cyst.    LEFT: No evidence of deep venous thrombosis with the caveat that the veins distal to the femoral vein could not be evaluated due to patient refusal. No superficial thrombophlebitis. No popliteal cyst.      Impression    IMPRESSION: Limited  exam.  1.  Extensive right lower extremity deep venous thrombosis as described above.  2.  Superficial venous thrombosis in the right great saphenous vein.   Glucose by meter   Result Value Ref Range    GLUCOSE BY METER POCT 110 (H) 70 - 99 mg/dL   Glucose by meter   Result Value Ref Range    GLUCOSE BY METER POCT 61 (L) 70 - 99 mg/dL   Glucose by meter   Result Value Ref Range    GLUCOSE BY METER POCT 104 (H) 70 - 99 mg/dL   Glucose by meter   Result Value Ref Range    GLUCOSE BY METER POCT 68 (L) 70 - 99 mg/dL   Glucose by meter   Result Value Ref Range    GLUCOSE BY METER POCT 116 (H) 70 - 99 mg/dL   Glucose by meter   Result Value Ref Range    GLUCOSE BY METER POCT 83 70 - 99 mg/dL   Glucose by meter   Result Value Ref Range    GLUCOSE BY METER POCT 64 (L) 70 - 99 mg/dL   Glucose by meter   Result Value Ref Range    GLUCOSE BY METER POCT 76 70 - 99 mg/dL   Glucose by meter   Result Value Ref Range    GLUCOSE BY METER POCT 54 (L) 70 - 99 mg/dL   Glucose by meter   Result Value Ref Range    GLUCOSE BY METER POCT 65 (L) 70 - 99 mg/dL   Glucose by meter   Result Value Ref Range    GLUCOSE BY METER POCT 63 (L) 70 - 99 mg/dL   Comprehensive metabolic panel   Result Value Ref Range    Sodium 141 136 - 145 mmol/L    Potassium 4.9 3.4 - 5.3 mmol/L    Chloride 112 (H) 98 - 107 mmol/L    Carbon Dioxide (CO2) 21 (L) 22 - 29 mmol/L    Anion Gap 8 7 - 15 mmol/L    Urea Nitrogen 19.0 8.0 - 23.0 mg/dL    Creatinine 1.49 (H) 0.67 - 1.17 mg/dL    Calcium 8.4 8.2 - 9.6 mg/dL    Glucose 86 70 - 99 mg/dL    Alkaline Phosphatase 120 40 - 129 U/L    AST 86 (H) 0 - 45 U/L    ALT 36 0 - 70 U/L    Protein Total 4.8 (L) 6.4 - 8.3 g/dL    Albumin 1.7 (L) 3.5 - 5.2 g/dL    Bilirubin Total 0.3 <=1.2 mg/dL    GFR Estimate 42 (L) >60 mL/min/1.73m2   Lactic acid whole blood   Result Value Ref Range    Lactic Acid 2.0 0.7 - 2.0 mmol/L   CBC with platelets   Result Value Ref Range    WBC Count 8.0 4.0 - 11.0 10e3/uL    RBC Count 3.01 (L) 4.40 -  5.90 10e6/uL    Hemoglobin 9.5 (L) 13.3 - 17.7 g/dL    Hematocrit 30.8 (L) 40.0 - 53.0 %     (H) 78 - 100 fL    MCH 31.6 26.5 - 33.0 pg    MCHC 30.8 (L) 31.5 - 36.5 g/dL    RDW 17.6 (H) 10.0 - 15.0 %    Platelet Count 98 (L) 150 - 450 10e3/uL   Troponin T, High Sensitivity (STAT)   Result Value Ref Range    Troponin T, High Sensitivity 92 (H) <=22 ng/L   Nt probnp inpatient   Result Value Ref Range    N terminal Pro BNP Inpatient 4,864 (H) 0 - 1,800 pg/mL   Glucose by meter   Result Value Ref Range    GLUCOSE BY METER POCT 72 70 - 99 mg/dL     COVID-19 PCR Results           No data to display              COVID-19 Antibody Results, Testing for Immunity           No data to display                Time Spent on this Encounter   I spent 75 minutes of critical care time on the unit/floor managing the care of Joselin Sanchez. Upon evaluation, this patient had a high probability of imminent or life-threatening deterioration due to unspecified shock, which required my direct attention, intervention, and personal management. 100% of my time was spent at the bedside counseling the patient and/or coordinating care regarding services listed in this note.    DEEPTI Thapa, CNP  Hospitalist - House [facility]  Text me on the [employer] tommie for a textback  Text page with web based paging for a callback

## 2023-08-21 NOTE — PROVIDER NOTIFICATION
MD Notification    Notified Person: MD    Notified Person Name: Dr. Hampton    Notification Date/Time: 8/20     Notification Interaction: AMCOM    Purpose of Notification:  312 AG    Pt is pulling at lines, can we get an order for mitts?    Summer RN   [phone]  Orders Received:    Comments:

## 2023-08-21 NOTE — PROGRESS NOTES
[facility]    Medicine Progress Note - Hospitalist Service    Date of Admission:  8/20/2023    Assessment & Plan     Joselin Sanchez is a 89-year-old male with dementia, atrial fibrillation, BPH, urinary retention, recent ischemic stroke and recent upper GI bleed who was brought in on 8/20/2023 from his long-term care facility due to lower extremity pain, acute confusion, poor oral intake for past several days.       He has had multiple hospitalizations recently  Was hospitalized 6/28-7/17 due to severe sepsis secondary to UTI,  new onset A-fib with RVR, ischemic stroke involving left occipital lobe.  Echo showed EF 40-45% with global hypokinesia. GI bleed and acute blood loss anemia with anticoagulation managed conservatively. Ethics and palliative care were consulted for goals of care discussion with his niece who is the decision maker.       Admitted 7/27-7/31 due to unwitnessed fall without injuries.  UA was abnormal with pyuria but urine culture was negative. He frequently declined cares.        Admitted 8/13-8/16 due to fall with left lower extremity laceration that was repaired.  UA was abnormal but urine culture was negative.  He again declined cares.        Acute metabolic encephalopathy   Dementia with behavioral disturbance  -Multifactorial-secondary to hypoglycemia, hypovolemia/dehydration, KIMO, pain, probable UTI, dementia  -Remains agitated, pulled out lines.  -Continue delirium precautions.  Continue mittens     Probable acute cystitis with microscopic hematuria-now ruled out  -UA showed 82 WBCs, large leukocyte esterase, negative nitrites  -Urine culture is negative  -Started on IV Zosyn on admission.  Will discontinue      Diabetes mellitus type 2, diet controlled, A1c 5.8 on 6/28/2023  Hypoglycemia, blood sugar 45  -Persistent hypoglycemia since admission, initial blood sugar was 45.    -Has required multiple doses of D50   -Continues to be on D10 0.45 NS.  Blood sugars still  remain low in 60s -70s.  Not on insulin at baseline.  -Monitor blood sugars and cover with sliding scale      Hypothermia, core temperature 91.3 on admission  -Now warmed up with Felix hugger.  Continue     Acute kidney injury on top of chronic kidney disease stage III  -Creatinine increased from 0.9 to 1.4.  Stable since yesterday  -IV fluids D10 0.45 NS at 40 ml per hour    Hypotension  Shock-unclear etiology but cardiogenic shock in differential  -Blood pressures have been in 70s and 80s systolic since admission.  Has not responded to IV fluids.  -Multiple discussion in last 24 hours with multiple family members including patient's EDUARDO Leslie, her , patient's sister, patient's brother.  They are agreeable to not escalating cares to ICU or starting pressors but are not ready for comfort cares yet.     Chronic systolic CHF with reduced EF of 40-45%, echo 6/2023  Essential hypertension  -Lasix and lisinopril held since previous admission.  Continue to hold due to hypotension   -Hold metoprolol         Chronic lower extremity wounds-due to lymphedema, peripheral arterial disease and diabetes  Recent left lower lobe laceration, s/p repair  -Was hospitalized at [facility] in [place], [place] from 3/27-4/4/2023 for polymicrobial infection of legs.  He underwent surgical debridement and was treated with IV Zosyn.  -Wounds do not appear to be infected  -Consult WOC   -Sutures to be removed around 8/25        Dementia with behavioral disturbance  -Frequently refuses cares.  All family members aware of this  -During recent hospitalization was started on scheduled Zyprexa   -Continue Zyprexa as needed     BPH  Chronic urinary retention  -Hold Avodart and Flomax as patient not alert enough for oral intake  -As needed bladder scan and straight cath as needed     Superficial thrombophlebitis of right basilic vein, 7/28  - supportive care     Chronic paroxysmal atrial fibrillation  -Hold Toprol-XL due to  hypotension  -Not on anticoagulation due to recent GI bleed     Recent left occipital ischemic stroke, 7/2023  -Hold aspirin 81 mg as unable to take p.o.      Partially thrombosed saccular ductus arteriosus aneurysm  - Noted on CTA chest during 6-7/2023 hospitalization.      Goals of care discussion-met with patient's POA, Mariama, Mariama's , patient's sister in room.  Patient's brother was present on phone.  Discussed patient's current medical condition, poor prognosis, ongoing agitation.  All questions regarding comfort cares, current treatment and ICU level of cares was discussed and answered.  Family is comfortable keeping patient as is.  They do not want to escalate to ICU or start pressors.  They also are not ready to move to comfort cares due to Yazidism reasons.  They wanted feeding tube.  Discussed rational for not using feeding tube at this time as patient is quite agitated on occasions and feeding tube will worsen his agitation and also not affect outcomes.       Diet: Advance Diet as Tolerated: Clear Liquid Diet    DVT Prophylaxis: Enoxaparin (Lovenox) SQ  Nails Catheter: Not present  Lines: None     Cardiac Monitoring: ACTIVE order. Indication: [facility] status  Code Status: No CPR- Do NOT Intubate      Clinically Significant Risk Factors           # Hypercalcemia: corrected calcium is >10.1, will monitor as appropriate    # Hypoalbuminemia: Lowest albumin = 1.7 g/dL at 8/21/2023  3:45 AM, will monitor as appropriate   # Thrombocytopenia: Lowest platelets = 98 in last 2 days, will monitor for bleeding  # Acute Kidney Injury, unspecified: based on a >150% or 0.3 mg/dL increase in last creatinine compared to past 90 day average, will monitor renal function                    Disposition Plan      Expected Discharge Date: 08/22/2023        Discharge Comments: Possible palliative consult          Montse Stroud MD  Hospitalist Service  [facility]  Securely message with imo.im [place]  info)  Text page via [employer] Paging/Directory   ______________________________________________________________________    Interval History   Has remained hypotensive since admission.  Remains confused, agitated.  Not able to take p.o. remains hypothermic and hypoglycemic    Physical Exam   Vital Signs: Temp: 97.4  F (36.3  C) Temp src: Axillary BP: (!) 81/32 Pulse: 65   Resp: 25 SpO2: 97 % O2 Device: None (Room air)    Weight: 188 lbs 11.42 oz    Constitutional -confused, intermittently agitated   Cardiovascular - regular rate and rhythm, no murmurs, 2+ edema  Pulmonary - lungs are clear to auscultation anteriorly   GI - abdomen is soft, nontender, nondistended  Integumentary -bilateral lower extremity wounds   Neurological - Moving all 4 extremities    Medical Decision Making       75 MINUTES SPENT BY ME on the date of service doing chart review, history, exam, documentation & further activities per the note.      Data     I have personally reviewed the following data over the past 24 hrs:    8.0  \   9.5 (L)   / 98 (L)     141 112 (H) 19.0 /  74   4.9 21 (L) 1.49 (H) \     ALT: 36 AST: 86 (H) AP: 120 TBILI: 0.3   ALB: 1.7 (L) TOT PROTEIN: 4.8 (L) LIPASE: N/A     Trop: 93 (H) BNP: 4,864 (H)     Procal: N/A CRP: N/A Lactic Acid: 2.0         Imaging results reviewed over the past 24 hrs:   Recent Results (from the past 24 hour(s))   US Lower Extremity Venous Duplex Bilateral    Narrative    EXAM: US LOWER EXTREMITY VENOUS DUPLEX BILATERAL  LOCATION: [facility]  DATE: 8/20/2023    INDICATION: Leg swelling  COMPARISON: None.  TECHNIQUE: Venous Duplex ultrasound of bilateral lower extremities with and without compression, augmentation and duplex. Color flow and spectral Doppler with waveform analysis performed.    FINDINGS: Exam includes the common femoral, femoral, popliteal veins as well as segmentally visualized deep calf veins and greater saphenous vein.     Evaluation was limited due to  patient's combative nature.    RIGHT: Nearly occlusive thrombus in the right proximal femoral vein including the profunda femoris extending down to the popliteal vein. Calf veins were not evaluated due to patient refusal. Thrombus is also identified at the origin of the great   saphenous vein. No popliteal cyst.    LEFT: No evidence of deep venous thrombosis with the caveat that the veins distal to the femoral vein could not be evaluated due to patient refusal. No superficial thrombophlebitis. No popliteal cyst.      Impression    IMPRESSION: Limited exam.  1.  Extensive right lower extremity deep venous thrombosis as described above.  2.  Superficial venous thrombosis in the right great saphenous vein.   XR Chest Port 1 View    Narrative    EXAM: CHEST SINGLE VIEW PORTABLE  LOCATION: [facility]  DATE: 8/21/2023    INDICATION: Concern for sepsis.  COMPARISON: 07/27/2023.    FINDINGS: A few ill-defined hazy opacities in bilateral lung bases. Blunting of the left costophrenic angle. Unchanged cardiac silhouette. Atherosclerotic calcification in the thoracic aorta.      Impression    IMPRESSION:   1. A few ill-defined hazy opacities in bilateral lung bases are nonspecific and could represent atelectasis or pneumonia.  2. Blunting of the left costophrenic angle that could relate to scarring or a very small left pleural effusion.

## 2023-08-21 NOTE — CONSULTS
"[facility] Nurse Inpatient Assessment      Consulted for: Wound lower extremities       Summary: patient known to [facility] team due to previous admissions with documented BLE wounds and right heel wound and buttock wounds, assessment completed 8/21 with family present      Patient History (according to provider note(s):       \"89-year-old male with dementia, atrial fibrillation, BPH, urinary retention, recent ischemic stroke and recent upper GI bleed who was brought in on 8/20/2023 from his long-term care facility due to lower extremity pain, acute confusion, poor oral intake for past several days. \"     Assessment:       Areas visualized during today's visit: Focused:, Sacrum/coccyx, and Lower extremities      Wound location: left lateral leg, distal to knee     Last photo: 8/21  Wound due to: Trauma fall PTA   Wound history/plan of care: found with sutures in place   Wound base: epidermis, well approximated      Palpation of the wound bed: normal      Drainage: none      Description of drainage: none      Measurements (length x width x depth, in cm): 4  x 0.1  x  0 cm      Tunneling: N/A     Undermining: N/A  Periwound skin: Denuded and Scar tissue      Color: normal and consistent with surrounding tissue      Temperature: normal   Odor: none  Pain: moderate, severe, and Irritable or crying out at intervals, tender  Pain interventions prior to dressing change: slow and gentle cares    Treatment goal: Protection  STATUS: initial assessment  Supplies ordered: discussed with RN      Wound location: right heel     Last photo: 8/21  Wound due to: Pressure Injury POA unstagable with superimposed DTI to periwound skin   Wound history/plan of care: found with mepilex in place   Wound base: slough     Palpation of the wound bed: boggy      Drainage: scant     Description of drainage: serosanguinous     Measurements (length x width x depth, in cm): 3.5  x 3.2  x  0.4 cm      Tunneling: N/A     " Undermining: N/A  Periwound skin:  maroon purple nonblanchable       Color: purple      Temperature: normal   Odor: mild  Pain: Irritable or crying out at intervals, tender  Pain interventions prior to dressing change: slow and gentle cares  Treatment goal: Protection  STATUS: initial assessment  Supplies ordered: stored on unit      Wound location: BLE    Last photo: 8/21  Wound due to: Venous Ulcer  Wound history/plan of care: found with nonstick pads and kerlix in place   Wound base:  dermis, non-granular tissue, serous scabbing, and superficial scab     Palpation of the wound bed: normal      Drainage: small     Description of drainage: serosanguinous     Measurements (length x width x depth, in cm): largest appears left posterior cluster of 10  x 6  x  0.1 cm      Tunneling: N/A     Undermining: N/A  Periwound skin: Denuded, Dry/scaly, Edematous, Superficial erosion, and Scar tissue      Color: normal and consistent with surrounding tissue, pink, and red      Temperature: normal   Odor: none  Pain: moderate, severe, and Irritable or crying out at intervals, tender  Pain interventions prior to dressing change: slow and gentle cares  and distraction  Treatment goal: Heal , Drainage control, and Pain control  STATUS: initial assessment  Supplies ordered: gathered and at bedside      location: buttock     Last photo: 8/21  Wound due to: Moisture Associated Skin Damage (MASD) POA scars   Wound history/plan of care: found open to air with diaper on   Wound base:   scar     Palpation of the wound bed: normal      Drainage: none     Description of drainage: none     Measurements (length x width x depth, in cm): did not note open areas      Tunneling: N/A     Undermining: N/A  Periwound skin: Intact      Color: normal and consistent with surrounding tissue      Temperature: normal   Odor: none  Pain: denies , none, none to wound area but complaint of discomfort with repositioning   Pain interventions prior to dressing  "change: slow and gentle cares   Treatment goal: Protection  STATUS: initial assessment  Supplies ordered: gathered and at bedside     Treatment Plan:     08/21/23 1805  Wound care  PER UNIT ROUTINE        Comments: Discontinue if no longer needed. Continue repositioning.    Wound care:  Location: buttock  Care: provided qshift by primary RN  1. Cleanse with each incontinence episode with lyssa spray cleanser and soft dry wipes  2. Apply Triad paste (#[id number]) to perianal. No need to wipe off all Triad paste, ok to wipe off soiled top layer and reapply more  3. Do Not apply diaper, use covidien underpad for incontinence and external catheter    Location: BLE, right heel, (left lateral lower leg with sutures from this admission)  Care: provided daily by primary RN  1. Cleanse daily with lyssa spray cleanser and soft dry wipes, No need to wipe off all Triad paste, ok to wipe off soiled top layer and reapply more  2. Apply Triad paste (#[id number]) to wounds and periwound skin  3. Ok to leave open to air while patient is in bed. Ok to apply covidien pad under legs, over pillows, to elevate heels and to absorb drainage. (Ok to wrap legs with kerlix if patient is up out of bed)        Orders: written     RECOMMEND PRIMARY TEAM ORDER: None, at this time  Education provided: importance of repositioning and plan of care  Discussed plan of care with: Patient, Nurse, family   WOC nurse follow-up plan: weekly  Notify WOC if wound(s) deteriorate.  Nursing to notify the Provider(s) and re-consult the WOC Nurse if new skin concern.          Amie MENDOZA   1st: [facility], call \"Amie Eugene\" or call Group \"WO Nurse\"   (2nd option: leave a voicemail at Dept. Office Number: [phone]. Messages checked occasionally M-F)  "

## 2023-08-21 NOTE — PROGRESS NOTES
SPIRITUAL HEALTH SERVICES Progress Note  [facility]    Saw pt Joselin Sanchez per triage from unit . Family requested support. I primarily spoke with his sister who was bedside.     Patient/Family Understanding of Illness and Goals of Care - Family was told that Joselin is approaching end of life and had a discussion about comfort cares and escalating to ICU which they declined.     Distress and Loss - Pt has had several hospitalizations prior and hasn't been eating much.    Strengths, Coping, and Resources - Pt has a strong support system through his family who is caring and praying for him.     Meaning, Beliefs, and Spirituality - Pt is a faithful Yarsanism and welcomes support from [facility]. During our visit his sister requested a visit from the [facility] and [employer] . I let her know I would request a visit and that he will visit when able.     Plan of Care - I plan to follow up and support tomorrow 8/22 and per request afterwards.       Maureen Moore  Chaplain Resident  Pager [phone]

## 2023-08-21 NOTE — H&P
MD Notification    Notified Person: MD    Notified Person Name:Dr. Hampton    Notification Date/Time: 8/20    Notification Interaction: AMCOM    Purpose of Notification:  312 AG     Pt rectal T is 93.3, can we get an order for a stephani castro to help warm pt up?    Mackenzie RN  [phone]  Orders Received: Stephani castro ordered     Comments:

## 2023-08-21 NOTE — PROGRESS NOTES
Brief House NP Note    Patient continues to be hypotensive with MAP of 55 mmHg. Patient is not fluid responsive after 500 of LR and 25g 5% albumin and BNP elevated so concern is now that he is in cardiogenic shock. Given eliu's desire to not escalate care if it would cause patient more suffering I feel that goals of care conversation should be continued with an emphasis on comfort measures. I attempted to contact eliu again this morning but she did not answer, I left a message.     DEEPTI Thapa, CNP  Hospitalist - House [facility]  Text me on the [employer] tommie for a textback  Text page with web based paging for a callback

## 2023-08-21 NOTE — PROGRESS NOTES
Midline ordered for iv access. Pt. very resistive and uncooperative with any cares. Pt. will need some sedation if midline is inserted as it is a sterile process. Bedside nurse will notify MD

## 2023-08-21 NOTE — PLAN OF CARE
Goal Outcome Evaluation:      Plan of Care Reviewed With: patient    Overall Patient Progress: no changeOverall Patient Progress: no change    Orientations: A/Ox0, Beninese speaking.  Vitals/Pain: Hypotensive, hypothermic,   Tele: afib CVR   Lines/Drains: L PIV running d10 at 75 mL/hr   Skin/Wounds: Bilateral LE wounds, Mepi on L heel for PI. Interdry on R breast and abdominal fold for moisture wounds. Blanchable redness on coccyx, mepi in place. R arm extravasation, antidote administered, cold pack applied. Pictures takes, 24 hour recheck around 0200  GI/: Hypoactive BS, multiple loose stools. No uo during shift- bladder scan <120 mL, external cath in place.   Labs: BNP (4864) creat (1.49) troponin (92)   Ambulation/Assist: turn and repo Q2H   Sleep Quality: Poor, awake all night   Plan: vascular consult. Family indicated did not want vasopressors, pressures did not improve much with fluids and a

## 2023-08-22 NOTE — PLAN OF CARE
Goal Outcome Evaluation: No Change  Incontinent of moderate amount of urine late evening shift. Pt lethargic, somnulent all shift. T/R as tolerated. Bilateral mitts in place, hits at staff when cares are being performed. BP's remain soft in 80-90 systolic range. Temp to 98.4 axillary at 0500. BG 66 early in shift, D50 given x1 with resolution. D10 1/2 NS infusing at 40 ml/hr                         bed rails

## 2023-08-22 NOTE — CONSULTS
Care Management Initial Consult    General Information  Assessment completed with: Other (Mariama),    Type of CM/SW Visit: Initial Assessment    Primary Care Provider verified and updated as needed: Yes   Readmission within the last 30 days: current reason for admission unrelated to previous admission      Reason for Consult: discharge planning  Advance Care Planning:            Communication Assessment  Patient's communication style: spoken language (non-English)             Cognitive  Cognitive/Neuro/Behavioral: .WDL except, all  Level of Consciousness: confused, somnolent  Arousal Level: opens eyes spontaneously  Orientation: disoriented x 4  Mood/Behavior: calm  Best Language:  (FRANCHESKA; nonverbal)  Speech: FRANCHESKA (unable to assess) (FRANCHESKA; nonverbal)    Living Environment:   People in home: facility resident  Alone  Current living Arrangements: residential facility  Name of Facility: [facility]   Able to return to prior arrangements: yes       Family/Social Support:  Care provided by: other (see comments) (Facility Staff)  Provides care for: no one, unable/limited ability to care for self  Marital Status: Single  Facility resident(s)/Staff (Niece)          Description of Support System: Supportive    Support Assessment: Adequate social supports    Current Resources:   Patient receiving home care services: No     Community Resources: None  Equipment currently used at home:    Supplies currently used at home: Wound Care Supplies    Employment/Financial:  Employment Status: retired        Financial Concerns: No concerns identified   Referral to Financial Worker: No       Does the patient's insurance plan have a 3 day qualifying hospital stay waiver?  No    Lifestyle & Psychosocial Needs:  Social Determinants of Health     Tobacco Use: Not on file   Alcohol Use: Not on file   Financial Resource Strain: Not on file   Food Insecurity: Not on file   Transportation Needs: Not on file   Physical Activity: Not on file    Stress: Not on file   Social Connections: Not on file   Intimate Partner Violence: Not on file   Depression: Not on file   Housing Stability: Not on file       Functional Status:  Prior to admission patient needed assistance:   Dependent ADLs:: Wheelchair-with assist, Incontinence, Transfers, Eating, Grooming, Dressing, Bathing  Dependent IADLs:: Cleaning, Cooking, Laundry, Shopping, Meal Preparation, Medication Management, Money Management, Transportation, Incontinence  Assesssment of Functional Status: At functional baseline    Mental Health Status:          Chemical Dependency Status:                Values/Beliefs:  Spiritual, Cultural Beliefs, Congregation Practices, Values that affect care:                 Additional Information:  Writer was consulted for discharge planning for patient. Per care management consult for discharge planning, chart was reviewed. Patient is a 89 year old male with a past medical history of ypertension, diabetes mellitus type 2, chronic atrial fibrillation not on anticoagulation, chronic systolic congestive heart failure dyslipidemia, diabetic foot ulcer, lymphedema, dementia. Patient has past admissions for was admitted here at [facility] 6/28-7/17 for severe sepsis secondary to urinary tract infection, bacteremia, and septic encephalopathy complicated by stroke and again 7/27-7/31 for confusion after a fall and urinary infection. Patient was admitted again to [facility] from 8/13 to 8/16 due to fall with left lower extremity laceration that was repaired. UA was abnormal but urine culture was negative. He again declined cares. Currently patient is admitted for dementia, atrial fibrillation, BPH, urinary retention, recent ischemic stroke and recent upper GI bleed who was brought in on 8/20/2023.     Patient will have MA bed hold at facility and can return when medically ready. SW will follow for when patient is medically ready for discharge.    Mitchell Espino [facility]  [place]  Care Management

## 2023-08-22 NOTE — PLAN OF CARE
[facility] discontinued [id number]    Orientations: Disoriented X4, lethargic & somnolent. Mittens in place for restlessness & pulling at lines.   Vitals/Pain: Hypotensive & hypoglycemic. RA. No pain noted.   Tele: A-fib CVR w/a BBB  Lines/Drains: L PIV running D10 1/2 NS at 40 ml/hr.   Skin/Wounds: BLE wounds & sacral wound--WOC consulted, see note.   GI/: no uop this shift. BS for 115 ml. +BS, +flatus, smear BM X1. No PO intake noted today.   Labs: Abnormal/Trends, Electrolyte Replacement- Routine  Plan: ctm

## 2023-08-22 NOTE — PROGRESS NOTES
SPIRITUAL HEALTH SERVICES: Tele-Encounter  Patient Location: [facility] ICU  Spoke with: Mariama    Referral Source: Nurse referral to call pt's niece.     Summary: I spoke with pt's niece and introduced myself and let her know I was calling to follow up. Mariaam expressed gratitude for support for her uncle and asked about having additional visitors from the community to come and recite Qur'an for her uncle. She also had some questions about  and burial processing which I addressed. I let Mariama know I would be in to see Ali again on Thursday when I am over at [facility] and how I can be reached. [facility] remains available for support as needed /requested.     Maureen [facility]  Staff    Pager [phone]      ______________________________    Type of service:  Telephone Visit     has received verbal consent for a TelephoneVisit from the patient? Yes    Distance Provider Location: designated [place] office or home office (secure setting)    Mode of Communication: telephone (via [employer] phone or [employer] tele-call-number ([phone]))      * [facility] remains available  for emergent requests/referrals, either by having the switchboard page the on-call  or by entering an ASAP/STAT consult in Epic (this will also page the on-call ). Routine Epic consults receive an initial response within 24 hours.*

## 2023-08-22 NOTE — PROGRESS NOTES
[facility]    Medicine Progress Note - Hospitalist Service    Date of Admission:  8/20/2023    Assessment & Plan     Joselin Sanchez is a 89-year-old male with dementia, atrial fibrillation, BPH, urinary retention, recent ischemic stroke and recent upper GI bleed who was brought in on 8/20/2023 from his long-term care facility due to lower extremity pain, acute confusion, poor oral intake for past several days.       He has had multiple hospitalizations recently  Was hospitalized 6/28-7/17 due to severe sepsis secondary to UTI,  new onset A-fib with RVR, ischemic stroke involving left occipital lobe.  Echo showed EF 40-45% with global hypokinesia. GI bleed and acute blood loss anemia with anticoagulation managed conservatively. Ethics and palliative care were consulted for goals of care discussion with his niece who is the decision maker.       Admitted 7/27-7/31 due to unwitnessed fall without injuries.  UA was abnormal with pyuria but urine culture was negative. He frequently declined cares.        Admitted 8/13-8/16 due to fall with left lower extremity laceration that was repaired.  UA was abnormal but urine culture was negative.  He again declined cares.        Acute metabolic encephalopathy   Dementia with behavioral disturbance  - Multifactorial-secondary to hypoglycemia, hypovolemia/dehydration, KIMO, pain, probable UTI, dementia  - More somnolent today, responding less.  Agitation improved   - Continue delirium precautions.  Continue mittens as needed     Probable acute cystitis with microscopic hematuria-now ruled out  - UA showed 82 WBCs, large leukocyte esterase, negative nitrites  - Urine culture is negative  - Started on IV Zosyn on admission.  Discontinued on 8/21       Diabetes mellitus type 2, diet controlled, A1c 5.8 on 6/28/2023  Hypoglycemia, blood sugar 45  - Persistent hypoglycemia since admission, initial blood sugar was 45.    - Has required multiple doses of D50   -  Continues to be on D10 0.45 NS.  Blood sugars still remain low in 60s -70s.  Not on insulin at baseline.  - Continues D10 0.45 NS at 40 ml/h     Hypothermia, core temperature 91.3 on admission  - Now warmed up with Felix hugger.  Continue     Acute kidney injury on top of chronic kidney disease stage III  - Creatinine increased from 0.9 -->1.4 -->1.63.  Creatinine gradually rising   - IV fluids D10 0.45 NS at 40 ml per hour    Hypotension  Shock-unclear etiology   - Blood pressures have been in 70s and 80s systolic since admission.  Has not responded to IV fluids.  - Multiple discussion on 8/21 with multiple family members including patient's ILANAA Mariama, her , patient's sister, patient's brother.  They agreed to not escalating cares to ICU or starting pressors but not ready for comfort cares yet.     Chronic systolic CHF with reduced EF of 40-45%, echo 6/2023  Essential hypertension  - Lasix and lisinopril held since previous admission.  Continue to hold due to hypotension   - Hold metoprolol         Chronic lower extremity wounds- due to lymphedema, peripheral arterial disease and diabetes-present on admission  Recent left lower extremity laceration, s/p repair  Right heel unstageable pressure injury-present on admission  Bilateral lower extremity venous ulcers-present on admission  Moisture associated skin damage of buttock-present on admission  - Was hospitalized at [facility] in [place], [place] from 3/27-4/4/2023 for polymicrobial infection of legs.  He underwent surgical debridement and was treated with IV Zosyn.  -Wounds do not appear to be infected  - Consulted WOC.  Continue wound care as per WOC  - Sutures to be removed around 8/25        Dementia with behavioral disturbance  - Frequently refuses cares.  All family members aware of this  - During recent hospitalization was started on scheduled Zyprexa   - Continue Zyprexa as needed  - Was agitated on 8/21 but somnolent on 8/22      BPH  Chronic urinary retention  - Hold Avodart and Flomax as patient not alert enough for oral intake  - As needed bladder scan and straight cath as needed     Superficial thrombophlebitis of right basilic vein, 7/28  - supportive care     Chronic paroxysmal atrial fibrillation  - Hold Toprol-XL due to hypotension  - Not on anticoagulation due to recent GI bleed     Recent left occipital ischemic stroke, 7/2023  - Hold aspirin 81 mg as unable to take p.o.      Partially thrombosed saccular ductus arteriosus aneurysm  - Noted on CTA chest during 6-7/2023 hospitalization.      Goals of care discussion 8/21-met with patient's POA, Mariama, Mariama's , patient's sister in room.  Patient's brother was present on phone.  Discussed patient's current medical condition, poor prognosis, ongoing agitation.  All questions regarding comfort cares, current treatment and ICU level of cares was discussed and answered.  Family is comfortable keeping patient as is.  They do not want to escalate to ICU or start pressors.  They also are not ready to move to comfort cares due to Denominational reasons.  They wanted feeding tube.  Discussed rational for not using feeding tube at this time as patient is quite agitated on occasions and feeding tube will worsen his agitation and also not affect outcomes.       Diet: Advance Diet as Tolerated: Clear Liquid Diet    DVT Prophylaxis:  Nails Catheter: Not present  Lines: None     Cardiac Monitoring: None  Code Status: No CPR- Do NOT Intubate      Clinically Significant Risk Factors           # Hypercalcemia: corrected calcium is >10.1, will monitor as appropriate    # Hypoalbuminemia: Lowest albumin = 1.7 g/dL at 8/21/2023  3:45 AM, will monitor as appropriate     # Thrombocytopenia: Lowest platelets = 87 in last 2 days, will monitor for bleeding    # Acute Kidney Injury, unspecified: based on a >150% or 0.3 mg/dL increase in last creatinine compared to past 90 day average, will monitor renal  function                    Disposition Plan      Expected Discharge Date: 2023        Discharge Comments: Possible palliative consult   Per fam no escalation of cares but not officically CC d/t Yazidi reasons.          Montse Stroud MD  Hospitalist Service  [facility]  Securely message with "[employer] (more info)  Text page via [employer] Paging/Directory   ______________________________________________________________________    Interval History   Patient somnolent, not responding much.  Less agitated today.  Appears comfortable   Multiple family members present   Remains hypotensive, hypoglycemic       Physical Exam   Vital Signs: Temp: 98.2  F (36.8  C) Temp src: Axillary BP: (!) 72/49 Pulse: 104   Resp: 15 SpO2: 93 % O2 Device: None (Room air)    Weight: 188 lbs 11.42 oz    Constitutional -somnolent   Cardiovascular - irregular rhythm, 2+ edema  Pulmonary - lungs are clear to auscultation anteriorly   Integumentary -bilateral lower extremity wounds       Medical Decision Making       ------------------ MEDICAL DECISION MAKING ------------------------------------------------------------------------------------------------------  Medical complexity over the past 24 hours:  -------------------------- MODERATE RISK FOR MORBIDITY --------------------------------------------------      Data     I have personally reviewed the following data over the past 24 hrs:    5.3  \   9.1 (L)   / 87 (L)     142 114 (H) 19.4 /  77   4.4 21 (L) 1.63 (H) \       Imaging results reviewed over the past 24 hrs:   Recent Results (from the past 24 hour(s))   Echocardiogram Limited   Result Value    LVEF  40-45%    [facility]    [id number]  [id number]  [id number]  [id number]^RENZO^ANTHONY^POLINA     [facility]  Echocardiography Laboratory  [address] [place] [address]     Name: QI PACHECO  MRN: [id number]  : 1924  Study Date: 2023 02:36 PM  Age: 89 yrs  Gender: Male  Patient  Location: [facility]  Reason For Study: Shock  Ordering Physician: ANTHONY MULLER  Referring Physician: Alistair Ross  Performed By: Christine Heaton     BSA: 1.9 m2  Height: 66 in  Weight: 188 lb  HR: 74  BP: 81/32 mmHg  ______________________________________________________________________________  Procedure  Limited Portable Echo Adult. Optison (NDC #[id number]) given intravenously.  ______________________________________________________________________________  Interpretation Summary     Left ventricular systolic function is mildly reduced.  The visual ejection fraction is 40-45%.  There is mild global hypokinesia of the left ventricle.  The right ventricular systolic pressure is approximated at 36mmHg plus the  right atrial pressure.  Moderate (46-55mmHg) pulmonary hypertension is present.  Mild-moderate TR.  Dilation of the inferior vena cava is present with abnormal respiratory  variation in diameter.  There is no pericardial effusion.  Atrial fibrillation.     Compared to prior study dated 6/29/2023, there is evidence of pulmonary  hypertension, RV dilation and dysfunction.  ______________________________________________________________________________  Left Ventricle  The left ventricle is normal in size. The visual ejection fraction is 40-45%.  Left ventricular systolic function is mildly reduced. Left ventricular  diastolic function is indeterminate. There is mild global hypokinesia of the  left ventricle.     Right Ventricle  The right ventricle is mildly dilated. Moderately decreased right ventricular  systolic function.     Atria  Normal left atrial size. The right atrium is moderately dilated.     Mitral Valve  The mitral valve is normal in structure and function. There is trace mitral  regurgitation.     Tricuspid Valve  The tricuspid valve is normal in structure and function. There is mild to  moderate (1-2+) tricuspid regurgitation. The right ventricular systolic  pressure is approximated at 36mmHg plus  the right atrial pressure. Moderate  (46-55mmHg) pulmonary hypertension is present.     Aortic Valve  The aortic valve is normal in structure and function.     Pulmonic Valve  The pulmonic valve is normal in structure and function. There is mild (1+)  pulmonic valvular regurgitation.     Vessels  The aortic root is normal size. Normal size ascending aorta. Dilation of the  inferior vena cava is present with abnormal respiratory variation in diameter.     Pericardium  There is no pericardial effusion.     Rhythm  The rhythm was atrial fibrillation.  ______________________________________________________________________________  MMode/2D Measurements & Calculations  asc Aorta Diam: 2.8 cm     TAPSE: 1.1 cm     Doppler Measurements & Calculations  Ao V2 max: 107.0 cm/sec  Ao max P.0 mmHg  Ao V2 mean: 72.1 cm/sec  Ao mean P.0 mmHg  Ao V2 VTI: 24.0 cm  LV V1 max P.6 mmHg  LV V1 max: 80.6 cm/sec  LV V1 VTI: 17.0 cm  TR max bashir: 303.0 cm/sec  TR max P.7 mmHg  AV Bashir Ratio (DI): 0.75  RV S Bashir: 8.9 cm/sec     ______________________________________________________________________________  Report approved by: Aury Pandey 2023 04:32 PM

## 2023-08-22 NOTE — PROGRESS NOTES
House Officer Death Pronouncement    Called by nursing staff to pronounce Joselin Sanchez dead.    Physical Exam: Spontaneous respirations absent, Breath sounds absent, Carotid pulse absent, Heart sounds absent, and Pupillary light reflex absent    Patient was pronounced dead at 517 PM, 2023.    No data filed       Principal Problem:    Acute kidney injury (H)  Active Problems:    Hypoglycemia    Acute cystitis with hematuria    Metabolic encephalopathy    Acute kidney failure, unspecified (H)     Infectious disease present?: YES    Communicable disease present? (examples: HIV, chicken pox, TB, Ebola, CJD) :  NO    Multi-drug resistant organism present? (example: MRSA): NO    Please consider an autopsy if any of the following exist:  NO Unexpected or unexplained death during or following any dental, medical, or surgical diagnostic treatment procedures.   NO Death of mother at or up to seven days after delivery.     NO All  and pediatric deaths.     NO Death where the cause is sufficiently obscure to delay completion of the death certificate.   NO Deaths in which autopsy would confirm a suspected illness/condition that would affect surviving family members or recipients of transplanted organs.     The following deaths must be reported to the 's Office:  NO A death that may be due entirely or in part to any factors other than natural disease (recent surgery, recent trauma, suspected abuse/neglect).   NO A death that may be an accident, suicide, or homicide.     NO Any sudden, unexpected death in which there is no prior history of significant heart disease or any other condition associated with sudden death.   NO A death under suspicious, unusual, or unexpected circumstances.    NO Any death which is apparently due to natural causes but in which the  does not have a personal physician familiar with the patient s medical history, social, or environmental situation or the circumstances of  the terminal event.   NO Any death apparently due to Sudden Infant Death Syndrome.     NO Deaths that occur during, in association with, or as consequences of a diagnostic, therapeutic, or anesthetic procedure.   YES Any death in which a fracture of a major bone has occurred within the past (6) six months.  Possible- X-ray right humerus showed subtle cortical lucency at the posterior greater tubercle which could reflect nondisplaced fracture. Patient does not have any pain in right upper extremity    YES A death of persons note seen by their physician within 120 days of demise.     NO Any death in which the  was an inmate of a public institution or was in the custody of Law Enforcement personnel.   NO  All unexpected deaths of children   NO Solid organ donors   YES Unidentified bodies   NO Deaths of persons whose bodies are to be cremated or otherwise disposed of so that the bodies will later be unavailable for examination;   NO Deaths unattended by a physician outside of a licensed healthcare facility or licensed residential hospice program   NO Deaths occurring within 24 hours of arrival to a health care facility if death is unexpected.    NO Deaths associated with the decedent s employment.   NO Deaths attributed to acts of terrorism.   NO Any death in which there is uncertainty as to whether it is a medical examiner s care should be discussed with the medical investigator.      Death Certificate to be directed to Dr Montse Stroud  Attending physician, Dr. Stroud, notified of death.    Body disposition: Autopsy was discussed with family member:  Family members and Son in person.  Permission for autopsy was declined.    DEEPTI [facility]  Hospitalist Service  [facility]  Securely message with [employer] (more info)  Text page via [employer] Paging/Directory

## 2023-08-23 NOTE — DISCHARGE SUMMARY
[facility]    Death Summary - Hospitalist Service     Date of Admission:  8/20/2023  Date of Death: 8/22/2023  5:17 PM  Discharging Provider: Montse Stroud MD    Discharge Diagnoses   Hospital Course     Joselin Sanchez, 89-year-old male with dementia, atrial fibrillation, BPH, urinary retention, recent ischemic stroke and recent upper GI bleed, was brought in on 8/20/2023 from his long-term care facility due to lower extremity pain, acute confusion, poor oral intake for past several days.       He had multiple hospitalizations recently  Was hospitalized 6/28-7/17 due to severe sepsis secondary to UTI,  new onset A-fib with RVR, ischemic stroke involving left occipital lobe.  Echo showed EF 40-45% with global hypokinesia. GI bleed and acute blood loss anemia with anticoagulation managed conservatively. Ethics and palliative care were consulted for goals of care discussion with his niece who is the decision maker.       Admitted 7/27-7/31 due to unwitnessed fall without injuries.  UA was abnormal with pyuria but urine culture was negative. He frequently declined cares.        Admitted 8/13-8/16 due to fall with left lower extremity laceration that was repaired.  UA was abnormal but urine culture was negative.  He again declined cares.    On arrival, he was confused, hypothermic, hypoglycemic, hypotensive and had KIMO.  He received continuous D10 infusion but still remained hypoglycemic.  He was warmed with stephani hugger.  Infection/UTI was subsequently ruled out and antibiotics were discontinued.  Goals of care discussion was held with multiple family members including power of .  They agreed to not escalate cares to pressors and ICU transfers but did not agree to comfort cares.  He was thus kept on IV fluids.  Patient passed away on 8/22/2023 at 5: 17 p.m.        Acute metabolic encephalopathy   Dementia with behavioral disturbance  - Encephalopathy multifactorial- secondary to  hypoglycemia, hypovolemia/dehydration, KIMO, pain, dementia  -Was initially agitated subsequently became more somnolent     Probable acute cystitis with microscopic hematuria-now ruled out  - UA showed 82 WBCs, large leukocyte esterase, negative nitrites  - Urine culture negative  - Started on IV Zosyn on admission.  Discontinued on 8/21       Diabetes mellitus type 2, diet controlled, A1c 5.8 on 6/28/2023  Hypoglycemia, blood sugar 45  - had Persistent hypoglycemia since admission, initial blood sugar was 45.    - required multiple doses of D50   - remained on continuous D10 0.45 NS infusion during hospital stay.  Blood sugars still remained low in 60s -70s.  Not on insulin at baseline.     Hypothermia, core temperature 91.3 on admission  - warmed up with Felix hugger.       Acute kidney injury on top of chronic kidney disease stage III  - Creatinine increased from 0.9 -->1.4 -->1.63.  Creatinine gradually rising      Hypotension  Shock-unclear etiology   - Blood pressures have been in 70s and 80s systolic since admission.  Has not responded to IV fluids.  - Multiple discussion on 8/21 with multiple family members including patient's EDUARDO Leslie, her , patient's sister, patient's brother.  They agreed to not escalating cares to ICU or starting pressors but declined comfort cares.     Chronic systolic CHF with reduced EF of 40-45%, echo 6/2023  Moderate pulmonary hypertension  -Moderate tricuspid regurgitation  Essential hypertension  - Lasix and lisinopril held since previous admission.  Metoprolol held   -Echo this admission showed mildly reduced LVEF of 40-45% with mild global hypokinesis of LV, moderate pulmonary hypertension, mild-moderate TR, dilated IVC with Normal respiratory variation, no pericardial effusion.     Chronic lower extremity wounds- due to lymphedema, peripheral arterial disease and diabetes-present on admission  Recent left lower extremity laceration, s/p repair  Right heel unstageable  pressure injury-present on admission  Bilateral lower extremity venous ulcers-present on admission  Moisture associated skin damage of buttock-present on admission  - Was hospitalized at [facility] in [place], [place] from 3/27-4/4/2023 for polymicrobial infection of legs.  He underwent surgical debridement and was treated with IV Zosyn.  - Wounds did not appear infected  - Consulted [facility].        Dementia with behavioral disturbance     BPH  Chronic urinary retention     Superficial thrombophlebitis of right basilic vein, 7/28     Chronic paroxysmal atrial fibrillation  - was Not on anticoagulation due to recent GI bleed     Recent left occipital ischemic stroke, 7/2023  - aspirin 81 mg was held as unable to take p.o.      Partially thrombosed saccular ductus arteriosus aneurysm  - Noted on CTA chest during 6-7/2023 hospitalization.      Goals of care discussion 8/21- met with patient's POA, Mariama, Mariama's , patient's sister in room. Patient's brother was present on phone.  Discussed patient's condition, poor prognosis, ongoing agitation and that patient was in dying process.  All questions regarding comfort cares, current treatment and ICU level of cares was discussed and answered.  Family decided not to escalate to ICU or start pressors. They declined comfort cares due to [employer] reasons.  They wanted feeding tube.  Discussed rational for not using feeding tube and a dying patient who is agitated as it would not change outcomes.  Family wanted IV fluids and this was provided.    Cause of death: Systolic CHF    Hospital Course   See above      Montse Stroud MD  [facility]  ______________________________________________________________________      Significant Results and Procedures   Results for orders placed or performed during the hospital encounter of 08/20/23   US Lower Extremity Venous Duplex Bilateral    Narrative    EXAM: US LOWER EXTREMITY VENOUS DUPLEX  BILATERAL  LOCATION: [facility]  DATE: 8/20/2023    INDICATION: Leg swelling  COMPARISON: None.  TECHNIQUE: Venous Duplex ultrasound of bilateral lower extremities with and without compression, augmentation and duplex. Color flow and spectral Doppler with waveform analysis performed.    FINDINGS: Exam includes the common femoral, femoral, popliteal veins as well as segmentally visualized deep calf veins and greater saphenous vein.     Evaluation was limited due to patient's combative nature.    RIGHT: Nearly occlusive thrombus in the right proximal femoral vein including the profunda femoris extending down to the popliteal vein. Calf veins were not evaluated due to patient refusal. Thrombus is also identified at the origin of the great   saphenous vein. No popliteal cyst.    LEFT: No evidence of deep venous thrombosis with the caveat that the veins distal to the femoral vein could not be evaluated due to patient refusal. No superficial thrombophlebitis. No popliteal cyst.      Impression    IMPRESSION: Limited exam.  1.  Extensive right lower extremity deep venous thrombosis as described above.  2.  Superficial venous thrombosis in the right great saphenous vein.   XR Chest Port 1 View    Narrative    EXAM: CHEST SINGLE VIEW PORTABLE  LOCATION: [facility]  DATE: 8/21/2023    INDICATION: Concern for sepsis.  COMPARISON: 07/27/2023.    FINDINGS: A few ill-defined hazy opacities in bilateral lung bases. Blunting of the left costophrenic angle. Unchanged cardiac silhouette. Atherosclerotic calcification in the thoracic aorta.      Impression    IMPRESSION:   1. A few ill-defined hazy opacities in bilateral lung bases are nonspecific and could represent atelectasis or pneumonia.  2. Blunting of the left costophrenic angle that could relate to scarring or a very small left pleural effusion.                 Echocardiogram Limited     Value    LVEF  40-45%    Narrative     [id number]  [id number]  [id number]  [id number]^RENZO^ANTHONY^POLINA     [facility]  Echocardiography Laboratory  [address] [place] [address]     Name: QI PACHECO  MRN: [id number]  : 1924  Study Date: 2023 02:36 PM  Age: 89 yrs  Gender: Male  Patient Location: [facility]  Reason For Study: Shock  Ordering Physician: ANTHONY MULLER  Referring Physician: Alistair Ross  Performed By: Christine Heaton     BSA: 1.9 m2  Height: 66 in  Weight: 188 lb  HR: 74  BP: 81/32 mmHg  ______________________________________________________________________________  Procedure  Limited Portable Echo Adult. Optison (NDC #[id number]) given intravenously.  ______________________________________________________________________________  Interpretation Summary     Left ventricular systolic function is mildly reduced.  The visual ejection fraction is 40-45%.  There is mild global hypokinesia of the left ventricle.  The right ventricular systolic pressure is approximated at 36mmHg plus the  right atrial pressure.  Moderate (46-55mmHg) pulmonary hypertension is present.  Mild-moderate TR.  Dilation of the inferior vena cava is present with abnormal respiratory  variation in diameter.  There is no pericardial effusion.  Atrial fibrillation.     Compared to prior study dated 2023, there is evidence of pulmonary  hypertension, RV dilation and dysfunction.  ______________________________________________________________________________  Left Ventricle  The left ventricle is normal in size. The visual ejection fraction is 40-45%.  Left ventricular systolic function is mildly reduced. Left ventricular  diastolic function is indeterminate. There is mild global hypokinesia of the  left ventricle.     Right Ventricle  The right ventricle is mildly dilated. Moderately decreased right ventricular  systolic function.     Atria  Normal left atrial size. The right atrium is moderately dilated.     Mitral Valve  The mitral valve  is normal in structure and function. There is trace mitral  regurgitation.     Tricuspid Valve  The tricuspid valve is normal in structure and function. There is mild to  moderate (1-2+) tricuspid regurgitation. The right ventricular systolic  pressure is approximated at 36mmHg plus the right atrial pressure. Moderate  (46-55mmHg) pulmonary hypertension is present.     Aortic Valve  The aortic valve is normal in structure and function.     Pulmonic Valve  The pulmonic valve is normal in structure and function. There is mild (1+)  pulmonic valvular regurgitation.     Vessels  The aortic root is normal size. Normal size ascending aorta. Dilation of the  inferior vena cava is present with abnormal respiratory variation in diameter.     Pericardium  There is no pericardial effusion.     Rhythm  The rhythm was atrial fibrillation.  ______________________________________________________________________________  MMode/2D Measurements & Calculations  asc Aorta Diam: 2.8 cm     TAPSE: 1.1 cm     Doppler Measurements & Calculations  Ao V2 max: 107.0 cm/sec  Ao max P.0 mmHg  Ao V2 mean: 72.1 cm/sec  Ao mean P.0 mmHg  Ao V2 VTI: 24.0 cm  LV V1 max P.6 mmHg  LV V1 max: 80.6 cm/sec  LV V1 VTI: 17.0 cm  TR max bashir: 303.0 cm/sec  TR max P.7 mmHg  AV Bashir Ratio (DI): 0.75  RV S Bashir: 8.9 cm/sec     ______________________________________________________________________________  Report approved by: Aury Padney 2023 04:32 PM             Consultations This Hospital Stay   WOUND OSTOMY CONTINENCE NURSE  IP CONSULT  CARE MANAGEMENT / SOCIAL WORK IP CONSULT  VASCULAR ACCESS ADULT IP CONSULT  CARE MANAGEMENT / SOCIAL WORK IP CONSULT    Primary Care Physician   Alistair Ross    Time Spent on this Encounter   I, Montse Stroud MD, personally saw the patient today and spent greater than 30 minutes discharging this patient.

## 2023-08-23 NOTE — PROGRESS NOTES
19:00- Post mortem care rendered and send to [facility] c/o [facility].

## 2023-08-24 LAB
ATRIAL RATE - MUSE: 70 BPM
DIASTOLIC BLOOD PRESSURE - MUSE: NORMAL MMHG
INTERPRETATION ECG - MUSE: NORMAL
P AXIS - MUSE: NORMAL DEGREES
PR INTERVAL - MUSE: NORMAL MS
QRS DURATION - MUSE: 88 MS
QT - MUSE: 404 MS
QTC - MUSE: 474 MS
R AXIS - MUSE: -67 DEGREES
SYSTOLIC BLOOD PRESSURE - MUSE: NORMAL MMHG
T AXIS - MUSE: 94 DEGREES
VENTRICULAR RATE- MUSE: 83 BPM

## 2023-08-25 LAB
BACTERIA BLD CULT: NO GROWTH
BACTERIA BLD CULT: NO GROWTH
